# Patient Record
Sex: FEMALE | Race: BLACK OR AFRICAN AMERICAN | Employment: OTHER | URBAN - METROPOLITAN AREA
[De-identification: names, ages, dates, MRNs, and addresses within clinical notes are randomized per-mention and may not be internally consistent; named-entity substitution may affect disease eponyms.]

---

## 2018-07-03 ENCOUNTER — OFFICE VISIT (OUTPATIENT)
Dept: ORTHOPEDIC SURGERY | Age: 58
End: 2018-07-03

## 2018-07-03 VITALS
HEART RATE: 93 BPM | HEIGHT: 64 IN | BODY MASS INDEX: 30.56 KG/M2 | DIASTOLIC BLOOD PRESSURE: 71 MMHG | SYSTOLIC BLOOD PRESSURE: 115 MMHG | WEIGHT: 179 LBS

## 2018-07-03 DIAGNOSIS — M25.562 CHRONIC PAIN OF LEFT KNEE: ICD-10-CM

## 2018-07-03 DIAGNOSIS — G89.29 CHRONIC PAIN OF RIGHT KNEE: Primary | ICD-10-CM

## 2018-07-03 DIAGNOSIS — M25.561 CHRONIC PAIN OF RIGHT KNEE: Primary | ICD-10-CM

## 2018-07-03 DIAGNOSIS — G89.29 CHRONIC PAIN OF LEFT KNEE: ICD-10-CM

## 2018-07-03 PROCEDURE — 20610 DRAIN/INJ JOINT/BURSA W/O US: CPT | Performed by: ORTHOPAEDIC SURGERY

## 2018-07-03 PROCEDURE — 99203 OFFICE O/P NEW LOW 30 MIN: CPT | Performed by: ORTHOPAEDIC SURGERY

## 2018-07-03 NOTE — PROGRESS NOTES
Ramila:  NDC: 8506-2507-39  Lot Number: [id number]  Expiration Date: 8/2019      XYLOCAINE  NDC: 59740-190-77  LOT: [id number]  EXP: 7/2019    BUPIVOCAINE  NDC: 3283-5200-15  LOT: [id number]  EXP: 2/1/2019    Lt.  Knee

## 2018-07-07 NOTE — PROGRESS NOTES
Patient Name: Martha Gonzalez  : 1960  DOS: 2018        Chief Complaint:   Chief Complaint   Patient presents with    Knee Pain      Bilateral knee  RT TKR PLUS REVISIONS       History of Present Illness:  Martha Gonzalez is a 59 y.o. female who presents with a chief complaint of continued complaints of pain in the knee with irritation with walking, stairs and with overuse. She is also having complaints of pain in the left ankle which are exacerbated by the knee problem    Her right total knee arthroplasty has been functioning adequately although she is having occasional give way episodes    Pain in the knee regularly with swelling and discomfort. Increase in pain over the past several months time. Medical History  Current Medications:   Prior to Admission medications    Medication Sig Start Date End Date Taking? Authorizing Provider   gabapentin (NEURONTIN) 300 MG capsule  12  Yes Historical Provider, MD   hydrochlorothiazide (HYDRODIURIL) 25 MG tablet  5/10/12  Yes Historical Provider, MD   hydrocodone-acetaminophen (VICODIN) 5-500 MG per tablet  12   Historical Provider, MD   oxaprozin (DAYPRO) 600 MG tablet Take 1 tablet by mouth daily. 12/13/10   Celeste Rebollar MD   estrogens, conjugated, (PREMARIN) 0.3 MG TABS Take  by mouth daily. Indications: 1/2 in am and 1 tab a night 09   Historical Provider, MD     Medical History:  has a past medical history of Arthritis; DDD (degenerative disc disease), lumbar; DVT (deep venous thrombosis) ([facility] [address] and High blood pressure. Allergies: No Known Allergies    Review of Systems:   Constitutional: Negative for fever and diaphoresis. Respiratory: Negative for shortness of breath. Gastrointestinal: Negative for abdominal pain. Musculoskeletal: Positive for joint pain. Skin: Negative for itching. Neurological: Negative for loss of consciousness.      Physical Exam:  Constitutional: She is oriented to person, place, Details     Verbal consent was obtained for the procedure. The left knee(s) was prepped with iodine and the skin was anesthetized. Using a 22 gauge needle the left knee(s) joint is injected with 2 ml 1% lidocaine and 2 ml of triamcinolone (KENALOG) 40mg/ml under the lateral aspect of the knee. The injection site was cleansed with topical isopropyl alcohol and a dressing was applied. Complications:  None; patient tolerated the procedure well. Diagnostic Test Findings:   Xray   Have reviewed the xrays above from 07/06/18   and my impression is: Right knee 3 view shows evidence of total knee arthroplasty with long stem revision stable although some changes are noted with linear calcifications along the stem  Anterior stem positioning show some abutment against the anterior cortex this may correspond to her occasional pains of anterior shin discomfort on the right side    Left knee shows medial knee osteoarthritis mild on 4 views with some mild patellofemoral pain    Assessment and Plan:   Diagnosis Orders   1. Chronic pain of right knee  XR KNEE RIGHT (3 VIEWS)   2. Chronic pain of left knee  XR KNEE LEFT (MIN 4 VIEWS)    MI ARTHROCENTESIS ASPIR&/INJ MAJOR JT/BURSA W/O US    MI TRIAMCINOLONE ACETONIDE INJ        The orders below, if any, were placed during this visit:   Orders Placed This Encounter   Procedures    XR KNEE LEFT (MIN 4 VIEWS)     Order Specific Question:   Reason for exam:     Answer:   Pain RM 10    XR KNEE RIGHT (3 VIEWS)     Order Specific Question:   Reason for exam:     Answer:   Pain RM 10    MI ARTHROCENTESIS ASPIR&/INJ MAJOR JT/BURSA W/O US    MI TRIAMCINOLONE ACETONIDE INJ        Treatment Plan:   Pes planus on the left side with Achilles tendinitis and posterior tibial tendinitis. She will inserts and modified she will inserts or custom inserts would be recommended. Additional injections would be offered for the left ankle as well as symptoms continue.     We planned an injection for

## 2018-07-25 ENCOUNTER — OFFICE VISIT (OUTPATIENT)
Dept: ORTHOPEDIC SURGERY | Age: 58
End: 2018-07-25

## 2018-07-25 VITALS
HEIGHT: 64 IN | HEART RATE: 103 BPM | SYSTOLIC BLOOD PRESSURE: 131 MMHG | BODY MASS INDEX: 29.53 KG/M2 | DIASTOLIC BLOOD PRESSURE: 88 MMHG | WEIGHT: 173 LBS

## 2018-07-25 DIAGNOSIS — M17.12 PRIMARY OSTEOARTHRITIS OF LEFT KNEE: Primary | ICD-10-CM

## 2018-07-25 PROCEDURE — 99213 OFFICE O/P EST LOW 20 MIN: CPT | Performed by: ORTHOPAEDIC SURGERY

## 2018-07-25 NOTE — PROGRESS NOTES
Patient Name: Ej Sanford  : 1960  DOS: 2018        Chief Complaint:   Chief Complaint   Patient presents with    Knee Pain     Left knee       History of Present Illness:  Ej Sanford is a 59 y.o. female who presents with a chief complaint of continued complaints of pain in the knee with irritation with walking, stairs and with overuse. She is also having complaints of pain in the left ankle which are exacerbated by the knee problem    Her right total knee arthroplasty has been functioning adequately although she is having occasional give way episodes    Pain in the knee regularly with swelling and discomfort. Increase in pain over the past several months time. marked increase in mechanical sympotms of the knee . Still able to get to work with tremendous pain. Medical History  Current Medications:   Prior to Admission medications    Medication Sig Start Date End Date Taking? Authorizing Provider   hydrocodone-acetaminophen (VICODIN) 5-500 MG per tablet  12   Historical Provider, MD   gabapentin (NEURONTIN) 300 MG capsule  12   Historical Provider, MD   hydrochlorothiazide (HYDRODIURIL) 25 MG tablet  5/10/12   Historical Provider, MD   oxaprozin (DAYPRO) 600 MG tablet Take 1 tablet by mouth daily. 12/13/10   Valentin Ware MD   estrogens, conjugated, (PREMARIN) 0.3 MG TABS Take  by mouth daily. Indications: 1/2 in am and 1 tab a night 09   Historical Provider, MD     Medical History:  has a past medical history of Arthritis; DDD (degenerative disc disease), lumbar; DVT (deep venous thrombosis) ([address] and High blood pressure. Allergies: No Known Allergies    Review of Systems:   Constitutional: Negative for fever and diaphoresis. Respiratory: Negative for shortness of breath. Gastrointestinal: Negative for abdominal pain. Musculoskeletal: Positive for joint pain. Skin: Negative for itching. Neurological: Negative for loss of consciousness. Physical Exam:  Constitutional: She is oriented to person, place, and time and well-developed, well-nourished, and in no distress. No distress. HENT:   Head: Normocephalic and atraumatic. Eyes: Conjunctivae are normal.   Cardiovascular: Intact distal pulses. Pulmonary/Chest: Effort normal.   Neurological: She is alert and oriented to person, place, and time. Skin: Skin is dry. She is not diaphoretic. Psychiatric: Mood, affect and judgment normal.     Assessment   Vital Signs:   Vitals:    07/25/18 1534   BP: 131/88   Pulse: 103       leftKnee shows evidence for DJD with  obvious pseudolaxity, pain with weight bearing, antalgic gait and palpable osteophytes. Inspection: Moderate anterior swelling. Swelling is present with a mild effusion. The posterior aspect of the knee appears to be full with tenderness. There is no erythema, rash, or ecchymosis. Range of Motion: 0 to 110    Pain withvarus Left . There is notable deformity varus Left   mild    Strength:  Hamstrings rated: 4/5. Quadriceps rated: 5/5    Palpation: There is moderate tenderness along the Medial joint line. meniscal signs positive. Special Tests: Patellar Compression test is positive. Valgus & Varus test is positive. Skin: There are no rashes, ulcerations or lesions. Gait: Gait pattern is antalgic  Skin shows no rashes/ecchymosis to the affected area, no hyperesthesias, no discoloration, no temperature or color discrepancies. NEUROLOGICALLY: There is no evidence for sensory or motor deficits in the extremity. Coordination appears full with no spacticity or rigidity. Reflexes appear to be symmetric. Distal circulation intact. No signs of RSD. Additional Comments: Right total knee arthroplasty is range of motion from 0° to approximately 100°.   Significant laxity is noted and 30° although she tends tolerated this fairly well  Left ankle shows decreased swelling pes planus bilaterally notable pain along the posterior tibial and the Achilles tendon insertion dimniished    Diagnostic Test Findings:   Xray   Have reviewed the xrays above from 07/25/18   and my impression is: Right knee 3 view shows evidence of total knee arthroplasty with long stem revision stable although some changes are noted with linear calcifications along the stem  Anterior stem positioning show some abutment against the anterior cortex this may correspond to her occasional pains of anterior shin discomfort on the right side    Left knee shows medial knee osteoarthritis mild on 4 views with some mild patellofemoral pain    Assessment and Plan:         Treatment Plan:   Pes planus on the left side with Achilles tendinitis and posterior tibial tendinitis. Improved with use of inserts. Plan for arthroscopy of the left knee and injection subchondral for knee osteoarthritis. Right total knee arthroplasty recommended some anterior shin supports and aggressive range of motion and strengthening of the right leg as tolerated.        Starr Jhaveri MD

## 2018-07-27 ENCOUNTER — TELEPHONE (OUTPATIENT)
Dept: ORTHOPEDIC SURGERY | Age: 58
End: 2018-07-27

## 2018-07-30 NOTE — PROGRESS NOTES
the order may or may not be in effect during this procedure. I give my doctor permission to give me blood or blood products. I understand that there are risks with receiving blood such as hepatitis, AIDS, fever, or allergic reaction. I acknowledge that the risks, benefits, and alternatives of this treatment have been explained to me and that no express or implied warranty has been given by the hospital, any blood bank, or any person or entity as to the blood or blood components transfused. At the discretion of my doctor, I agree to allow observers, equipment/product representatives and allow photographing, and/or televising of the procedure, provided my name or identity is maintained confidentially. I agree the hospital may dispose of or use for scientific or educational purposes any tissue, fluid, or body parts which may be removed.     ________________________________Date________Time______ am/pm  ([facility])  Patient or Signature of Closest Relative or Legal Guardian    ________________________________Date________Time______am/pm      Page 1 of  1  Witness

## 2018-08-02 ENCOUNTER — TELEPHONE (OUTPATIENT)
Dept: ORTHOPEDIC SURGERY | Age: 58
End: 2018-08-02

## 2018-08-09 ENCOUNTER — ANESTHESIA EVENT (OUTPATIENT)
Dept: OPERATING ROOM | Age: 58
End: 2018-08-09
Payer: COMMERCIAL

## 2018-08-09 RX ORDER — OYSTER SHELL CALCIUM WITH VITAMIN D 500; 200 MG/1; [IU]/1
1 TABLET, FILM COATED ORAL DAILY
COMMUNITY

## 2018-08-09 RX ORDER — DULOXETIN HYDROCHLORIDE 60 MG/1
60 CAPSULE, DELAYED RELEASE ORAL DAILY
COMMUNITY
End: 2018-10-19 | Stop reason: CLARIF

## 2018-08-09 RX ORDER — FLUTICASONE PROPIONATE 50 MCG
1 SPRAY, SUSPENSION (ML) NASAL NIGHTLY PRN
COMMUNITY

## 2018-08-09 NOTE — PROGRESS NOTES
[email or web address]                          Date of Procedure 8/9 Time of Procedure 1200    PRIOR TO PROCEDURE DATE:  1. Please follow any guidelines/instructions prior to your procedure as advised by your surgeon. 2. Arrange for someone to drive you home and be with you for the first 24 hours after discharge for your safety after your procedure for which you received sedation. Ensure it is someone we can share information with regarding your discharge. 3. You must contact your surgeon for instructions IF:   You are taking any blood thinners, aspirin, anti-inflammatory or vitamin E.   There is a change in your physical condition such as a cold, fever, rash, cuts, sores or any other infection, especially near your surgical site. 4. Do not drink alcohol the day before or day of your procedure. 5. A Pre-op History and Physical for surgery MUST be completed by your Physician or Urgent Care within 30 days of your procedure date. Please bring a copy with you on the day of your procedure and along with any other testing performed. THE DAY OF YOUR PROCEDURE:  1. Follow instructions for ARRIVAL TIME as DIRECTED BY YOUR SURGEON. If your surgeon does not give you a specific arrival time, please arrive at  [place] [address] . 2. Enter the MAIN entrance from [address] [place] [address] and follow the signs to the free [address] [place] [address] or [facility] parking (offered free of charge 6am-5pm). 3. Enter the Main Entrance of the hospital (do not enter from the lower level of the parking garage). Upon entrance, check in with the  at the main desk on your left. If no one is available at the desk, proceed into the [facility] Waiting Room and go through the door directly into the [facility]. There is a Check-in desk ACROSS from Room 5 (marked with a sign hanging from the ceiling). The phone number for the surgery center is [phone].     4. Please call [phone] option #2 option #2 if you have not been preregistered yet. On the day of your procedure bring your insurance card and photo ID. You will be registered at your bedside once brought back to your room. 5. DO NOT EAT OR DRINK ANYTHING AFTER MIDNIGHT. 6. MEDICATIONS    Take the following medications with a SMALL sip of water:  MAY TAKE GABAPENTIN, CYMBALTA, AND VICODIN    Use your usual dose of inhalers the morning of surgery. BRING your rescue inhaler with you to hospital.    Anesthesia does NOT want you to take insulin the morning of surgery. They will control your blood sugar while you are at the hospital. Please contact your ordering physician for instructions regarding your insulin the night before your procedure. If you have an insulin pump, please keep it set on basal rate. 7. Do not swallow water when brushing teeth. No gum, candy, mints or ice chips. Refrain from smoking or at least decrease the amount. 8. Dress in loose, comfortable clothing appropriate for redressing after your procedure. Do not wear jewelry (including body piercings), make-up (especially NO eye make-up), fingernail polish (NO toenail polish if foot/leg surgery), lotion, powders or metal hairclips. 9. Dentures, glasses, or contacts will need to be removed before your procedure. Bring cases for your glasses, contacts, dentures, or hearing aids to protect them while you are in surgery. 10. If you use a CPAP, please bring it with you on the day of your procedure. 11. We recommend that valuable personal  belongings, such as credit cards, cash, cell phones, e-tablets or jewelry, be left at home during your stay. The hospital will not be responsible for valuables that are not secured in the hospital safe. However, if your insurance requires a co-pay, you may want to bring a method of payment, i.e. Check or credit card, if you wish to pay your co-pay the day of surgery.       12. If you are to stay overnight, you may bring a bag patterns. Nausea, headache, muscle aches, or sore throat may also occur after anesthesia. Your nurse will help you manage these potential side effects. 2. For comfort and safety, arrange to have someone at home with you for the first 24 hours after discharge. 3. You and your family will be given written instructions about your diet, activity, dressing care, medications, and return visits. 4. Once at home, should issues with nausea, pain, or bleeding occur, or should you notice any signs of infection, you should call your surgeon. 5. Always clean your hands before and after caring for your wound. Do not let your family touch your surgery site without cleaning their hands. 6. Narcotic pain medications can cause significant constipation. You may want to add a stool softener to your postoperative medication schedule or speak to your surgeon on how best to manage this SIDE EFFECT. SPECIAL INSTRUCTIONS     Thank you for allowing us to care for you. We strive to exceed your expectations in the delivery of care and service provided to you and your family. If you need to contact us for any reason, please call us at [phone]    Instructions reviewed with patient during preadmission testing phone interview. Lisa Gasca. 8/9/2018 .1:12 PM      ADDITIONAL EDUCATIONAL INFORMATION REVIEWED PER PHONE WITH YOU AND/OR YOUR FAMILY:  No Bring a urine sample on day of surgery  Yes Pain Goal-Taking Control of Your Pain  Yes FAQs about Surgical Site Infections  No Hibiclens® Bathing Instructions   Yes Antibacterial Soap - LANCE R/C'D PER SURGEON   Yes Lance® Wipes Bathing Instructions (Obtained from: [email or web address] pdf )

## 2018-08-10 ENCOUNTER — ANESTHESIA (OUTPATIENT)
Dept: OPERATING ROOM | Age: 58
End: 2018-08-10
Payer: COMMERCIAL

## 2018-08-10 ENCOUNTER — APPOINTMENT (OUTPATIENT)
Dept: GENERAL RADIOLOGY | Age: 58
End: 2018-08-10
Attending: ORTHOPAEDIC SURGERY
Payer: COMMERCIAL

## 2018-08-10 ENCOUNTER — HOSPITAL ENCOUNTER (OUTPATIENT)
Age: 58
Setting detail: OUTPATIENT SURGERY
Discharge: HOME | End: 2018-08-10
Attending: ORTHOPAEDIC SURGERY | Admitting: ORTHOPAEDIC SURGERY
Payer: COMMERCIAL

## 2018-08-10 VITALS — DIASTOLIC BLOOD PRESSURE: 90 MMHG | OXYGEN SATURATION: 100 % | SYSTOLIC BLOOD PRESSURE: 175 MMHG

## 2018-08-10 VITALS
DIASTOLIC BLOOD PRESSURE: 85 MMHG | BODY MASS INDEX: 29.53 KG/M2 | OXYGEN SATURATION: 92 % | SYSTOLIC BLOOD PRESSURE: 122 MMHG | TEMPERATURE: 97.9 F | WEIGHT: 173 LBS | HEIGHT: 64 IN | RESPIRATION RATE: 16 BRPM | HEART RATE: 70 BPM

## 2018-08-10 DIAGNOSIS — M25.562 LEFT KNEE PAIN, UNSPECIFIED CHRONICITY: ICD-10-CM

## 2018-08-10 PROCEDURE — 6370000000 HC RX 637 (ALT 250 FOR IP): Performed by: ORTHOPAEDIC SURGERY

## 2018-08-10 PROCEDURE — 3600000004 HC SURGERY LEVEL 4 BASE: Performed by: ORTHOPAEDIC SURGERY

## 2018-08-10 PROCEDURE — 3209999900 FLUORO FOR SURGICAL PROCEDURES

## 2018-08-10 PROCEDURE — S0028 INJECTION, FAMOTIDINE, 20 MG: HCPCS | Performed by: ANESTHESIOLOGY

## 2018-08-10 PROCEDURE — 3700000001 HC ADD 15 MINUTES (ANESTHESIA): Performed by: ORTHOPAEDIC SURGERY

## 2018-08-10 PROCEDURE — 29881 ARTHRS KNE SRG MNISECTMY M/L: CPT | Performed by: ORTHOPAEDIC SURGERY

## 2018-08-10 PROCEDURE — 2500000003 HC RX 250 WO HCPCS: Performed by: ANESTHESIOLOGY

## 2018-08-10 PROCEDURE — 7100000000 HC PACU RECOVERY - FIRST 15 MIN: Performed by: ORTHOPAEDIC SURGERY

## 2018-08-10 PROCEDURE — 3700000000 HC ANESTHESIA ATTENDED CARE: Performed by: ORTHOPAEDIC SURGERY

## 2018-08-10 PROCEDURE — 6360000002 HC RX W HCPCS

## 2018-08-10 PROCEDURE — L1830 KO IMMOB CANVAS LONG PRE OTS: HCPCS | Performed by: ORTHOPAEDIC SURGERY

## 2018-08-10 PROCEDURE — 3600000014 HC SURGERY LEVEL 4 ADDTL 15MIN: Performed by: ORTHOPAEDIC SURGERY

## 2018-08-10 PROCEDURE — 2709999900 HC NON-CHARGEABLE SUPPLY: Performed by: ORTHOPAEDIC SURGERY

## 2018-08-10 PROCEDURE — 6360000002 HC RX W HCPCS: Performed by: ORTHOPAEDIC SURGERY

## 2018-08-10 PROCEDURE — 7100000010 HC PHASE II RECOVERY - FIRST 15 MIN: Performed by: ORTHOPAEDIC SURGERY

## 2018-08-10 PROCEDURE — 2580000003 HC RX 258: Performed by: NURSE ANESTHETIST, CERTIFIED REGISTERED

## 2018-08-10 PROCEDURE — 6360000002 HC RX W HCPCS: Performed by: NURSE ANESTHETIST, CERTIFIED REGISTERED

## 2018-08-10 PROCEDURE — 7100000001 HC PACU RECOVERY - ADDTL 15 MIN: Performed by: ORTHOPAEDIC SURGERY

## 2018-08-10 PROCEDURE — 2580000003 HC RX 258: Performed by: ORTHOPAEDIC SURGERY

## 2018-08-10 PROCEDURE — 6370000000 HC RX 637 (ALT 250 FOR IP): Performed by: ANESTHESIOLOGY

## 2018-08-10 PROCEDURE — 6360000002 HC RX W HCPCS: Performed by: PHYSICIAN ASSISTANT

## 2018-08-10 PROCEDURE — 38232 BONE MARROW HARVEST AUTOLOG: CPT | Performed by: ORTHOPAEDIC SURGERY

## 2018-08-10 PROCEDURE — 2720000010 HC SURG SUPPLY STERILE: Performed by: ORTHOPAEDIC SURGERY

## 2018-08-10 PROCEDURE — 2580000003 HC RX 258: Performed by: ANESTHESIOLOGY

## 2018-08-10 PROCEDURE — 27599 UNLISTED PX FEMUR/KNEE: CPT | Performed by: ORTHOPAEDIC SURGERY

## 2018-08-10 PROCEDURE — 73560 X-RAY EXAM OF KNEE 1 OR 2: CPT

## 2018-08-10 PROCEDURE — 2500000003 HC RX 250 WO HCPCS: Performed by: NURSE ANESTHETIST, CERTIFIED REGISTERED

## 2018-08-10 PROCEDURE — 7100000011 HC PHASE II RECOVERY - ADDTL 15 MIN: Performed by: ORTHOPAEDIC SURGERY

## 2018-08-10 PROCEDURE — 6360000002 HC RX W HCPCS: Performed by: ANESTHESIOLOGY

## 2018-08-10 RX ORDER — HYDRALAZINE HYDROCHLORIDE 20 MG/ML
5 INJECTION INTRAMUSCULAR; INTRAVENOUS EVERY 10 MIN PRN
Status: DISCONTINUED | OUTPATIENT
Start: 2018-08-10 | End: 2018-08-10 | Stop reason: HOSPADM

## 2018-08-10 RX ORDER — ACETAMINOPHEN 500 MG
1000 TABLET ORAL EVERY 6 HOURS PRN
Status: DISCONTINUED | OUTPATIENT
Start: 2018-08-10 | End: 2018-08-10 | Stop reason: HOSPADM

## 2018-08-10 RX ORDER — MIDAZOLAM HYDROCHLORIDE 1 MG/ML
INJECTION INTRAMUSCULAR; INTRAVENOUS PRN
Status: DISCONTINUED | OUTPATIENT
Start: 2018-08-10 | End: 2018-08-10 | Stop reason: SDUPTHER

## 2018-08-10 RX ORDER — SODIUM CHLORIDE, SODIUM LACTATE, POTASSIUM CHLORIDE, CALCIUM CHLORIDE 600; 310; 30; 20 MG/100ML; MG/100ML; MG/100ML; MG/100ML
INJECTION, SOLUTION INTRAVENOUS CONTINUOUS PRN
Status: DISCONTINUED | OUTPATIENT
Start: 2018-08-10 | End: 2018-08-10 | Stop reason: SDUPTHER

## 2018-08-10 RX ORDER — ONDANSETRON 2 MG/ML
4 INJECTION INTRAMUSCULAR; INTRAVENOUS
Status: DISCONTINUED | OUTPATIENT
Start: 2018-08-10 | End: 2018-08-10 | Stop reason: HOSPADM

## 2018-08-10 RX ORDER — OXYCODONE HYDROCHLORIDE AND ACETAMINOPHEN 5; 325 MG/1; MG/1
1 TABLET ORAL ONCE
Status: COMPLETED | OUTPATIENT
Start: 2018-08-10 | End: 2018-08-10

## 2018-08-10 RX ORDER — LABETALOL HYDROCHLORIDE 5 MG/ML
5 INJECTION, SOLUTION INTRAVENOUS EVERY 10 MIN PRN
Status: DISCONTINUED | OUTPATIENT
Start: 2018-08-10 | End: 2018-08-10 | Stop reason: HOSPADM

## 2018-08-10 RX ORDER — SODIUM CHLORIDE, SODIUM LACTATE, POTASSIUM CHLORIDE, CALCIUM CHLORIDE 600; 310; 30; 20 MG/100ML; MG/100ML; MG/100ML; MG/100ML
INJECTION, SOLUTION INTRAVENOUS CONTINUOUS
Status: DISCONTINUED | OUTPATIENT
Start: 2018-08-10 | End: 2018-08-10 | Stop reason: HOSPADM

## 2018-08-10 RX ORDER — GLYCOPYRROLATE 0.2 MG/ML
0.1 INJECTION INTRAMUSCULAR; INTRAVENOUS ONCE
Status: COMPLETED | OUTPATIENT
Start: 2018-08-10 | End: 2018-08-10

## 2018-08-10 RX ORDER — FENTANYL CITRATE 50 UG/ML
INJECTION, SOLUTION INTRAMUSCULAR; INTRAVENOUS PRN
Status: DISCONTINUED | OUTPATIENT
Start: 2018-08-10 | End: 2018-08-10 | Stop reason: SDUPTHER

## 2018-08-10 RX ORDER — PROMETHAZINE HYDROCHLORIDE 25 MG/ML
6.25 INJECTION, SOLUTION INTRAMUSCULAR; INTRAVENOUS
Status: DISCONTINUED | OUTPATIENT
Start: 2018-08-10 | End: 2018-08-10 | Stop reason: HOSPADM

## 2018-08-10 RX ORDER — DIPHENHYDRAMINE HYDROCHLORIDE 50 MG/ML
12.5 INJECTION INTRAMUSCULAR; INTRAVENOUS
Status: DISCONTINUED | OUTPATIENT
Start: 2018-08-10 | End: 2018-08-10 | Stop reason: HOSPADM

## 2018-08-10 RX ORDER — LIDOCAINE HYDROCHLORIDE 20 MG/ML
INJECTION, SOLUTION INFILTRATION; PERINEURAL PRN
Status: DISCONTINUED | OUTPATIENT
Start: 2018-08-10 | End: 2018-08-10 | Stop reason: SDUPTHER

## 2018-08-10 RX ORDER — PROPOFOL 10 MG/ML
INJECTION, EMULSION INTRAVENOUS PRN
Status: DISCONTINUED | OUTPATIENT
Start: 2018-08-10 | End: 2018-08-10 | Stop reason: SDUPTHER

## 2018-08-10 RX ORDER — OXYCODONE HYDROCHLORIDE AND ACETAMINOPHEN 5; 325 MG/1; MG/1
2 TABLET ORAL EVERY 4 HOURS PRN
Status: DISCONTINUED | OUTPATIENT
Start: 2018-08-10 | End: 2018-08-10 | Stop reason: HOSPADM

## 2018-08-10 RX ORDER — LIDOCAINE HYDROCHLORIDE 10 MG/ML
1 INJECTION, SOLUTION EPIDURAL; INFILTRATION; INTRACAUDAL; PERINEURAL
Status: DISCONTINUED | OUTPATIENT
Start: 2018-08-10 | End: 2018-08-10 | Stop reason: HOSPADM

## 2018-08-10 RX ORDER — SODIUM CHLORIDE 0.9 % (FLUSH) 0.9 %
10 SYRINGE (ML) INJECTION PRN
Status: DISCONTINUED | OUTPATIENT
Start: 2018-08-10 | End: 2018-08-10 | Stop reason: HOSPADM

## 2018-08-10 RX ORDER — OXYCODONE HYDROCHLORIDE AND ACETAMINOPHEN 5; 325 MG/1; MG/1
1 TABLET ORAL EVERY 6 HOURS PRN
Qty: 28 TABLET | Refills: 0 | Status: SHIPPED | OUTPATIENT
Start: 2018-08-10 | End: 2018-08-17

## 2018-08-10 RX ORDER — ONDANSETRON 2 MG/ML
INJECTION INTRAMUSCULAR; INTRAVENOUS PRN
Status: DISCONTINUED | OUTPATIENT
Start: 2018-08-10 | End: 2018-08-10 | Stop reason: SDUPTHER

## 2018-08-10 RX ORDER — CELECOXIB 100 MG/1
200 CAPSULE ORAL ONCE
Status: COMPLETED | OUTPATIENT
Start: 2018-08-10 | End: 2018-08-10

## 2018-08-10 RX ORDER — OXYCODONE HYDROCHLORIDE AND ACETAMINOPHEN 5; 325 MG/1; MG/1
1 TABLET ORAL EVERY 4 HOURS PRN
Status: DISCONTINUED | OUTPATIENT
Start: 2018-08-10 | End: 2018-08-10 | Stop reason: HOSPADM

## 2018-08-10 RX ORDER — FENTANYL CITRATE 50 UG/ML
25 INJECTION, SOLUTION INTRAMUSCULAR; INTRAVENOUS EVERY 5 MIN PRN
Status: DISCONTINUED | OUTPATIENT
Start: 2018-08-10 | End: 2018-08-10 | Stop reason: HOSPADM

## 2018-08-10 RX ORDER — SODIUM CHLORIDE 0.9 % (FLUSH) 0.9 %
10 SYRINGE (ML) INJECTION EVERY 12 HOURS SCHEDULED
Status: DISCONTINUED | OUTPATIENT
Start: 2018-08-10 | End: 2018-08-10 | Stop reason: HOSPADM

## 2018-08-10 RX ORDER — MEPERIDINE HYDROCHLORIDE 25 MG/ML
12.5 INJECTION INTRAMUSCULAR; INTRAVENOUS; SUBCUTANEOUS EVERY 5 MIN PRN
Status: DISCONTINUED | OUTPATIENT
Start: 2018-08-10 | End: 2018-08-10 | Stop reason: HOSPADM

## 2018-08-10 RX ADMIN — Medication 2 G: at 12:15

## 2018-08-10 RX ADMIN — FENTANYL CITRATE 100 MCG: 50 INJECTION INTRAMUSCULAR; INTRAVENOUS at 12:50

## 2018-08-10 RX ADMIN — HYDROMORPHONE HYDROCHLORIDE 0.5 MG: 1 INJECTION, SOLUTION INTRAMUSCULAR; INTRAVENOUS; SUBCUTANEOUS at 14:03

## 2018-08-10 RX ADMIN — ONDANSETRON 4 MG: 2 INJECTION INTRAMUSCULAR; INTRAVENOUS at 13:34

## 2018-08-10 RX ADMIN — OXYCODONE HYDROCHLORIDE AND ACETAMINOPHEN 1 TABLET: 5; 325 TABLET ORAL at 15:36

## 2018-08-10 RX ADMIN — GLYCOPYRROLATE 0.1 MG: 0.2 INJECTION, SOLUTION INTRAMUSCULAR; INTRAVENOUS at 10:58

## 2018-08-10 RX ADMIN — FENTANYL CITRATE 50 MCG: 50 INJECTION INTRAMUSCULAR; INTRAVENOUS at 12:12

## 2018-08-10 RX ADMIN — FENTANYL CITRATE 50 MCG: 50 INJECTION INTRAMUSCULAR; INTRAVENOUS at 12:13

## 2018-08-10 RX ADMIN — SODIUM CHLORIDE, SODIUM LACTATE, POTASSIUM CHLORIDE, AND CALCIUM CHLORIDE: 600; 310; 30; 20 INJECTION, SOLUTION INTRAVENOUS at 12:09

## 2018-08-10 RX ADMIN — FENTANYL CITRATE 25 MCG: 50 INJECTION INTRAMUSCULAR; INTRAVENOUS at 14:38

## 2018-08-10 RX ADMIN — SODIUM CHLORIDE, POTASSIUM CHLORIDE, SODIUM LACTATE AND CALCIUM CHLORIDE: 600; 310; 30; 20 INJECTION, SOLUTION INTRAVENOUS at 10:57

## 2018-08-10 RX ADMIN — MIDAZOLAM HYDROCHLORIDE 2 MG: 1 INJECTION INTRAMUSCULAR; INTRAVENOUS at 12:05

## 2018-08-10 RX ADMIN — FAMOTIDINE 20 MG: 10 INJECTION, SOLUTION INTRAVENOUS at 10:58

## 2018-08-10 RX ADMIN — FENTANYL CITRATE 25 MCG: 50 INJECTION INTRAMUSCULAR; INTRAVENOUS at 14:47

## 2018-08-10 RX ADMIN — CELECOXIB 200 MG: 100 CAPSULE ORAL at 10:58

## 2018-08-10 RX ADMIN — HYDROMORPHONE HYDROCHLORIDE 0.5 MG: 1 INJECTION, SOLUTION INTRAMUSCULAR; INTRAVENOUS; SUBCUTANEOUS at 14:20

## 2018-08-10 RX ADMIN — OXYCODONE HYDROCHLORIDE AND ACETAMINOPHEN 1 TABLET: 5; 325 TABLET ORAL at 10:58

## 2018-08-10 RX ADMIN — PROPOFOL 200 MG: 10 INJECTION, EMULSION INTRAVENOUS at 12:12

## 2018-08-10 RX ADMIN — LIDOCAINE HYDROCHLORIDE 50 MG: 20 INJECTION, SOLUTION INFILTRATION; PERINEURAL at 12:12

## 2018-08-10 ASSESSMENT — PULMONARY FUNCTION TESTS
PIF_VALUE: 21
PIF_VALUE: 21
PIF_VALUE: 20
PIF_VALUE: 22
PIF_VALUE: 21
PIF_VALUE: 16
PIF_VALUE: 8
PIF_VALUE: 17
PIF_VALUE: 19
PIF_VALUE: 18
PIF_VALUE: 22
PIF_VALUE: 22
PIF_VALUE: 21
PIF_VALUE: 19
PIF_VALUE: 22
PIF_VALUE: 22
PIF_VALUE: 20
PIF_VALUE: 1
PIF_VALUE: 22
PIF_VALUE: 4
PIF_VALUE: 21
PIF_VALUE: 16
PIF_VALUE: 20
PIF_VALUE: 21
PIF_VALUE: 18
PIF_VALUE: 19
PIF_VALUE: 0
PIF_VALUE: 20
PIF_VALUE: 21
PIF_VALUE: 21
PIF_VALUE: 22
PIF_VALUE: 19
PIF_VALUE: 22
PIF_VALUE: 20
PIF_VALUE: 22
PIF_VALUE: 0
PIF_VALUE: 20
PIF_VALUE: 21
PIF_VALUE: 22
PIF_VALUE: 21
PIF_VALUE: 0
PIF_VALUE: 16
PIF_VALUE: 2
PIF_VALUE: 20
PIF_VALUE: 21
PIF_VALUE: 22
PIF_VALUE: 21
PIF_VALUE: 21
PIF_VALUE: 20
PIF_VALUE: 21
PIF_VALUE: 21
PIF_VALUE: 20
PIF_VALUE: 21
PIF_VALUE: 1
PIF_VALUE: 22
PIF_VALUE: 16
PIF_VALUE: 20
PIF_VALUE: 2
PIF_VALUE: 16
PIF_VALUE: 21
PIF_VALUE: 21
PIF_VALUE: 22
PIF_VALUE: 20
PIF_VALUE: 22
PIF_VALUE: 17
PIF_VALUE: 21
PIF_VALUE: 21
PIF_VALUE: 22
PIF_VALUE: 20
PIF_VALUE: 16
PIF_VALUE: 22
PIF_VALUE: 2
PIF_VALUE: 16
PIF_VALUE: 22
PIF_VALUE: 21
PIF_VALUE: 22
PIF_VALUE: 20
PIF_VALUE: 16
PIF_VALUE: 22
PIF_VALUE: 21
PIF_VALUE: 22
PIF_VALUE: 15

## 2018-08-10 ASSESSMENT — LIFESTYLE VARIABLES: SMOKING_STATUS: 1

## 2018-08-10 ASSESSMENT — PAIN SCALES - GENERAL
PAINLEVEL_OUTOF10: 5
PAINLEVEL_OUTOF10: 9
PAINLEVEL_OUTOF10: 9
PAINLEVEL_OUTOF10: 7
PAINLEVEL_OUTOF10: 10
PAINLEVEL_OUTOF10: 6

## 2018-08-10 ASSESSMENT — PAIN DESCRIPTION - LOCATION: LOCATION: KNEE;ANKLE;HIP

## 2018-08-10 ASSESSMENT — PAIN DESCRIPTION - ORIENTATION: ORIENTATION: LEFT

## 2018-08-10 NOTE — ANESTHESIA PRE PROCEDURE
Known Allergies    Problem List:    Patient Active Problem List   Diagnosis Code    Bursitis, hip M70.70       Past Medical History:        Diagnosis Date    Arthritis     DDD (degenerative disc disease), lumbar     DVT (deep venous thrombosis) ([facility] [address] 2004    after right knee arthroscopy    High blood pressure        Past Surgical History:        Procedure Laterality Date    EYE SURGERY      TEAR DUCTS RECONSTRUCTED    FOOT SURGERY  2000    hammer toe    HYSTERECTOMY  2004    KNEE ARTHROPLASTY  2008    right    KNEE SURGERY  8/2008    RT TKA    REVISION TOTAL KNEE ARTHROPLASTY  2011  (X3 TOTAL)    right       Social History:    Social History   Substance Use Topics    Smoking status: Current Every Day Smoker     Packs/day: 0.50     Years: 20.00     Types: Cigarettes    Smokeless tobacco: Never Used    Alcohol use Yes      Comment: RARELY                                Ready to quit: Not Answered  Counseling given: Not Answered      Vital Signs (Current):   Vitals:    08/09/18 1309 08/10/18 1017   BP:  122/82   Pulse:  88   Resp:  16   Temp:  98 °F (36.7 °C)   TempSrc:  Oral   SpO2:  96%   Weight: 173 lb (78.5 kg) 173 lb (78.5 kg)   Height: 5' 4\" (1.626 m) 5' 4\" (1.626 m)                                              BP Readings from Last 3 Encounters:   08/10/18 122/82   07/25/18 131/88   07/03/18 115/71       NPO Status: Time of last liquid consumption: 2300                        Time of last solid consumption: 2300                        Date of last liquid consumption: 08/09/18                        Date of last solid food consumption: 08/09/18    BMI:   Wt Readings from Last 3 Encounters:   08/10/18 173 lb (78.5 kg)   07/25/18 173 lb (78.5 kg)   07/03/18 179 lb (81.2 kg)     Body mass index is 29.7 kg/m².     CBC:   Lab Results   Component Value Date    WBC 7.9 05/30/2012    RBC 4.36 05/30/2012    HGB 13.7 05/30/2012    HCT 40.3 05/30/2012    MCV 92.6 05/30/2012    RDW 13.3 05/30/2012     05/30/2012       CMP:   Lab Results   Component Value Date     04/11/2011    K 4.1 04/11/2011     04/11/2011    CO2 32 04/11/2011    BUN 13 04/11/2011    CREATININE 0.6 04/20/2011    GFRAA >60 04/20/2011    GLUCOSE 81 04/11/2011    CALCIUM 9.8 04/11/2011       POC Tests: No results for input(s): POCGLU, POCNA, POCK, POCCL, POCBUN, POCHEMO, POCHCT in the last 72 hours. Coags:   Lab Results   Component Value Date    PROTIME 10.9 04/11/2011    INR 1.00 04/11/2011    APTT 28.3 04/11/2011       HCG (If Applicable): No results found for: PREGTESTUR, PREGSERUM, HCG, HCGQUANT     ABGs: No results found for: PHART, PO2ART, JRS6RBL, BKP3AZI, BEART, A8XGWQLM     Type & Screen (If Applicable):  Lab Results   Component Value Date    LABABO B 04/11/2011    [address] Positive 04/11/2011       Anesthesia Evaluation  Patient summary reviewed no history of anesthetic complications:   Airway: Mallampati: II  TM distance: >3 FB   Neck ROM: full  Mouth opening: > = 3 FB Dental:      Comment: Missing some teeth      Pulmonary:normal exam    (+) current smoker          Patient smoked on day of surgery. Cardiovascular:    (+) hypertension:,                   Neuro/Psych:               GI/Hepatic/Renal:             Endo/Other:    (+) : arthritis: OA., .                 Abdominal:           Vascular:   + DVT (post surgery), . Anesthesia Plan      general     ASA 2       Induction: intravenous. MIPS: Postoperative opioids intended and Prophylactic antiemetics administered. Plan discussed with CRNA.     Attending anesthesiologist reviewed and agrees with Rosemary Cooper MD   8/10/2018

## 2018-08-10 NOTE — PROGRESS NOTES
Spoke to Dr. Natalya Prieto per tele, received d/c instructions and Dr. Twila Gallardo with pt family member

## 2018-08-13 DIAGNOSIS — Z98.890 S/P LEFT KNEE ARTHROSCOPY: ICD-10-CM

## 2018-08-22 ENCOUNTER — OFFICE VISIT (OUTPATIENT)
Dept: ORTHOPEDIC SURGERY | Age: 58
End: 2018-08-22

## 2018-08-22 VITALS
HEIGHT: 64 IN | BODY MASS INDEX: 29.53 KG/M2 | SYSTOLIC BLOOD PRESSURE: 114 MMHG | DIASTOLIC BLOOD PRESSURE: 78 MMHG | WEIGHT: 173 LBS | HEART RATE: 95 BPM

## 2018-08-22 DIAGNOSIS — Z98.890 S/P ARTHROSCOPY OF LEFT KNEE: Primary | ICD-10-CM

## 2018-08-22 PROCEDURE — 99024 POSTOP FOLLOW-UP VISIT: CPT | Performed by: ORTHOPAEDIC SURGERY

## 2018-08-22 RX ORDER — OXYCODONE HYDROCHLORIDE AND ACETAMINOPHEN 5; 325 MG/1; MG/1
1 TABLET ORAL EVERY 6 HOURS PRN
Qty: 28 TABLET | Refills: 0 | Status: SHIPPED | OUTPATIENT
Start: 2018-08-22 | End: 2018-09-07 | Stop reason: SDUPTHER

## 2018-08-22 NOTE — PROGRESS NOTES
Department of Orthopedic Surgery  Physician Assistant   Progress Note    Subjective:     Post-Operative Day: 12 Status Post left Knee arthroscopy with stem cell subchondroplasty and PRP injection of the left ankle. Systemic or Specific Complaints: she states that her pain is improved now down to a 6 out of 10 and the burning pain on the medial side of the knee is resolved. Prior to the surgery she was a 9 out of 10. Objective:     @IPVITALS(24)@    General: alert, appears stated age, cooperative and no distress   Wound: Wound clean and dry no evidence of infection. , No Erythema, Wound Intact and serous fluid drainage noted on the medial puncture site. Motion: Painless Range of Motion   DVT Exam: No evidence of DVT seen on physical exam.       Knee is mildly swollen but thigh soft to palpation. Moving foot and ankle. Good distal pulses. Full ROM and 5/5 strength to the knee, ankle and foot. Data Review      Assessment:     Status Post left knee arthroscopy with stem cell subchondroplasty and left ankle PRP injection. Doing well postoperatively. No evidence of infection. Plan:      1: Continues current post-op course :  2:  maintain off work status until tuesday 8/28/18  Where she can return to work full duty. 3:  Remain off NSAIDs for another 4 weeks to ensure no decay in the stem cell efficacy. 4:  Continue Pain Control refilled prescription of oxycodone for one more week. 5: advised to wrap the knee to help keep the swelling out of the knee epically when returning to work. Patient being given increased dosage/quantity of opioid pain medication in excess of OSM[employer] guidelines which noted a 30 MED of opioids due to the fact that she has undergone major orthopaedic surgery as outlined in rule 4731-11-13. Dosages and further duration of the pain medication will be re-evaluated at her post op visit.   Patient was educated on dosing expectations and limits of prescribing as a result of the new

## 2018-09-04 ENCOUNTER — TELEPHONE (OUTPATIENT)
Dept: ORTHOPEDIC SURGERY | Age: 58
End: 2018-09-04

## 2018-09-07 ENCOUNTER — TELEPHONE (OUTPATIENT)
Dept: ORTHOPEDIC SURGERY | Age: 58
End: 2018-09-07

## 2018-09-07 DIAGNOSIS — Z98.890 S/P ARTHROSCOPY OF LEFT KNEE: ICD-10-CM

## 2018-09-07 RX ORDER — OXYCODONE HYDROCHLORIDE AND ACETAMINOPHEN 5; 325 MG/1; MG/1
1 TABLET ORAL EVERY 6 HOURS PRN
Qty: 28 TABLET | Refills: 0 | Status: SHIPPED | OUTPATIENT
Start: 2018-09-07 | End: 2018-09-14

## 2018-09-19 ENCOUNTER — TELEPHONE (OUTPATIENT)
Dept: ORTHOPEDIC SURGERY | Age: 58
End: 2018-09-19

## 2018-09-19 DIAGNOSIS — Z96.652 HISTORY OF TOTAL LEFT KNEE REPLACEMENT: Primary | ICD-10-CM

## 2018-09-20 RX ORDER — HYDROCODONE BITARTRATE AND ACETAMINOPHEN 5; 325 MG/1; MG/1
1 TABLET ORAL EVERY 4 HOURS PRN
Qty: 42 TABLET | Refills: 0 | Status: SHIPPED | OUTPATIENT
Start: 2018-09-20 | End: 2018-09-27

## 2018-09-26 ENCOUNTER — OFFICE VISIT (OUTPATIENT)
Dept: ORTHOPEDIC SURGERY | Age: 58
End: 2018-09-26
Payer: COMMERCIAL

## 2018-09-26 VITALS
BODY MASS INDEX: 29.53 KG/M2 | WEIGHT: 173 LBS | HEART RATE: 99 BPM | HEIGHT: 64 IN | DIASTOLIC BLOOD PRESSURE: 79 MMHG | SYSTOLIC BLOOD PRESSURE: 121 MMHG

## 2018-09-26 DIAGNOSIS — M76.62 ACHILLES TENDINITIS OF LEFT LOWER EXTREMITY: ICD-10-CM

## 2018-09-26 DIAGNOSIS — M25.562 CHRONIC PAIN OF LEFT KNEE: ICD-10-CM

## 2018-09-26 DIAGNOSIS — Z98.890 S/P LEFT KNEE ARTHROSCOPY: Primary | ICD-10-CM

## 2018-09-26 DIAGNOSIS — G89.29 CHRONIC PAIN OF LEFT KNEE: ICD-10-CM

## 2018-09-26 PROCEDURE — 20610 DRAIN/INJ JOINT/BURSA W/O US: CPT | Performed by: ORTHOPAEDIC SURGERY

## 2018-09-26 PROCEDURE — 99024 POSTOP FOLLOW-UP VISIT: CPT | Performed by: ORTHOPAEDIC SURGERY

## 2018-09-26 ASSESSMENT — ENCOUNTER SYMPTOMS
WHEEZING: 0
CONSTIPATION: 0
VOMITING: 0
BLURRED VISION: 0
DOUBLE VISION: 0
HEARTBURN: 0
ABDOMINAL PAIN: 0
SORE THROAT: 0
DIARRHEA: 0
NAUSEA: 0
SHORTNESS OF BREATH: 0
COUGH: 0

## 2018-09-26 NOTE — LETTER
[address]  [address] [employer] [address]  Phone: [phone]  Fax: [phone]    Blanka Shah MD        September 26, 2018      Please be advised that patient Regi Krishnamurthy, date of birth [birth year], was seen in my office today. She may return to work on 9/26/18.       Sincerely,        Blanka Shah MD

## 2018-09-26 NOTE — PROGRESS NOTES
Ramila:  NDC: 2464-4990-76  Lot Number: [id number] Date: 8/2019    BUPIVOCAINE  NDC: 4721-4998-17  LOT: [id number]  EXP: 6/20    XYLOCAINE  NDC: 77649-656-55  LOT: [id number]  EXP: 2/21    Lt.  Knee

## 2018-10-03 ENCOUNTER — TELEPHONE (OUTPATIENT)
Dept: ORTHOPEDIC SURGERY | Age: 58
End: 2018-10-03

## 2018-10-03 DIAGNOSIS — Z98.890 S/P ARTHROSCOPY OF KNEE: Primary | ICD-10-CM

## 2018-10-04 RX ORDER — HYDROCODONE BITARTRATE AND ACETAMINOPHEN 5; 325 MG/1; MG/1
1 TABLET ORAL EVERY 6 HOURS PRN
Qty: 28 TABLET | Refills: 0 | Status: SHIPPED | OUTPATIENT
Start: 2018-10-04 | End: 2018-10-15 | Stop reason: SDUPTHER

## 2018-10-04 RX ORDER — DULOXETIN HYDROCHLORIDE 60 MG/1
60 CAPSULE, DELAYED RELEASE ORAL DAILY
Qty: 30 CAPSULE | Refills: 1 | Status: SHIPPED | OUTPATIENT
Start: 2018-10-04 | End: 2018-12-03 | Stop reason: SDUPTHER

## 2018-10-08 ENCOUNTER — TELEPHONE (OUTPATIENT)
Dept: ORTHOPEDIC SURGERY | Age: 58
End: 2018-10-08

## 2018-10-15 ENCOUNTER — TELEPHONE (OUTPATIENT)
Dept: ORTHOPEDIC SURGERY | Age: 58
End: 2018-10-15

## 2018-10-15 ENCOUNTER — TELEPHONE (OUTPATIENT)
Dept: PAIN MANAGEMENT | Age: 58
End: 2018-10-15

## 2018-10-15 DIAGNOSIS — Z98.890 S/P ARTHROSCOPY OF KNEE: ICD-10-CM

## 2018-10-15 RX ORDER — HYDROCODONE BITARTRATE AND ACETAMINOPHEN 5; 325 MG/1; MG/1
1 TABLET ORAL EVERY 6 HOURS PRN
Qty: 28 TABLET | Refills: 0 | Status: SHIPPED | OUTPATIENT
Start: 2018-10-15 | End: 2018-10-19

## 2018-10-19 ENCOUNTER — OFFICE VISIT (OUTPATIENT)
Dept: PAIN MANAGEMENT | Age: 58
End: 2018-10-19
Payer: COMMERCIAL

## 2018-10-19 VITALS
BODY MASS INDEX: 29.78 KG/M2 | WEIGHT: 174.4 LBS | OXYGEN SATURATION: 100 % | DIASTOLIC BLOOD PRESSURE: 73 MMHG | HEIGHT: 64 IN | HEART RATE: 81 BPM | SYSTOLIC BLOOD PRESSURE: 117 MMHG

## 2018-10-19 DIAGNOSIS — M70.71 BURSITIS OF BOTH HIPS, UNSPECIFIED BURSA: ICD-10-CM

## 2018-10-19 DIAGNOSIS — M54.41 CHRONIC BILATERAL LOW BACK PAIN WITH BILATERAL SCIATICA: ICD-10-CM

## 2018-10-19 DIAGNOSIS — M51.36 BULGING LUMBAR DISC: ICD-10-CM

## 2018-10-19 DIAGNOSIS — Z96.651 HISTORY OF TOTAL RIGHT KNEE REPLACEMENT: ICD-10-CM

## 2018-10-19 DIAGNOSIS — Z98.890 S/P LEFT KNEE ARTHROSCOPY: ICD-10-CM

## 2018-10-19 DIAGNOSIS — M17.0 PRIMARY OSTEOARTHRITIS OF BOTH KNEES: ICD-10-CM

## 2018-10-19 DIAGNOSIS — G89.29 CHRONIC BILATERAL LOW BACK PAIN WITH BILATERAL SCIATICA: ICD-10-CM

## 2018-10-19 DIAGNOSIS — G89.4 CHRONIC PAIN SYNDROME: Primary | ICD-10-CM

## 2018-10-19 DIAGNOSIS — R53.83 FATIGUE, UNSPECIFIED TYPE: ICD-10-CM

## 2018-10-19 DIAGNOSIS — M70.72 BURSITIS OF BOTH HIPS, UNSPECIFIED BURSA: ICD-10-CM

## 2018-10-19 DIAGNOSIS — M54.42 CHRONIC BILATERAL LOW BACK PAIN WITH BILATERAL SCIATICA: ICD-10-CM

## 2018-10-19 PROCEDURE — 99244 OFF/OP CNSLTJ NEW/EST MOD 40: CPT | Performed by: NURSE PRACTITIONER

## 2018-10-19 RX ORDER — HYDROCODONE BITARTRATE AND ACETAMINOPHEN 5; 325 MG/1; MG/1
1 TABLET ORAL EVERY 8 HOURS PRN
Qty: 78 TABLET | Refills: 0 | Status: SHIPPED | OUTPATIENT
Start: 2018-10-19 | End: 2018-11-16 | Stop reason: SDUPTHER

## 2018-10-19 RX ORDER — GABAPENTIN 300 MG/1
300 CAPSULE ORAL 3 TIMES DAILY
Qty: 90 CAPSULE | Refills: 0 | Status: SHIPPED | OUTPATIENT
Start: 2018-10-19 | End: 2018-11-16 | Stop reason: SDUPTHER

## 2018-10-19 NOTE — PATIENT INSTRUCTIONS
[email or web address] and sign in to your Web[employer] account. Enter J482 in the [employer] box to learn more about \"Back Stretches: Exercises. \"     If you do not have an account, please click on the \"Sign Up Now\" link. Current as of: November 29, 2017  Content Version: 11.7  © 5549-6051 [employer]. Care instructions adapted under license by [facility] ([facility]). If you have questions about a medical condition or this instruction, always ask your healthcare professional. [address] any warranty or liability for your use of this information. Patient Education        Knee Arthritis: Exercises  Your Care Instructions  Here are some examples of exercises for knee arthritis. Start each exercise slowly. Ease off the exercise if you start to have pain. Your doctor or physical therapist will tell you when you can start these exercises and which ones will work best for you. How to do the exercises  Knee flexion with heel slide    5. Lie on your back with your knees bent. 6. Slide your heel back by bending your affected knee as far as you can. Then hook your other foot around your ankle to help pull your heel even farther back. 7. Hold for about 6 seconds, then rest for up to 10 seconds. 8. Repeat 8 to 12 times. 9. Switch legs and repeat steps 1 through 4, even if only one knee is sore. Quad sets    5. Sit with your affected leg straight and supported on the floor or a firm bed. Place a small, rolled-up towel under your knee. Your other leg should be bent, with that foot flat on the floor. 6. Tighten the thigh muscles of your affected leg by pressing the back of your knee down into the towel. 7. Hold for about 6 seconds, then rest for up to 10 seconds. 8. Repeat 8 to 12 times. 9. Switch legs and repeat steps 1 through 4, even if only one knee is sore. Straight-leg raises to the front    5.  Lie on your back with your good knee bent so that your foot

## 2018-10-20 NOTE — PROGRESS NOTES
Bursitis of both hips, unspecified bursa    8. Fatigue, unspecified type         PLAN:   -Chronic opiate treatment protocol was discussed withthe patient, informed consent was obtained. -Treatment guidelines were discussed and established  -Risks and benefits of narcotics were addressedwith the patient  - Obtainable long term and short term goals of opioid therapy were reviewed, including pain relief, sleep, psychosocial and physical functioning   -Obtain urine Toxicology today  -Start taking Norco 5-325 mg po tid prn, Neurontin 300 mg po tid  -Aquatic PT  -Reviewed previous imaging studies and blood work  -Obtain TSH, BMP, CBC. Reports that her PCP completed blood work recently, advised patient to have blood work faxed to the office  -Advised patient to quit smoking for  health related concerns and to improve the treatment outcomes. Education was given on quitting smoking and the use of different modalities including medications, hypnotherapy, counselling  and biofeedback. These were discussed with patient. -CBT techniques- relaxation therapies such as biofeedback, mindfulness based stress reduction, imagery, cognitive restructuring, problem solving discussed with patient  -SOAPP score 2  -ORT score 5 moderate risk  -PHQ-9 score 3 minimal depression  -Return in about 4 weeks (around 11/16/2018). Controlled Substances Monitoring: Attestation: The Prescription Monitoring Report for this patient was reviewed today.  MARLENY Navarro - CHE)

## 2018-10-30 ENCOUNTER — TELEPHONE (OUTPATIENT)
Dept: PAIN MANAGEMENT | Age: 58
End: 2018-10-30

## 2018-11-05 ENCOUNTER — HOSPITAL ENCOUNTER (OUTPATIENT)
Age: 58
Discharge: HOME | End: 2018-11-05
Payer: COMMERCIAL

## 2018-11-05 ENCOUNTER — HOSPITAL ENCOUNTER (OUTPATIENT)
Dept: GENERAL RADIOLOGY | Age: 58
Discharge: HOME | End: 2018-11-05
Payer: COMMERCIAL

## 2018-11-05 DIAGNOSIS — M51.36 BULGING LUMBAR DISC: ICD-10-CM

## 2018-11-05 DIAGNOSIS — G89.29 CHRONIC BILATERAL LOW BACK PAIN WITH BILATERAL SCIATICA: ICD-10-CM

## 2018-11-05 DIAGNOSIS — R53.83 FATIGUE, UNSPECIFIED TYPE: ICD-10-CM

## 2018-11-05 DIAGNOSIS — G89.4 CHRONIC PAIN SYNDROME: ICD-10-CM

## 2018-11-05 DIAGNOSIS — M54.41 CHRONIC BILATERAL LOW BACK PAIN WITH BILATERAL SCIATICA: ICD-10-CM

## 2018-11-05 DIAGNOSIS — M54.42 CHRONIC BILATERAL LOW BACK PAIN WITH BILATERAL SCIATICA: ICD-10-CM

## 2018-11-05 LAB
A/G RATIO: 1.8 (ref 1.1–2.2)
ALBUMIN SERPL-MCNC: 4.4 G/DL (ref 3.4–5)
ALP BLD-CCNC: 96 U/L (ref 40–129)
ALT SERPL-CCNC: 9 U/L (ref 10–40)
ANION GAP SERPL CALCULATED.3IONS-SCNC: 14 MMOL/L (ref 3–16)
AST SERPL-CCNC: 11 U/L (ref 15–37)
BILIRUB SERPL-MCNC: 0.3 MG/DL (ref 0–1)
BUN BLDV-MCNC: 12 MG/DL (ref 7–20)
CALCIUM SERPL-MCNC: 10.4 MG/DL (ref 8.3–10.6)
CHLORIDE BLD-SCNC: 102 MMOL/L (ref 99–110)
CO2: 26 MMOL/L (ref 21–32)
CREAT SERPL-MCNC: 0.6 MG/DL (ref 0.6–1.1)
GFR AFRICAN AMERICAN: >60
GFR NON-AFRICAN AMERICAN: >60
GLOBULIN: 2.4 G/DL
GLUCOSE BLD-MCNC: 86 MG/DL (ref 70–99)
HCT VFR BLD CALC: 44.6 % (ref 36–48)
HEMOGLOBIN: 14.6 G/DL (ref 12–16)
MCH RBC QN AUTO: 29.8 PG (ref 26–34)
MCHC RBC AUTO-ENTMCNC: 32.7 G/DL (ref 31–36)
MCV RBC AUTO: 91.2 FL (ref 80–100)
PDW BLD-RTO: 13.7 % (ref 12.4–15.4)
PLATELET # BLD: 330 K/UL (ref 135–450)
PMV BLD AUTO: 9 FL (ref 5–10.5)
POTASSIUM SERPL-SCNC: 4.8 MMOL/L (ref 3.5–5.1)
RBC # BLD: 4.89 M/UL (ref 4–5.2)
SODIUM BLD-SCNC: 142 MMOL/L (ref 136–145)
TOTAL PROTEIN: 6.8 G/DL (ref 6.4–8.2)
TSH SERPL DL<=0.05 MIU/L-ACNC: 0.54 UIU/ML (ref 0.27–4.2)
WBC # BLD: 7.4 K/UL (ref 4–11)

## 2018-11-05 PROCEDURE — 72100 X-RAY EXAM L-S SPINE 2/3 VWS: CPT

## 2018-11-06 ENCOUNTER — HOSPITAL ENCOUNTER (OUTPATIENT)
Dept: PHYSICAL THERAPY | Age: 58
Setting detail: THERAPIES SERIES
Discharge: HOME | End: 2018-11-06
Payer: COMMERCIAL

## 2018-11-08 ENCOUNTER — APPOINTMENT (OUTPATIENT)
Dept: PHYSICAL THERAPY | Age: 58
End: 2018-11-08
Payer: COMMERCIAL

## 2018-11-08 ENCOUNTER — HOSPITAL ENCOUNTER (OUTPATIENT)
Dept: PHYSICAL THERAPY | Age: 58
Setting detail: THERAPIES SERIES
Discharge: HOME | End: 2018-11-08
Payer: COMMERCIAL

## 2018-11-08 PROCEDURE — G8981 BODY POS CURRENT STATUS: HCPCS

## 2018-11-08 PROCEDURE — 97162 PT EVAL MOD COMPLEX 30 MIN: CPT

## 2018-11-08 PROCEDURE — G8982 BODY POS GOAL STATUS: HCPCS

## 2018-11-08 PROCEDURE — 97530 THERAPEUTIC ACTIVITIES: CPT

## 2018-11-08 PROCEDURE — 97113 AQUATIC THERAPY/EXERCISES: CPT

## 2018-11-08 ASSESSMENT — PAIN SCALES - QUEBEC BACK PAIN DISABILITY SCALE
MOVE A CHAIR: 1
WALK A FEW BLOCKS OR 300 TO 400M: 2
SLEEP THROUGH THE NIGHT: 2
GET OUT OF BED: 1
RUN ONE BLOCK OR 100M: 5
TAKE FOOD OUT OF THE REFRIGERATOR: 0
BEND OVER TO CLEAN THE BATHTUB: 4
STAND UP FOR 20 TO 30 MINUTES: 1
SIT IN A CHAIR FOR SEVERAL HOURS: 2
PULL OR PUSH HEAVY DOORS: 1
WALK SEVERAL KILOMETERS  OR MILES: 2
PUT ON SOCKS OR PANYHOSE: 1
LIFT AND CARRY A HEAVY SUITCASE: 3
QUEBEC CMS MODIFIER: CK
QUEBEC DISABILITY INDEX: 40-59%
CLIMB ONE FLIGHT OF STAIRS: 1
THROW A BALL: 2
REACH UP TO HIGH SHELVES: 3
CARRY TWO BAGS OF GROCERIES: 3
RIDE IN A CAR: 2
TURN OVER IN BED: 3
TOTAL SCORE: 40
MAKE YOUR BED: 1

## 2018-11-08 NOTE — PLAN OF CARE
Outpatient Physical Therapy  [x] [facility]    Phone: [phone]   Fax: [phone]   [] [facility]  Phone: [phone]              Fax: [phone]  [] [facility]   Phone: [phone]   Fax: [phone]     To: Referring Practitioner: Alberto Grover CNP      Patient: Rubi Osborne   : 1960   MRN: [id number]  Evaluation Date: 2018      Diagnosis Information:  · Diagnosis: Chronic pain syndrome; B knee pain; LBP; bursitis B hips    · Treatment Diagnosis: back pain, B knee pain, B hip bursitis     Physical Therapy Certification/Re-Certification Form  Dear Alberto Grover CNP,   The following patient has been evaluated for physical therapy services and for therapy to continue, Medicare requires monthly physician review of the treatment plan. Please review the attached evaluation and/or summary of the patient's plan of care, and verify that you agree therapy should continue by signing the attached document and sending it back to our office. Plan of Care/Treatment to date:  [] Therapeutic Exercise    [] Modalities:  [] Therapeutic Activity     [] Ultrasound  [] Electrical Stimulation  [] Gait Training      [] Cervical Traction [] Lumbar Traction  [] Neuromuscular Re-education    [] Cold/hotpack [] Iontophoresis   [] Instruction in HEP     Other:  [] Manual Therapy      []             [x] Aquatic Therapy      []           ? Frequency/Duration:  # Days per week: [] 1 day # Weeks: [] 1 week [] 5 weeks     [x] 2 days? [] 2 weeks [] 6 weeks     [] 3 days   [] 3 weeks [] 7 weeks     [] 4 days   [x] 4 weeks [] 8 weeks    Rehab Potential: [] Excellent [x] Good [x] Fair  [] Poor       Electronically signed by:  Oliver Lion, [id number] [employer]       If you have any questions or concerns, please don't hesitate to call.   Thank you for your referral.      Physician Signature:________________________________Date:__________________  By signing above, therapists plan is approved by physician      \

## 2018-11-08 NOTE — FLOWSHEET NOTE
Piriformis   Horiz Abd/Add     Hip flexor    Arm Circles     SKTC   PNF Diagonals    DKTC  UE oscillations f/b, s/s    ITB   Wall Push-ups    Quad  Figure 8's    Mid back   Buoy pull/push downs    UT  Tband rows    Rhom  Tband lats    Post Shoulder  Lumbar Rot w/ paddles    Pec   Small ball pull downs                   diagonals             Cervical:       AROM Flex       AROM Extension     LEs exercises:  B AROM Side Bending    Marching  10 AROM Rotation    Heel Raises  10 Chin tucks    Toe Raises  10 Chin nods     Squats  10      Hamstring Curls  10      Hip Flexion  10 Balance:      Hip Abduction 10 SLS    Hip Circles 10 Tandem stance    TA set 10 NBOS eyes open    Glut Set 10 NBOS eyes closed    Hip Extension  Hand to Opposite Knee    Hip Adduction    Box Step     Hip IR   Noodle Stance     Hip ER  Stop/Go Gait    Fig 8's  Switch Gait                Seated: B Functional:    Ankle Pumps  Step up forward    Ankle circles  Step up lateral    Knee flex & ext  Step down    Hip Abd & Adduction  [place] squats    Bicycle   Crate Lifts    Add Set with ball  Lunges forward    LX stab with med ball throws  Lunges lateral    Ankle INV  Lunges retro    Ankle EV  Lower ab curl with noodle      Upper ab curl with ball      Med ball straight lifts      Med ball diagonal lifts      Hydrorider          Noodle:      Leg Press  Deep Water:     hang at wall 1 min    relief Jog    Noodle hang deep water  Jumping Jacks    Noodle Bicycle  Heel to toe      Hand to opposite knee      Cross country skier      Rocking Horse    Goals:    Short term goals  Time Frame for Short term goals: 2 wks   Short term goal 1: pt reports 20-30% pain improvement overall   Short term goal 2: min - mod tightness R LE  Short term goal 3: pt [place] 40 min pool exercises       Long term goals  Time Frame for Long term goals : 4 wks  Long term goal 1: pt reports 40-50% pain improvement overall for ease with sleep   Long term goal 2: lumbar flex 20, ext

## 2018-11-13 ENCOUNTER — HOSPITAL ENCOUNTER (OUTPATIENT)
Dept: PHYSICAL THERAPY | Age: 58
Setting detail: THERAPIES SERIES
Discharge: HOME | End: 2018-11-13
Payer: COMMERCIAL

## 2018-11-13 ENCOUNTER — APPOINTMENT (OUTPATIENT)
Dept: PHYSICAL THERAPY | Age: 58
End: 2018-11-13
Payer: COMMERCIAL

## 2018-11-13 PROCEDURE — 97113 AQUATIC THERAPY/EXERCISES: CPT

## 2018-11-13 PROCEDURE — 97150 GROUP THERAPEUTIC PROCEDURES: CPT

## 2018-11-15 ENCOUNTER — HOSPITAL ENCOUNTER (OUTPATIENT)
Dept: PHYSICAL THERAPY | Age: 58
Setting detail: THERAPIES SERIES
Discharge: HOME | End: 2018-11-15
Payer: COMMERCIAL

## 2018-11-15 ENCOUNTER — APPOINTMENT (OUTPATIENT)
Dept: PHYSICAL THERAPY | Age: 58
End: 2018-11-15
Payer: COMMERCIAL

## 2018-11-16 ENCOUNTER — OFFICE VISIT (OUTPATIENT)
Dept: PAIN MANAGEMENT | Age: 58
End: 2018-11-16
Payer: COMMERCIAL

## 2018-11-16 VITALS
BODY MASS INDEX: 29.97 KG/M2 | OXYGEN SATURATION: 100 % | WEIGHT: 174.6 LBS | SYSTOLIC BLOOD PRESSURE: 143 MMHG | HEART RATE: 87 BPM | DIASTOLIC BLOOD PRESSURE: 92 MMHG

## 2018-11-16 DIAGNOSIS — M17.0 PRIMARY OSTEOARTHRITIS OF BOTH KNEES: ICD-10-CM

## 2018-11-16 DIAGNOSIS — M70.72 BURSITIS OF BOTH HIPS, UNSPECIFIED BURSA: ICD-10-CM

## 2018-11-16 DIAGNOSIS — M51.36 BULGING LUMBAR DISC: ICD-10-CM

## 2018-11-16 DIAGNOSIS — M70.71 BURSITIS OF BOTH HIPS, UNSPECIFIED BURSA: ICD-10-CM

## 2018-11-16 DIAGNOSIS — M47.816 LUMBAR ARTHROPATHY: ICD-10-CM

## 2018-11-16 DIAGNOSIS — G89.29 CHRONIC BILATERAL LOW BACK PAIN WITH BILATERAL SCIATICA: ICD-10-CM

## 2018-11-16 DIAGNOSIS — Z98.890 S/P LEFT KNEE ARTHROSCOPY: ICD-10-CM

## 2018-11-16 DIAGNOSIS — R53.83 FATIGUE, UNSPECIFIED TYPE: ICD-10-CM

## 2018-11-16 DIAGNOSIS — M54.42 CHRONIC BILATERAL LOW BACK PAIN WITH BILATERAL SCIATICA: ICD-10-CM

## 2018-11-16 DIAGNOSIS — M54.41 CHRONIC BILATERAL LOW BACK PAIN WITH BILATERAL SCIATICA: ICD-10-CM

## 2018-11-16 DIAGNOSIS — Z96.651 HISTORY OF TOTAL RIGHT KNEE REPLACEMENT: ICD-10-CM

## 2018-11-16 DIAGNOSIS — G89.4 CHRONIC PAIN SYNDROME: Primary | ICD-10-CM

## 2018-11-16 PROBLEM — M54.50 CHRONIC BILATERAL LOW BACK PAIN: Status: ACTIVE | Noted: 2018-11-16

## 2018-11-16 PROBLEM — R53.82 CHRONIC FATIGUE: Status: ACTIVE | Noted: 2018-11-16

## 2018-11-16 PROCEDURE — 99214 OFFICE O/P EST MOD 30 MIN: CPT | Performed by: NURSE PRACTITIONER

## 2018-11-16 RX ORDER — HYDROCODONE BITARTRATE AND ACETAMINOPHEN 5; 325 MG/1; MG/1
1 TABLET ORAL EVERY 8 HOURS PRN
Qty: 84 TABLET | Refills: 0 | Status: SHIPPED | OUTPATIENT
Start: 2018-11-16 | End: 2018-12-13 | Stop reason: SDUPTHER

## 2018-11-16 RX ORDER — GABAPENTIN 300 MG/1
300 CAPSULE ORAL 3 TIMES DAILY
Qty: 90 CAPSULE | Refills: 0 | Status: SHIPPED | OUTPATIENT
Start: 2018-11-16 | End: 2018-12-13 | Stop reason: SDUPTHER

## 2018-11-16 RX ORDER — HYDROCODONE BITARTRATE AND ACETAMINOPHEN 5; 325 MG/1; MG/1
1 TABLET ORAL EVERY 8 HOURS PRN
Qty: 78 TABLET | Refills: 0 | Status: SHIPPED | OUTPATIENT
Start: 2018-11-16 | End: 2018-11-16 | Stop reason: SDUPTHER

## 2018-11-16 NOTE — PATIENT INSTRUCTIONS
Patient Education        Back Stretches: Exercises  Your Care Instructions  Here are some examples of exercises for stretching your back. Start each exercise slowly. Ease off the exercise if you start to have pain. Your doctor or physical therapist will tell you when you can start these exercises and which ones will work best for you. How to do the exercises  Overhead stretch    1. Stand comfortably with your feet shoulder-width apart. 2. Looking straight ahead, raise both arms over your head and reach toward the ceiling. Do not allow your head to tilt back. 3. Hold for 15 to 30 seconds, then lower your arms to your sides. 4. Repeat 2 to 4 times. Side stretch    1. Stand comfortably with your feet shoulder-width apart. 2. Raise one arm over your head, and then lean to the other side. 3. Slide your hand down your leg as you let the weight of your arm gently stretch your side muscles. Hold for 15 to 30 seconds. 4. Repeat 2 to 4 times on each side. Press-up    1. Lie on your stomach, supporting your body with your forearms. 2. Press your elbows down into the floor to raise your upper back. As you do this, relax your stomach muscles and allow your back to arch without using your back muscles. As your press up, do not let your hips or pelvis come off the floor. 3. Hold for 15 to 30 seconds, then relax. 4. Repeat 2 to 4 times. Relax and rest    1. Lie on your back with a rolled towel under your neck and a pillow under your knees. Extend your arms comfortably to your sides. 2. Relax and breathe normally. 3. Remain in this position for about 10 minutes. 4. If you can, do this 2 or 3 times each day. Follow-up care is a key part of your treatment and safety. Be sure to make and go to all appointments, and call your doctor if you are having problems. It's also a good idea to know your test results and keep a list of the medicines you take. Where can you learn more?   Go to [email or web address] and sign in to your MashMang[employer] account. Enter I666 in the [employer] box to learn more about \"Back Stretches: Exercises. \"     If you do not have an account, please click on the \"Sign Up Now\" link. Current as of: November 29, 2017  Content Version: 11.8  © 2746-4981 Healthwise, [employer]. Care instructions adapted under license by [facility] ([facility]). If you have questions about a medical condition or this instruction, always ask your healthcare professional. [address] any warranty or liability for your use of this information.

## 2018-11-20 ENCOUNTER — APPOINTMENT (OUTPATIENT)
Dept: PHYSICAL THERAPY | Age: 58
End: 2018-11-20
Payer: COMMERCIAL

## 2018-11-27 ENCOUNTER — APPOINTMENT (OUTPATIENT)
Dept: PHYSICAL THERAPY | Age: 58
End: 2018-11-27
Payer: COMMERCIAL

## 2018-11-27 ENCOUNTER — HOSPITAL ENCOUNTER (OUTPATIENT)
Dept: PHYSICAL THERAPY | Age: 58
Setting detail: THERAPIES SERIES
Discharge: HOME | End: 2018-11-27
Payer: COMMERCIAL

## 2018-11-27 PROCEDURE — 97113 AQUATIC THERAPY/EXERCISES: CPT

## 2018-11-27 PROCEDURE — 97150 GROUP THERAPEUTIC PROCEDURES: CPT

## 2018-11-29 ENCOUNTER — APPOINTMENT (OUTPATIENT)
Dept: PHYSICAL THERAPY | Age: 58
End: 2018-11-29
Payer: COMMERCIAL

## 2018-11-29 ENCOUNTER — HOSPITAL ENCOUNTER (OUTPATIENT)
Dept: PHYSICAL THERAPY | Age: 58
Setting detail: THERAPIES SERIES
Discharge: HOME | End: 2018-11-29
Payer: COMMERCIAL

## 2018-11-29 PROCEDURE — 97150 GROUP THERAPEUTIC PROCEDURES: CPT

## 2018-11-29 PROCEDURE — 97113 AQUATIC THERAPY/EXERCISES: CPT

## 2018-11-29 NOTE — FLOWSHEET NOTE
Hamstring  2 Shldr Flex/Ext  10   Knee flex stretch   Shldr Abd/Add 10   Piriformis   Horiz Abd/Add  10   Hip flexor    Arm Circles  10   SKTC   PNF Diagonals 10   DKTC  UE oscillations f/b, s/s    ITB   Wall Push-ups    Quad  Figure 8's    Mid back   Buoy pull/push downs    UT  Tband rows    Rhom  Tband lats    Post Shoulder  Lumbar Rot w/ paddles    Pec   Small ball pull downs                   diagonals             Cervical:       AROM Flex       AROM Extension     LEs exercises:  B AROM Side Bending    Marching  10 AROM Rotation    Heel Raises  10 Chin tucks    Toe Raises  10 Chin nods     Squats  10      Hamstring Curls  10      Hip Flexion  10 Balance:      Hip Abduction 10 SLS    Hip Circles 10 Tandem stance    TA set 10 NBOS eyes open    Glut Set 10 NBOS eyes closed    Hip Extension  Hand to Opposite Knee 10   Hip Adduction    Box Step     Hip IR   Noodle Stance     Hip ER  Stop/Go Gait    Fig 8's  Switch Gait                Seated: B Functional: B   Ankle Pumps 20 Step up forward    Ankle circles 10 as able L Step up lateral    Knee flex & ext 20 Step down    Hip Abd & Adduction 20 [place] squats    Bicycle  20 Crate Lifts    Add Set with ball 10 Lunges forward    LX stab with med ball throws  Lunges lateral    Ankle INV  Lunges retro    Ankle EV  Lower ab curl with noodle      Upper ab curl with ball      Med ball straight lifts      Med ball diagonal lifts      Hydrorider          Noodle:      Leg Press  Deep Water:     hang at wall 2 min    relief Jog    Noodle hang deep water  Jumping Jacks    Noodle Bicycle 1 min assist Heel to toe      Hand to opposite knee      Cross country skier      Rocking Horse    Goals:    Short term goals  Time Frame for Short term goals: 2 wks   Short term goal 1: pt reports 20-30% pain improvement overall   Short term goal 2: min - mod tightness R LE  Short term goal 3: pt [place] 40 min pool exercises       Long term goals  Time Frame for Long term goals : 4 wks  Long

## 2018-12-03 ENCOUNTER — TELEPHONE (OUTPATIENT)
Dept: PAIN MANAGEMENT | Age: 58
End: 2018-12-03

## 2018-12-03 DIAGNOSIS — G89.4 CHRONIC PAIN SYNDROME: ICD-10-CM

## 2018-12-03 RX ORDER — DULOXETIN HYDROCHLORIDE 60 MG/1
60 CAPSULE, DELAYED RELEASE ORAL DAILY
Qty: 30 CAPSULE | Refills: 1 | Status: SHIPPED | OUTPATIENT
Start: 2018-12-03 | End: 2019-01-10 | Stop reason: SDUPTHER

## 2018-12-04 ENCOUNTER — OFFICE VISIT (OUTPATIENT)
Dept: ORTHOPEDIC SURGERY | Age: 58
End: 2018-12-04
Payer: COMMERCIAL

## 2018-12-04 ENCOUNTER — APPOINTMENT (OUTPATIENT)
Dept: PHYSICAL THERAPY | Age: 58
End: 2018-12-04
Payer: COMMERCIAL

## 2018-12-04 ENCOUNTER — HOSPITAL ENCOUNTER (OUTPATIENT)
Dept: PHYSICAL THERAPY | Age: 58
Setting detail: THERAPIES SERIES
Discharge: HOME | End: 2018-12-04
Payer: COMMERCIAL

## 2018-12-04 VITALS
HEART RATE: 89 BPM | SYSTOLIC BLOOD PRESSURE: 119 MMHG | HEIGHT: 64 IN | DIASTOLIC BLOOD PRESSURE: 72 MMHG | WEIGHT: 173 LBS | BODY MASS INDEX: 29.53 KG/M2

## 2018-12-04 DIAGNOSIS — Z98.890 S/P LEFT KNEE ARTHROSCOPY: Primary | ICD-10-CM

## 2018-12-04 DIAGNOSIS — M76.62 ACHILLES TENDINITIS OF LEFT LOWER EXTREMITY: ICD-10-CM

## 2018-12-04 DIAGNOSIS — M25.562 CHRONIC PAIN OF LEFT KNEE: ICD-10-CM

## 2018-12-04 DIAGNOSIS — G89.29 CHRONIC PAIN OF LEFT KNEE: ICD-10-CM

## 2018-12-04 PROCEDURE — L1812 KO ELASTIC W/JOINTS PRE OTS: HCPCS | Performed by: ORTHOPAEDIC SURGERY

## 2018-12-04 PROCEDURE — 99024 POSTOP FOLLOW-UP VISIT: CPT | Performed by: ORTHOPAEDIC SURGERY

## 2018-12-04 PROCEDURE — 97113 AQUATIC THERAPY/EXERCISES: CPT

## 2018-12-04 ASSESSMENT — ENCOUNTER SYMPTOMS
VOMITING: 0
CONSTIPATION: 0
COUGH: 0
NAUSEA: 0
HEARTBURN: 0
WHEEZING: 0
DIARRHEA: 0
BLURRED VISION: 0
ABDOMINAL PAIN: 0
SHORTNESS OF BREATH: 0
SORE THROAT: 0
DOUBLE VISION: 0

## 2018-12-04 NOTE — PROGRESS NOTES
the extremity. Coordination appears full with no spacticity or rigidity. Reflexes appear to be symmetric. Distal circulation intact. No signs of RSD. Calf nontender. Negative nica's,  no signs of dvt    Hip exam shows full ROM with no focal pain or Instability. Leg lengths are normal. There is no Trendelenburg Gait. Tenderness to the distal left Achilles with noted osseous malformation base of calcaneus likely bone spur formation but has acute tenderness with palpation. RADIOLOGY   Xray   3 viewsOf the left foot AP lateral and oblique views were performed are reviewed today revealing no acute fracture or dislocation. No significant osteoarthritic changes noted however moderate posterior calcaneal bone spur was noted and a small plantar calcaneal bone spur was noted. IMPRESSION   4 month(s) status post Left knee arthroscopy with stem cell subchondroplasty     PLAN   The patient is doing Fair 4 month(s) status post Left knee arthroscopy with stem cell subchondroplasty. The patient will finish up therapy. They may  resume normal activities. The orders below, if any, were placed during this visit:        1. Achilles tendinitis of left lower extremity  -advised at home stretching, ice and rest  - Ambulatory referral to Physical Therapy  -discussed with patient possibility of performing removal of bone posterior to the tendon for relief and decompression of pain. Patient consented to performing surgery and scheduled for mid January. 2. S/P left knee arthroscopy, synovectomy and chondroplasty 8/10/2018  -advised patient to follow up with pain management a referral to Dr. Tutu Newby for continued management of pain.   -Believe a lot of the aggravation to her knee is related to the ankle and Achilles being aggravated still causing her to alter her gait.  -Discussed with patient the potential for this to possibly improve based upon the surgical outcome of the Achilles tendon may be with relief of

## 2018-12-04 NOTE — FLOWSHEET NOTE
2 Shldr Flex/Ext  10   Knee flex stretch   Shldr Abd/Add 10   Piriformis   Horiz Abd/Add  10   Hip flexor    Arm Circles  10   SKTC   PNF Diagonals 10   DKTC  UE oscillations f/b, s/s    ITB   Wall Push-ups    Quad  Figure 8's 10   Mid back   Buoy pull/push downs    UT  Tband rows    Rhom  Tband lats    Post Shoulder  Lumbar Rot w/ paddles    Pec   Small ball pull downs                   diagonals 10            Cervical:       AROM Flex       AROM Extension     LEs exercises:  B AROM Side Bending    Marching  10 AROM Rotation    Heel Raises  10 Chin tucks    Toe Raises  10 Chin nods     Squats  10      Hamstring Curls  10      Hip Flexion  10 Balance:      Hip Abduction 10 SLS    Hip Circles 10 Tandem stance    TA set 10 NBOS eyes open    Glut Set 10 NBOS eyes closed    Hip Extension  Hand to Opposite Knee 10   Hip Adduction    Box Step     Hip IR   Noodle Stance     Hip ER  Stop/Go Gait    Fig 8's  Switch Gait                Seated: B Functional: B   Ankle Pumps 20 Step up forward    Ankle circles 10       as able L Step up lateral    Knee flex & ext 30 Step down    Hip Abd & Adduction 30 [place] squats 10   Bicycle  30 Crate Lifts    Add Set with ball 10 Lunges forward    LX stab with med ball throws  Lunges lateral    Ankle INV  Lunges retro    Ankle EV  Lower ab curl with noodle      Upper ab curl with ball      Med ball straight lifts      Med ball diagonal lifts      Hydrorider          Noodle:      Leg Press  Deep Water:     hang at wall 2 min    relief Jog    Noodle hang deep water  Jumping Jacks    Noodle Bicycle 1 min assist Heel to toe      Hand to opposite knee      Cross country skier      Rocking Horse    Goals:    Short term goals  Time Frame for Short term goals: 2 wks   Short term goal 1: pt reports 20-30% pain improvement overall   Short term goal 2: min - mod tightness R LE  Short term goal 3: pt [place] 40 min pool exercises       Long term goals  Time Frame for Long term goals : 4 wks  Long

## 2018-12-06 ENCOUNTER — HOSPITAL ENCOUNTER (OUTPATIENT)
Dept: PHYSICAL THERAPY | Age: 58
Setting detail: THERAPIES SERIES
Discharge: HOME | End: 2018-12-06
Payer: COMMERCIAL

## 2018-12-06 ENCOUNTER — APPOINTMENT (OUTPATIENT)
Dept: PHYSICAL THERAPY | Age: 58
End: 2018-12-06
Payer: COMMERCIAL

## 2018-12-11 ENCOUNTER — APPOINTMENT (OUTPATIENT)
Dept: PHYSICAL THERAPY | Age: 58
End: 2018-12-11
Payer: COMMERCIAL

## 2018-12-11 ENCOUNTER — HOSPITAL ENCOUNTER (OUTPATIENT)
Dept: PHYSICAL THERAPY | Age: 58
Setting detail: THERAPIES SERIES
Discharge: HOME | End: 2018-12-11
Payer: COMMERCIAL

## 2018-12-11 PROCEDURE — 97113 AQUATIC THERAPY/EXERCISES: CPT

## 2018-12-11 NOTE — FLOWSHEET NOTE
goal 1: pt reports 40-50% pain improvement overall for ease with sleep   Long term goal 2: lumbar flex 20, ext 10, B SB 20 for ease with dressing and general mobility   Long term goal 3: B LE 5/5 and pt is independent with pool hep to cont with 30 day FC pass         Timed Code Treatment Minutes:  45  (1 on 1 x 45 min)    Total Treatment Minutes:  45    Treatment/Activity Tolerance:  [x] Patient tolerated treatment well [] Patient limited by fatique  [] Patient limited by pain  [] Patient limited by other medical complications  [x] Other: pain 3/10 after aquatic ex.      Prognosis: [x] Good [] Fair  [] Poor    Patient Requires Follow-up: [x] Yes  [] No    Plan:   [x] Continue per plan of care [] Alter current plan (see comments)  [] Plan of care initiated [] Hold pending MD visit [] Discharge    Plan for Next Session:  Increase gt, , box step    Electronically signed by: Daphne Jerome PTA  [id number]

## 2018-12-13 ENCOUNTER — APPOINTMENT (OUTPATIENT)
Dept: PHYSICAL THERAPY | Age: 58
End: 2018-12-13
Payer: COMMERCIAL

## 2018-12-13 ENCOUNTER — OFFICE VISIT (OUTPATIENT)
Dept: PAIN MANAGEMENT | Age: 58
End: 2018-12-13
Payer: COMMERCIAL

## 2018-12-13 VITALS
HEART RATE: 92 BPM | DIASTOLIC BLOOD PRESSURE: 77 MMHG | SYSTOLIC BLOOD PRESSURE: 142 MMHG | BODY MASS INDEX: 29.49 KG/M2 | OXYGEN SATURATION: 98 % | WEIGHT: 171.8 LBS

## 2018-12-13 DIAGNOSIS — Z98.890 S/P LEFT KNEE ARTHROSCOPY: ICD-10-CM

## 2018-12-13 DIAGNOSIS — M54.41 CHRONIC BILATERAL LOW BACK PAIN WITH BILATERAL SCIATICA: ICD-10-CM

## 2018-12-13 DIAGNOSIS — M47.816 LUMBAR ARTHROPATHY: ICD-10-CM

## 2018-12-13 DIAGNOSIS — Z96.651 HISTORY OF TOTAL RIGHT KNEE REPLACEMENT: ICD-10-CM

## 2018-12-13 DIAGNOSIS — M17.0 PRIMARY OSTEOARTHRITIS OF BOTH KNEES: ICD-10-CM

## 2018-12-13 DIAGNOSIS — G89.29 CHRONIC BILATERAL LOW BACK PAIN WITH BILATERAL SCIATICA: ICD-10-CM

## 2018-12-13 DIAGNOSIS — M70.71 BURSITIS OF BOTH HIPS, UNSPECIFIED BURSA: ICD-10-CM

## 2018-12-13 DIAGNOSIS — R53.83 FATIGUE, UNSPECIFIED TYPE: ICD-10-CM

## 2018-12-13 DIAGNOSIS — M51.36 BULGING LUMBAR DISC: ICD-10-CM

## 2018-12-13 DIAGNOSIS — G89.4 CHRONIC PAIN SYNDROME: ICD-10-CM

## 2018-12-13 DIAGNOSIS — M54.42 CHRONIC BILATERAL LOW BACK PAIN WITH BILATERAL SCIATICA: ICD-10-CM

## 2018-12-13 DIAGNOSIS — M70.72 BURSITIS OF BOTH HIPS, UNSPECIFIED BURSA: ICD-10-CM

## 2018-12-13 PROCEDURE — 99213 OFFICE O/P EST LOW 20 MIN: CPT | Performed by: NURSE PRACTITIONER

## 2018-12-13 RX ORDER — GABAPENTIN 300 MG/1
300 CAPSULE ORAL 3 TIMES DAILY
Qty: 90 CAPSULE | Refills: 0 | Status: SHIPPED | OUTPATIENT
Start: 2018-12-13 | End: 2019-01-10 | Stop reason: SDUPTHER

## 2018-12-13 RX ORDER — HYDROCODONE BITARTRATE AND ACETAMINOPHEN 5; 325 MG/1; MG/1
1 TABLET ORAL EVERY 8 HOURS PRN
Qty: 84 TABLET | Refills: 0 | Status: SHIPPED | OUTPATIENT
Start: 2018-12-13 | End: 2019-01-10 | Stop reason: SDUPTHER

## 2018-12-13 NOTE — PROGRESS NOTES
while adjusting the dose of medicines or if having cognitive  issues related to the current medications. Risk of overdose and death, if medicines not taken as prescribed, were also discussed. If the patient develops new symptoms or if the symptoms worsen, the patient should call the office. While transcribing every attempt was made to maintain the accuracy of the note in terms of it's contents,there may have been some errors made inadvertently. Thank you for allowing me to participate in the care of this patient.     Radha Latham, CHE    Cc: Katelynn Sainz

## 2018-12-18 ENCOUNTER — HOSPITAL ENCOUNTER (OUTPATIENT)
Dept: PHYSICAL THERAPY | Age: 58
Setting detail: THERAPIES SERIES
Discharge: HOME | End: 2018-12-18
Payer: COMMERCIAL

## 2018-12-18 ENCOUNTER — APPOINTMENT (OUTPATIENT)
Dept: PHYSICAL THERAPY | Age: 58
End: 2018-12-18
Payer: COMMERCIAL

## 2018-12-18 PROCEDURE — 97113 AQUATIC THERAPY/EXERCISES: CPT

## 2018-12-18 PROCEDURE — 97150 GROUP THERAPEUTIC PROCEDURES: CPT

## 2018-12-20 ENCOUNTER — HOSPITAL ENCOUNTER (OUTPATIENT)
Dept: PHYSICAL THERAPY | Age: 58
Setting detail: THERAPIES SERIES
Discharge: HOME | End: 2018-12-20
Payer: COMMERCIAL

## 2018-12-20 PROCEDURE — 97530 THERAPEUTIC ACTIVITIES: CPT

## 2018-12-20 PROCEDURE — 97113 AQUATIC THERAPY/EXERCISES: CPT

## 2018-12-20 PROCEDURE — G8983 BODY POS D/C STATUS: HCPCS

## 2018-12-20 PROCEDURE — 97150 GROUP THERAPEUTIC PROCEDURES: CPT

## 2018-12-20 PROCEDURE — G8982 BODY POS GOAL STATUS: HCPCS

## 2018-12-20 ASSESSMENT — PAIN SCALES - QUEBEC BACK PAIN DISABILITY SCALE
TOTAL SCORE: 53
PULL OR PUSH HEAVY DOORS: 2
MAKE YOUR BED: 1
RIDE IN A CAR: 1
CARRY TWO BAGS OF GROCERIES: 4
SLEEP THROUGH THE NIGHT: 2
THROW A BALL: 3
TURN OVER IN BED: 2
WALK SEVERAL KILOMETERS  OR MILES: 4
REACH UP TO HIGH SHELVES: 4
WALK A FEW BLOCKS OR 300 TO 400M: 4
BEND OVER TO CLEAN THE BATHTUB: 3
PUT ON SOCKS OR PANYHOSE: 1
QUEBEC CMS MODIFIER: CK
STAND UP FOR 20 TO 30 MINUTES: 2
LIFT AND CARRY A HEAVY SUITCASE: 4
GET OUT OF BED: 2
QUEBEC DISABILITY INDEX: 40-59%
RUN ONE BLOCK OR 100M: 5
TAKE FOOD OUT OF THE REFRIGERATOR: 1
SIT IN A CHAIR FOR SEVERAL HOURS: 3
MOVE A CHAIR: 2
CLIMB ONE FLIGHT OF STAIRS: 3

## 2018-12-20 NOTE — FLOWSHEET NOTE
: 4 wks  Long term goal 1: pt reports 40-50% pain improvement overall for ease with sleep   Long term goal 2: lumbar flex 20, ext 10, B SB 20 for ease with dressing and general mobility   Long term goal 3: B LE 5/5 and pt is independent with pool hep to cont with 30 day FC pass         Timed Code Treatment Minutes:  50     Total Treatment Minutes:  50    Treatment/Activity Tolerance:  [x] Patient tolerated treatment well [] Patient limited by fatique  [] Patient limited by pain  [] Patient limited by other medical complications  [x] Other: pain 4/10 after aquatic ex. Issued written aquatic HEP booklet,   Reviewed aquatic exercise progression/precautions. Instructed not to resume aquatic ex after achilles Sx until released to do so. Pt verbalized understanding. Prognosis: [x] Good [] Fair  [] Poor    Patient Requires Follow-up: [x] Yes  [] No    Plan:   [x] Continue per plan of care [] Alter current plan (see comments)  [] Plan of care initiated [] Hold pending MD visit [x] Discharge    Plan for Next Session:  D/C, to have achilles Sx. To resume aquatic ex independently when released to do so from achilles Sx.     Electronically signed by: Rosa Pimentel, PTA  [id number]

## 2018-12-27 ENCOUNTER — APPOINTMENT (OUTPATIENT)
Dept: PHYSICAL THERAPY | Age: 58
End: 2018-12-27
Payer: COMMERCIAL

## 2019-01-03 ENCOUNTER — TELEPHONE (OUTPATIENT)
Dept: ORTHOPEDIC SURGERY | Age: 59
End: 2019-01-03

## 2019-01-10 ENCOUNTER — OFFICE VISIT (OUTPATIENT)
Dept: PAIN MANAGEMENT | Age: 59
End: 2019-01-10
Payer: COMMERCIAL

## 2019-01-10 ENCOUNTER — ANESTHESIA EVENT (OUTPATIENT)
Dept: OPERATING ROOM | Age: 59
End: 2019-01-10
Payer: COMMERCIAL

## 2019-01-10 VITALS
DIASTOLIC BLOOD PRESSURE: 80 MMHG | OXYGEN SATURATION: 96 % | SYSTOLIC BLOOD PRESSURE: 114 MMHG | HEART RATE: 83 BPM | BODY MASS INDEX: 29.56 KG/M2 | WEIGHT: 172.2 LBS

## 2019-01-10 DIAGNOSIS — M54.41 CHRONIC BILATERAL LOW BACK PAIN WITH BILATERAL SCIATICA: ICD-10-CM

## 2019-01-10 DIAGNOSIS — M70.71 BURSITIS OF BOTH HIPS, UNSPECIFIED BURSA: ICD-10-CM

## 2019-01-10 DIAGNOSIS — M51.36 BULGING LUMBAR DISC: ICD-10-CM

## 2019-01-10 DIAGNOSIS — Z96.651 HISTORY OF TOTAL RIGHT KNEE REPLACEMENT: ICD-10-CM

## 2019-01-10 DIAGNOSIS — G89.4 CHRONIC PAIN SYNDROME: Primary | ICD-10-CM

## 2019-01-10 DIAGNOSIS — M70.72 BURSITIS OF BOTH HIPS, UNSPECIFIED BURSA: ICD-10-CM

## 2019-01-10 DIAGNOSIS — M47.816 LUMBAR ARTHROPATHY: ICD-10-CM

## 2019-01-10 DIAGNOSIS — M54.42 CHRONIC BILATERAL LOW BACK PAIN WITH BILATERAL SCIATICA: ICD-10-CM

## 2019-01-10 DIAGNOSIS — G89.29 CHRONIC BILATERAL LOW BACK PAIN WITH BILATERAL SCIATICA: ICD-10-CM

## 2019-01-10 DIAGNOSIS — M76.62 LEFT ACHILLES TENDINITIS: ICD-10-CM

## 2019-01-10 DIAGNOSIS — Z98.890 S/P LEFT KNEE ARTHROSCOPY: ICD-10-CM

## 2019-01-10 DIAGNOSIS — M17.0 PRIMARY OSTEOARTHRITIS OF BOTH KNEES: ICD-10-CM

## 2019-01-10 PROCEDURE — 99213 OFFICE O/P EST LOW 20 MIN: CPT | Performed by: NURSE PRACTITIONER

## 2019-01-10 RX ORDER — GABAPENTIN 300 MG/1
300 CAPSULE ORAL 3 TIMES DAILY
Qty: 90 CAPSULE | Refills: 0 | Status: SHIPPED | OUTPATIENT
Start: 2019-01-10 | End: 2019-02-07 | Stop reason: SDUPTHER

## 2019-01-10 RX ORDER — DULOXETIN HYDROCHLORIDE 60 MG/1
60 CAPSULE, DELAYED RELEASE ORAL DAILY
Qty: 30 CAPSULE | Refills: 1 | Status: SHIPPED | OUTPATIENT
Start: 2019-01-10 | End: 2019-03-07 | Stop reason: SDUPTHER

## 2019-01-10 RX ORDER — HYDROCODONE BITARTRATE AND ACETAMINOPHEN 5; 325 MG/1; MG/1
1 TABLET ORAL EVERY 8 HOURS PRN
Qty: 84 TABLET | Refills: 0 | Status: ON HOLD | OUTPATIENT
Start: 2019-01-10 | End: 2019-01-11 | Stop reason: HOSPADM

## 2019-01-11 ENCOUNTER — ANESTHESIA (OUTPATIENT)
Dept: OPERATING ROOM | Age: 59
End: 2019-01-11
Payer: COMMERCIAL

## 2019-01-11 ENCOUNTER — HOSPITAL ENCOUNTER (OUTPATIENT)
Age: 59
Setting detail: OUTPATIENT SURGERY
Discharge: HOME | End: 2019-01-11
Attending: ORTHOPAEDIC SURGERY | Admitting: ORTHOPAEDIC SURGERY
Payer: COMMERCIAL

## 2019-01-11 VITALS — SYSTOLIC BLOOD PRESSURE: 121 MMHG | DIASTOLIC BLOOD PRESSURE: 72 MMHG | OXYGEN SATURATION: 88 % | TEMPERATURE: 95.4 F

## 2019-01-11 VITALS
OXYGEN SATURATION: 95 % | HEIGHT: 64 IN | RESPIRATION RATE: 14 BRPM | BODY MASS INDEX: 29.02 KG/M2 | TEMPERATURE: 97.8 F | SYSTOLIC BLOOD PRESSURE: 115 MMHG | WEIGHT: 170 LBS | DIASTOLIC BLOOD PRESSURE: 75 MMHG | HEART RATE: 63 BPM

## 2019-01-11 DIAGNOSIS — M76.62 ACHILLES TENDINITIS OF LEFT LOWER EXTREMITY: Primary | ICD-10-CM

## 2019-01-11 PROCEDURE — 3600000014 HC SURGERY LEVEL 4 ADDTL 15MIN: Performed by: ORTHOPAEDIC SURGERY

## 2019-01-11 PROCEDURE — 27654 REPAIR OF ACHILLES TENDON: CPT | Performed by: ORTHOPAEDIC SURGERY

## 2019-01-11 PROCEDURE — 6360000002 HC RX W HCPCS: Performed by: ORTHOPAEDIC SURGERY

## 2019-01-11 PROCEDURE — 3700000001 HC ADD 15 MINUTES (ANESTHESIA): Performed by: ORTHOPAEDIC SURGERY

## 2019-01-11 PROCEDURE — 28118 REMOVAL OF HEEL BONE: CPT | Performed by: PHYSICIAN ASSISTANT

## 2019-01-11 PROCEDURE — 6370000000 HC RX 637 (ALT 250 FOR IP): Performed by: ORTHOPAEDIC SURGERY

## 2019-01-11 PROCEDURE — 28118 REMOVAL OF HEEL BONE: CPT | Performed by: ORTHOPAEDIC SURGERY

## 2019-01-11 PROCEDURE — 3600000004 HC SURGERY LEVEL 4 BASE: Performed by: ORTHOPAEDIC SURGERY

## 2019-01-11 PROCEDURE — 2580000003 HC RX 258: Performed by: ORTHOPAEDIC SURGERY

## 2019-01-11 PROCEDURE — 27654 REPAIR OF ACHILLES TENDON: CPT | Performed by: PHYSICIAN ASSISTANT

## 2019-01-11 PROCEDURE — 3700000000 HC ANESTHESIA ATTENDED CARE: Performed by: ORTHOPAEDIC SURGERY

## 2019-01-11 PROCEDURE — 2500000003 HC RX 250 WO HCPCS: Performed by: NURSE ANESTHETIST, CERTIFIED REGISTERED

## 2019-01-11 PROCEDURE — 2709999900 HC NON-CHARGEABLE SUPPLY: Performed by: ORTHOPAEDIC SURGERY

## 2019-01-11 PROCEDURE — 7100000001 HC PACU RECOVERY - ADDTL 15 MIN: Performed by: ORTHOPAEDIC SURGERY

## 2019-01-11 PROCEDURE — 2580000003 HC RX 258: Performed by: ANESTHESIOLOGY

## 2019-01-11 PROCEDURE — 6360000002 HC RX W HCPCS: Performed by: NURSE ANESTHETIST, CERTIFIED REGISTERED

## 2019-01-11 PROCEDURE — 2720000010 HC SURG SUPPLY STERILE: Performed by: ORTHOPAEDIC SURGERY

## 2019-01-11 PROCEDURE — 7100000000 HC PACU RECOVERY - FIRST 15 MIN: Performed by: ORTHOPAEDIC SURGERY

## 2019-01-11 RX ORDER — HYDROMORPHONE HCL 110MG/55ML
PATIENT CONTROLLED ANALGESIA SYRINGE INTRAVENOUS PRN
Status: DISCONTINUED | OUTPATIENT
Start: 2019-01-11 | End: 2019-01-11 | Stop reason: SDUPTHER

## 2019-01-11 RX ORDER — ONDANSETRON 2 MG/ML
INJECTION INTRAMUSCULAR; INTRAVENOUS PRN
Status: DISCONTINUED | OUTPATIENT
Start: 2019-01-11 | End: 2019-01-11 | Stop reason: SDUPTHER

## 2019-01-11 RX ORDER — MIDAZOLAM HYDROCHLORIDE 1 MG/ML
INJECTION INTRAMUSCULAR; INTRAVENOUS PRN
Status: DISCONTINUED | OUTPATIENT
Start: 2019-01-11 | End: 2019-01-11 | Stop reason: SDUPTHER

## 2019-01-11 RX ORDER — NALOXONE HYDROCHLORIDE 0.4 MG/ML
INJECTION, SOLUTION INTRAMUSCULAR; INTRAVENOUS; SUBCUTANEOUS PRN
Status: DISCONTINUED | OUTPATIENT
Start: 2019-01-11 | End: 2019-01-11 | Stop reason: SDUPTHER

## 2019-01-11 RX ORDER — SODIUM CHLORIDE 0.9 % (FLUSH) 0.9 %
10 SYRINGE (ML) INJECTION PRN
Status: DISCONTINUED | OUTPATIENT
Start: 2019-01-11 | End: 2019-01-11 | Stop reason: HOSPADM

## 2019-01-11 RX ORDER — ACETAMINOPHEN 500 MG
1000 TABLET ORAL EVERY 6 HOURS PRN
Status: DISCONTINUED | OUTPATIENT
Start: 2019-01-11 | End: 2019-01-11 | Stop reason: HOSPADM

## 2019-01-11 RX ORDER — PROPOFOL 10 MG/ML
INJECTION, EMULSION INTRAVENOUS PRN
Status: DISCONTINUED | OUTPATIENT
Start: 2019-01-11 | End: 2019-01-11 | Stop reason: SDUPTHER

## 2019-01-11 RX ORDER — OXYCODONE HYDROCHLORIDE AND ACETAMINOPHEN 5; 325 MG/1; MG/1
2 TABLET ORAL EVERY 4 HOURS PRN
Status: DISCONTINUED | OUTPATIENT
Start: 2019-01-11 | End: 2019-01-11 | Stop reason: HOSPADM

## 2019-01-11 RX ORDER — SODIUM CHLORIDE, SODIUM LACTATE, POTASSIUM CHLORIDE, CALCIUM CHLORIDE 600; 310; 30; 20 MG/100ML; MG/100ML; MG/100ML; MG/100ML
INJECTION, SOLUTION INTRAVENOUS CONTINUOUS
Status: DISCONTINUED | OUTPATIENT
Start: 2019-01-11 | End: 2019-01-11 | Stop reason: HOSPADM

## 2019-01-11 RX ORDER — OXYCODONE HYDROCHLORIDE AND ACETAMINOPHEN 5; 325 MG/1; MG/1
1 TABLET ORAL ONCE
Status: COMPLETED | OUTPATIENT
Start: 2019-01-11 | End: 2019-01-11

## 2019-01-11 RX ORDER — OXYCODONE HYDROCHLORIDE AND ACETAMINOPHEN 5; 325 MG/1; MG/1
1 TABLET ORAL EVERY 4 HOURS PRN
Status: DISCONTINUED | OUTPATIENT
Start: 2019-01-11 | End: 2019-01-11 | Stop reason: HOSPADM

## 2019-01-11 RX ORDER — SODIUM CHLORIDE 0.9 % (FLUSH) 0.9 %
10 SYRINGE (ML) INJECTION EVERY 12 HOURS SCHEDULED
Status: DISCONTINUED | OUTPATIENT
Start: 2019-01-11 | End: 2019-01-11 | Stop reason: HOSPADM

## 2019-01-11 RX ORDER — ROCURONIUM BROMIDE 10 MG/ML
INJECTION, SOLUTION INTRAVENOUS PRN
Status: DISCONTINUED | OUTPATIENT
Start: 2019-01-11 | End: 2019-01-11 | Stop reason: SDUPTHER

## 2019-01-11 RX ORDER — OXYCODONE HYDROCHLORIDE AND ACETAMINOPHEN 5; 325 MG/1; MG/1
1 TABLET ORAL EVERY 6 HOURS PRN
Qty: 28 TABLET | Refills: 0 | Status: SHIPPED | OUTPATIENT
Start: 2019-01-11 | End: 2019-02-07

## 2019-01-11 RX ORDER — ACETAMINOPHEN 500 MG
1000 TABLET ORAL EVERY 6 HOURS PRN
Status: DISCONTINUED | OUTPATIENT
Start: 2019-01-11 | End: 2019-01-11 | Stop reason: SDUPTHER

## 2019-01-11 RX ORDER — GLYCOPYRROLATE 0.2 MG/ML
INJECTION INTRAMUSCULAR; INTRAVENOUS PRN
Status: DISCONTINUED | OUTPATIENT
Start: 2019-01-11 | End: 2019-01-11 | Stop reason: SDUPTHER

## 2019-01-11 RX ORDER — CEFAZOLIN SODIUM 2 G/50ML
2 SOLUTION INTRAVENOUS ONCE
Status: COMPLETED | OUTPATIENT
Start: 2019-01-11 | End: 2019-01-11

## 2019-01-11 RX ORDER — LIDOCAINE HYDROCHLORIDE 20 MG/ML
INJECTION, SOLUTION INFILTRATION; PERINEURAL PRN
Status: DISCONTINUED | OUTPATIENT
Start: 2019-01-11 | End: 2019-01-11 | Stop reason: SDUPTHER

## 2019-01-11 RX ORDER — FENTANYL CITRATE 50 UG/ML
INJECTION, SOLUTION INTRAMUSCULAR; INTRAVENOUS PRN
Status: DISCONTINUED | OUTPATIENT
Start: 2019-01-11 | End: 2019-01-11 | Stop reason: SDUPTHER

## 2019-01-11 RX ORDER — CELECOXIB 100 MG/1
200 CAPSULE ORAL ONCE
Status: COMPLETED | OUTPATIENT
Start: 2019-01-11 | End: 2019-01-11

## 2019-01-11 RX ORDER — LIDOCAINE HYDROCHLORIDE 10 MG/ML
1 INJECTION, SOLUTION EPIDURAL; INFILTRATION; INTRACAUDAL; PERINEURAL
Status: DISCONTINUED | OUTPATIENT
Start: 2019-01-11 | End: 2019-01-11 | Stop reason: HOSPADM

## 2019-01-11 RX ADMIN — NALOXONE HYDROCHLORIDE 0.04 MG: 0.4 INJECTION, SOLUTION INTRAMUSCULAR; INTRAVENOUS; SUBCUTANEOUS at 09:52

## 2019-01-11 RX ADMIN — SODIUM CHLORIDE, SODIUM LACTATE, POTASSIUM CHLORIDE, AND CALCIUM CHLORIDE: 600; 310; 30; 20 INJECTION, SOLUTION INTRAVENOUS at 09:20

## 2019-01-11 RX ADMIN — ONDANSETRON 4 MG: 2 INJECTION INTRAMUSCULAR; INTRAVENOUS at 09:40

## 2019-01-11 RX ADMIN — ROCURONIUM BROMIDE 40 MG: 10 INJECTION, SOLUTION INTRAVENOUS at 08:50

## 2019-01-11 RX ADMIN — FENTANYL CITRATE 50 MCG: 50 INJECTION INTRAMUSCULAR; INTRAVENOUS at 08:40

## 2019-01-11 RX ADMIN — MIDAZOLAM HYDROCHLORIDE 1 MG: 1 INJECTION INTRAMUSCULAR; INTRAVENOUS at 08:40

## 2019-01-11 RX ADMIN — GLYCOPYRROLATE 0.4 MG: 0.2 INJECTION INTRAMUSCULAR; INTRAVENOUS at 09:46

## 2019-01-11 RX ADMIN — MIDAZOLAM HYDROCHLORIDE 1 MG: 1 INJECTION INTRAMUSCULAR; INTRAVENOUS at 08:37

## 2019-01-11 RX ADMIN — LIDOCAINE HYDROCHLORIDE 50 MG: 20 INJECTION, SOLUTION INFILTRATION; PERINEURAL at 08:50

## 2019-01-11 RX ADMIN — SODIUM CHLORIDE, SODIUM LACTATE, POTASSIUM CHLORIDE, AND CALCIUM CHLORIDE: 600; 310; 30; 20 INJECTION, SOLUTION INTRAVENOUS at 08:37

## 2019-01-11 RX ADMIN — HYDROMORPHONE HYDROCHLORIDE 0.5 MG: 2 INJECTION, SOLUTION INTRAMUSCULAR; INTRAVENOUS; SUBCUTANEOUS at 09:16

## 2019-01-11 RX ADMIN — HYDROMORPHONE HYDROCHLORIDE 0.5 MG: 2 INJECTION, SOLUTION INTRAMUSCULAR; INTRAVENOUS; SUBCUTANEOUS at 09:13

## 2019-01-11 RX ADMIN — PROPOFOL 150 MG: 10 INJECTION, EMULSION INTRAVENOUS at 08:49

## 2019-01-11 RX ADMIN — GLYCOPYRROLATE 0.2 MG: 0.2 INJECTION INTRAMUSCULAR; INTRAVENOUS at 08:37

## 2019-01-11 RX ADMIN — OXYCODONE AND ACETAMINOPHEN 1 TABLET: 5; 325 TABLET ORAL at 07:35

## 2019-01-11 RX ADMIN — CEFAZOLIN SODIUM 2 G: 2 SOLUTION INTRAVENOUS at 09:02

## 2019-01-11 RX ADMIN — CELECOXIB 200 MG: 100 CAPSULE ORAL at 07:35

## 2019-01-11 RX ADMIN — FENTANYL CITRATE 50 MCG: 50 INJECTION INTRAMUSCULAR; INTRAVENOUS at 08:49

## 2019-01-11 ASSESSMENT — PULMONARY FUNCTION TESTS
PIF_VALUE: 21
PIF_VALUE: 24
PIF_VALUE: 3
PIF_VALUE: 25
PIF_VALUE: 0
PIF_VALUE: 24
PIF_VALUE: 4
PIF_VALUE: 23
PIF_VALUE: 23
PIF_VALUE: 18
PIF_VALUE: 13
PIF_VALUE: 18
PIF_VALUE: 0
PIF_VALUE: 24
PIF_VALUE: 24
PIF_VALUE: 14
PIF_VALUE: 6
PIF_VALUE: 16
PIF_VALUE: 14
PIF_VALUE: 24
PIF_VALUE: 18
PIF_VALUE: 24
PIF_VALUE: 19
PIF_VALUE: 4
PIF_VALUE: 24
PIF_VALUE: 25
PIF_VALUE: 24
PIF_VALUE: 20
PIF_VALUE: 25
PIF_VALUE: 11
PIF_VALUE: 24
PIF_VALUE: 2
PIF_VALUE: 24
PIF_VALUE: 0
PIF_VALUE: 19
PIF_VALUE: 6
PIF_VALUE: 1
PIF_VALUE: 13
PIF_VALUE: 1
PIF_VALUE: 24
PIF_VALUE: 11
PIF_VALUE: 22
PIF_VALUE: 2
PIF_VALUE: 24
PIF_VALUE: 3
PIF_VALUE: 14
PIF_VALUE: 14
PIF_VALUE: 25
PIF_VALUE: 0
PIF_VALUE: 24
PIF_VALUE: 20
PIF_VALUE: 24
PIF_VALUE: 0
PIF_VALUE: 23
PIF_VALUE: 18
PIF_VALUE: 3
PIF_VALUE: 16
PIF_VALUE: 2
PIF_VALUE: 16
PIF_VALUE: 24
PIF_VALUE: 1
PIF_VALUE: 7
PIF_VALUE: 24
PIF_VALUE: 24
PIF_VALUE: 11
PIF_VALUE: 7
PIF_VALUE: 3
PIF_VALUE: 8
PIF_VALUE: 25
PIF_VALUE: 24
PIF_VALUE: 12
PIF_VALUE: 8
PIF_VALUE: 19
PIF_VALUE: 25
PIF_VALUE: 22

## 2019-01-11 ASSESSMENT — PAIN SCALES - GENERAL: PAINLEVEL_OUTOF10: 2

## 2019-01-23 ENCOUNTER — OFFICE VISIT (OUTPATIENT)
Dept: ORTHOPEDIC SURGERY | Age: 59
End: 2019-01-23
Payer: COMMERCIAL

## 2019-01-23 VITALS
SYSTOLIC BLOOD PRESSURE: 116 MMHG | HEIGHT: 64 IN | WEIGHT: 173 LBS | DIASTOLIC BLOOD PRESSURE: 72 MMHG | HEART RATE: 93 BPM | BODY MASS INDEX: 29.53 KG/M2

## 2019-01-23 DIAGNOSIS — Z98.890 HISTORY OF ANKLE SURGERY: ICD-10-CM

## 2019-01-23 PROCEDURE — L4361 PNEUMA/VAC WALK BOOT PRE OTS: HCPCS | Performed by: ORTHOPAEDIC SURGERY

## 2019-01-23 PROCEDURE — 99024 POSTOP FOLLOW-UP VISIT: CPT | Performed by: ORTHOPAEDIC SURGERY

## 2019-01-24 ENCOUNTER — TELEPHONE (OUTPATIENT)
Dept: ORTHOPEDIC SURGERY | Age: 59
End: 2019-01-24

## 2019-02-07 ENCOUNTER — OFFICE VISIT (OUTPATIENT)
Dept: PAIN MANAGEMENT | Age: 59
End: 2019-02-07
Payer: COMMERCIAL

## 2019-02-07 VITALS
BODY MASS INDEX: 29.42 KG/M2 | HEART RATE: 85 BPM | DIASTOLIC BLOOD PRESSURE: 72 MMHG | OXYGEN SATURATION: 97 % | WEIGHT: 171.4 LBS | SYSTOLIC BLOOD PRESSURE: 110 MMHG

## 2019-02-07 DIAGNOSIS — M76.62 ACHILLES TENDINITIS OF LEFT LOWER EXTREMITY: ICD-10-CM

## 2019-02-07 DIAGNOSIS — M17.0 PRIMARY OSTEOARTHRITIS OF BOTH KNEES: ICD-10-CM

## 2019-02-07 DIAGNOSIS — G89.4 CHRONIC PAIN SYNDROME: Primary | ICD-10-CM

## 2019-02-07 DIAGNOSIS — Z98.890 S/P ACHILLES TENDON REPAIR: ICD-10-CM

## 2019-02-07 DIAGNOSIS — M54.41 CHRONIC BILATERAL LOW BACK PAIN WITH BILATERAL SCIATICA: ICD-10-CM

## 2019-02-07 DIAGNOSIS — M51.36 BULGING LUMBAR DISC: ICD-10-CM

## 2019-02-07 DIAGNOSIS — G89.29 CHRONIC BILATERAL LOW BACK PAIN WITH BILATERAL SCIATICA: ICD-10-CM

## 2019-02-07 DIAGNOSIS — M54.42 CHRONIC BILATERAL LOW BACK PAIN WITH BILATERAL SCIATICA: ICD-10-CM

## 2019-02-07 DIAGNOSIS — M76.62 LEFT ACHILLES TENDINITIS: ICD-10-CM

## 2019-02-07 DIAGNOSIS — Z98.890 S/P LEFT KNEE ARTHROSCOPY: ICD-10-CM

## 2019-02-07 DIAGNOSIS — M47.816 LUMBAR ARTHROPATHY: ICD-10-CM

## 2019-02-07 DIAGNOSIS — M70.71 BURSITIS OF BOTH HIPS, UNSPECIFIED BURSA: ICD-10-CM

## 2019-02-07 DIAGNOSIS — M70.72 BURSITIS OF BOTH HIPS, UNSPECIFIED BURSA: ICD-10-CM

## 2019-02-07 DIAGNOSIS — Z96.651 HISTORY OF TOTAL RIGHT KNEE REPLACEMENT: ICD-10-CM

## 2019-02-07 PROCEDURE — 99213 OFFICE O/P EST LOW 20 MIN: CPT | Performed by: NURSE PRACTITIONER

## 2019-02-07 RX ORDER — HYDROCODONE BITARTRATE AND ACETAMINOPHEN 5; 325 MG/1; MG/1
1 TABLET ORAL EVERY 8 HOURS PRN
Qty: 63 TABLET | Refills: 0 | Status: SHIPPED | OUTPATIENT
Start: 2019-02-07 | End: 2019-02-28

## 2019-02-07 RX ORDER — GABAPENTIN 300 MG/1
300 CAPSULE ORAL 3 TIMES DAILY
Qty: 90 CAPSULE | Refills: 0 | Status: SHIPPED | OUTPATIENT
Start: 2019-02-07 | End: 2019-03-07 | Stop reason: SDUPTHER

## 2019-02-13 ENCOUNTER — OFFICE VISIT (OUTPATIENT)
Dept: ORTHOPEDIC SURGERY | Age: 59
End: 2019-02-13

## 2019-02-13 VITALS
WEIGHT: 173 LBS | HEIGHT: 64 IN | DIASTOLIC BLOOD PRESSURE: 82 MMHG | BODY MASS INDEX: 29.53 KG/M2 | SYSTOLIC BLOOD PRESSURE: 123 MMHG | HEART RATE: 82 BPM

## 2019-02-13 DIAGNOSIS — Z98.890 HISTORY OF ANKLE SURGERY: Primary | ICD-10-CM

## 2019-02-13 PROCEDURE — 99024 POSTOP FOLLOW-UP VISIT: CPT | Performed by: PHYSICIAN ASSISTANT

## 2019-02-26 ENCOUNTER — OFFICE VISIT (OUTPATIENT)
Dept: ORTHOPEDIC SURGERY | Age: 59
End: 2019-02-26

## 2019-02-26 VITALS
BODY MASS INDEX: 29.53 KG/M2 | WEIGHT: 173 LBS | HEART RATE: 87 BPM | SYSTOLIC BLOOD PRESSURE: 112 MMHG | HEIGHT: 64 IN | DIASTOLIC BLOOD PRESSURE: 76 MMHG

## 2019-02-26 DIAGNOSIS — T81.89XD DELAYED SURGICAL WOUND HEALING, SUBSEQUENT ENCOUNTER: ICD-10-CM

## 2019-02-26 DIAGNOSIS — Z98.890 HISTORY OF ANKLE SURGERY: Primary | ICD-10-CM

## 2019-02-26 PROCEDURE — 99024 POSTOP FOLLOW-UP VISIT: CPT | Performed by: PHYSICIAN ASSISTANT

## 2019-03-07 ENCOUNTER — OFFICE VISIT (OUTPATIENT)
Dept: PAIN MANAGEMENT | Age: 59
End: 2019-03-07
Payer: COMMERCIAL

## 2019-03-07 VITALS — DIASTOLIC BLOOD PRESSURE: 82 MMHG | SYSTOLIC BLOOD PRESSURE: 121 MMHG | HEART RATE: 117 BPM | OXYGEN SATURATION: 96 %

## 2019-03-07 DIAGNOSIS — Z96.651 HISTORY OF TOTAL RIGHT KNEE REPLACEMENT: ICD-10-CM

## 2019-03-07 DIAGNOSIS — G89.4 CHRONIC PAIN SYNDROME: ICD-10-CM

## 2019-03-07 DIAGNOSIS — M76.62 ACHILLES TENDINITIS OF LEFT LOWER EXTREMITY: ICD-10-CM

## 2019-03-07 DIAGNOSIS — G89.29 CHRONIC BILATERAL LOW BACK PAIN WITH BILATERAL SCIATICA: ICD-10-CM

## 2019-03-07 DIAGNOSIS — Z98.890 S/P LEFT KNEE ARTHROSCOPY: ICD-10-CM

## 2019-03-07 DIAGNOSIS — M70.72 BURSITIS OF BOTH HIPS, UNSPECIFIED BURSA: ICD-10-CM

## 2019-03-07 DIAGNOSIS — M47.816 LUMBAR ARTHROPATHY: ICD-10-CM

## 2019-03-07 DIAGNOSIS — M54.41 CHRONIC BILATERAL LOW BACK PAIN WITH BILATERAL SCIATICA: ICD-10-CM

## 2019-03-07 DIAGNOSIS — M51.36 BULGING LUMBAR DISC: ICD-10-CM

## 2019-03-07 DIAGNOSIS — Z98.890 S/P ACHILLES TENDON REPAIR: ICD-10-CM

## 2019-03-07 DIAGNOSIS — M17.0 PRIMARY OSTEOARTHRITIS OF BOTH KNEES: ICD-10-CM

## 2019-03-07 DIAGNOSIS — M70.71 BURSITIS OF BOTH HIPS, UNSPECIFIED BURSA: ICD-10-CM

## 2019-03-07 DIAGNOSIS — M54.42 CHRONIC BILATERAL LOW BACK PAIN WITH BILATERAL SCIATICA: ICD-10-CM

## 2019-03-07 DIAGNOSIS — M76.62 LEFT ACHILLES TENDINITIS: ICD-10-CM

## 2019-03-07 PROCEDURE — 99213 OFFICE O/P EST LOW 20 MIN: CPT | Performed by: NURSE PRACTITIONER

## 2019-03-07 RX ORDER — HYDROCODONE BITARTRATE AND ACETAMINOPHEN 5; 325 MG/1; MG/1
1 TABLET ORAL EVERY 8 HOURS PRN
Qty: 84 TABLET | Refills: 0 | Status: SHIPPED | OUTPATIENT
Start: 2019-03-07 | End: 2019-04-04 | Stop reason: SDUPTHER

## 2019-03-07 RX ORDER — DULOXETIN HYDROCHLORIDE 60 MG/1
60 CAPSULE, DELAYED RELEASE ORAL DAILY
Qty: 30 CAPSULE | Refills: 1 | Status: SHIPPED | OUTPATIENT
Start: 2019-03-07 | End: 2019-04-04 | Stop reason: SDUPTHER

## 2019-03-07 RX ORDER — GABAPENTIN 300 MG/1
300 CAPSULE ORAL 3 TIMES DAILY
Qty: 90 CAPSULE | Refills: 0 | Status: SHIPPED | OUTPATIENT
Start: 2019-03-07 | End: 2019-04-04 | Stop reason: SDUPTHER

## 2019-03-12 ENCOUNTER — OFFICE VISIT (OUTPATIENT)
Dept: ORTHOPEDIC SURGERY | Age: 59
End: 2019-03-12

## 2019-03-12 VITALS
WEIGHT: 173 LBS | DIASTOLIC BLOOD PRESSURE: 64 MMHG | HEIGHT: 64 IN | BODY MASS INDEX: 29.53 KG/M2 | HEART RATE: 84 BPM | SYSTOLIC BLOOD PRESSURE: 111 MMHG

## 2019-03-12 DIAGNOSIS — Z98.890 HISTORY OF ANKLE SURGERY: Primary | ICD-10-CM

## 2019-03-12 DIAGNOSIS — T81.89XD DELAYED SURGICAL WOUND HEALING, SUBSEQUENT ENCOUNTER: ICD-10-CM

## 2019-03-12 PROBLEM — T81.89XA DELAYED SURGICAL WOUND HEALING: Status: ACTIVE | Noted: 2019-03-12

## 2019-03-12 PROCEDURE — 99024 POSTOP FOLLOW-UP VISIT: CPT | Performed by: PHYSICIAN ASSISTANT

## 2019-03-26 ENCOUNTER — OFFICE VISIT (OUTPATIENT)
Dept: ORTHOPEDIC SURGERY | Age: 59
End: 2019-03-26

## 2019-03-26 VITALS
WEIGHT: 171 LBS | HEIGHT: 64 IN | BODY MASS INDEX: 29.19 KG/M2 | DIASTOLIC BLOOD PRESSURE: 75 MMHG | HEART RATE: 83 BPM | SYSTOLIC BLOOD PRESSURE: 108 MMHG

## 2019-03-26 DIAGNOSIS — T81.89XD DELAYED SURGICAL WOUND HEALING, SUBSEQUENT ENCOUNTER: ICD-10-CM

## 2019-03-26 DIAGNOSIS — Z98.890 HISTORY OF ANKLE SURGERY: Primary | ICD-10-CM

## 2019-03-26 PROCEDURE — 99024 POSTOP FOLLOW-UP VISIT: CPT | Performed by: PHYSICIAN ASSISTANT

## 2019-04-04 ENCOUNTER — OFFICE VISIT (OUTPATIENT)
Dept: PAIN MANAGEMENT | Age: 59
End: 2019-04-04
Payer: COMMERCIAL

## 2019-04-04 VITALS — OXYGEN SATURATION: 98 % | HEART RATE: 88 BPM | DIASTOLIC BLOOD PRESSURE: 80 MMHG | SYSTOLIC BLOOD PRESSURE: 118 MMHG

## 2019-04-04 DIAGNOSIS — G89.4 CHRONIC PAIN SYNDROME: ICD-10-CM

## 2019-04-04 DIAGNOSIS — M17.0 PRIMARY OSTEOARTHRITIS OF BOTH KNEES: ICD-10-CM

## 2019-04-04 DIAGNOSIS — M51.36 BULGING LUMBAR DISC: ICD-10-CM

## 2019-04-04 DIAGNOSIS — M54.41 CHRONIC BILATERAL LOW BACK PAIN WITH BILATERAL SCIATICA: ICD-10-CM

## 2019-04-04 DIAGNOSIS — M70.72 BURSITIS OF BOTH HIPS, UNSPECIFIED BURSA: ICD-10-CM

## 2019-04-04 DIAGNOSIS — M47.816 LUMBAR ARTHROPATHY: ICD-10-CM

## 2019-04-04 DIAGNOSIS — M76.62 LEFT ACHILLES TENDINITIS: ICD-10-CM

## 2019-04-04 DIAGNOSIS — M76.62 ACHILLES TENDINITIS OF LEFT LOWER EXTREMITY: ICD-10-CM

## 2019-04-04 DIAGNOSIS — Z96.651 HISTORY OF TOTAL RIGHT KNEE REPLACEMENT: ICD-10-CM

## 2019-04-04 DIAGNOSIS — M54.42 CHRONIC BILATERAL LOW BACK PAIN WITH BILATERAL SCIATICA: ICD-10-CM

## 2019-04-04 DIAGNOSIS — M70.71 BURSITIS OF BOTH HIPS, UNSPECIFIED BURSA: ICD-10-CM

## 2019-04-04 DIAGNOSIS — Z98.890 S/P LEFT KNEE ARTHROSCOPY: ICD-10-CM

## 2019-04-04 DIAGNOSIS — G89.29 CHRONIC BILATERAL LOW BACK PAIN WITH BILATERAL SCIATICA: ICD-10-CM

## 2019-04-04 DIAGNOSIS — Z98.890 S/P ACHILLES TENDON REPAIR: ICD-10-CM

## 2019-04-04 PROCEDURE — 99213 OFFICE O/P EST LOW 20 MIN: CPT | Performed by: NURSE PRACTITIONER

## 2019-04-04 RX ORDER — GABAPENTIN 300 MG/1
300 CAPSULE ORAL 3 TIMES DAILY
Qty: 90 CAPSULE | Refills: 0 | Status: SHIPPED | OUTPATIENT
Start: 2019-04-04 | End: 2019-05-02 | Stop reason: SDUPTHER

## 2019-04-04 RX ORDER — HYDROCODONE BITARTRATE AND ACETAMINOPHEN 5; 325 MG/1; MG/1
1 TABLET ORAL EVERY 8 HOURS PRN
Qty: 84 TABLET | Refills: 0 | Status: SHIPPED | OUTPATIENT
Start: 2019-04-04 | End: 2019-05-02 | Stop reason: SDUPTHER

## 2019-04-04 RX ORDER — DULOXETIN HYDROCHLORIDE 60 MG/1
60 CAPSULE, DELAYED RELEASE ORAL DAILY
Qty: 30 CAPSULE | Refills: 1 | Status: SHIPPED | OUTPATIENT
Start: 2019-04-04 | End: 2019-05-02 | Stop reason: SDUPTHER

## 2019-04-04 NOTE — PROGRESS NOTES
Ollie Vernon  1960  [id number]      HISTORY OF PRESENT ILLNESS:  Ms. Karina Benton is a 59 y.o. female returns for a follow up visit for pain management  She has a diagnosis of   1. Chronic pain syndrome    2. S/P Achilles tendon debridement with Dr. Teddy Wang 1/11/19    3. Left Achilles tendinitis    4. Primary osteoarthritis of both knees    5. S/P left knee arthroscopy synovectomy and chondroplasty 8/10/18    6. History of total right knee replacement, +revision x3    7. Lumbar arthropathy    8. Bulging lumbar disc    9. Chronic bilateral low back pain with bilateral sciatica    10. Bursitis of both hips, unspecified bursa    11. Achilles tendinitis of left lower extremity      On the Patients Pain Assessment form:  She complains of pain in the both buttocks, left foot/feet: entire area, right knee(s), right leg(s): entire limb and bilateral lower back She rates the pain 7/10 and describes it as sharp, aching, burning, numbness, pins and needles. Current treatment regimen has helped relieve about 50% of the pain. She denies any side effects from the current pain regimen. Patient reports that since the last follow up visit the physical functioning is unchanged, family/social relationships are unchanged, mood is unchanged sleep patterns are unchanged, and that the overall functioning is unchanged. Patient denies misusing/abusing her narcotic pain medications or using any illegal drugs. There are No indicators for possible drug abuse, addiction or diversion problems. Upon obtaining medical history from Ms. Karina Benton states that pain is manageable on current pain therapy. Recovering well post left achilles tendon surgery 3 months ago. Returned to work a week ago, which causes pain at times, pain medications adequately manages her pain, takes them as prescribed. Mood is stable without anxiety. Sleep is fair with an average of 5-6 hours. Denies to having issues of constipation.  Tolerating activities/house chores with moderate tenderness to the lower back/left achilles. Working on smoking cessation    ALLERGIES: Patients list of allergies were reviewed     MEDICATIONS: Ms. Osiris Valenzuela list of medications were reviewed. Her current medications are   Outpatient Medications Prior to Visit   Medication Sig Dispense Refill    Naloxone HCl (EVZIO) 0.4 MG/0.4ML SOAJ Use as directed 1 Package 0    Prenatal Vit-Fe Fumarate-FA (PRENATAL COMPLETE PO) Take by mouth daily      calcium-vitamin D (OSCAL-500) 500-200 MG-UNIT per tablet Take 1 tablet by mouth daily      fluticasone (FLONASE) 50 MCG/ACT nasal spray 1 spray by Nasal route nightly       hydrochlorothiazide (HYDRODIURIL) 25 MG tablet 12.5 mg daily       DULoxetine (CYMBALTA) 60 MG extended release capsule Take 1 capsule by mouth daily 30 capsule 1    gabapentin (NEURONTIN) 300 MG capsule Take 1 capsule by mouth 3 times daily for 30 days. 90 capsule 0    HYDROcodone-acetaminophen (NORCO) 5-325 MG per tablet Take 1 tablet by mouth every 8 hours as needed for Pain for up to 28 days. 84 tablet 0     No facility-administered medications prior to visit. SOCIAL/FAMILY/PAST MEDICAL HISTORY: Ms. Elizabeth Good, family and past medical history was reviewed. REVIEW OF SYSTEMS:    Respiratory: Negative for apnea, chest tightness and shortness of breath or change in baseline breathing. Gastrointestinal: Negative for nausea, vomiting, abdominal pain, diarrhea, constipation, blood in stool and abdominal distention. PHYSICAL EXAM:   Nursing note and vitals reviewed. /80   Pulse 88   LMP  (LMP Unknown)   SpO2 98%   Constitutional: She appears well-developed and well-nourished. No acute distress. Skin: Skin is warm and dry, good turgor. No rash noted. She is not diaphoretic. Cardiovascular: Normal rate, regular rhythm, normal heart sounds, and does not have murmur. Pulmonary/Chest: Effort normal. No respiratory distress.  She does not have wheezes in the lung fields. She has no rales. Neurological/Psychiatric:She is alert and oriented to person, place, and time. Coordination is  normal.  Her mood isAppropriate and affect is Neutral/Euthymic(normal) . IMPRESSION:   1. Chronic pain syndrome    2. S/P Achilles tendon debridement with Dr. Jose L Kim 1/11/19    3. Left Achilles tendinitis    4. Primary osteoarthritis of both knees    5. S/P left knee arthroscopy synovectomy and chondroplasty 8/10/18    6. History of total right knee replacement, +revision x3    7. Lumbar arthropathy    8. Bulging lumbar disc    9. Chronic bilateral low back pain with bilateral sciatica    10. Bursitis of both hips, unspecified bursa    11. Achilles tendinitis of left lower extremity        PLAN:  Informed verbal consent was obtained  -Continue with Norco, Neurontin, Cymbalta, Evzio  -Nathanael exercises  -Maintain f/u with orthopedics post left ankle surgery  -Advised patient to quit smoking for  health related concerns and to improve the treatment outcomes. Education was given on quitting smoking and the use of different modalities including medications, hypnotherapy, counselling  and biofeedback. These were discussed with patient. -CBT techniques- relaxation therapies such as biofeedback, mindfulness based stress reduction, imagery, cognitive restructuring, problem solving discussed with patient  -Last UDS consistent  -Return in about 1 month (around 5/2/2019). -[place] was obtained and reviewed  for the last one year and no indicators of drug misuse  were found. Any other controlled substance prescriptions  seen on the record have been accounted for, I am aware of the patient receiving these medications. Sara January [place] record will be rechecked as part of office protocol.      Current Outpatient Medications   Medication Sig Dispense Refill    DULoxetine (CYMBALTA) 60 MG extended release capsule Take 1 capsule by mouth daily 30 capsule 1    gabapentin (NEURONTIN) 300 MG capsule Take 1 capsule by mouth 3 times daily for 30 days. 90 capsule 0    HYDROcodone-acetaminophen (NORCO) 5-325 MG per tablet Take 1 tablet by mouth every 8 hours as needed for Pain for up to 28 days. 84 tablet 0    Naloxone HCl (EVZIO) 0.4 MG/0.4ML SOAJ Use as directed 1 Package 0    Prenatal Vit-Fe Fumarate-FA (PRENATAL COMPLETE PO) Take by mouth daily      calcium-vitamin D (OSCAL-500) 500-200 MG-UNIT per tablet Take 1 tablet by mouth daily      fluticasone (FLONASE) 50 MCG/ACT nasal spray 1 spray by Nasal route nightly       hydrochlorothiazide (HYDRODIURIL) 25 MG tablet 12.5 mg daily        No current facility-administered medications for this visit. I will continue her current medication regimen  which is part of the above treatment schedule. It has been helping with Ms. Vu's chronic  medical problems which for this visit include:   Diagnoses of Chronic pain syndrome, S/P Achilles tendon debridement with Dr. Immanuel Garcia 1/11/19, Left Achilles tendinitis, Primary osteoarthritis of both knees, S/P left knee arthroscopy synovectomy and chondroplasty 8/10/18, History of total right knee replacement, +revision x3, Lumbar arthropathy, Bulging lumbar disc, Chronic bilateral low back pain with bilateral sciatica, Bursitis of both hips, unspecified bursa, and Achilles tendinitis of left lower extremity were pertinent to this visit. Risks and benefits of the medications and other alternative treatments  including no treatment were discussed with the patient. The common side effects of these medications were also explained to the patient. Informed verbal consent was obtained. Goals of current treatment regimen include improvement in pain, restoration of functioning- with focus on improvement in physical performance, general activity, work or disability,emotional distress, health care utilization and  decreased medication consumption.  Will continue to monitor progress towards achieving/maintaining therapeutic goals with special emphasis on  1. Improvement in perceived interfernce  of pain with ADL's. Ability to do home exercises independently. Ability to do household chores indoor and/or outdoor work and social and leisure activities. Improve psychosocial and physical functioning. - she is showing progression towards this treatment goal with the current regimen. 2. Ability to focus/concentrate at work and perform the duties required of her at work  Sit through a workday without lower extremity symptoms. Stand 30-60 minutes without lower extremity symptoms. Ability to lift up to 10-20 lbs. Ability to go up and down stairs. Sit 30-60 minutes  Without having to stand up frequently. - she is maintaining/progressing towards her work related goals with the current regimen. She was advised against drinking alcohol with the narcotic pain medicines, advised against driving or handling machinery while adjusting the dose of medicines or if having cognitive  issues related to the current medications. Risk of overdose and death, if medicines not taken as prescribed, were also discussed. If the patient develops new symptoms or if the symptoms worsen, the patient should call the office. While transcribing every attempt was made to maintain the accuracy of the note in terms of it's contents,there may have been some errors made inadvertently. Thank you for allowing me to participate in the care of this patient.     Katalina Galdamez CNP    Cc: Delma Ashford

## 2019-04-04 NOTE — PATIENT INSTRUCTIONS
Patient Education        Back Stretches: Exercises  Your Care Instructions  Here are some examples of exercises for stretching your back. Start each exercise slowly. Ease off the exercise if you start to have pain. Your doctor or physical therapist will tell you when you can start these exercises and which ones will work best for you. How to do the exercises  Overhead stretch    1. Stand comfortably with your feet shoulder-width apart. 2. Looking straight ahead, raise both arms over your head and reach toward the ceiling. Do not allow your head to tilt back. 3. Hold for 15 to 30 seconds, then lower your arms to your sides. 4. Repeat 2 to 4 times. Side stretch    1. Stand comfortably with your feet shoulder-width apart. 2. Raise one arm over your head, and then lean to the other side. 3. Slide your hand down your leg as you let the weight of your arm gently stretch your side muscles. Hold for 15 to 30 seconds. 4. Repeat 2 to 4 times on each side. Press-up    1. Lie on your stomach, supporting your body with your forearms. 2. Press your elbows down into the floor to raise your upper back. As you do this, relax your stomach muscles and allow your back to arch without using your back muscles. As your press up, do not let your hips or pelvis come off the floor. 3. Hold for 15 to 30 seconds, then relax. 4. Repeat 2 to 4 times. Relax and rest    1. Lie on your back with a rolled towel under your neck and a pillow under your knees. Extend your arms comfortably to your sides. 2. Relax and breathe normally. 3. Remain in this position for about 10 minutes. 4. If you can, do this 2 or 3 times each day. Follow-up care is a key part of your treatment and safety. Be sure to make and go to all appointments, and call your doctor if you are having problems. It's also a good idea to know your test results and keep a list of the medicines you take. Where can you learn more?   Go to [email or web address] and sign in to your [employer] account. Enter U479 in the [employer] box to learn more about \"Back Stretches: Exercises. \"     If you do not have an account, please click on the \"Sign Up Now\" link. Current as of: September 20, 2018  Content Version: 11.9  © 4825-6109 [employer]. Care instructions adapted under license by [employer]. If you have questions about a medical condition or this instruction, always ask your healthcare professional. [address] any warranty or liability for your use of this information.

## 2019-05-02 ENCOUNTER — OFFICE VISIT (OUTPATIENT)
Dept: PAIN MANAGEMENT | Age: 59
End: 2019-05-02
Payer: COMMERCIAL

## 2019-05-02 VITALS — HEART RATE: 94 BPM | SYSTOLIC BLOOD PRESSURE: 119 MMHG | DIASTOLIC BLOOD PRESSURE: 88 MMHG | OXYGEN SATURATION: 98 %

## 2019-05-02 DIAGNOSIS — M47.816 LUMBAR ARTHROPATHY: ICD-10-CM

## 2019-05-02 DIAGNOSIS — M54.41 CHRONIC BILATERAL LOW BACK PAIN WITH BILATERAL SCIATICA: ICD-10-CM

## 2019-05-02 DIAGNOSIS — M17.0 PRIMARY OSTEOARTHRITIS OF BOTH KNEES: ICD-10-CM

## 2019-05-02 DIAGNOSIS — M54.42 CHRONIC BILATERAL LOW BACK PAIN WITH BILATERAL SCIATICA: ICD-10-CM

## 2019-05-02 DIAGNOSIS — M70.72 BURSITIS OF BOTH HIPS, UNSPECIFIED BURSA: ICD-10-CM

## 2019-05-02 DIAGNOSIS — M76.62 LEFT ACHILLES TENDINITIS: ICD-10-CM

## 2019-05-02 DIAGNOSIS — Z98.890 S/P ACHILLES TENDON REPAIR: ICD-10-CM

## 2019-05-02 DIAGNOSIS — Z98.890 S/P LEFT KNEE ARTHROSCOPY: ICD-10-CM

## 2019-05-02 DIAGNOSIS — M70.71 BURSITIS OF BOTH HIPS, UNSPECIFIED BURSA: ICD-10-CM

## 2019-05-02 DIAGNOSIS — G89.29 CHRONIC BILATERAL LOW BACK PAIN WITH BILATERAL SCIATICA: ICD-10-CM

## 2019-05-02 DIAGNOSIS — Z96.651 HISTORY OF TOTAL RIGHT KNEE REPLACEMENT: ICD-10-CM

## 2019-05-02 DIAGNOSIS — G89.4 CHRONIC PAIN SYNDROME: ICD-10-CM

## 2019-05-02 DIAGNOSIS — M51.36 BULGING LUMBAR DISC: ICD-10-CM

## 2019-05-02 PROCEDURE — 99213 OFFICE O/P EST LOW 20 MIN: CPT | Performed by: NURSE PRACTITIONER

## 2019-05-02 RX ORDER — DULOXETIN HYDROCHLORIDE 60 MG/1
60 CAPSULE, DELAYED RELEASE ORAL DAILY
Qty: 30 CAPSULE | Refills: 1 | Status: SHIPPED | OUTPATIENT
Start: 2019-05-02 | End: 2019-07-01 | Stop reason: SDUPTHER

## 2019-05-02 RX ORDER — HYDROCODONE BITARTRATE AND ACETAMINOPHEN 5; 325 MG/1; MG/1
1 TABLET ORAL EVERY 8 HOURS PRN
Qty: 84 TABLET | Refills: 0 | Status: SHIPPED | OUTPATIENT
Start: 2019-05-02 | End: 2019-05-30 | Stop reason: SDUPTHER

## 2019-05-02 RX ORDER — GABAPENTIN 300 MG/1
300 CAPSULE ORAL 3 TIMES DAILY
Qty: 90 CAPSULE | Refills: 0 | Status: SHIPPED | OUTPATIENT
Start: 2019-05-02 | End: 2019-05-30 | Stop reason: SDUPTHER

## 2019-05-02 NOTE — PROGRESS NOTES
Abdirahman Doctor  1960  [id number]      HISTORY OF PRESENT ILLNESS:  Ms. Rupa Pepper is a 59 y.o. female returns for a follow up visit for pain management  She has a diagnosis of   1. Chronic pain syndrome    2. S/P Achilles tendon debridement with Dr. Melia Espino 1/11/19    3. Left Achilles tendinitis    4. Primary osteoarthritis of both knees    5. S/P left knee arthroscopy synovectomy and chondroplasty 8/10/18    6. History of total right knee replacement, +revision x3    7. Lumbar arthropathy    8. Bulging lumbar disc    9. Chronic bilateral low back pain with bilateral sciatica    10. Bursitis of both hips, unspecified bursa      On the Patients Pain Assessment form:  She complains of pain in the both buttocks, right knee(s), right leg(s): upper, mid and bilateral lower back She rates the pain 6/10 and describes it as sharp, aching, burning. Current treatment regimen has helped relieve about 40% of the pain. She denies any side effects from the current pain regimen. Patient reports that since the last follow up visit the physical functioning is unchanged, family/social relationships are unchanged, mood is unchanged sleep patterns are unchanged, and that the overall functioning is unchanged. Patient denies misusing/abusing her narcotic pain medications or using any illegal drugs. There are No indicators for possible drug abuse, addiction or diversion problems. Upon obtaining medical history from Ms. Rupa Pepper states that pain is manageable on current pain therapy. Recovering well post left achilles surgery. 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 Pain is adequately manages by her current medications. Mood is stable without anxiety. Sleep is fair with an average of 5-6 hours. Denies to having issues of constipation. Tolerating activities/house chores with moderate tenderness to the lower back.  Smokes 1/2 a pack of cigarettes a day    ALLERGIES: Patients list of allergies were reviewed     MEDICATIONS: Ms. Luis Angel Watkins list of medications were reviewed. Her current medications are   Outpatient Medications Prior to Visit   Medication Sig Dispense Refill    Naloxone HCl (EVZIO) 0.4 MG/0.4ML SOAJ Use as directed 1 Package 0    Prenatal Vit-Fe Fumarate-FA (PRENATAL COMPLETE PO) Take by mouth daily      calcium-vitamin D (OSCAL-500) 500-200 MG-UNIT per tablet Take 1 tablet by mouth daily      fluticasone (FLONASE) 50 MCG/ACT nasal spray 1 spray by Nasal route nightly       hydrochlorothiazide (HYDRODIURIL) 25 MG tablet 12.5 mg daily       DULoxetine (CYMBALTA) 60 MG extended release capsule Take 1 capsule by mouth daily 30 capsule 1    gabapentin (NEURONTIN) 300 MG capsule Take 1 capsule by mouth 3 times daily for 30 days. 90 capsule 0    HYDROcodone-acetaminophen (NORCO) 5-325 MG per tablet Take 1 tablet by mouth every 8 hours as needed for Pain for up to 28 days. 84 tablet 0     No facility-administered medications prior to visit. SOCIAL/FAMILY/PAST MEDICAL HISTORY: Ms. Ildefonso Scott, family and past medical history was reviewed. REVIEW OF SYSTEMS:    Respiratory: Negative for apnea, chest tightness and shortness of breath or change in baseline breathing. Gastrointestinal: Negative for nausea, vomiting, abdominal pain, diarrhea, constipation, blood in stool and abdominal distention. PHYSICAL EXAM:   Nursing note and vitals reviewed. /88   Pulse 94   LMP  (LMP Unknown)   SpO2 98%   Constitutional: She appears well-developed and well-nourished. No acute distress. Skin: Skin is warm and dry, good turgor. No rash noted. She is not diaphoretic. Cardiovascular: Normal rate, regular rhythm, normal heart sounds, and does not have murmur. Pulmonary/Chest: Effort normal. No respiratory distress. She does not have wheezes in the lung fields. She has no rales. Neurological/Psychiatric:She is alert and oriented to person, place, and time. Coordination is  normal.  Her mood isAppropriate and affect is Neutral/Euthymic(normal) . IMPRESSION:   1. Chronic pain syndrome    2. S/P Achilles tendon debridement with Dr. Dallas Pace 1/11/19    3. Left Achilles tendinitis    4. Primary osteoarthritis of both knees    5. S/P left knee arthroscopy synovectomy and chondroplasty 8/10/18    6. History of total right knee replacement, +revision x3    7. Lumbar arthropathy    8. Bulging lumbar disc    9. Chronic bilateral low back pain with bilateral sciatica    10. Bursitis of both hips, unspecified bursa        PLAN:  Informed verbal consent was obtained   -Continue with Norco, Neurontin Cymbalta, Evzio  -ZTLido 1.8% topical daily  -Nathanael exercises  -Advised patient to avoid lifting material over 40 pounds. Maintain proper body lifting and bending techniques to avoid straining her back. Verbalized understanding  -Advised patient to quit smoking for  health related concerns and to improve the treatment outcomes. Education was given on quitting smoking and the use of different modalities including medications, hypnotherapy, counselling  and biofeedback. These were discussed with patient. -CBT techniques- relaxation therapies such as biofeedback, mindfulness based stress reduction, imagery, cognitive restructuring, problem solving discussed with patient  -Last UDS consistent  -Return in about 1 month (around 5/30/2019). Current Outpatient Medications   Medication Sig Dispense Refill    DULoxetine (CYMBALTA) 60 MG extended release capsule Take 1 capsule by mouth daily 30 capsule 1    gabapentin (NEURONTIN) 300 MG capsule Take 1 capsule by mouth 3 times daily for 30 days. 90 capsule 0    HYDROcodone-acetaminophen (NORCO) 5-325 MG per tablet Take 1 tablet by mouth every 8 hours as needed for Pain for up to 28 days.  84 tablet 0    Lidocaine (ZTLIDO) 1.8 % PTCH Apply 1 patch topically daily 30 patch 0    Naloxone HCl (EVZIO) 0.4 MG/0.4ML SOAJ Use as directed 1 Package 0    Prenatal Vit-Fe Fumarate-FA (PRENATAL COMPLETE PO) Take by mouth daily      calcium-vitamin D (OSCAL-500) 500-200 MG-UNIT per tablet Take 1 tablet by mouth daily      fluticasone (FLONASE) 50 MCG/ACT nasal spray 1 spray by Nasal route nightly       hydrochlorothiazide (HYDRODIURIL) 25 MG tablet 12.5 mg daily        No current facility-administered medications for this visit. I will continue her current medication regimen  which is part of the above treatment schedule. It has been helping with Ms. Vu's chronic  medical problems which for this visit include:   Diagnoses of Chronic pain syndrome, S/P Achilles tendon debridement with Dr. Teri Vigil 1/11/19, Left Achilles tendinitis, Primary osteoarthritis of both knees, S/P left knee arthroscopy synovectomy and chondroplasty 8/10/18, History of total right knee replacement, +revision x3, Lumbar arthropathy, Bulging lumbar disc, Chronic bilateral low back pain with bilateral sciatica, and Bursitis of both hips, unspecified bursa were pertinent to this visit. Risks and benefits of the medications and other alternative treatments  including no treatment were discussed with the patient. The common side effects of these medications were also explained to the patient. Informed verbal consent was obtained. Goals of current treatment regimen include improvement in pain, restoration of functioning- with focus on improvement in physical performance, general activity, work or disability,emotional distress, health care utilization and  decreased medication consumption. Will continue to monitor progress towards achieving/maintaining therapeutic goals with special emphasis on  1. Improvement in perceived interfernce  of pain with ADL's. Ability to do home exercises independently. Ability to do household chores indoor and/or outdoor work and social and leisure activities. Improve psychosocial and physical functioning. - she is showing progression towards

## 2019-05-30 ENCOUNTER — OFFICE VISIT (OUTPATIENT)
Dept: PAIN MANAGEMENT | Age: 59
End: 2019-05-30
Payer: COMMERCIAL

## 2019-05-30 VITALS — SYSTOLIC BLOOD PRESSURE: 126 MMHG | DIASTOLIC BLOOD PRESSURE: 80 MMHG | HEART RATE: 91 BPM | OXYGEN SATURATION: 100 %

## 2019-05-30 DIAGNOSIS — M70.71 BURSITIS OF BOTH HIPS, UNSPECIFIED BURSA: ICD-10-CM

## 2019-05-30 DIAGNOSIS — M54.41 CHRONIC BILATERAL LOW BACK PAIN WITH BILATERAL SCIATICA: ICD-10-CM

## 2019-05-30 DIAGNOSIS — M47.816 LUMBAR ARTHROPATHY: ICD-10-CM

## 2019-05-30 DIAGNOSIS — S46.912A STRAIN OF LEFT SHOULDER, INITIAL ENCOUNTER: ICD-10-CM

## 2019-05-30 DIAGNOSIS — Z96.651 HISTORY OF TOTAL RIGHT KNEE REPLACEMENT: ICD-10-CM

## 2019-05-30 DIAGNOSIS — M54.42 CHRONIC BILATERAL LOW BACK PAIN WITH BILATERAL SCIATICA: ICD-10-CM

## 2019-05-30 DIAGNOSIS — M70.72 BURSITIS OF BOTH HIPS, UNSPECIFIED BURSA: ICD-10-CM

## 2019-05-30 DIAGNOSIS — G89.29 CHRONIC BILATERAL LOW BACK PAIN WITH BILATERAL SCIATICA: ICD-10-CM

## 2019-05-30 DIAGNOSIS — G89.4 CHRONIC PAIN SYNDROME: Primary | ICD-10-CM

## 2019-05-30 DIAGNOSIS — M17.0 PRIMARY OSTEOARTHRITIS OF BOTH KNEES: ICD-10-CM

## 2019-05-30 DIAGNOSIS — Z98.890 S/P LEFT KNEE ARTHROSCOPY: ICD-10-CM

## 2019-05-30 DIAGNOSIS — M76.62 LEFT ACHILLES TENDINITIS: ICD-10-CM

## 2019-05-30 DIAGNOSIS — M51.36 BULGING LUMBAR DISC: ICD-10-CM

## 2019-05-30 DIAGNOSIS — Z98.890 S/P ACHILLES TENDON REPAIR: ICD-10-CM

## 2019-05-30 PROCEDURE — 99213 OFFICE O/P EST LOW 20 MIN: CPT | Performed by: NURSE PRACTITIONER

## 2019-05-30 RX ORDER — HYDROCODONE BITARTRATE AND ACETAMINOPHEN 5; 325 MG/1; MG/1
1 TABLET ORAL EVERY 8 HOURS PRN
Qty: 90 TABLET | Refills: 0 | Status: SHIPPED | OUTPATIENT
Start: 2019-05-30 | End: 2019-05-30 | Stop reason: SDUPTHER

## 2019-05-30 RX ORDER — HYDROCODONE BITARTRATE AND ACETAMINOPHEN 5; 325 MG/1; MG/1
1 TABLET ORAL EVERY 8 HOURS PRN
Qty: 84 TABLET | Refills: 0 | Status: SHIPPED | OUTPATIENT
Start: 2019-05-30 | End: 2019-05-30 | Stop reason: SDUPTHER

## 2019-05-30 RX ORDER — GABAPENTIN 300 MG/1
300 CAPSULE ORAL 3 TIMES DAILY
Qty: 90 CAPSULE | Refills: 0 | Status: SHIPPED | OUTPATIENT
Start: 2019-05-30 | End: 2019-07-01 | Stop reason: SDUPTHER

## 2019-05-30 RX ORDER — HYDROCODONE BITARTRATE AND ACETAMINOPHEN 5; 325 MG/1; MG/1
1 TABLET ORAL EVERY 8 HOURS PRN
Qty: 21 TABLET | Refills: 0 | Status: SHIPPED | OUTPATIENT
Start: 2019-05-30 | End: 2019-07-01 | Stop reason: SDUPTHER

## 2019-05-30 NOTE — PROGRESS NOTES
Dilip Dani  1960  [id number]      HISTORY OF PRESENT ILLNESS:  Ms. Richelle Slo is a 59 y.o. female returns for a follow up visit for pain management  She has a diagnosis of   1. Chronic pain syndrome    2. Strain of left shoulder, initial encounter    3. S/P Achilles tendon debridement with Dr. Jericho Salgado 1/11/19    4. Left Achilles tendinitis    5. Primary osteoarthritis of both knees    6. S/P left knee arthroscopy synovectomy and chondroplasty 8/10/18    7. History of total right knee replacement, +revision x3    8. Lumbar arthropathy    9. Bulging lumbar disc    10. Chronic bilateral low back pain with bilateral sciatica    11. Bursitis of both hips, unspecified bursa      On the Patients Pain Assessment form:  She complains of pain in the left arm(s): entire limb, both buttocks, left foot/feet: entire area, both knee(s), both leg(s): scattered, bilateral lower back and left shoulder(s): entire area She rates the pain 8/10 and describes it as sharp, aching, burning, numbness, pins and needles. Current treatment regimen has helped relieve about 40% of the pain. She denies any side effects from the current pain regimen. Patient reports that since the last follow up visit the physical functioning is unchanged, family/social relationships are unchanged, mood is unchanged sleep patterns are unchanged, and that the overall functioning is unchanged. Patient denies misusing/abusing her narcotic pain medications or using any illegal drugs. There are No indicators for possible drug abuse, addiction or diversion problems. Upon obtaining medical history from Ms. Richelle Sol states that pain is manageable on current pain therapy. Admits to tenderness in the left shoulder for 4 days. Admits to having exercises on a machine that may have pulled on the shoulder. Pain medications adequatley manages her pain, takes medications as prescribed. Mood is stable without anxiety. Sleep is fair with an average of 5-6 hours.  Denies to having issues of constipation. Tolerating activities/house chores with moderate tenderness to the lower back. Working on smoking cessation    ALLERGIES: Patients list of allergies were reviewed     MEDICATIONS: Ms. Kathrin Garcia list of medications were reviewed. Her current medications are   Outpatient Medications Prior to Visit   Medication Sig Dispense Refill    DULoxetine (CYMBALTA) 60 MG extended release capsule Take 1 capsule by mouth daily 30 capsule 1    Naloxone HCl (EVZIO) 0.4 MG/0.4ML SOAJ Use as directed 1 Package 0    Prenatal Vit-Fe Fumarate-FA (PRENATAL COMPLETE PO) Take by mouth daily      calcium-vitamin D (OSCAL-500) 500-200 MG-UNIT per tablet Take 1 tablet by mouth daily      fluticasone (FLONASE) 50 MCG/ACT nasal spray 1 spray by Nasal route nightly       hydrochlorothiazide (HYDRODIURIL) 25 MG tablet 12.5 mg daily       gabapentin (NEURONTIN) 300 MG capsule Take 1 capsule by mouth 3 times daily for 30 days. 90 capsule 0    HYDROcodone-acetaminophen (NORCO) 5-325 MG per tablet Take 1 tablet by mouth every 8 hours as needed for Pain for up to 28 days. 84 tablet 0    Lidocaine (ZTLIDO) 1.8 % PTCH Apply 1 patch topically daily 30 patch 0     No facility-administered medications prior to visit. SOCIAL/FAMILY/PAST MEDICAL HISTORY: Ms. Wilkins Oar, family and past medical history was reviewed. REVIEW OF SYSTEMS:    Respiratory: Negative for apnea, chest tightness and shortness of breath or change in baseline breathing. Gastrointestinal: Negative for nausea, vomiting, abdominal pain, diarrhea, constipation, blood in stool and abdominal distention. PHYSICAL EXAM:   Nursing note and vitals reviewed. /80   Pulse 91   LMP  (LMP Unknown)   SpO2 100%   Constitutional: She appears well-developed and well-nourished. No acute distress. Skin: Skin is warm and dry, good turgor. No rash noted. She is not diaphoretic.   Cardiovascular: Normal rate, regular rhythm, normal heart sounds, and does not have murmur. Pulmonary/Chest: Effort normal. No respiratory distress. She does not have wheezes in the lung fields. She has no rales. Neurological/Psychiatric:She is alert and oriented to person, place, and time. Coordination is  normal. +Left shoulder pain Her mood isAppropriate and affect is Neutral/Euthymic(normal) . IMPRESSION:   1. Chronic pain syndrome    2. Strain of left shoulder, initial encounter    3. S/P Achilles tendon debridement with Dr. Ernestina Quiroga 1/11/19    4. Left Achilles tendinitis    5. Primary osteoarthritis of both knees    6. S/P left knee arthroscopy synovectomy and chondroplasty 8/10/18    7. History of total right knee replacement, +revision x3    8. Lumbar arthropathy    9. Bulging lumbar disc    10. Chronic bilateral low back pain with bilateral sciatica    11. Bursitis of both hips, unspecified bursa        PLAN:  Informed verbal consent was obtained  -Continue with Norco, Neurontin, Cymbalta, Evzio, ZTLido  -Shoulder exercise material provided  -Declined Xray of the left shoulder, opted to monitor as pain medications manage the pain. Advised to call the office for concerns or worsening of symptoms. She verbalized understanding  -Advised patient to quit smoking for  health related concerns and to improve the treatment outcomes. Education was given on quitting smoking and the use of different modalities including medications, hypnotherapy, counselling  and biofeedback. These were discussed with patient. -CBT techniques- relaxation therapies such as biofeedback, mindfulness based stress reduction, imagery, cognitive restructuring, problem solving discussed with patient  -Last UDS consistent  -Return in about 1 month (around 6/27/2019). Current Outpatient Medications   Medication Sig Dispense Refill    gabapentin (NEURONTIN) 300 MG capsule Take 1 capsule by mouth 3 times daily for 30 days.  90 capsule 0    Lidocaine (ZTLIDO) 1.8 % PTCH Apply 1 patch topically daily 30 patch 0    HYDROcodone-acetaminophen (NORCO) 5-325 MG per tablet Take 1 tablet by mouth every 8 hours as needed for Pain for up to 7 days. !Do not fill until 6/30/19! 21 tablet 0    DULoxetine (CYMBALTA) 60 MG extended release capsule Take 1 capsule by mouth daily 30 capsule 1    Naloxone HCl (EVZIO) 0.4 MG/0.4ML SOAJ Use as directed 1 Package 0    Prenatal Vit-Fe Fumarate-FA (PRENATAL COMPLETE PO) Take by mouth daily      calcium-vitamin D (OSCAL-500) 500-200 MG-UNIT per tablet Take 1 tablet by mouth daily      fluticasone (FLONASE) 50 MCG/ACT nasal spray 1 spray by Nasal route nightly       hydrochlorothiazide (HYDRODIURIL) 25 MG tablet 12.5 mg daily        No current facility-administered medications for this visit. I will continue her current medication regimen  which is part of the above treatment schedule. It has been helping with Ms. Vu's chronic  medical problems which for this visit include: The primary encounter diagnosis was Chronic pain syndrome. Diagnoses of Strain of left shoulder, initial encounter, S/P Achilles tendon debridement with Dr. Justino Schaeffer 1/11/19, Left Achilles tendinitis, Primary osteoarthritis of both knees, S/P left knee arthroscopy synovectomy and chondroplasty 8/10/18, History of total right knee replacement, +revision x3, Lumbar arthropathy, Bulging lumbar disc, Chronic bilateral low back pain with bilateral sciatica, and Bursitis of both hips, unspecified bursa were also pertinent to this visit. Risks and benefits of the medications and other alternative treatments  including no treatment were discussed with the patient. The common side effects of these medications were also explained to the patient. Informed verbal consent was obtained.    Goals of current treatment regimen include improvement in pain, restoration of functioning- with focus on improvement in physical performance, general activity, work or disability,emotional distress, health care utilization and  decreased medication consumption. Will continue to monitor progress towards achieving/maintaining therapeutic goals with special emphasis on  1. Improvement in perceived interfernce  of pain with ADL's. Ability to do home exercises independently. Ability to do household chores indoor and/or outdoor work and social and leisure activities. Improve psychosocial and physical functioning. - she is showing progression towards this treatment goal with the current regimen. 2. Ability to focus/concentrate at work and perform the duties required of her at work  Sit through a workday without lower extremity symptoms. Stand 30-60 minutes without lower extremity symptoms. Ability to lift up to 10-20 lbs. Ability to go up and down stairs. Sit 30-60 minutes  Without having to stand up frequently. - she is maintaining/progressing towards her work related goals with the current regimen. She was advised against drinking alcohol with the narcotic pain medicines, advised against driving or handling machinery while adjusting the dose of medicines or if having cognitive  issues related to the current medications. Risk of overdose and death, if medicines not taken as prescribed, were also discussed. If the patient develops new symptoms or if the symptoms worsen, the patient should call the office. While transcribing every attempt was made to maintain the accuracy of the note in terms of it's contents,there may have been some errors made inadvertently. Thank you for allowing me to participate in the care of this patient.     Dany Child CNP    Cc: Patrick Briones

## 2019-05-30 NOTE — PATIENT INSTRUCTIONS
Patient Education        Back Stretches: Exercises  Your Care Instructions  Here are some examples of exercises for stretching your back. Start each exercise slowly. Ease off the exercise if you start to have pain. Your doctor or physical therapist will tell you when you can start these exercises and which ones will work best for you. How to do the exercises  Overhead stretch    1. Stand comfortably with your feet shoulder-width apart. 2. Looking straight ahead, raise both arms over your head and reach toward the ceiling. Do not allow your head to tilt back. 3. Hold for 15 to 30 seconds, then lower your arms to your sides. 4. Repeat 2 to 4 times. Side stretch    1. Stand comfortably with your feet shoulder-width apart. 2. Raise one arm over your head, and then lean to the other side. 3. Slide your hand down your leg as you let the weight of your arm gently stretch your side muscles. Hold for 15 to 30 seconds. 4. Repeat 2 to 4 times on each side. Press-up    1. Lie on your stomach, supporting your body with your forearms. 2. Press your elbows down into the floor to raise your upper back. As you do this, relax your stomach muscles and allow your back to arch without using your back muscles. As your press up, do not let your hips or pelvis come off the floor. 3. Hold for 15 to 30 seconds, then relax. 4. Repeat 2 to 4 times. Relax and rest    1. Lie on your back with a rolled towel under your neck and a pillow under your knees. Extend your arms comfortably to your sides. 2. Relax and breathe normally. 3. Remain in this position for about 10 minutes. 4. If you can, do this 2 or 3 times each day. Follow-up care is a key part of your treatment and safety. Be sure to make and go to all appointments, and call your doctor if you are having problems. It's also a good idea to know your test results and keep a list of the medicines you take. Where can you learn more?   Go to [email or web address] and sign in to your LaunchTrackt account. Enter J866 in the VII NETWORKhire box to learn more about \"Back Stretches: Exercises. \"     If you do not have an account, please click on the \"Sign Up Now\" link. Current as of: September 20, 2018  Content Version: 12.0  © 6004-7626 [employer]. Care instructions adapted under license by [facility] ([facility]). If you have questions about a medical condition or this instruction, always ask your healthcare professional. [address] any warranty or liability for your use of this information. Patient Education        Shoulder Stretches: Exercises  Your Care Instructions  Here are some examples of exercises for your shoulder. Start each exercise slowly. Ease off the exercise if you start to have pain. Your doctor or physical therapist will tell you when you can start these exercises and which ones will work best for you. How to do the exercises  Shoulder stretch    1.  a doorway and place one arm against the door frame. Your elbow should be a little higher than your shoulder. 2. Relax your shoulders as you lean forward, allowing your chest and shoulder muscles to stretch. You can also turn your body slightly away from your arm to stretch the muscles even more. 3. Hold for 15 to 30 seconds. 4. Repeat 2 to 4 times with each arm. Shoulder and chest stretch    1. Shoulder and chest stretch  2. While sitting, relax your upper body so you slump slightly in your chair. 3. As you breathe in, straighten your back and open your arms out to the sides. 4. Gently pull your shoulder blades back and downward. 5. Hold for 15 to 30 seconds as your breathe normally. 6. Repeat 2 to 4 times. Overhead stretch    1. Reach up over your head with both arms. 2. Hold for 15 to 30 seconds. 3. Repeat 2 to 4 times. Follow-up care is a key part of your treatment and safety.  Be sure to make and go to all appointments, and call your doctor if you are having problems. It's also a good idea to know your test results and keep a list of the medicines you take. Where can you learn more? Go to [email or web address] and sign in to your [employer] account. Enter S254 in the OZ SafeRooms box to learn more about \"Shoulder Stretches: Exercises. \"     If you do not have an account, please click on the \"Sign Up Now\" link. Current as of: September 20, 2018  Content Version: 12.0  © 5593-3543 Healthwise, [employer]. Care instructions adapted under license by [facility] [place] ([facility]). If you have questions about a medical condition or this instruction, always ask your healthcare professional. [address] any warranty or liability for your use of this information.

## 2019-06-03 ENCOUNTER — TELEPHONE (OUTPATIENT)
Dept: PAIN MANAGEMENT | Age: 59
End: 2019-06-03

## 2019-06-03 DIAGNOSIS — S46.912A STRAIN OF LEFT SHOULDER, INITIAL ENCOUNTER: ICD-10-CM

## 2019-06-03 DIAGNOSIS — M76.62 LEFT ACHILLES TENDINITIS: ICD-10-CM

## 2019-06-03 DIAGNOSIS — Z98.890 S/P ACHILLES TENDON REPAIR: ICD-10-CM

## 2019-06-03 DIAGNOSIS — G89.4 CHRONIC PAIN SYNDROME: Primary | ICD-10-CM

## 2019-06-03 RX ORDER — LIDOCAINE 50 MG/G
1 PATCH TOPICAL DAILY
Qty: 30 PATCH | Refills: 0 | Status: SHIPPED | OUTPATIENT
Start: 2019-06-03 | End: 2019-07-01 | Stop reason: SDUPTHER

## 2019-06-03 NOTE — TELEPHONE ENCOUNTER
[address] [place] [address] calling about ZTLido patches. They state for the whole month, ZTLido brand will be over $100. If we give the lidocaine 5% patches, it will be $8 for the month.

## 2019-07-01 ENCOUNTER — OFFICE VISIT (OUTPATIENT)
Dept: PAIN MANAGEMENT | Age: 59
End: 2019-07-01
Payer: COMMERCIAL

## 2019-07-01 VITALS — HEART RATE: 84 BPM | OXYGEN SATURATION: 95 % | SYSTOLIC BLOOD PRESSURE: 125 MMHG | DIASTOLIC BLOOD PRESSURE: 82 MMHG

## 2019-07-01 DIAGNOSIS — Z98.890 S/P LEFT KNEE ARTHROSCOPY: ICD-10-CM

## 2019-07-01 DIAGNOSIS — Z98.890 S/P ACHILLES TENDON REPAIR: ICD-10-CM

## 2019-07-01 DIAGNOSIS — M70.72 BURSITIS OF BOTH HIPS, UNSPECIFIED BURSA: ICD-10-CM

## 2019-07-01 DIAGNOSIS — M54.41 CHRONIC BILATERAL LOW BACK PAIN WITH BILATERAL SCIATICA: ICD-10-CM

## 2019-07-01 DIAGNOSIS — G89.29 CHRONIC BILATERAL LOW BACK PAIN WITH BILATERAL SCIATICA: ICD-10-CM

## 2019-07-01 DIAGNOSIS — M54.42 CHRONIC BILATERAL LOW BACK PAIN WITH BILATERAL SCIATICA: ICD-10-CM

## 2019-07-01 DIAGNOSIS — Z96.651 HISTORY OF TOTAL RIGHT KNEE REPLACEMENT: ICD-10-CM

## 2019-07-01 DIAGNOSIS — G89.4 CHRONIC PAIN SYNDROME: ICD-10-CM

## 2019-07-01 DIAGNOSIS — M76.62 LEFT ACHILLES TENDINITIS: ICD-10-CM

## 2019-07-01 DIAGNOSIS — F17.200 READY TO QUIT SMOKING: ICD-10-CM

## 2019-07-01 DIAGNOSIS — S46.912A STRAIN OF LEFT SHOULDER, INITIAL ENCOUNTER: ICD-10-CM

## 2019-07-01 DIAGNOSIS — M17.0 PRIMARY OSTEOARTHRITIS OF BOTH KNEES: ICD-10-CM

## 2019-07-01 DIAGNOSIS — M70.71 BURSITIS OF BOTH HIPS, UNSPECIFIED BURSA: ICD-10-CM

## 2019-07-01 DIAGNOSIS — M47.816 LUMBAR ARTHROPATHY: ICD-10-CM

## 2019-07-01 DIAGNOSIS — M51.36 BULGING LUMBAR DISC: ICD-10-CM

## 2019-07-01 PROCEDURE — 99213 OFFICE O/P EST LOW 20 MIN: CPT | Performed by: NURSE PRACTITIONER

## 2019-07-01 RX ORDER — NICOTINE 21 MG/24HR
1 PATCH, TRANSDERMAL 24 HOURS TRANSDERMAL DAILY
Qty: 42 PATCH | Refills: 0 | Status: SHIPPED | OUTPATIENT
Start: 2019-07-01 | End: 2020-01-30 | Stop reason: ALTCHOICE

## 2019-07-01 RX ORDER — DULOXETIN HYDROCHLORIDE 60 MG/1
60 CAPSULE, DELAYED RELEASE ORAL DAILY
Qty: 30 CAPSULE | Refills: 1 | Status: SHIPPED | OUTPATIENT
Start: 2019-07-01 | End: 2019-08-01 | Stop reason: SDUPTHER

## 2019-07-01 RX ORDER — HYDROCODONE BITARTRATE AND ACETAMINOPHEN 5; 325 MG/1; MG/1
1 TABLET ORAL EVERY 8 HOURS PRN
Qty: 90 TABLET | Refills: 0 | Status: SHIPPED | OUTPATIENT
Start: 2019-07-01 | End: 2019-08-01 | Stop reason: SDUPTHER

## 2019-07-01 RX ORDER — GABAPENTIN 300 MG/1
300 CAPSULE ORAL 3 TIMES DAILY
Qty: 90 CAPSULE | Refills: 0 | Status: SHIPPED | OUTPATIENT
Start: 2019-07-01 | End: 2019-08-01 | Stop reason: SDUPTHER

## 2019-07-01 RX ORDER — LIDOCAINE 50 MG/G
1 PATCH TOPICAL DAILY
Qty: 30 PATCH | Refills: 0 | Status: SHIPPED | OUTPATIENT
Start: 2019-07-01 | End: 2020-01-21 | Stop reason: SDUPTHER

## 2019-07-01 NOTE — PATIENT INSTRUCTIONS
Patient Education        Back Stretches: Exercises  Your Care Instructions  Here are some examples of exercises for stretching your back. Start each exercise slowly. Ease off the exercise if you start to have pain. Your doctor or physical therapist will tell you when you can start these exercises and which ones will work best for you. How to do the exercises  Overhead stretch    1. Stand comfortably with your feet shoulder-width apart. 2. Looking straight ahead, raise both arms over your head and reach toward the ceiling. Do not allow your head to tilt back. 3. Hold for 15 to 30 seconds, then lower your arms to your sides. 4. Repeat 2 to 4 times. Side stretch    1. Stand comfortably with your feet shoulder-width apart. 2. Raise one arm over your head, and then lean to the other side. 3. Slide your hand down your leg as you let the weight of your arm gently stretch your side muscles. Hold for 15 to 30 seconds. 4. Repeat 2 to 4 times on each side. Press-up    1. Lie on your stomach, supporting your body with your forearms. 2. Press your elbows down into the floor to raise your upper back. As you do this, relax your stomach muscles and allow your back to arch without using your back muscles. As your press up, do not let your hips or pelvis come off the floor. 3. Hold for 15 to 30 seconds, then relax. 4. Repeat 2 to 4 times. Relax and rest    1. Lie on your back with a rolled towel under your neck and a pillow under your knees. Extend your arms comfortably to your sides. 2. Relax and breathe normally. 3. Remain in this position for about 10 minutes. 4. If you can, do this 2 or 3 times each day. Follow-up care is a key part of your treatment and safety. Be sure to make and go to all appointments, and call your doctor if you are having problems. It's also a good idea to know your test results and keep a list of the medicines you take. Where can you learn more?   Go to [email or web address] and sign in to your [employer] account. Enter T360 in the ExploraMedhire box to learn more about \"Back Stretches: Exercises. \"     If you do not have an account, please click on the \"Sign Up Now\" link. Current as of: September 20, 2018  Content Version: 12.0  © 2749-7719 Healthwise, [employer]. Care instructions adapted under license by [address] If you have questions about a medical condition or this instruction, always ask your healthcare professional. [address] any warranty or liability for your use of this information.

## 2019-07-01 NOTE — PROGRESS NOTES
with moderate tenderness to the lower back. ALLERGIES: Patients list of allergies were reviewed     MEDICATIONS: Ms. Mary Anne Hernandez list of medications were reviewed. Her current medications are   Outpatient Medications Prior to Visit   Medication Sig Dispense Refill    Naloxone HCl (EVZIO) 0.4 MG/0.4ML SOAJ Use as directed 1 Package 0    Prenatal Vit-Fe Fumarate-FA (PRENATAL COMPLETE PO) Take by mouth daily      calcium-vitamin D (OSCAL-500) 500-200 MG-UNIT per tablet Take 1 tablet by mouth daily      fluticasone (FLONASE) 50 MCG/ACT nasal spray 1 spray by Nasal route nightly       hydrochlorothiazide (HYDRODIURIL) 25 MG tablet 12.5 mg daily       lidocaine (LIDODERM) 5 % Place 1 patch onto the skin daily 12 hours on, 12 hours off. 30 patch 0    gabapentin (NEURONTIN) 300 MG capsule Take 1 capsule by mouth 3 times daily for 30 days. 90 capsule 0    DULoxetine (CYMBALTA) 60 MG extended release capsule Take 1 capsule by mouth daily 30 capsule 1     No facility-administered medications prior to visit. SOCIAL/FAMILY/PAST MEDICAL HISTORY: Ms. Farrah Blanton, family and past medical history was reviewed. REVIEW OF SYSTEMS:    Respiratory: Negative for apnea, chest tightness and shortness of breath or change in baseline breathing. Gastrointestinal: Negative for nausea, vomiting, abdominal pain, diarrhea, constipation, blood in stool and abdominal distention. PHYSICAL EXAM:   Nursing note and vitals reviewed. /82   Pulse 84   LMP  (LMP Unknown)   SpO2 95%   Constitutional: She appears well-developed and well-nourished. No acute distress. Skin: Skin is warm and dry, good turgor. No rash noted. She is not diaphoretic. Cardiovascular: Normal rate, regular rhythm, normal heart sounds, and does not have murmur. Pulmonary/Chest: Effort normal. No respiratory distress. She does not have wheezes in the lung fields. She has no rales.      Neurological/Psychiatric:She is alert and oriented to

## 2019-08-01 ENCOUNTER — OFFICE VISIT (OUTPATIENT)
Dept: PAIN MANAGEMENT | Age: 59
End: 2019-08-01
Payer: COMMERCIAL

## 2019-08-01 VITALS
DIASTOLIC BLOOD PRESSURE: 76 MMHG | WEIGHT: 170 LBS | BODY MASS INDEX: 29.17 KG/M2 | HEART RATE: 92 BPM | OXYGEN SATURATION: 96 % | SYSTOLIC BLOOD PRESSURE: 118 MMHG

## 2019-08-01 DIAGNOSIS — Z96.651 HISTORY OF TOTAL RIGHT KNEE REPLACEMENT: ICD-10-CM

## 2019-08-01 DIAGNOSIS — M76.62 LEFT ACHILLES TENDINITIS: ICD-10-CM

## 2019-08-01 DIAGNOSIS — M70.72 BURSITIS OF BOTH HIPS, UNSPECIFIED BURSA: ICD-10-CM

## 2019-08-01 DIAGNOSIS — Z98.890 S/P ACHILLES TENDON REPAIR: ICD-10-CM

## 2019-08-01 DIAGNOSIS — M70.71 BURSITIS OF BOTH HIPS, UNSPECIFIED BURSA: ICD-10-CM

## 2019-08-01 DIAGNOSIS — M47.816 LUMBAR ARTHROPATHY: ICD-10-CM

## 2019-08-01 DIAGNOSIS — M54.41 CHRONIC BILATERAL LOW BACK PAIN WITH BILATERAL SCIATICA: ICD-10-CM

## 2019-08-01 DIAGNOSIS — S46.912A STRAIN OF LEFT SHOULDER, INITIAL ENCOUNTER: ICD-10-CM

## 2019-08-01 DIAGNOSIS — F17.200 READY TO QUIT SMOKING: ICD-10-CM

## 2019-08-01 DIAGNOSIS — G89.29 CHRONIC BILATERAL LOW BACK PAIN WITH BILATERAL SCIATICA: ICD-10-CM

## 2019-08-01 DIAGNOSIS — M51.36 BULGING LUMBAR DISC: ICD-10-CM

## 2019-08-01 DIAGNOSIS — M17.0 PRIMARY OSTEOARTHRITIS OF BOTH KNEES: ICD-10-CM

## 2019-08-01 DIAGNOSIS — M54.42 CHRONIC BILATERAL LOW BACK PAIN WITH BILATERAL SCIATICA: ICD-10-CM

## 2019-08-01 DIAGNOSIS — Z98.890 S/P LEFT KNEE ARTHROSCOPY: ICD-10-CM

## 2019-08-01 DIAGNOSIS — G89.4 CHRONIC PAIN SYNDROME: ICD-10-CM

## 2019-08-01 PROCEDURE — 99213 OFFICE O/P EST LOW 20 MIN: CPT | Performed by: NURSE PRACTITIONER

## 2019-08-01 RX ORDER — DULOXETIN HYDROCHLORIDE 60 MG/1
60 CAPSULE, DELAYED RELEASE ORAL DAILY
Qty: 30 CAPSULE | Refills: 1 | Status: SHIPPED | OUTPATIENT
Start: 2019-08-01 | End: 2019-09-30 | Stop reason: SDUPTHER

## 2019-08-01 RX ORDER — GABAPENTIN 300 MG/1
300 CAPSULE ORAL 3 TIMES DAILY
Qty: 90 CAPSULE | Refills: 0 | Status: SHIPPED | OUTPATIENT
Start: 2019-08-01 | End: 2019-08-29 | Stop reason: SDUPTHER

## 2019-08-01 RX ORDER — HYDROCODONE BITARTRATE AND ACETAMINOPHEN 5; 325 MG/1; MG/1
1 TABLET ORAL EVERY 8 HOURS PRN
Qty: 90 TABLET | Refills: 0 | Status: SHIPPED | OUTPATIENT
Start: 2019-08-01 | End: 2019-08-29 | Stop reason: SDUPTHER

## 2019-08-01 NOTE — PATIENT INSTRUCTIONS
Patient Education        Back Stretches: Exercises  Your Care Instructions  Here are some examples of exercises for stretching your back. Start each exercise slowly. Ease off the exercise if you start to have pain. Your doctor or physical therapist will tell you when you can start these exercises and which ones will work best for you. How to do the exercises  Overhead stretch    1. Stand comfortably with your feet shoulder-width apart. 2. Looking straight ahead, raise both arms over your head and reach toward the ceiling. Do not allow your head to tilt back. 3. Hold for 15 to 30 seconds, then lower your arms to your sides. 4. Repeat 2 to 4 times. Side stretch    1. Stand comfortably with your feet shoulder-width apart. 2. Raise one arm over your head, and then lean to the other side. 3. Slide your hand down your leg as you let the weight of your arm gently stretch your side muscles. Hold for 15 to 30 seconds. 4. Repeat 2 to 4 times on each side. Press-up    1. Lie on your stomach, supporting your body with your forearms. 2. Press your elbows down into the floor to raise your upper back. As you do this, relax your stomach muscles and allow your back to arch without using your back muscles. As your press up, do not let your hips or pelvis come off the floor. 3. Hold for 15 to 30 seconds, then relax. 4. Repeat 2 to 4 times. Relax and rest    1. Lie on your back with a rolled towel under your neck and a pillow under your knees. Extend your arms comfortably to your sides. 2. Relax and breathe normally. 3. Remain in this position for about 10 minutes. 4. If you can, do this 2 or 3 times each day. Follow-up care is a key part of your treatment and safety. Be sure to make and go to all appointments, and call your doctor if you are having problems. It's also a good idea to know your test results and keep a list of the medicines you take. Where can you learn more?   Go to [email or web address] and sign in to your [employer] account. Enter R025 in the Who is Undercover Spyhire box to learn more about \"Back Stretches: Exercises. \"     If you do not have an account, please click on the \"Sign Up Now\" link. Current as of: September 20, 2018  Content Version: 12.0  © 6965-0558 Healthwise, Incorporated. Care instructions adapted under license by [facility] ([facility]). If you have questions about a medical condition or this instruction, always ask your healthcare professional. [address] any warranty or liability for your use of this information.

## 2019-08-29 ENCOUNTER — OFFICE VISIT (OUTPATIENT)
Dept: PAIN MANAGEMENT | Age: 59
End: 2019-08-29
Payer: COMMERCIAL

## 2019-08-29 VITALS — DIASTOLIC BLOOD PRESSURE: 84 MMHG | OXYGEN SATURATION: 97 % | HEART RATE: 92 BPM | SYSTOLIC BLOOD PRESSURE: 123 MMHG

## 2019-08-29 DIAGNOSIS — F17.200 READY TO QUIT SMOKING: ICD-10-CM

## 2019-08-29 DIAGNOSIS — Z98.890 S/P ACHILLES TENDON REPAIR: ICD-10-CM

## 2019-08-29 DIAGNOSIS — M54.41 CHRONIC BILATERAL LOW BACK PAIN WITH BILATERAL SCIATICA: ICD-10-CM

## 2019-08-29 DIAGNOSIS — G89.29 CHRONIC BILATERAL LOW BACK PAIN WITH BILATERAL SCIATICA: ICD-10-CM

## 2019-08-29 DIAGNOSIS — G89.4 CHRONIC PAIN SYNDROME: ICD-10-CM

## 2019-08-29 DIAGNOSIS — Z98.890 S/P LEFT KNEE ARTHROSCOPY: ICD-10-CM

## 2019-08-29 DIAGNOSIS — M17.0 PRIMARY OSTEOARTHRITIS OF BOTH KNEES: ICD-10-CM

## 2019-08-29 DIAGNOSIS — M70.72 BURSITIS OF BOTH HIPS, UNSPECIFIED BURSA: ICD-10-CM

## 2019-08-29 DIAGNOSIS — M70.71 BURSITIS OF BOTH HIPS, UNSPECIFIED BURSA: ICD-10-CM

## 2019-08-29 DIAGNOSIS — M47.816 LUMBAR ARTHROPATHY: ICD-10-CM

## 2019-08-29 DIAGNOSIS — M51.36 BULGING LUMBAR DISC: ICD-10-CM

## 2019-08-29 DIAGNOSIS — S46.912A STRAIN OF LEFT SHOULDER, INITIAL ENCOUNTER: ICD-10-CM

## 2019-08-29 DIAGNOSIS — M76.62 LEFT ACHILLES TENDINITIS: ICD-10-CM

## 2019-08-29 DIAGNOSIS — M54.42 CHRONIC BILATERAL LOW BACK PAIN WITH BILATERAL SCIATICA: ICD-10-CM

## 2019-08-29 DIAGNOSIS — Z96.651 HISTORY OF TOTAL RIGHT KNEE REPLACEMENT: ICD-10-CM

## 2019-08-29 PROCEDURE — 99213 OFFICE O/P EST LOW 20 MIN: CPT | Performed by: NURSE PRACTITIONER

## 2019-08-29 RX ORDER — HYDROCODONE BITARTRATE AND ACETAMINOPHEN 5; 325 MG/1; MG/1
1 TABLET ORAL EVERY 8 HOURS PRN
Qty: 90 TABLET | Refills: 0 | Status: SHIPPED | OUTPATIENT
Start: 2019-08-29 | End: 2019-09-30 | Stop reason: SDUPTHER

## 2019-08-29 RX ORDER — GABAPENTIN 300 MG/1
300 CAPSULE ORAL 3 TIMES DAILY
Qty: 90 CAPSULE | Refills: 0 | Status: SHIPPED | OUTPATIENT
Start: 2019-08-29 | End: 2019-09-30 | Stop reason: SDUPTHER

## 2019-08-29 NOTE — PROGRESS NOTES
showing progression towards this treatment goal with the current regimen. 2. Ability to focus/concentrate at work and perform the duties required of her at work  Sit through a workday without lower extremity symptoms. Stand 30-60 minutes without lower extremity symptoms. Ability to lift up to 10-20 lbs. Ability to go up and down stairs. Sit 30-60 minutes  Without having to stand up frequently. - she is maintaining/progressing towards her work related goals with the current regimen. She was advised against drinking alcohol with the narcotic pain medicines, advised against driving or handling machinery while adjusting the dose of medicines or if having cognitive  issues related to the current medications. Risk of overdose and death, if medicines not taken as prescribed, were also discussed. If the patient develops new symptoms or if the symptoms worsen, the patient should call the office. While transcribing every attempt was made to maintain the accuracy of the note in terms of it's contents,there may have been some errors made inadvertently. Thank you for allowing me to participate in the care of this patient.     Kenny Parnell CNP    Cc: Falguni Good

## 2019-09-30 ENCOUNTER — OFFICE VISIT (OUTPATIENT)
Dept: PAIN MANAGEMENT | Age: 59
End: 2019-09-30
Payer: COMMERCIAL

## 2019-09-30 VITALS — OXYGEN SATURATION: 97 % | DIASTOLIC BLOOD PRESSURE: 78 MMHG | SYSTOLIC BLOOD PRESSURE: 115 MMHG | HEART RATE: 96 BPM

## 2019-09-30 DIAGNOSIS — M17.0 PRIMARY OSTEOARTHRITIS OF BOTH KNEES: ICD-10-CM

## 2019-09-30 DIAGNOSIS — F17.200 READY TO QUIT SMOKING: ICD-10-CM

## 2019-09-30 DIAGNOSIS — M76.62 LEFT ACHILLES TENDINITIS: ICD-10-CM

## 2019-09-30 DIAGNOSIS — G89.29 CHRONIC BILATERAL LOW BACK PAIN WITH BILATERAL SCIATICA: ICD-10-CM

## 2019-09-30 DIAGNOSIS — Z98.890 S/P ACHILLES TENDON REPAIR: ICD-10-CM

## 2019-09-30 DIAGNOSIS — M70.71 BURSITIS OF BOTH HIPS, UNSPECIFIED BURSA: ICD-10-CM

## 2019-09-30 DIAGNOSIS — G89.4 CHRONIC PAIN SYNDROME: ICD-10-CM

## 2019-09-30 DIAGNOSIS — Z96.651 HISTORY OF TOTAL RIGHT KNEE REPLACEMENT: ICD-10-CM

## 2019-09-30 DIAGNOSIS — M54.42 CHRONIC BILATERAL LOW BACK PAIN WITH BILATERAL SCIATICA: ICD-10-CM

## 2019-09-30 DIAGNOSIS — M51.36 BULGING LUMBAR DISC: ICD-10-CM

## 2019-09-30 DIAGNOSIS — M70.72 BURSITIS OF BOTH HIPS, UNSPECIFIED BURSA: ICD-10-CM

## 2019-09-30 DIAGNOSIS — Z98.890 S/P LEFT KNEE ARTHROSCOPY: ICD-10-CM

## 2019-09-30 DIAGNOSIS — M54.41 CHRONIC BILATERAL LOW BACK PAIN WITH BILATERAL SCIATICA: ICD-10-CM

## 2019-09-30 DIAGNOSIS — M47.816 LUMBAR ARTHROPATHY: ICD-10-CM

## 2019-09-30 PROCEDURE — 99213 OFFICE O/P EST LOW 20 MIN: CPT | Performed by: NURSE PRACTITIONER

## 2019-09-30 RX ORDER — GABAPENTIN 300 MG/1
300 CAPSULE ORAL 3 TIMES DAILY
Qty: 90 CAPSULE | Refills: 0 | Status: SHIPPED | OUTPATIENT
Start: 2019-09-30 | End: 2019-10-28 | Stop reason: SDUPTHER

## 2019-09-30 RX ORDER — DULOXETIN HYDROCHLORIDE 60 MG/1
60 CAPSULE, DELAYED RELEASE ORAL DAILY
Qty: 30 CAPSULE | Refills: 1 | Status: SHIPPED | OUTPATIENT
Start: 2019-09-30 | End: 2019-11-25 | Stop reason: SDUPTHER

## 2019-09-30 RX ORDER — HYDROCODONE BITARTRATE AND ACETAMINOPHEN 5; 325 MG/1; MG/1
1 TABLET ORAL EVERY 8 HOURS PRN
Qty: 90 TABLET | Refills: 0 | Status: SHIPPED | OUTPATIENT
Start: 2019-09-30 | End: 2019-10-28 | Stop reason: SDUPTHER

## 2019-10-23 ENCOUNTER — TELEPHONE (OUTPATIENT)
Dept: PAIN MANAGEMENT | Age: 59
End: 2019-10-23

## 2019-10-23 DIAGNOSIS — G89.4 CHRONIC PAIN SYNDROME: ICD-10-CM

## 2019-10-23 DIAGNOSIS — M76.62 LEFT ACHILLES TENDINITIS: ICD-10-CM

## 2019-10-23 DIAGNOSIS — Z98.890 S/P ACHILLES TENDON REPAIR: ICD-10-CM

## 2019-10-23 DIAGNOSIS — S46.912A STRAIN OF LEFT SHOULDER, INITIAL ENCOUNTER: ICD-10-CM

## 2019-10-23 RX ORDER — LIDOCAINE 50 MG/G
1 PATCH TOPICAL DAILY
Qty: 30 PATCH | Refills: 0 | Status: CANCELLED | OUTPATIENT
Start: 2019-10-23 | End: 2019-11-22

## 2019-10-28 ENCOUNTER — OFFICE VISIT (OUTPATIENT)
Dept: PAIN MANAGEMENT | Age: 59
End: 2019-10-28
Payer: COMMERCIAL

## 2019-10-28 VITALS
OXYGEN SATURATION: 98 % | WEIGHT: 167.2 LBS | SYSTOLIC BLOOD PRESSURE: 122 MMHG | DIASTOLIC BLOOD PRESSURE: 81 MMHG | BODY MASS INDEX: 28.69 KG/M2 | HEART RATE: 99 BPM

## 2019-10-28 DIAGNOSIS — Z96.651 HISTORY OF TOTAL RIGHT KNEE REPLACEMENT: ICD-10-CM

## 2019-10-28 DIAGNOSIS — Z98.890 S/P ACHILLES TENDON REPAIR: ICD-10-CM

## 2019-10-28 DIAGNOSIS — M51.36 BULGING LUMBAR DISC: ICD-10-CM

## 2019-10-28 DIAGNOSIS — M70.71 BURSITIS OF BOTH HIPS, UNSPECIFIED BURSA: ICD-10-CM

## 2019-10-28 DIAGNOSIS — M47.816 LUMBAR ARTHROPATHY: ICD-10-CM

## 2019-10-28 DIAGNOSIS — M76.62 ACHILLES TENDINITIS OF LEFT LOWER EXTREMITY: ICD-10-CM

## 2019-10-28 DIAGNOSIS — Z98.890 S/P LEFT KNEE ARTHROSCOPY: ICD-10-CM

## 2019-10-28 DIAGNOSIS — M70.72 BURSITIS OF BOTH HIPS, UNSPECIFIED BURSA: ICD-10-CM

## 2019-10-28 DIAGNOSIS — M25.551 RIGHT HIP PAIN: ICD-10-CM

## 2019-10-28 DIAGNOSIS — M54.42 CHRONIC BILATERAL LOW BACK PAIN WITH BILATERAL SCIATICA: ICD-10-CM

## 2019-10-28 DIAGNOSIS — M54.41 CHRONIC BILATERAL LOW BACK PAIN WITH BILATERAL SCIATICA: ICD-10-CM

## 2019-10-28 DIAGNOSIS — F17.200 READY TO QUIT SMOKING: ICD-10-CM

## 2019-10-28 DIAGNOSIS — G89.4 CHRONIC PAIN SYNDROME: ICD-10-CM

## 2019-10-28 DIAGNOSIS — M17.0 PRIMARY OSTEOARTHRITIS OF BOTH KNEES: ICD-10-CM

## 2019-10-28 DIAGNOSIS — G89.29 CHRONIC BILATERAL LOW BACK PAIN WITH BILATERAL SCIATICA: ICD-10-CM

## 2019-10-28 PROCEDURE — 99213 OFFICE O/P EST LOW 20 MIN: CPT | Performed by: NURSE PRACTITIONER

## 2019-10-28 RX ORDER — GABAPENTIN 300 MG/1
300 CAPSULE ORAL 3 TIMES DAILY
Qty: 90 CAPSULE | Refills: 0 | Status: SHIPPED | OUTPATIENT
Start: 2019-10-28 | End: 2019-11-25 | Stop reason: SDUPTHER

## 2019-10-28 RX ORDER — HYDROCODONE BITARTRATE AND ACETAMINOPHEN 5; 325 MG/1; MG/1
1 TABLET ORAL EVERY 8 HOURS PRN
Qty: 90 TABLET | Refills: 0 | Status: SHIPPED | OUTPATIENT
Start: 2019-10-28 | End: 2019-11-25 | Stop reason: SDUPTHER

## 2019-11-05 ENCOUNTER — TELEPHONE (OUTPATIENT)
Dept: PAIN MANAGEMENT | Age: 59
End: 2019-11-05

## 2019-11-05 DIAGNOSIS — Z98.890 S/P ACHILLES TENDON REPAIR: ICD-10-CM

## 2019-11-05 DIAGNOSIS — G89.4 CHRONIC PAIN SYNDROME: ICD-10-CM

## 2019-11-05 DIAGNOSIS — M76.62 LEFT ACHILLES TENDINITIS: ICD-10-CM

## 2019-11-05 DIAGNOSIS — S46.912A STRAIN OF LEFT SHOULDER, INITIAL ENCOUNTER: ICD-10-CM

## 2019-11-18 ENCOUNTER — TELEPHONE (OUTPATIENT)
Dept: PAIN MANAGEMENT | Age: 59
End: 2019-11-18

## 2019-11-25 ENCOUNTER — OFFICE VISIT (OUTPATIENT)
Dept: PAIN MANAGEMENT | Age: 59
End: 2019-11-25
Payer: COMMERCIAL

## 2019-11-25 VITALS — DIASTOLIC BLOOD PRESSURE: 73 MMHG | HEART RATE: 102 BPM | SYSTOLIC BLOOD PRESSURE: 122 MMHG | OXYGEN SATURATION: 94 %

## 2019-11-25 DIAGNOSIS — M76.62 ACHILLES TENDINITIS OF LEFT LOWER EXTREMITY: ICD-10-CM

## 2019-11-25 DIAGNOSIS — M70.72 BURSITIS OF BOTH HIPS, UNSPECIFIED BURSA: ICD-10-CM

## 2019-11-25 DIAGNOSIS — M47.816 LUMBAR ARTHROPATHY: ICD-10-CM

## 2019-11-25 DIAGNOSIS — G89.4 CHRONIC PAIN SYNDROME: ICD-10-CM

## 2019-11-25 DIAGNOSIS — Z98.890 S/P ACHILLES TENDON REPAIR: ICD-10-CM

## 2019-11-25 DIAGNOSIS — Z96.651 HISTORY OF TOTAL RIGHT KNEE REPLACEMENT: ICD-10-CM

## 2019-11-25 DIAGNOSIS — M54.42 CHRONIC BILATERAL LOW BACK PAIN WITH BILATERAL SCIATICA: ICD-10-CM

## 2019-11-25 DIAGNOSIS — M17.0 PRIMARY OSTEOARTHRITIS OF BOTH KNEES: ICD-10-CM

## 2019-11-25 DIAGNOSIS — G89.29 CHRONIC BILATERAL LOW BACK PAIN WITH BILATERAL SCIATICA: ICD-10-CM

## 2019-11-25 DIAGNOSIS — M70.71 BURSITIS OF BOTH HIPS, UNSPECIFIED BURSA: ICD-10-CM

## 2019-11-25 DIAGNOSIS — M54.41 CHRONIC BILATERAL LOW BACK PAIN WITH BILATERAL SCIATICA: ICD-10-CM

## 2019-11-25 DIAGNOSIS — Z98.890 S/P LEFT KNEE ARTHROSCOPY: ICD-10-CM

## 2019-11-25 DIAGNOSIS — M51.36 BULGING LUMBAR DISC: ICD-10-CM

## 2019-11-25 DIAGNOSIS — F17.200 READY TO QUIT SMOKING: ICD-10-CM

## 2019-11-25 PROCEDURE — 99213 OFFICE O/P EST LOW 20 MIN: CPT | Performed by: NURSE PRACTITIONER

## 2019-11-25 RX ORDER — GABAPENTIN 300 MG/1
300 CAPSULE ORAL 3 TIMES DAILY
Qty: 90 CAPSULE | Refills: 0 | Status: SHIPPED | OUTPATIENT
Start: 2019-11-25 | End: 2019-12-26 | Stop reason: SDUPTHER

## 2019-11-25 RX ORDER — DULOXETIN HYDROCHLORIDE 60 MG/1
60 CAPSULE, DELAYED RELEASE ORAL DAILY
Qty: 30 CAPSULE | Refills: 1 | Status: SHIPPED | OUTPATIENT
Start: 2019-11-25 | End: 2020-01-21 | Stop reason: SDUPTHER

## 2019-11-25 RX ORDER — HYDROCODONE BITARTRATE AND ACETAMINOPHEN 5; 325 MG/1; MG/1
1 TABLET ORAL EVERY 8 HOURS PRN
Qty: 90 TABLET | Refills: 0 | Status: SHIPPED | OUTPATIENT
Start: 2019-11-25 | End: 2019-12-26

## 2019-12-11 ENCOUNTER — OFFICE VISIT (OUTPATIENT)
Dept: ORTHOPEDIC SURGERY | Age: 59
End: 2019-12-11
Payer: COMMERCIAL

## 2019-12-11 VITALS
HEIGHT: 64 IN | RESPIRATION RATE: 16 BRPM | WEIGHT: 167 LBS | DIASTOLIC BLOOD PRESSURE: 72 MMHG | SYSTOLIC BLOOD PRESSURE: 114 MMHG | HEART RATE: 78 BPM | BODY MASS INDEX: 28.51 KG/M2

## 2019-12-11 DIAGNOSIS — M25.551 PAIN OF RIGHT HIP JOINT: Primary | ICD-10-CM

## 2019-12-11 DIAGNOSIS — Z98.890 S/P LEFT KNEE ARTHROSCOPY: ICD-10-CM

## 2019-12-11 DIAGNOSIS — Z96.651 HISTORY OF TOTAL RIGHT KNEE REPLACEMENT: ICD-10-CM

## 2019-12-11 PROCEDURE — 99214 OFFICE O/P EST MOD 30 MIN: CPT | Performed by: PHYSICIAN ASSISTANT

## 2019-12-11 RX ORDER — VITAMIN A, VITAMIN C, VITAMIN D-3, VITAMIN E, VITAMIN B-1, VITAMIN B-2, NIACIN, VITAMIN B-6, CALCIUM, IRON, ZINC, COPPER 4000; 120; 400; 22; 1.84; 3; 20; 10; 1; 12; 200; 27; 25; 2 [IU]/1; MG/1; [IU]/1; MG/1; MG/1; MG/1; MG/1; MG/1; MG/1; UG/1; MG/1; MG/1; MG/1; MG/1
1 TABLET ORAL 2 TIMES DAILY
Qty: 30 TABLET | Refills: 2 | Status: SHIPPED | OUTPATIENT
Start: 2019-12-11 | End: 2020-01-30 | Stop reason: ALTCHOICE

## 2019-12-26 ENCOUNTER — OFFICE VISIT (OUTPATIENT)
Dept: PAIN MANAGEMENT | Age: 59
End: 2019-12-26
Payer: COMMERCIAL

## 2019-12-26 VITALS — DIASTOLIC BLOOD PRESSURE: 89 MMHG | HEART RATE: 95 BPM | OXYGEN SATURATION: 97 % | SYSTOLIC BLOOD PRESSURE: 134 MMHG

## 2019-12-26 DIAGNOSIS — M47.816 LUMBAR ARTHROPATHY: ICD-10-CM

## 2019-12-26 DIAGNOSIS — Z98.890 S/P ACHILLES TENDON REPAIR: ICD-10-CM

## 2019-12-26 DIAGNOSIS — M70.72 BURSITIS OF BOTH HIPS, UNSPECIFIED BURSA: ICD-10-CM

## 2019-12-26 DIAGNOSIS — M17.0 PRIMARY OSTEOARTHRITIS OF BOTH KNEES: ICD-10-CM

## 2019-12-26 DIAGNOSIS — G89.29 CHRONIC BILATERAL LOW BACK PAIN WITH BILATERAL SCIATICA: ICD-10-CM

## 2019-12-26 DIAGNOSIS — M76.62 ACHILLES TENDINITIS OF LEFT LOWER EXTREMITY: ICD-10-CM

## 2019-12-26 DIAGNOSIS — F17.200 READY TO QUIT SMOKING: ICD-10-CM

## 2019-12-26 DIAGNOSIS — Z96.651 HISTORY OF TOTAL RIGHT KNEE REPLACEMENT: ICD-10-CM

## 2019-12-26 DIAGNOSIS — G89.4 CHRONIC PAIN SYNDROME: ICD-10-CM

## 2019-12-26 DIAGNOSIS — M54.41 CHRONIC BILATERAL LOW BACK PAIN WITH BILATERAL SCIATICA: ICD-10-CM

## 2019-12-26 DIAGNOSIS — M70.71 BURSITIS OF BOTH HIPS, UNSPECIFIED BURSA: ICD-10-CM

## 2019-12-26 DIAGNOSIS — M51.36 BULGING LUMBAR DISC: ICD-10-CM

## 2019-12-26 DIAGNOSIS — M54.42 CHRONIC BILATERAL LOW BACK PAIN WITH BILATERAL SCIATICA: ICD-10-CM

## 2019-12-26 DIAGNOSIS — Z98.890 S/P LEFT KNEE ARTHROSCOPY: ICD-10-CM

## 2019-12-26 PROCEDURE — 99214 OFFICE O/P EST MOD 30 MIN: CPT | Performed by: NURSE PRACTITIONER

## 2019-12-26 RX ORDER — GABAPENTIN 300 MG/1
300 CAPSULE ORAL 3 TIMES DAILY
Qty: 90 CAPSULE | Refills: 0 | Status: SHIPPED | OUTPATIENT
Start: 2019-12-26 | End: 2020-01-21 | Stop reason: SDUPTHER

## 2020-01-06 ENCOUNTER — TELEPHONE (OUTPATIENT)
Dept: PAIN MANAGEMENT | Age: 60
End: 2020-01-06

## 2020-01-17 ENCOUNTER — HOSPITAL ENCOUNTER (OUTPATIENT)
Dept: CT IMAGING | Age: 60
Discharge: HOME | End: 2020-01-17
Payer: COMMERCIAL

## 2020-01-17 PROCEDURE — 73700 CT LOWER EXTREMITY W/O DYE: CPT

## 2020-01-21 ENCOUNTER — OFFICE VISIT (OUTPATIENT)
Dept: PAIN MANAGEMENT | Age: 60
End: 2020-01-21
Payer: COMMERCIAL

## 2020-01-21 VITALS — SYSTOLIC BLOOD PRESSURE: 118 MMHG | DIASTOLIC BLOOD PRESSURE: 78 MMHG | OXYGEN SATURATION: 100 % | HEART RATE: 89 BPM

## 2020-01-21 PROCEDURE — 99213 OFFICE O/P EST LOW 20 MIN: CPT | Performed by: NURSE PRACTITIONER

## 2020-01-21 RX ORDER — LIDOCAINE 50 MG/G
1 PATCH TOPICAL DAILY
Qty: 30 PATCH | Refills: 0 | Status: ON HOLD | OUTPATIENT
Start: 2020-01-21 | End: 2020-02-04 | Stop reason: HOSPADM

## 2020-01-21 RX ORDER — GABAPENTIN 300 MG/1
300 CAPSULE ORAL 3 TIMES DAILY
Qty: 90 CAPSULE | Refills: 0 | Status: SHIPPED | OUTPATIENT
Start: 2020-01-21 | End: 2020-04-07 | Stop reason: SDUPTHER

## 2020-01-21 RX ORDER — DULOXETIN HYDROCHLORIDE 60 MG/1
60 CAPSULE, DELAYED RELEASE ORAL DAILY
Qty: 30 CAPSULE | Refills: 1 | Status: SHIPPED | OUTPATIENT
Start: 2020-01-21 | End: 2020-02-07 | Stop reason: SDUPTHER

## 2020-01-21 NOTE — PROGRESS NOTES
Jose David Mary  1960  <[id number]>      HISTORY OF PRESENT ILLNESS:  Ms. Jaycee Leo is a 62 y.o. female returns for a follow up visit for pain management  She has a diagnosis of   1. Chronic pain syndrome    2. Bursitis of both hips, unspecified bursa    3. History of total right knee replacement, +revision x3    4. Primary osteoarthritis of both knees    5. Lumbar arthropathy    6. Bulging lumbar disc    7. Chronic bilateral low back pain with bilateral sciatica    8. Achilles tendinitis of left lower extremity    9. S/P Achilles tendon debridement with Dr. Caro Inman 1/11/19    10. S/P left knee arthroscopy synovectomy and chondroplasty 8/10/18    11. Ready to quit smoking    12. Strain of left shoulder, initial encounter    13. Left Achilles tendinitis      On the Patients Pain Assessment form:  She complains of pain in the both buttocks, both knee(s), both leg(s): lower and bilateral lower back She rates the pain 8/10 and describes it as sharp, aching, burning, numbness, pins and needles. Current treatment regimen has helped relieve about 20% of the pain. She denies any side effects from the current pain regimen. Patient reports that since the last follow up visit the physical functioning is worse, family/social relationships are unchanged, mood is worse sleep patterns are worse, and that the overall functioning is worse. Patient denies misusing/abusing her narcotic pain medications or using any illegal drugs. There are No indicators for possible drug abuse, addiction or diversion problems. Upon obtaining medical history from Ms. Jaycee Leo states that pain is manageable on current pain therapy. Admits to pain since she has not had opioids from other sources. She is scheduled for left knee partial arthroplasty with Dr. Caro Inman. Mood is stable without anxiety. Sleep is fair with an average of 5-6 hours. Denies to having issues of constipation.  Tolerating activities/house chores with moderate tenderness to the lower back/knees. ALLERGIES: Patients list of allergies were reviewed     MEDICATIONS: Ms. Tay Lozoya list of medications were reviewed. Her current medications are   Outpatient Medications Prior to Visit   Medication Sig Dispense Refill    Naloxone HCl (EVZIO) 0.4 MG/0.4ML SOAJ Use as directed 1 Package 0    Prenatal Vit-Fe Fumarate-FA (PRENATAL COMPLETE PO) Take by mouth daily      calcium-vitamin D (OSCAL-500) 500-200 MG-UNIT per tablet Take 1 tablet by mouth daily      fluticasone (FLONASE) 50 MCG/ACT nasal spray 1 spray by Nasal route nightly       hydrochlorothiazide (HYDRODIURIL) 25 MG tablet 12.5 mg daily       gabapentin (NEURONTIN) 300 MG capsule Take 1 capsule by mouth 3 times daily for 30 days. 90 capsule 0    Prenatal Vit-Fe Fumarate-FA (PRENATAL VITAMIN PLUS LOW IRON) 27-1 MG TABS Take 1 tablet by mouth 2 times daily 30 tablet 2    DULoxetine (CYMBALTA) 60 MG extended release capsule Take 1 capsule by mouth daily 30 capsule 1    nicotine (NICODERM CQ) 14 MG/24HR Place 1 patch onto the skin daily 42 patch 0     No facility-administered medications prior to visit. SOCIAL/FAMILY/PAST MEDICAL HISTORY: Ms. Viktor Singh, family and past medical history was reviewed. REVIEW OF SYSTEMS:    Respiratory: Negative for apnea, chest tightness and shortness of breath or change in baseline breathing. Gastrointestinal: Negative for nausea, vomiting, abdominal pain, diarrhea, constipation, blood in stool and abdominal distention. PHYSICAL EXAM:   Nursing note and vitals reviewed. /78   Pulse 89   LMP  (LMP Unknown)   SpO2 100%   Constitutional: She appears well-developed and well-nourished. No acute distress. Skin: Skin is warm and dry, good turgor. No rash noted. She is not diaphoretic. Cardiovascular: Normal rate, regular rhythm, normal heart sounds, and does not have murmur. Pulmonary/Chest: Effort normal. No respiratory distress.  She does not have wheezes in the lung fields. She has no rales. Neurological/Psychiatric:She is alert and oriented to person, place, and time. Coordination is  Normal. +Lumbar pain, knee pain  Her mood isAppropriate and affect is Neutral/Euthymic(normal) . IMPRESSION:   1. Chronic pain syndrome    2. Bursitis of both hips, unspecified bursa    3. History of total right knee replacement, +revision x3    4. Primary osteoarthritis of both knees    5. Lumbar arthropathy    6. Bulging lumbar disc    7. Chronic bilateral low back pain with bilateral sciatica    8. Achilles tendinitis of left lower extremity    9. S/P Achilles tendon debridement with Dr. Randolph Calix 1/11/19    10. S/P left knee arthroscopy synovectomy and chondroplasty 8/10/18    11. Ready to quit smoking    12. Strain of left shoulder, initial encounter    13. Left Achilles tendinitis        PLAN:  Informed verbal consent was obtained  -Continue with Neurontin, Cymbalta, Evzio, lidocaine  -Belbuca was prescribed x10 days pending UDS  -Nathanael exercises  -Coaching completed, patient still had metabolites of Oxycodone with the last UDS. Reports that she has not taken opioids from other sources since devon last UDS. She will be given another chance to continues with abuse deterrent opioids after passing UDS. Failure to pass UDS will results on discontinuation of opioids  -CBT techniques- relaxation therapies such as biofeedback, mindfulness based stress reduction, imagery, cognitive restructuring, problem solving discussed with patient  -Advised patient to quit smoking for  health related concerns and to improve the treatment outcomes. Education was given on quitting smoking and the use of different modalities including medications, hypnotherapy, counselling  and biofeedback. These were discussed with patient.  -Last UDS inconsistent/UDS today  -Return in about 4 weeks (around 2/18/2020).    -[place] record was obtained and reviewed  for the last one year and no indicators of drug misuse  were develops new symptoms or if the symptoms worsen, the patient should call the office. While transcribing every attempt was made to maintain the accuracy of the note in terms of it's contents,there may have been some errors made inadvertently. Thank you for allowing me to participate in the care of this patient.     Natalie Jackson CNP    Cc: Vail Gums

## 2020-01-21 NOTE — PATIENT INSTRUCTIONS
Patient Education        Back Stretches: Exercises  Introduction  Here are some examples of exercises for stretching your back. Start each exercise slowly. Ease off the exercise if you start to have pain. Your doctor or physical therapist will tell you when you can start these exercises and which ones will work best for you. How to do the exercises  Overhead stretch   1. Stand comfortably with your feet shoulder-width apart. 2. Looking straight ahead, raise both arms over your head and reach toward the ceiling. Do not allow your head to tilt back. 3. Hold for 15 to 30 seconds, then lower your arms to your sides. 4. Repeat 2 to 4 times. Side stretch   1. Stand comfortably with your feet shoulder-width apart. 2. Raise one arm over your head, and then lean to the other side. 3. Slide your hand down your leg as you let the weight of your arm gently stretch your side muscles. Hold for 15 to 30 seconds. 4. Repeat 2 to 4 times on each side. Press-up   1. Lie on your stomach, supporting your body with your forearms. 2. Press your elbows down into the floor to raise your upper back. As you do this, relax your stomach muscles and allow your back to arch without using your back muscles. As your press up, do not let your hips or pelvis come off the floor. 3. Hold for 15 to 30 seconds, then relax. 4. Repeat 2 to 4 times. Relax and rest   1. Lie on your back with a rolled towel under your neck and a pillow under your knees. Extend your arms comfortably to your sides. 2. Relax and breathe normally. 3. Remain in this position for about 10 minutes. 4. If you can, do this 2 or 3 times each day. Follow-up care is a key part of your treatment and safety. Be sure to make and go to all appointments, and call your doctor if you are having problems. It's also a good idea to know your test results and keep a list of the medicines you take. Where can you learn more? Go to https://parveen.healthBrndstr. org and sign in to your [employer] account. Enter U624 in the [employer] box to learn more about \"Back Stretches: Exercises. \"     If you do not have an account, please click on the \"Sign Up Now\" link. Current as of: June 26, 2019  Content Version: 12.3  © 6242-2357 [employer]. Care instructions adapted under license by [facility] ([facility]). If you have questions about a medical condition or this instruction, always ask your healthcare professional. [address] any warranty or liability for your use of this information. Patient Education        Knee Arthritis: Exercises  Introduction  Here are some examples of exercises for you to try. The exercises may be suggested for a condition or for rehabilitation. Start each exercise slowly. Ease off the exercises if you start to have pain. You will be told when to start these exercises and which ones will work best for you. How to do the exercises  Knee flexion with heel slide   5. Lie on your back with your knees bent. 6. Slide your heel back by bending your affected knee as far as you can. Then hook your other foot around your ankle to help pull your heel even farther back. 7. Hold for about 6 seconds, then rest for up to 10 seconds. 8. Repeat 8 to 12 times. 9. Switch legs and repeat steps 1 through 4, even if only one knee is sore. Quad sets   5. Sit with your affected leg straight and supported on the floor or a firm bed. Place a small, rolled-up towel under your knee. Your other leg should be bent, with that foot flat on the floor. 6. Tighten the thigh muscles of your affected leg by pressing the back of your knee down into the towel. 7. Hold for about 6 seconds, then rest for up to 10 seconds. 8. Repeat 8 to 12 times. 9. Switch legs and repeat steps 1 through 4, even if only one knee is sore. Straight-leg raises to the front   5.  Lie on your back with your good knee bent so that your foot rests flat on the your knee is slightly bent when your leg is extended downward. If your knee hurts when the pedal reaches the top, you can raise the seat so that your knee does not bend as much. 3. Start slowly. At first, try to do 5 to 10 minutes of cycling with little to no resistance. Then increase your time and the resistance bit by bit until you can do 20 to 30 minutes without pain. 4. If you start to have pain, rest your knee until your pain gets back to the level that is normal for you. Or cycle for less time or with less effort. Follow-up care is a key part of your treatment and safety. Be sure to make and go to all appointments, and call your doctor if you are having problems. It's also a good idea to know your test results and keep a list of the medicines you take. Where can you learn more? Go to https://Tequila MobilepepicMaulSoup.HipFlat. org and sign in to your [employer] account. Enter C159 in the [employer] box to learn more about \"Knee Arthritis: Exercises. \"     If you do not have an account, please click on the \"Sign Up Now\" link. Current as of: June 26, 2019  Content Version: 12.3  © 6407-0216 Healthwise, Incorporated. Care instructions adapted under license by [facility] ([facility]). If you have questions about a medical condition or this instruction, always ask your healthcare professional. [address] any warranty or liability for your use of this information.

## 2020-01-27 NOTE — PROGRESS NOTES
[facility] / [facility] ([facility]) [address] [facility], [address]    Acknowledgment of Informed Consent for Surgical or Medical Procedure and Sedation  I agree to allow doctor(s) TAYO MATTHEWS and his/her associates or assistants, including residents and/or other qualified medical practitioner to perform the following medical treatment or procedure and to administer or direct the administration of sedation as necessary:  Procedure(s): LEFT KNEE [address]  My doctor has explained the following regarding the proposed procedure:   the explanation of the procedure   the benefits of the procedure   the potential problems that might occur during recuperation   the risks and side effects of the procedure which could include but are not limited to severe blood loss, infection, stroke or death   the benefits, risks and side effect of alternative procedures including the consequences of declining this procedure or any alternative procedures   the likelihood of achieving satisfactory results. I acknowledge no guarantee or assurance has been made to me regarding the results. I understand that during the course of this treatment/procedure, unforeseen conditions can occur which require an additional or different procedure. I agree to allow my physician or assistants to perform such extension of the original procedure as they may find necessary. I understand that sedation will often result in temporary impairment of memory and fine motor skills and that sedation can occasionally progress to a state of deep sedation or general anesthesia. I understand the risks of anesthesia for surgery include, but are not limited to, sore throat, hoarseness, injury to face, mouth, or teeth; nausea; headache; injury to blood vessels or nerves; death, brain damage, or paralysis.     I understand that if I have a Limitation of Treatment order in effect during my hospitalization, the order

## 2020-01-28 ENCOUNTER — TELEPHONE (OUTPATIENT)
Dept: ORTHOPEDIC SURGERY | Age: 60
End: 2020-01-28

## 2020-01-28 NOTE — TELEPHONE ENCOUNTER
Gricelda Hill [id number]    Date: 2/3/2020  Type of SX:  Outpatient/Observation  Location: [facility]  CPT: [id number]   SX area: [facility]

## 2020-01-30 ENCOUNTER — HOSPITAL ENCOUNTER (OUTPATIENT)
Dept: PREADMISSION TESTING | Age: 60
Discharge: HOME | End: 2020-02-03
Payer: COMMERCIAL

## 2020-01-30 VITALS
HEART RATE: 107 BPM | DIASTOLIC BLOOD PRESSURE: 70 MMHG | RESPIRATION RATE: 14 BRPM | BODY MASS INDEX: 29.37 KG/M2 | OXYGEN SATURATION: 95 % | WEIGHT: 172 LBS | HEIGHT: 64 IN | SYSTOLIC BLOOD PRESSURE: 118 MMHG | TEMPERATURE: 97.2 F

## 2020-01-30 LAB
ABO/RH: NORMAL
ANTIBODY SCREEN: NORMAL
APTT: 29.7 SEC (ref 24.2–36.2)
C-REACTIVE PROTEIN: 1.4 MG/L (ref 0–5.1)
EKG ATRIAL RATE: 102 BPM
EKG DIAGNOSIS: NORMAL
EKG P AXIS: 67 DEGREES
EKG P-R INTERVAL: 146 MS
EKG Q-T INTERVAL: 328 MS
EKG QRS DURATION: 86 MS
EKG QTC CALCULATION (BAZETT): 427 MS
EKG R AXIS: 53 DEGREES
EKG T AXIS: 66 DEGREES
EKG VENTRICULAR RATE: 102 BPM
INR BLD: 1.05 (ref 0.86–1.14)
PROTHROMBIN TIME: 12.2 SEC (ref 10–13.2)
SEDIMENTATION RATE, ERYTHROCYTE: 21 MM/HR (ref 0–30)

## 2020-01-30 PROCEDURE — 86900 BLOOD TYPING SEROLOGIC ABO: CPT

## 2020-01-30 PROCEDURE — 87641 MR-STAPH DNA AMP PROBE: CPT

## 2020-01-30 PROCEDURE — 85730 THROMBOPLASTIN TIME PARTIAL: CPT

## 2020-01-30 PROCEDURE — 86850 RBC ANTIBODY SCREEN: CPT

## 2020-01-30 PROCEDURE — 85610 PROTHROMBIN TIME: CPT

## 2020-01-30 PROCEDURE — 93010 ELECTROCARDIOGRAM REPORT: CPT | Performed by: INTERNAL MEDICINE

## 2020-01-30 PROCEDURE — 86140 C-REACTIVE PROTEIN: CPT

## 2020-01-30 PROCEDURE — 86901 BLOOD TYPING SEROLOGIC RH(D): CPT

## 2020-01-30 PROCEDURE — 85652 RBC SED RATE AUTOMATED: CPT

## 2020-01-30 PROCEDURE — 93005 ELECTROCARDIOGRAM TRACING: CPT | Performed by: ORTHOPAEDIC SURGERY

## 2020-01-30 ASSESSMENT — PAIN DESCRIPTION - PAIN TYPE: TYPE: CHRONIC PAIN

## 2020-01-30 ASSESSMENT — PAIN DESCRIPTION - FREQUENCY: FREQUENCY: CONTINUOUS

## 2020-01-30 ASSESSMENT — PAIN DESCRIPTION - ORIENTATION: ORIENTATION: LEFT;RIGHT

## 2020-01-30 ASSESSMENT — PAIN DESCRIPTION - DESCRIPTORS: DESCRIPTORS: CONSTANT

## 2020-01-30 ASSESSMENT — PAIN DESCRIPTION - LOCATION: LOCATION: NOSE

## 2020-01-30 ASSESSMENT — PAIN SCALES - GENERAL: PAINLEVEL_OUTOF10: 6

## 2020-01-30 NOTE — PROGRESS NOTES
discharge. 3. You and your family will be given written instructions about your diet, activity, dressing care, medications, and return visits. 4. Once at home, should issues with nausea, pain, or bleeding occur, or should you notice any signs of infection, you should call your surgeon. 5. Always clean your hands before and after caring for your wound. Do not let your family touch your surgery site without cleaning their hands. 6. Narcotic pain medications can cause significant constipation. You may want to add a stool softener to your postoperative medication schedule or speak to your surgeon on how best to manage this SIDE EFFECT. SPECIAL INSTRUCTIONS     Thank you for allowing us to care for you. We strive to exceed your expectations in the overall delivery of care and service provided to you and your family. If you need to contact us for any reason, please call us at [phone]. Instructions reviewed and copy given to patient during preadmission testing visit. Vishnu Martinez. 1/30/2020 .2:32 PM      ADDITIONAL EDUCATIONAL INFORMATION REVIEWED / PROVIDED TO YOU AND YOUR FAMILY:  Yes Taking Control of Your Pain   Yes FAQs about Surgical Site Infections        Yes Lance® Wipes Bathing Instructions (Obtained from:  https://www.foodjunky.[employer]/. pdf )  Yes Hibiclens® Bathing Instructions    Yes Incentive Spirometer given to patient- PLEASE BRING THIS SPIROMETER BACK WITH YOU ON THE DAY OF YOUR SURGERY      Yes Your Guide to Knee Replacement Surgery. PLEASE BRING THIS BOOKLET BACK ON THE DAY OF YOUR SURGERY.     Yes  Reviewed/Given handout for TJ Video/Class

## 2020-01-31 ENCOUNTER — ANESTHESIA EVENT (OUTPATIENT)
Dept: OPERATING ROOM | Age: 60
DRG: 470 | End: 2020-01-31
Payer: COMMERCIAL

## 2020-01-31 LAB — MRSA SCREEN RT-PCR: NORMAL

## 2020-01-31 NOTE — PROGRESS NOTES
Checked with [facility] lab, they have the MRSA, it was received to day and will be ready by [place] on 1-31.

## 2020-02-03 ENCOUNTER — HOSPITAL ENCOUNTER (INPATIENT)
Age: 60
LOS: 1 days | Discharge: HOME | DRG: 470 | End: 2020-02-04
Attending: ORTHOPAEDIC SURGERY | Admitting: ORTHOPAEDIC SURGERY
Payer: COMMERCIAL

## 2020-02-03 ENCOUNTER — ANESTHESIA (OUTPATIENT)
Dept: OPERATING ROOM | Age: 60
DRG: 470 | End: 2020-02-03
Payer: COMMERCIAL

## 2020-02-03 ENCOUNTER — APPOINTMENT (OUTPATIENT)
Dept: GENERAL RADIOLOGY | Age: 60
DRG: 470 | End: 2020-02-03
Attending: ORTHOPAEDIC SURGERY
Payer: COMMERCIAL

## 2020-02-03 VITALS — TEMPERATURE: 97.7 F | DIASTOLIC BLOOD PRESSURE: 84 MMHG | OXYGEN SATURATION: 97 % | SYSTOLIC BLOOD PRESSURE: 119 MMHG

## 2020-02-03 PROBLEM — M17.12 OSTEOARTHRITIS OF LEFT KNEE: Status: ACTIVE | Noted: 2020-02-03

## 2020-02-03 PROBLEM — I10 HIGH BLOOD PRESSURE: Status: ACTIVE | Noted: 2020-02-03

## 2020-02-03 LAB
ABO/RH: NORMAL
ANTIBODY SCREEN: NORMAL

## 2020-02-03 PROCEDURE — 20985 CPTR-ASST DIR MS PX: CPT | Performed by: ORTHOPAEDIC SURGERY

## 2020-02-03 PROCEDURE — 6360000002 HC RX W HCPCS: Performed by: ORTHOPAEDIC SURGERY

## 2020-02-03 PROCEDURE — 0SRD0N9 REPLACEMENT OF LEFT KNEE JOINT WITH PATELLOFEMORAL SYNTHETIC SUBSTITUTE, CEMENTED, OPEN APPROACH: ICD-10-PCS | Performed by: ORTHOPAEDIC SURGERY

## 2020-02-03 PROCEDURE — 2500000003 HC RX 250 WO HCPCS: Performed by: ORTHOPAEDIC SURGERY

## 2020-02-03 PROCEDURE — 0SRD0L9 REPLACEMENT OF LEFT KNEE JOINT WITH MEDIAL UNICONDYLAR SYNTHETIC SUBSTITUTE, CEMENTED, OPEN APPROACH: ICD-10-PCS | Performed by: ORTHOPAEDIC SURGERY

## 2020-02-03 PROCEDURE — 2700000000 HC OXYGEN THERAPY PER DAY

## 2020-02-03 PROCEDURE — 6370000000 HC RX 637 (ALT 250 FOR IP): Performed by: ORTHOPAEDIC SURGERY

## 2020-02-03 PROCEDURE — 2580000003 HC RX 258: Performed by: ORTHOPAEDIC SURGERY

## 2020-02-03 PROCEDURE — 2580000003 HC RX 258: Performed by: PHYSICIAN ASSISTANT

## 2020-02-03 PROCEDURE — 86901 BLOOD TYPING SEROLOGIC RH(D): CPT

## 2020-02-03 PROCEDURE — 2580000003 HC RX 258: Performed by: ANESTHESIOLOGY

## 2020-02-03 PROCEDURE — 6360000002 HC RX W HCPCS: Performed by: PHYSICIAN ASSISTANT

## 2020-02-03 PROCEDURE — C1713 ANCHOR/SCREW BN/BN,TIS/BN: HCPCS | Performed by: ORTHOPAEDIC SURGERY

## 2020-02-03 PROCEDURE — G0378 HOSPITAL OBSERVATION PER HR: HCPCS

## 2020-02-03 PROCEDURE — 2720000010 HC SURG SUPPLY STERILE: Performed by: ORTHOPAEDIC SURGERY

## 2020-02-03 PROCEDURE — 2709999900 HC NON-CHARGEABLE SUPPLY: Performed by: ORTHOPAEDIC SURGERY

## 2020-02-03 PROCEDURE — 6370000000 HC RX 637 (ALT 250 FOR IP): Performed by: STUDENT IN AN ORGANIZED HEALTH CARE EDUCATION/TRAINING PROGRAM

## 2020-02-03 PROCEDURE — 94761 N-INVAS EAR/PLS OXIMETRY MLT: CPT

## 2020-02-03 PROCEDURE — C1776 JOINT DEVICE (IMPLANTABLE): HCPCS | Performed by: ORTHOPAEDIC SURGERY

## 2020-02-03 PROCEDURE — 86850 RBC ANTIBODY SCREEN: CPT

## 2020-02-03 PROCEDURE — 94150 VITAL CAPACITY TEST: CPT

## 2020-02-03 PROCEDURE — 2500000003 HC RX 250 WO HCPCS: Performed by: NURSE ANESTHETIST, CERTIFIED REGISTERED

## 2020-02-03 PROCEDURE — 3600000005 HC SURGERY LEVEL 5 BASE: Performed by: ORTHOPAEDIC SURGERY

## 2020-02-03 PROCEDURE — 94799 UNLISTED PULMONARY SVC/PX: CPT

## 2020-02-03 PROCEDURE — 86900 BLOOD TYPING SEROLOGIC ABO: CPT

## 2020-02-03 PROCEDURE — 7100000000 HC PACU RECOVERY - FIRST 15 MIN: Performed by: ORTHOPAEDIC SURGERY

## 2020-02-03 PROCEDURE — 27446 REVISION OF KNEE JOINT: CPT | Performed by: PHYSICIAN ASSISTANT

## 2020-02-03 PROCEDURE — 1200000000 HC SEMI PRIVATE

## 2020-02-03 PROCEDURE — 3700000000 HC ANESTHESIA ATTENDED CARE: Performed by: ORTHOPAEDIC SURGERY

## 2020-02-03 PROCEDURE — 3700000001 HC ADD 15 MINUTES (ANESTHESIA): Performed by: ORTHOPAEDIC SURGERY

## 2020-02-03 PROCEDURE — 7100000001 HC PACU RECOVERY - ADDTL 15 MIN: Performed by: ORTHOPAEDIC SURGERY

## 2020-02-03 PROCEDURE — 3600000015 HC SURGERY LEVEL 5 ADDTL 15MIN: Performed by: ORTHOPAEDIC SURGERY

## 2020-02-03 PROCEDURE — 2580000003 HC RX 258: Performed by: NURSE ANESTHETIST, CERTIFIED REGISTERED

## 2020-02-03 PROCEDURE — 6370000000 HC RX 637 (ALT 250 FOR IP): Performed by: PHYSICIAN ASSISTANT

## 2020-02-03 PROCEDURE — 73560 X-RAY EXAM OF KNEE 1 OR 2: CPT

## 2020-02-03 PROCEDURE — 3E0T3BZ INTRODUCTION OF ANESTHETIC AGENT INTO PERIPHERAL NERVES AND PLEXI, PERCUTANEOUS APPROACH: ICD-10-PCS | Performed by: ANESTHESIOLOGY

## 2020-02-03 PROCEDURE — 27446 REVISION OF KNEE JOINT: CPT | Performed by: ORTHOPAEDIC SURGERY

## 2020-02-03 PROCEDURE — 6360000002 HC RX W HCPCS: Performed by: NURSE ANESTHETIST, CERTIFIED REGISTERED

## 2020-02-03 DEVICE — CEMENT BNE 20ML 40GM FULL DOSE PMMA W/O ANTIBIO M VISC: Type: IMPLANTABLE DEVICE | Site: KNEE | Status: FUNCTIONAL

## 2020-02-03 DEVICE — BASEPLATE TIB SZ 3 L MEDIAL/RIGHT LAT UNI KNEE TI ONLAY FOR: Type: IMPLANTABLE DEVICE | Site: KNEE | Status: FUNCTIONAL

## 2020-02-03 DEVICE — COMPONENT TOT KNEE CAPPED ADV STRYKNEEA] STRYKER CORP]: Type: IMPLANTABLE DEVICE | Site: KNEE | Status: FUNCTIONAL

## 2020-02-03 DEVICE — COMPONENT FEM SZ 3 L MED R LAT CNDYL KNEE RESTORIS MCK: Type: IMPLANTABLE DEVICE | Site: KNEE | Status: FUNCTIONAL

## 2020-02-03 DEVICE — Z DUP USE 2368702 INSERT TIB X3 UNI ONLAY SIZE 3 - 8 MM MAKO: Type: IMPLANTABLE DEVICE | Site: KNEE | Status: FUNCTIONAL

## 2020-02-03 RX ORDER — MIDAZOLAM HYDROCHLORIDE 1 MG/ML
INJECTION INTRAMUSCULAR; INTRAVENOUS PRN
Status: DISCONTINUED | OUTPATIENT
Start: 2020-02-03 | End: 2020-02-03 | Stop reason: SDUPTHER

## 2020-02-03 RX ORDER — OXYCODONE HYDROCHLORIDE 5 MG/1
10 TABLET ORAL PRN
Status: DISCONTINUED | OUTPATIENT
Start: 2020-02-03 | End: 2020-02-03 | Stop reason: HOSPADM

## 2020-02-03 RX ORDER — OXYCODONE HYDROCHLORIDE 5 MG/1
5 TABLET ORAL PRN
Status: DISCONTINUED | OUTPATIENT
Start: 2020-02-03 | End: 2020-02-03 | Stop reason: HOSPADM

## 2020-02-03 RX ORDER — ONDANSETRON 2 MG/ML
INJECTION INTRAMUSCULAR; INTRAVENOUS PRN
Status: DISCONTINUED | OUTPATIENT
Start: 2020-02-03 | End: 2020-02-03 | Stop reason: SDUPTHER

## 2020-02-03 RX ORDER — SODIUM CHLORIDE 450 MG/100ML
INJECTION, SOLUTION INTRAVENOUS CONTINUOUS
Status: DISCONTINUED | OUTPATIENT
Start: 2020-02-03 | End: 2020-02-04 | Stop reason: HOSPADM

## 2020-02-03 RX ORDER — DOCUSATE SODIUM 100 MG/1
100 CAPSULE, LIQUID FILLED ORAL 2 TIMES DAILY
Status: DISCONTINUED | OUTPATIENT
Start: 2020-02-03 | End: 2020-02-04 | Stop reason: HOSPADM

## 2020-02-03 RX ORDER — CELECOXIB 200 MG/1
400 CAPSULE ORAL
Status: COMPLETED | OUTPATIENT
Start: 2020-02-03 | End: 2020-02-03

## 2020-02-03 RX ORDER — PREDNISONE 10 MG/1
10 TABLET ORAL DAILY
Status: DISCONTINUED | OUTPATIENT
Start: 2020-02-03 | End: 2020-02-04 | Stop reason: HOSPADM

## 2020-02-03 RX ORDER — MIDAZOLAM HYDROCHLORIDE 1 MG/ML
INJECTION INTRAMUSCULAR; INTRAVENOUS
Status: DISPENSED
Start: 2020-02-03 | End: 2020-02-03

## 2020-02-03 RX ORDER — GABAPENTIN 300 MG/1
300 CAPSULE ORAL 3 TIMES DAILY
Status: DISCONTINUED | OUTPATIENT
Start: 2020-02-03 | End: 2020-02-04 | Stop reason: HOSPADM

## 2020-02-03 RX ORDER — OXYCODONE HYDROCHLORIDE 5 MG/1
10 TABLET ORAL EVERY 4 HOURS PRN
Status: DISCONTINUED | OUTPATIENT
Start: 2020-02-03 | End: 2020-02-04 | Stop reason: HOSPADM

## 2020-02-03 RX ORDER — DIPHENHYDRAMINE HYDROCHLORIDE 50 MG/ML
12.5 INJECTION INTRAMUSCULAR; INTRAVENOUS
Status: DISCONTINUED | OUTPATIENT
Start: 2020-02-03 | End: 2020-02-03 | Stop reason: HOSPADM

## 2020-02-03 RX ORDER — DULOXETIN HYDROCHLORIDE 60 MG/1
60 CAPSULE, DELAYED RELEASE ORAL DAILY
Status: DISCONTINUED | OUTPATIENT
Start: 2020-02-03 | End: 2020-02-04 | Stop reason: HOSPADM

## 2020-02-03 RX ORDER — SODIUM CHLORIDE 0.9 % (FLUSH) 0.9 %
10 SYRINGE (ML) INJECTION EVERY 12 HOURS SCHEDULED
Status: DISCONTINUED | OUTPATIENT
Start: 2020-02-03 | End: 2020-02-04 | Stop reason: HOSPADM

## 2020-02-03 RX ORDER — SODIUM CHLORIDE 0.9 % (FLUSH) 0.9 %
10 SYRINGE (ML) INJECTION PRN
Status: DISCONTINUED | OUTPATIENT
Start: 2020-02-03 | End: 2020-02-04 | Stop reason: HOSPADM

## 2020-02-03 RX ORDER — ZOLPIDEM TARTRATE 5 MG/1
5 TABLET ORAL NIGHTLY PRN
Status: DISCONTINUED | OUTPATIENT
Start: 2020-02-03 | End: 2020-02-04 | Stop reason: HOSPADM

## 2020-02-03 RX ORDER — OXYCODONE HCL 10 MG/1
20 TABLET, FILM COATED, EXTENDED RELEASE ORAL
Status: COMPLETED | OUTPATIENT
Start: 2020-02-03 | End: 2020-02-03

## 2020-02-03 RX ORDER — PHENYLEPHRINE HYDROCHLORIDE 10 MG/ML
INJECTION INTRAVENOUS PRN
Status: DISCONTINUED | OUTPATIENT
Start: 2020-02-03 | End: 2020-02-03 | Stop reason: SDUPTHER

## 2020-02-03 RX ORDER — MORPHINE SULFATE 4 MG/ML
1 INJECTION, SOLUTION INTRAMUSCULAR; INTRAVENOUS EVERY 5 MIN PRN
Status: DISCONTINUED | OUTPATIENT
Start: 2020-02-03 | End: 2020-02-03 | Stop reason: HOSPADM

## 2020-02-03 RX ORDER — HYDROMORPHONE HCL 110MG/55ML
PATIENT CONTROLLED ANALGESIA SYRINGE INTRAVENOUS PRN
Status: DISCONTINUED | OUTPATIENT
Start: 2020-02-03 | End: 2020-02-03 | Stop reason: SDUPTHER

## 2020-02-03 RX ORDER — PROPOFOL 10 MG/ML
INJECTION, EMULSION INTRAVENOUS PRN
Status: DISCONTINUED | OUTPATIENT
Start: 2020-02-03 | End: 2020-02-03 | Stop reason: SDUPTHER

## 2020-02-03 RX ORDER — ROCURONIUM BROMIDE 10 MG/ML
INJECTION, SOLUTION INTRAVENOUS PRN
Status: DISCONTINUED | OUTPATIENT
Start: 2020-02-03 | End: 2020-02-03 | Stop reason: SDUPTHER

## 2020-02-03 RX ORDER — METOCLOPRAMIDE HYDROCHLORIDE 5 MG/ML
10 INJECTION INTRAMUSCULAR; INTRAVENOUS
Status: DISCONTINUED | OUTPATIENT
Start: 2020-02-03 | End: 2020-02-03 | Stop reason: HOSPADM

## 2020-02-03 RX ORDER — ACETAMINOPHEN 325 MG/1
650 TABLET ORAL EVERY 6 HOURS
Status: DISCONTINUED | OUTPATIENT
Start: 2020-02-03 | End: 2020-02-04 | Stop reason: HOSPADM

## 2020-02-03 RX ORDER — SODIUM CHLORIDE, SODIUM LACTATE, POTASSIUM CHLORIDE, CALCIUM CHLORIDE 600; 310; 30; 20 MG/100ML; MG/100ML; MG/100ML; MG/100ML
INJECTION, SOLUTION INTRAVENOUS CONTINUOUS
Status: DISCONTINUED | OUTPATIENT
Start: 2020-02-03 | End: 2020-02-03

## 2020-02-03 RX ORDER — GLYCOPYRROLATE 1 MG/5 ML
SYRINGE (ML) INTRAVENOUS PRN
Status: DISCONTINUED | OUTPATIENT
Start: 2020-02-03 | End: 2020-02-03 | Stop reason: SDUPTHER

## 2020-02-03 RX ORDER — DEXAMETHASONE SODIUM PHOSPHATE 4 MG/ML
10 INJECTION, SOLUTION INTRA-ARTICULAR; INTRALESIONAL; INTRAMUSCULAR; INTRAVENOUS; SOFT TISSUE ONCE
Status: COMPLETED | OUTPATIENT
Start: 2020-02-03 | End: 2020-02-03

## 2020-02-03 RX ORDER — FENTANYL CITRATE 50 UG/ML
INJECTION, SOLUTION INTRAMUSCULAR; INTRAVENOUS PRN
Status: DISCONTINUED | OUTPATIENT
Start: 2020-02-03 | End: 2020-02-03 | Stop reason: SDUPTHER

## 2020-02-03 RX ORDER — FENTANYL CITRATE 50 UG/ML
INJECTION, SOLUTION INTRAMUSCULAR; INTRAVENOUS
Status: DISPENSED
Start: 2020-02-03 | End: 2020-02-03

## 2020-02-03 RX ORDER — SENNA AND DOCUSATE SODIUM 50; 8.6 MG/1; MG/1
1 TABLET, FILM COATED ORAL 2 TIMES DAILY
Status: DISCONTINUED | OUTPATIENT
Start: 2020-02-03 | End: 2020-02-04 | Stop reason: HOSPADM

## 2020-02-03 RX ORDER — ATORVASTATIN CALCIUM 10 MG/1
10 TABLET, FILM COATED ORAL DAILY
COMMUNITY

## 2020-02-03 RX ORDER — ONDANSETRON 2 MG/ML
4 INJECTION INTRAMUSCULAR; INTRAVENOUS EVERY 6 HOURS PRN
Status: DISCONTINUED | OUTPATIENT
Start: 2020-02-03 | End: 2020-02-04 | Stop reason: HOSPADM

## 2020-02-03 RX ORDER — LIDOCAINE HYDROCHLORIDE 20 MG/ML
INJECTION, SOLUTION INTRAVENOUS PRN
Status: DISCONTINUED | OUTPATIENT
Start: 2020-02-03 | End: 2020-02-03 | Stop reason: SDUPTHER

## 2020-02-03 RX ORDER — HYDRALAZINE HYDROCHLORIDE 20 MG/ML
5 INJECTION INTRAMUSCULAR; INTRAVENOUS EVERY 10 MIN PRN
Status: DISCONTINUED | OUTPATIENT
Start: 2020-02-03 | End: 2020-02-03 | Stop reason: HOSPADM

## 2020-02-03 RX ORDER — SODIUM CHLORIDE, SODIUM LACTATE, POTASSIUM CHLORIDE, CALCIUM CHLORIDE 600; 310; 30; 20 MG/100ML; MG/100ML; MG/100ML; MG/100ML
INJECTION, SOLUTION INTRAVENOUS CONTINUOUS PRN
Status: DISCONTINUED | OUTPATIENT
Start: 2020-02-03 | End: 2020-02-03 | Stop reason: SDUPTHER

## 2020-02-03 RX ORDER — MAGNESIUM HYDROXIDE 1200 MG/15ML
LIQUID ORAL CONTINUOUS PRN
Status: COMPLETED | OUTPATIENT
Start: 2020-02-03 | End: 2020-02-03

## 2020-02-03 RX ORDER — KETOROLAC TROMETHAMINE 30 MG/ML
30 INJECTION, SOLUTION INTRAMUSCULAR; INTRAVENOUS EVERY 6 HOURS PRN
Status: DISCONTINUED | OUTPATIENT
Start: 2020-02-03 | End: 2020-02-04 | Stop reason: HOSPADM

## 2020-02-03 RX ORDER — LABETALOL 20 MG/4 ML (5 MG/ML) INTRAVENOUS SYRINGE
5 EVERY 10 MIN PRN
Status: DISCONTINUED | OUTPATIENT
Start: 2020-02-03 | End: 2020-02-03 | Stop reason: HOSPADM

## 2020-02-03 RX ORDER — HYDROCHLOROTHIAZIDE 25 MG/1
12.5 TABLET ORAL DAILY
Status: DISCONTINUED | OUTPATIENT
Start: 2020-02-03 | End: 2020-02-04 | Stop reason: HOSPADM

## 2020-02-03 RX ORDER — MEPERIDINE HYDROCHLORIDE 25 MG/ML
12.5 INJECTION INTRAMUSCULAR; INTRAVENOUS; SUBCUTANEOUS EVERY 5 MIN PRN
Status: DISCONTINUED | OUTPATIENT
Start: 2020-02-03 | End: 2020-02-03 | Stop reason: HOSPADM

## 2020-02-03 RX ORDER — M-VIT,TX,IRON,MINS/CALC/FOLIC 27MG-0.4MG
1 TABLET ORAL 2 TIMES DAILY
Status: DISCONTINUED | OUTPATIENT
Start: 2020-02-03 | End: 2020-02-04 | Stop reason: HOSPADM

## 2020-02-03 RX ORDER — FLUTICASONE PROPIONATE 50 MCG
1 SPRAY, SUSPENSION (ML) NASAL NIGHTLY PRN
Status: DISCONTINUED | OUTPATIENT
Start: 2020-02-03 | End: 2020-02-04 | Stop reason: HOSPADM

## 2020-02-03 RX ORDER — OXYCODONE HYDROCHLORIDE 5 MG/1
5 TABLET ORAL EVERY 4 HOURS PRN
Status: DISCONTINUED | OUTPATIENT
Start: 2020-02-03 | End: 2020-02-04 | Stop reason: HOSPADM

## 2020-02-03 RX ORDER — ACETAMINOPHEN 10 MG/ML
750 INJECTION, SOLUTION INTRAVENOUS ONCE
Status: COMPLETED | OUTPATIENT
Start: 2020-02-03 | End: 2020-02-03

## 2020-02-03 RX ORDER — VANCOMYCIN HYDROCHLORIDE 1 G/20ML
INJECTION, POWDER, LYOPHILIZED, FOR SOLUTION INTRAVENOUS PRN
Status: DISCONTINUED | OUTPATIENT
Start: 2020-02-03 | End: 2020-02-03 | Stop reason: ALTCHOICE

## 2020-02-03 RX ORDER — BUPIVACAINE HYDROCHLORIDE 5 MG/ML
INJECTION, SOLUTION EPIDURAL; INTRACAUDAL
Status: DISPENSED
Start: 2020-02-03 | End: 2020-02-03

## 2020-02-03 RX ORDER — WARFARIN SODIUM 5 MG/1
5 TABLET ORAL
Status: COMPLETED | OUTPATIENT
Start: 2020-02-03 | End: 2020-02-03

## 2020-02-03 RX ORDER — PREGABALIN 150 MG/1
150 CAPSULE ORAL
Status: COMPLETED | OUTPATIENT
Start: 2020-02-03 | End: 2020-02-03

## 2020-02-03 RX ORDER — PROMETHAZINE HYDROCHLORIDE 25 MG/ML
6.25 INJECTION, SOLUTION INTRAMUSCULAR; INTRAVENOUS
Status: DISCONTINUED | OUTPATIENT
Start: 2020-02-03 | End: 2020-02-03 | Stop reason: HOSPADM

## 2020-02-03 RX ORDER — OYSTER SHELL CALCIUM WITH VITAMIN D 500; 200 MG/1; [IU]/1
1 TABLET, FILM COATED ORAL DAILY
Status: DISCONTINUED | OUTPATIENT
Start: 2020-02-03 | End: 2020-02-03

## 2020-02-03 RX ORDER — KETAMINE HYDROCHLORIDE 50 MG/ML
INJECTION, SOLUTION, CONCENTRATE INTRAMUSCULAR; INTRAVENOUS PRN
Status: DISCONTINUED | OUTPATIENT
Start: 2020-02-03 | End: 2020-02-03 | Stop reason: SDUPTHER

## 2020-02-03 RX ORDER — OYSTER SHELL CALCIUM WITH VITAMIN D 500; 200 MG/1; [IU]/1
1 TABLET, FILM COATED ORAL DAILY
Status: DISCONTINUED | OUTPATIENT
Start: 2020-02-03 | End: 2020-02-04 | Stop reason: HOSPADM

## 2020-02-03 RX ORDER — ATORVASTATIN CALCIUM 10 MG/1
10 TABLET, FILM COATED ORAL NIGHTLY
Status: DISCONTINUED | OUTPATIENT
Start: 2020-02-03 | End: 2020-02-04 | Stop reason: HOSPADM

## 2020-02-03 RX ADMIN — KETOROLAC TROMETHAMINE 30 MG: 30 INJECTION, SOLUTION INTRAMUSCULAR at 14:11

## 2020-02-03 RX ADMIN — KETAMINE HYDROCHLORIDE 10 MG: 50 INJECTION, SOLUTION INTRAMUSCULAR; INTRAVENOUS at 12:16

## 2020-02-03 RX ADMIN — DEXAMETHASONE SODIUM PHOSPHATE 10 MG: 4 INJECTION, SOLUTION INTRAMUSCULAR; INTRAVENOUS at 09:44

## 2020-02-03 RX ADMIN — PREGABALIN 150 MG: 150 CAPSULE ORAL at 09:33

## 2020-02-03 RX ADMIN — CALCIUM CARBONATE-VITAMIN D TAB 500 MG-200 UNIT 1 TABLET: 500-200 TAB at 18:34

## 2020-02-03 RX ADMIN — ONDANSETRON 4 MG: 2 INJECTION INTRAMUSCULAR; INTRAVENOUS at 09:59

## 2020-02-03 RX ADMIN — HYDROCHLOROTHIAZIDE 12.5 MG: 25 TABLET ORAL at 17:10

## 2020-02-03 RX ADMIN — CEFAZOLIN SODIUM 2 G: 10 INJECTION, POWDER, FOR SOLUTION INTRAVENOUS at 10:09

## 2020-02-03 RX ADMIN — KETAMINE HYDROCHLORIDE 10 MG: 50 INJECTION, SOLUTION INTRAMUSCULAR; INTRAVENOUS at 10:59

## 2020-02-03 RX ADMIN — DOCUSATE SODIUM 100 MG: 100 CAPSULE, LIQUID FILLED ORAL at 20:20

## 2020-02-03 RX ADMIN — ROCURONIUM BROMIDE 20 MG: 10 INJECTION, SOLUTION INTRAVENOUS at 10:34

## 2020-02-03 RX ADMIN — HYDROMORPHONE HYDROCHLORIDE 0.5 MG: 2 INJECTION, SOLUTION INTRAMUSCULAR; INTRAVENOUS; SUBCUTANEOUS at 11:29

## 2020-02-03 RX ADMIN — ATORVASTATIN CALCIUM 10 MG: 10 TABLET, FILM COATED ORAL at 20:20

## 2020-02-03 RX ADMIN — PHENYLEPHRINE HYDROCHLORIDE 80 MCG: 10 INJECTION INTRAVENOUS at 10:18

## 2020-02-03 RX ADMIN — WARFARIN SODIUM 5 MG: 5 TABLET ORAL at 17:26

## 2020-02-03 RX ADMIN — PHENYLEPHRINE HYDROCHLORIDE 80 MCG: 10 INJECTION INTRAVENOUS at 12:16

## 2020-02-03 RX ADMIN — SUGAMMADEX 200 MG: 100 INJECTION, SOLUTION INTRAVENOUS at 12:34

## 2020-02-03 RX ADMIN — OXYCODONE HYDROCHLORIDE 20 MG: 10 TABLET, FILM COATED, EXTENDED RELEASE ORAL at 09:33

## 2020-02-03 RX ADMIN — PHENYLEPHRINE HYDROCHLORIDE 80 MCG: 10 INJECTION INTRAVENOUS at 10:28

## 2020-02-03 RX ADMIN — ACETAMINOPHEN 650 MG: 325 TABLET ORAL at 17:10

## 2020-02-03 RX ADMIN — CELECOXIB 400 MG: 200 CAPSULE ORAL at 09:36

## 2020-02-03 RX ADMIN — Medication 10 ML: at 20:32

## 2020-02-03 RX ADMIN — ACETAMINOPHEN 750 MG: 10 INJECTION, SOLUTION INTRAVENOUS at 12:26

## 2020-02-03 RX ADMIN — DOCUSATE SODIUM 50MG AND SENNOSIDES 8.6MG 1 TABLET: 8.6; 5 TABLET, FILM COATED ORAL at 20:20

## 2020-02-03 RX ADMIN — SODIUM CHLORIDE, SODIUM LACTATE, POTASSIUM CHLORIDE, AND CALCIUM CHLORIDE: 600; 310; 30; 20 INJECTION, SOLUTION INTRAVENOUS at 09:55

## 2020-02-03 RX ADMIN — ROCURONIUM BROMIDE 50 MG: 10 INJECTION, SOLUTION INTRAVENOUS at 09:59

## 2020-02-03 RX ADMIN — FENTANYL CITRATE 100 MCG: 50 INJECTION INTRAMUSCULAR; INTRAVENOUS at 09:59

## 2020-02-03 RX ADMIN — TRANEXAMIC ACID 1.17 G: 1 INJECTION, SOLUTION INTRAVENOUS at 10:29

## 2020-02-03 RX ADMIN — ONDANSETRON 4 MG: 2 INJECTION INTRAMUSCULAR; INTRAVENOUS at 12:34

## 2020-02-03 RX ADMIN — KETAMINE HYDROCHLORIDE 10 MG: 50 INJECTION, SOLUTION INTRAMUSCULAR; INTRAVENOUS at 11:55

## 2020-02-03 RX ADMIN — PREDNISONE 10 MG: 10 TABLET ORAL at 17:10

## 2020-02-03 RX ADMIN — PHENYLEPHRINE HYDROCHLORIDE 80 MCG: 10 INJECTION INTRAVENOUS at 12:34

## 2020-02-03 RX ADMIN — PHENYLEPHRINE HYDROCHLORIDE 80 MCG: 10 INJECTION INTRAVENOUS at 11:53

## 2020-02-03 RX ADMIN — PHENYLEPHRINE HYDROCHLORIDE 80 MCG: 10 INJECTION INTRAVENOUS at 12:05

## 2020-02-03 RX ADMIN — PHENYLEPHRINE HYDROCHLORIDE 80 MCG: 10 INJECTION INTRAVENOUS at 10:22

## 2020-02-03 RX ADMIN — PROPOFOL 150 MG: 10 INJECTION, EMULSION INTRAVENOUS at 09:59

## 2020-02-03 RX ADMIN — ACETAMINOPHEN 650 MG: 325 TABLET ORAL at 22:21

## 2020-02-03 RX ADMIN — PROPOFOL 30 MG: 10 INJECTION, EMULSION INTRAVENOUS at 12:34

## 2020-02-03 RX ADMIN — CEFAZOLIN SODIUM 2 G: 10 INJECTION, POWDER, FOR SOLUTION INTRAVENOUS at 17:21

## 2020-02-03 RX ADMIN — KETAMINE HYDROCHLORIDE 40 MG: 50 INJECTION, SOLUTION INTRAMUSCULAR; INTRAVENOUS at 09:59

## 2020-02-03 RX ADMIN — MIDAZOLAM HYDROCHLORIDE 2 MG: 2 INJECTION, SOLUTION INTRAMUSCULAR; INTRAVENOUS at 09:59

## 2020-02-03 RX ADMIN — DULOXETINE HYDROCHLORIDE 60 MG: 60 CAPSULE, DELAYED RELEASE ORAL at 17:10

## 2020-02-03 RX ADMIN — SODIUM CHLORIDE: 4.5 INJECTION, SOLUTION INTRAVENOUS at 13:53

## 2020-02-03 RX ADMIN — PHENYLEPHRINE HYDROCHLORIDE 80 MCG: 10 INJECTION INTRAVENOUS at 10:12

## 2020-02-03 RX ADMIN — SODIUM CHLORIDE, SODIUM LACTATE, POTASSIUM CHLORIDE, AND CALCIUM CHLORIDE: 600; 310; 30; 20 INJECTION, SOLUTION INTRAVENOUS at 10:32

## 2020-02-03 RX ADMIN — LIDOCAINE HYDROCHLORIDE 60 MG: 20 INJECTION, SOLUTION INTRAVENOUS at 09:59

## 2020-02-03 RX ADMIN — GABAPENTIN 300 MG: 300 CAPSULE ORAL at 17:10

## 2020-02-03 RX ADMIN — SODIUM CHLORIDE, POTASSIUM CHLORIDE, SODIUM LACTATE AND CALCIUM CHLORIDE: 600; 310; 30; 20 INJECTION, SOLUTION INTRAVENOUS at 09:24

## 2020-02-03 RX ADMIN — Medication 0.2 MG: at 09:59

## 2020-02-03 RX ADMIN — OXYCODONE 5 MG: 5 TABLET ORAL at 17:19

## 2020-02-03 RX ADMIN — HYDROMORPHONE HYDROCHLORIDE 0.5 MG: 2 INJECTION, SOLUTION INTRAMUSCULAR; INTRAVENOUS; SUBCUTANEOUS at 09:59

## 2020-02-03 RX ADMIN — PHENYLEPHRINE HYDROCHLORIDE 80 MCG: 10 INJECTION INTRAVENOUS at 10:59

## 2020-02-03 RX ADMIN — GABAPENTIN 300 MG: 300 CAPSULE ORAL at 20:20

## 2020-02-03 ASSESSMENT — PULMONARY FUNCTION TESTS
PIF_VALUE: 23
PIF_VALUE: 2
PIF_VALUE: 1
PIF_VALUE: 2
PIF_VALUE: 23
PIF_VALUE: 22
PIF_VALUE: 22
PIF_VALUE: 24
PIF_VALUE: 23
PIF_VALUE: 22
PIF_VALUE: 23
PIF_VALUE: 24
PIF_VALUE: 23
PIF_VALUE: 0
PIF_VALUE: 23
PIF_VALUE: 23
PIF_VALUE: 24
PIF_VALUE: 22
PIF_VALUE: 24
PIF_VALUE: 22
PIF_VALUE: 23
PIF_VALUE: 23
PIF_VALUE: 24
PIF_VALUE: 23
PIF_VALUE: 24
PIF_VALUE: 24
PIF_VALUE: 23
PIF_VALUE: 24
PIF_VALUE: 22
PIF_VALUE: 2
PIF_VALUE: 8
PIF_VALUE: 23
PIF_VALUE: 24
PIF_VALUE: 25
PIF_VALUE: 23
PIF_VALUE: 24
PIF_VALUE: 22
PIF_VALUE: 23
PIF_VALUE: 24
PIF_VALUE: 23
PIF_VALUE: 6
PIF_VALUE: 23
PIF_VALUE: 23
PIF_VALUE: 22
PIF_VALUE: 23
PIF_VALUE: 24
PIF_VALUE: 23
PIF_VALUE: 1
PIF_VALUE: 24
PIF_VALUE: 0
PIF_VALUE: 28
PIF_VALUE: 24
PIF_VALUE: 24
PIF_VALUE: 23
PIF_VALUE: 23
PIF_VALUE: 2
PIF_VALUE: 23
PIF_VALUE: 23
PIF_VALUE: 24
PIF_VALUE: 2
PIF_VALUE: 24
PIF_VALUE: 3
PIF_VALUE: 23
PIF_VALUE: 23
PIF_VALUE: 25
PIF_VALUE: 23
PIF_VALUE: 24
PIF_VALUE: 23
PIF_VALUE: 25
PIF_VALUE: 24
PIF_VALUE: 23
PIF_VALUE: 3
PIF_VALUE: 24
PIF_VALUE: 24
PIF_VALUE: 22
PIF_VALUE: 23
PIF_VALUE: 24
PIF_VALUE: 0
PIF_VALUE: 4
PIF_VALUE: 4
PIF_VALUE: 24
PIF_VALUE: 24
PIF_VALUE: 23
PIF_VALUE: 2
PIF_VALUE: 23
PIF_VALUE: 24
PIF_VALUE: 23
PIF_VALUE: 22
PIF_VALUE: 24
PIF_VALUE: 25
PIF_VALUE: 24
PIF_VALUE: 4
PIF_VALUE: 4
PIF_VALUE: 0
PIF_VALUE: 22
PIF_VALUE: 23
PIF_VALUE: 24
PIF_VALUE: 24
PIF_VALUE: 23
PIF_VALUE: 23
PIF_VALUE: 24
PIF_VALUE: 24
PIF_VALUE: 23
PIF_VALUE: 22
PIF_VALUE: 23
PIF_VALUE: 24
PIF_VALUE: 24
PIF_VALUE: 23
PIF_VALUE: 22
PIF_VALUE: 23
PIF_VALUE: 2
PIF_VALUE: 23
PIF_VALUE: 24
PIF_VALUE: 23
PIF_VALUE: 22
PIF_VALUE: 24
PIF_VALUE: 0
PIF_VALUE: 23
PIF_VALUE: 29
PIF_VALUE: 23
PIF_VALUE: 1
PIF_VALUE: 24
PIF_VALUE: 2
PIF_VALUE: 23
PIF_VALUE: 22
PIF_VALUE: 23
PIF_VALUE: 24
PIF_VALUE: 23
PIF_VALUE: 22
PIF_VALUE: 3

## 2020-02-03 ASSESSMENT — PAIN SCALES - GENERAL
PAINLEVEL_OUTOF10: 5
PAINLEVEL_OUTOF10: 5
PAINLEVEL_OUTOF10: 6
PAINLEVEL_OUTOF10: 0
PAINLEVEL_OUTOF10: 6
PAINLEVEL_OUTOF10: 0

## 2020-02-03 ASSESSMENT — PAIN DESCRIPTION - ONSET: ONSET: ON-GOING

## 2020-02-03 ASSESSMENT — PAIN DESCRIPTION - DESCRIPTORS
DESCRIPTORS: ACHING
DESCRIPTORS: ACHING
DESCRIPTORS: DISCOMFORT

## 2020-02-03 ASSESSMENT — PAIN - FUNCTIONAL ASSESSMENT
PAIN_FUNCTIONAL_ASSESSMENT: PREVENTS OR INTERFERES SOME ACTIVE ACTIVITIES AND ADLS
PAIN_FUNCTIONAL_ASSESSMENT: PREVENTS OR INTERFERES WITH MANY ACTIVE NOT PASSIVE ACTIVITIES
PAIN_FUNCTIONAL_ASSESSMENT: 0-10

## 2020-02-03 ASSESSMENT — PAIN DESCRIPTION - ORIENTATION: ORIENTATION: LEFT

## 2020-02-03 ASSESSMENT — PAIN DESCRIPTION - PAIN TYPE: TYPE: SURGICAL PAIN

## 2020-02-03 ASSESSMENT — PAIN DESCRIPTION - PROGRESSION: CLINICAL_PROGRESSION: GRADUALLY WORSENING

## 2020-02-03 ASSESSMENT — ENCOUNTER SYMPTOMS: RESPIRATORY NEGATIVE: 1

## 2020-02-03 ASSESSMENT — PAIN DESCRIPTION - FREQUENCY: FREQUENCY: CONTINUOUS

## 2020-02-03 ASSESSMENT — PAIN DESCRIPTION - LOCATION: LOCATION: KNEE

## 2020-02-03 NOTE — CONSULTS
No wheezing or rales. Abdominal:      General: Abdomen is flat. Bowel sounds are normal.      Palpations: Abdomen is soft. Musculoskeletal:      Comments: L knee brace in place. Neurological:      General: No focal deficit present. Mental Status: She is alert and oriented to person, place, and time. Comments: Drowsy, but awake and alert. A&Ox 3   Psychiatric:         Mood and Affect: Mood normal.       Physical exam:       Vitals:    02/03/20 1500   BP: 121/64   Pulse: 73   Resp: 11   Temp: 97.5 °F (36.4 °C)   SpO2: 96%       DATA:    Labs:  CBC: No results for input(s): WBC, HGB, HCT, PLT in the last 72 hours. BMP: No results for input(s): NA, K, CL, CO2, BUN, CREATININE, GLUCOSE, PHOS in the last 72 hours. Invalid input(s):  CA  LFT's: No results for input(s): AST, ALT, ALB, BILITOT, ALKPHOS in the last 72 hours. Troponin: No results for input(s): TROPONINI in the last 72 hours. BNP:No results for input(s): BNP in the last 72 hours. ABGs: No results for input(s): PHART, KES5AKK, PO2ART in the last 72 hours. INR: No results for input(s): INR in the last 72 hours. U/A:No results for input(s): NITRITE, COLORU, PHUR, LABCAST, WBCUA, RBCUA, MUCUS, TRICHOMONAS, YEAST, BACTERIA, CLARITYU, SPECGRAV, LEUKOCYTESUR, UROBILINOGEN, BILIRUBINUR, BLOODU, GLUCOSEU, AMORPHOUS in the last 72 hours. Invalid input(s): KETONESU    XR KNEE LEFT (1-2 VIEWS)   Final Result      2 views demonstrate medial compartment hemiarthroplasty with postoperative changes. Old screw tracts are seen in the femoral and tibial diaphyses. ASSESSMENT AND PLAN:  Ms. Adilia Reyna is a 61 yo F with PMHx significant for DVT and OA who presents for elective L TKA. L TKA POD # 1- Patient feeling well, somewhat drowsy on exam but otherwise no acute complaints. - Ortho primary  - Pain management   - PT/OT  - Bowel regimen  - Currently on ASA BID for DVT ppx; however, patient is high risk as she has history of DVT. Spoke to ortho NP and explained my concerns- okay with switching to warfarin  - D/C  mg BID  - Pharmacy consulted to dose warfarin      HTN  - Cont home HCTZ with holding parameters as BP on softer side     Neuropathy-  - Cont home gabapentin, Cymbalta        Will discuss with attending physician Dr. Lizzette Calle Status:Full code  FEN: General  PPX: Will switch to warfarin, pharmacy consulted   DISPO: Jasper Hagen MD  2/3/2020,  3:30 PM    Medically sable this AM but still with anaesthesia from nerve block

## 2020-02-03 NOTE — PLAN OF CARE
Problem: Falls - Risk of:  Goal: Will remain free from falls  Description  Fall precautions in place. Bed is in lowest position, wheels locked, alarm on, non-skid socks on. Call light and bedside table within reach. Patient calls out appropriately. Patient is up x1-2 assist with walker and GB. Will continue to assess and monitor. Outcome: Ongoing     Problem: Pain - Acute:  Goal: Pain level will decrease  Description  Pain level will decrease. Pt denies pain medication at this time.   Outcome: Ongoing

## 2020-02-03 NOTE — ANESTHESIA POSTPROCEDURE EVALUATION
Department of Anesthesiology  Postprocedure Note    Patient: Del Jain  MRN: [id number]  YOB: 1960  Date of evaluation: 2/3/2020  Time:  1:48 PM     Procedure Summary     Date:  02/03/20 Room / Location:  [address] [place] [address] [address] [place] [address] / [facility]    Anesthesia Start:  4277 Anesthesia Stop:  56    Procedure:  LEFT MEDIAL PARTAL KNEE ARTHROPLASTY DUGLAS (Left ) Diagnosis:       Primary osteoarthritis of left knee      (Primary osteoarthritis of left knee [M17.12])    Surgeon:  Connor Odell MD Responsible Provider:  Reena Polanco MD    Anesthesia Type:  general ASA Status:  2          Anesthesia Type: general    Lyndsay Phase I: Lyndsay Score: 8    Lyndsay Phase II:      Last vitals: Reviewed and per EMR flowsheets.        Anesthesia Post Evaluation    Patient location during evaluation: PACU  Patient participation: complete - patient participated  Level of consciousness: awake and alert  Pain score: 0  Airway patency: patent  Nausea & Vomiting: no nausea and no vomiting  Complications: no  Cardiovascular status: hemodynamically stable  Respiratory status: acceptable  Hydration status: euvolemic

## 2020-02-03 NOTE — H&P
Skip Medrano    [id number]    [facility] Same Day Surgery Update H & P  Department of General Surgery   Surgical Service   Pre-operative History and Physical  Last H & P within the last 30 days. DIAGNOSIS:   Primary osteoarthritis of left knee [M17.12]    PROCEDURE:  ID PLASTY KNEE,MED OR LAT COMPARTMT [61168] (LEFT MEDIAL PARTAL KNEE ARTHROPLASTY DUGLAS)     HISTORY OF PRESENT ILLNESS:    Patient with left knee pain, swelling and instability in the setting of arthrosis. The symptoms have been recalcitrant to conservative treatment and the patient presents today for the above procedure.      Past Medical History:        Diagnosis Date    Arthritis     Bulging lumbar disc     Chronic back pain since early 2000's    DDD (degenerative disc disease), cervical     DDD (degenerative disc disease), lumbar     DVT (deep venous thrombosis) ([facility] [address] 2004    after right knee arthroscopy    High blood pressure     Hyperlipidemia      Past Surgical History:        Procedure Laterality Date    ACHILLES TENDON SURGERY Left 1/11/2019    LEFT ACHILLES DEBRIDEMENT, TOPAZ PROCEDURE performed by Clemente Patterson MD at [facility]    hamm toe    HYSTERECTOMY  2004    JOINT REPLACEMENT      KNEE ARTHROPLASTY  2008    right    KNEE SURGERY  8/2008    RT TKA    ID ARTHRS KNE SURG W/MENISCECTOMY MED/LAT W/SHVG Left 8/10/2018    LEFT ILIAC CREST BONE MARROW HARVEST,  LEFT KNEE ARTHROSCOPY; SUBCHONDROPLASTY, ULTRASOUND GUIDED BONE MARROW CONCENTRATE INJECTION SUBCHONDRAL LEFT KNEE, PPP INJECTION INTRA-ARTICULAR LEFT KNEE, PPP INJECTION INTRA-ARTICULAR  LEFT KNEE, PPP INJECTION LEFT SUBTALAR JOINT performed by Clemente Patterson MD at [facility]  2011  (X3 TOTAL)    right     Past Social History:  Social History     Socioeconomic History    Marital status:      Spouse name: None    Number of children: None    Years of education: None    Highest education level: None   Occupational History    None   Social Needs    Financial resource strain: None    Food insecurity:     Worry: None     Inability: None    Transportation needs:     Medical: None     Non-medical: None   Tobacco Use    Smoking status: Current Every Day Smoker     Packs/day: 0.50     Years: 20.00     Pack years: 10.00     Types: Cigarettes    Smokeless tobacco: Never Used   Substance and Sexual Activity    Alcohol use: Yes     Comment: RARELY    Drug use: No    Sexual activity: None   Lifestyle    Physical activity:     Days per week: None     Minutes per session: None    Stress: None   Relationships    Social connections:     Talks on phone: None     Gets together: None     Attends Cheondoism service: None     Active member of club or organization: None     Attends meetings of clubs or organizations: None     Relationship status: None    Intimate partner violence:     Fear of current or ex partner: None     Emotionally abused: None     Physically abused: None     Forced sexual activity: None   Other Topics Concern    None   Social History Narrative    None         Medications Prior to Admission:      Prior to Admission medications    Medication Sig Start Date End Date Taking? Authorizing Provider   atorvastatin (LIPITOR) 10 MG tablet Take 10 mg by mouth daily   Yes Historical Provider, MD   gabapentin (NEURONTIN) 300 MG capsule Take 1 capsule by mouth 3 times daily for 30 days.  1/21/20 2/20/20 Yes MARLENY Solis CNP   DULoxetine (CYMBALTA) 60 MG extended release capsule Take 1 capsule by mouth daily 1/21/20  Yes MARLENY Solis CNP   Prenatal Vit-Fe Fumarate-FA (PRENATAL COMPLETE PO) Take by mouth daily   Yes Historical Provider, MD   calcium-vitamin D (OSCAL-500) 500-200 MG-UNIT per tablet Take 1 tablet by mouth daily   Yes Historical Provider, MD   hydrochlorothiazide (HYDRODIURIL) 25 MG tablet 12.5 mg

## 2020-02-03 NOTE — CONSULTS
Clinical Pharmacy Consult Note    Admit date: 2/3/2020    Subjective/Objective:  61 yo female with PMHx significant for OA and DVT. Admitted today for left partial knee arthroplasty. Per pt, she developed DVT in 2008 s/p knee surgery. Not currently on anticoagulation. Pharmacy is consulted to dose warfarin per Dr. Glenn Gasca anticoagulation regimen:  Not on home anticoagulation          Labs:  Date INR Warfarin Dose   1/30 1.05 Pre-surg   2/3 -- 5 mg ordered   2/4 2/5         No results for input(s): NA, K, CL, CO2, BUN, CREATININE, GLUCOSE in the last 72 hours. CrCl cannot be calculated (Patient's most recent lab result is older than the maximum 10 days allowed. ). Lab Results   Component Value Date    WBC 7.4 11/05/2018    HGB 14.6 11/05/2018    HCT 44.6 11/05/2018    MCV 91.2 11/05/2018     11/05/2018       Lab Results   Component Value Date    PROTIME 12.2 01/30/2020    INR 1.05 01/30/2020       Height:  5' 4\" (162.6 cm)  Weight:  172 lb (78 kg)        Assessment/Plan:  1. Anticoagulation: s/p TKA - hx of DVT following TKA  · INR goal 2-3  · Will start with 5 mg this evening then further dosing based on INR  · Warfarin dosing placeholder is ordered to alert staff to monitor for bleeding  · Medication profile reviewed for potential drug interactions. No significant drug interactions with warfarin noted. · Daily INR will be monitored and dose adjustments made as needed. · Plan to educate patient on warfarin tomorrow. Thanks for consulting pharmacy! Please call with questions:  [phone] ([email or web address])   [phone] ([address] [place] [address])    Cynthia Lebron. Raymundo DANIELS   2/3/2020 4:59 PM

## 2020-02-04 VITALS
HEART RATE: 85 BPM | WEIGHT: 172 LBS | SYSTOLIC BLOOD PRESSURE: 121 MMHG | OXYGEN SATURATION: 96 % | BODY MASS INDEX: 29.37 KG/M2 | TEMPERATURE: 98.4 F | RESPIRATION RATE: 18 BRPM | DIASTOLIC BLOOD PRESSURE: 72 MMHG | HEIGHT: 64 IN

## 2020-02-04 LAB
ANION GAP SERPL CALCULATED.3IONS-SCNC: 13 MMOL/L (ref 3–16)
BUN BLDV-MCNC: 15 MG/DL (ref 7–20)
CALCIUM SERPL-MCNC: 9.5 MG/DL (ref 8.3–10.6)
CHLORIDE BLD-SCNC: 99 MMOL/L (ref 99–110)
CO2: 25 MMOL/L (ref 21–32)
CREAT SERPL-MCNC: 0.8 MG/DL (ref 0.6–1.2)
GFR AFRICAN AMERICAN: >60
GFR NON-AFRICAN AMERICAN: >60
GLUCOSE BLD-MCNC: 159 MG/DL (ref 70–99)
HCT VFR BLD CALC: 38.2 % (ref 36–48)
HEMOGLOBIN: 12.3 G/DL (ref 12–16)
INR BLD: 1.09 (ref 0.86–1.14)
MCH RBC QN AUTO: 29.8 PG (ref 26–34)
MCHC RBC AUTO-ENTMCNC: 32.1 G/DL (ref 31–36)
MCV RBC AUTO: 92.7 FL (ref 80–100)
PDW BLD-RTO: 13 % (ref 12.4–15.4)
PLATELET # BLD: 274 K/UL (ref 135–450)
PMV BLD AUTO: 9.1 FL (ref 5–10.5)
POTASSIUM SERPL-SCNC: 4.7 MMOL/L (ref 3.5–5.1)
PROTHROMBIN TIME: 12.6 SEC (ref 10–13.2)
RBC # BLD: 4.12 M/UL (ref 4–5.2)
SODIUM BLD-SCNC: 137 MMOL/L (ref 136–145)
WBC # BLD: 14 K/UL (ref 4–11)

## 2020-02-04 PROCEDURE — G0378 HOSPITAL OBSERVATION PER HR: HCPCS

## 2020-02-04 PROCEDURE — 96374 THER/PROPH/DIAG INJ IV PUSH: CPT

## 2020-02-04 PROCEDURE — 97116 GAIT TRAINING THERAPY: CPT

## 2020-02-04 PROCEDURE — 97161 PT EVAL LOW COMPLEX 20 MIN: CPT

## 2020-02-04 PROCEDURE — 97166 OT EVAL MOD COMPLEX 45 MIN: CPT

## 2020-02-04 PROCEDURE — 99253 IP/OBS CNSLTJ NEW/EST LOW 45: CPT | Performed by: INTERNAL MEDICINE

## 2020-02-04 PROCEDURE — 97530 THERAPEUTIC ACTIVITIES: CPT

## 2020-02-04 PROCEDURE — 6370000000 HC RX 637 (ALT 250 FOR IP): Performed by: PHYSICIAN ASSISTANT

## 2020-02-04 PROCEDURE — 6360000002 HC RX W HCPCS: Performed by: PHYSICIAN ASSISTANT

## 2020-02-04 PROCEDURE — 36415 COLL VENOUS BLD VENIPUNCTURE: CPT

## 2020-02-04 PROCEDURE — 80048 BASIC METABOLIC PNL TOTAL CA: CPT

## 2020-02-04 PROCEDURE — 2580000003 HC RX 258: Performed by: PHYSICIAN ASSISTANT

## 2020-02-04 PROCEDURE — 97110 THERAPEUTIC EXERCISES: CPT

## 2020-02-04 PROCEDURE — 85610 PROTHROMBIN TIME: CPT

## 2020-02-04 PROCEDURE — 97535 SELF CARE MNGMENT TRAINING: CPT

## 2020-02-04 PROCEDURE — 85027 COMPLETE CBC AUTOMATED: CPT

## 2020-02-04 PROCEDURE — 6370000000 HC RX 637 (ALT 250 FOR IP): Performed by: STUDENT IN AN ORGANIZED HEALTH CARE EDUCATION/TRAINING PROGRAM

## 2020-02-04 RX ORDER — WARFARIN SODIUM 5 MG/1
TABLET ORAL
Qty: 21 TABLET | Refills: 0 | Status: SHIPPED | OUTPATIENT
Start: 2020-02-04 | End: 2020-04-07 | Stop reason: ALTCHOICE

## 2020-02-04 RX ORDER — WARFARIN SODIUM 5 MG/1
5 TABLET ORAL DAILY
Status: DISCONTINUED | OUTPATIENT
Start: 2020-02-04 | End: 2020-02-04 | Stop reason: HOSPADM

## 2020-02-04 RX ORDER — OXYCODONE HYDROCHLORIDE 5 MG/1
5-10 TABLET ORAL EVERY 4 HOURS PRN
Qty: 28 TABLET | Refills: 0 | Status: SHIPPED | OUTPATIENT
Start: 2020-02-04 | End: 2020-02-07

## 2020-02-04 RX ORDER — PREDNISONE 10 MG/1
10 TABLET ORAL DAILY
Qty: 10 TABLET | Refills: 0 | Status: SHIPPED | OUTPATIENT
Start: 2020-02-05 | End: 2020-02-15

## 2020-02-04 RX ORDER — SENNA AND DOCUSATE SODIUM 50; 8.6 MG/1; MG/1
1 TABLET, FILM COATED ORAL 2 TIMES DAILY
Qty: 30 TABLET | Refills: 0 | Status: SHIPPED | OUTPATIENT
Start: 2020-02-04 | End: 2020-12-08 | Stop reason: SDUPTHER

## 2020-02-04 RX ADMIN — Medication 10 ML: at 08:52

## 2020-02-04 RX ADMIN — PREDNISONE 10 MG: 10 TABLET ORAL at 08:51

## 2020-02-04 RX ADMIN — DULOXETINE HYDROCHLORIDE 60 MG: 60 CAPSULE, DELAYED RELEASE ORAL at 08:51

## 2020-02-04 RX ADMIN — DOCUSATE SODIUM 50MG AND SENNOSIDES 8.6MG 1 TABLET: 8.6; 5 TABLET, FILM COATED ORAL at 08:57

## 2020-02-04 RX ADMIN — GABAPENTIN 300 MG: 300 CAPSULE ORAL at 15:13

## 2020-02-04 RX ADMIN — MULTIPLE VITAMINS W/ MINERALS TAB 1 TABLET: TAB at 08:51

## 2020-02-04 RX ADMIN — OXYCODONE 5 MG: 5 TABLET ORAL at 12:55

## 2020-02-04 RX ADMIN — OXYCODONE 5 MG: 5 TABLET ORAL at 00:00

## 2020-02-04 RX ADMIN — OXYCODONE 5 MG: 5 TABLET ORAL at 16:20

## 2020-02-04 RX ADMIN — WARFARIN SODIUM 5 MG: 5 TABLET ORAL at 18:09

## 2020-02-04 RX ADMIN — KETOROLAC TROMETHAMINE 30 MG: 30 INJECTION, SOLUTION INTRAMUSCULAR at 15:13

## 2020-02-04 RX ADMIN — CEFAZOLIN SODIUM 2 G: 10 INJECTION, POWDER, FOR SOLUTION INTRAVENOUS at 01:57

## 2020-02-04 RX ADMIN — ACETAMINOPHEN 650 MG: 325 TABLET ORAL at 04:11

## 2020-02-04 RX ADMIN — ACETAMINOPHEN 650 MG: 325 TABLET ORAL at 15:13

## 2020-02-04 RX ADMIN — DOCUSATE SODIUM 100 MG: 100 CAPSULE, LIQUID FILLED ORAL at 08:51

## 2020-02-04 RX ADMIN — GABAPENTIN 300 MG: 300 CAPSULE ORAL at 08:51

## 2020-02-04 RX ADMIN — ACETAMINOPHEN 650 MG: 325 TABLET ORAL at 08:51

## 2020-02-04 RX ADMIN — CALCIUM CARBONATE-VITAMIN D TAB 500 MG-200 UNIT 1 TABLET: 500-200 TAB at 08:55

## 2020-02-04 ASSESSMENT — PAIN DESCRIPTION - LOCATION
LOCATION: KNEE

## 2020-02-04 ASSESSMENT — PAIN DESCRIPTION - ORIENTATION
ORIENTATION: LEFT

## 2020-02-04 ASSESSMENT — PAIN SCALES - GENERAL
PAINLEVEL_OUTOF10: 6
PAINLEVEL_OUTOF10: 8
PAINLEVEL_OUTOF10: 4
PAINLEVEL_OUTOF10: 6
PAINLEVEL_OUTOF10: 0
PAINLEVEL_OUTOF10: 5
PAINLEVEL_OUTOF10: 0

## 2020-02-04 ASSESSMENT — PAIN DESCRIPTION - ONSET
ONSET: ON-GOING

## 2020-02-04 ASSESSMENT — PAIN - FUNCTIONAL ASSESSMENT
PAIN_FUNCTIONAL_ASSESSMENT: PREVENTS OR INTERFERES SOME ACTIVE ACTIVITIES AND ADLS

## 2020-02-04 ASSESSMENT — PAIN DESCRIPTION - DESCRIPTORS
DESCRIPTORS: ACHING

## 2020-02-04 ASSESSMENT — PAIN DESCRIPTION - PAIN TYPE
TYPE: SURGICAL PAIN

## 2020-02-04 ASSESSMENT — PAIN DESCRIPTION - FREQUENCY
FREQUENCY: CONTINUOUS

## 2020-02-04 ASSESSMENT — PAIN DESCRIPTION - PROGRESSION
CLINICAL_PROGRESSION: GRADUALLY WORSENING

## 2020-02-04 NOTE — PROGRESS NOTES
Pt is alert and oriented. Ace wrap CD&I. Pt denies numbness/tingling. Tolerating diet well, denies nausea/vomiting. Pt requests oral pain medication PRN. Voiding adequately. Notified NP of foul smelling urine. Pt unable to void again for specimen collection.

## 2020-02-04 NOTE — PROGRESS NOTES
Patient is AAOx4. VSS. Patients knees buckled when trying to get up to the bathroom. Patient has been voiding per bed pan. Patient complained of some pain, medicated per mar. Patient is voiding adequately. Patient has ace wrap on, which is clean dry and intact. Will continue to monitor.

## 2020-02-04 NOTE — PROGRESS NOTES
Occupational Therapy   Occupational Therapy Initial Assessment and Treatment  Date: 2020   Patient Name: Chris Fan  MRN: [id number]     : 1960    Date of Service: 2020    Discharge Recommendations:Laverne Daugherty scored a 21/24 on the  [address] form. Current research shows that an AM-PAC score of 18 or greater is typically associated with a discharge to the patient's home setting. Based on the patients AM-PAC score and their current ADL deficits, it is recommended that the patient have 2-3 sessions per week of Occupational Therapy at d/c to increase the patients independence. OT Equipment Recommendations  Equipment Needed: No    Assessment   Performance deficits / Impairments: Decreased functional mobility ; Decreased ADL status; Decreased safe awareness  Assessment: Pt seen 2020 and appears to be functioning below baseline after a L medical partial knee anthroplasty on 2/3/2020. Pt required CGA assist for functional mobility and ADLs. Feel pt would benefit from continued OT services. Treatment Diagnosis: Decreased functional independence  Prognosis: Good  Decision Making: Medium Complexity  OT Education: OT Role;Transfer Training  Patient Education: Pt acknowledged OT role, dressing techniques, and safe walker use   REQUIRES OT FOLLOW UP: Yes  Activity Tolerance  Activity Tolerance: Patient Tolerated treatment well  Safety Devices  Safety Devices in place: Yes(Pt with PT upon departure )  Type of devices: Nurse notified; Left in bed         Treatment Diagnosis: Decreased functional independence      Restrictions  Position Activity Restriction  Other position/activity restrictions: Full weight bearing as tolerated of LLE; knee immobilizer    Subjective   General  Chart Reviewed: Yes  Patient assessed for rehabilitation services?: Yes  Additional Pertinent Hx: Pt recieved a L medical partial knee anthroplasty 2/3/2020 after pt was expereincing L knee pain, swelling and instability in the setting of arthrosis. PMHx includes DVT (2004), arthritis, bulging lumbar disc, degenerative disc disease, HTN, hyperlipidemia. Family / Caregiver Present: No  Referring Practitioner: TOMASZ Edwards  Diagnosis: Primary osteoarthritis of L knee  Subjective  Subjective: Pt lying in bed upon arrival and agreeable to therapy.    Patient Currently in Pain: Yes(7/10 at end of session - RN aware)  Pain Assessment  Pain Level: 8  Vital Signs  Temp: 98.4 °F (36.9 °C)  Temp Source: Oral  Pulse: 85  Heart Rate Source: Monitor  Resp: 18  BP: 121/72  BP Location: Left upper arm  BP Upper/Lower: Upper  MAP (mmHg): 86  Level of Consciousness: Alert  MEWS Score: 1  Patient Currently in Pain: Yes(7/10 at end of session - RN aware)  Oxygen Therapy  SpO2: 96 %  Pulse Oximeter Device Mode: Intermittent  Pulse Oximeter Device Location: Finger  O2 Device: None (Room air)  Social/Functional History  Social/Functional History  Lives With: Family( and granddaughter (14))  Type of Home: House  Home Layout: Laundry in basement, One level  Home Access: Stairs to enter with rails(3+5 FELICIANO; ralings and side of house can be used for assist)  Bathroom Shower/Tub: Tub/Shower unit  Bathroom Toilet: Standard  Bathroom Equipment: Hand-held shower(sink is close to toilet for support prn)  Home Equipment: Long-handled shoehorn(plans to get walker; hasn't been using shoe horn)  ADL Assistance: Independent  Homemaking Assistance: Independent  Ambulation Assistance: Independent  Transfer Assistance: Independent  Active : Yes  Mode of Transportation: Car  Type of occupation:  at post office  Leisure & Hobbies: Zoroastrian, go on walks  Additional Comments:  will be home to assist prn upon d/c       Objective        Orientation  Overall Orientation Status: Within Normal Limits     Balance  Sitting Balance: Stand by assistance  Standing Balance: Contact guard assistance  Standing Balance  Time: 1 min x1, 4 min x1  Activity: Bathroom mobility   Functional Mobility  Functional - Mobility Device: Rolling Walker  Activity: To/from bathroom  Assist Level: Contact guard assistance  Functional Mobility Comments: Verbal cues for directions regarding which foot to step with functional mobility  Toilet Transfers  Toilet - Technique: Ambulating  Equipment Used: Standard toilet  Toilet Transfer: Minimal assistance  Toilet Transfers Comments: Pt required min assist to sit and stand from toilet. Pt required cues for direction to toilet   ADL  Grooming: Contact guard assistance(To stand at the sink to brush teeth, wash hands and wipe face )  LE Dressing: Contact guard assistance(To don/doff socks while seated on EOB. To thread underwear to knees while seaed on EOB and pull up while standing. )  Toileting: (Attempted but did not void )  Tone RUE  RUE Tone: Normotonic  Tone LUE  LUE Tone: Normotonic  Coordination  Movements Are Fluid And Coordinated: Yes     Bed mobility  Rolling to Right: Stand by assistance  Supine to Sit: Stand by assistance(HOB raised)  Scooting: Stand by assistance  Transfers  Stand Step Transfers: Minimal assistance  Sit to stand: Minimal assistance  Stand to sit: Minimal assistance  Transfer Comments: Pt required CGA from bed but min A from toilet      Cognition  Overall Cognitive Status: WNL                 LUE AROM (degrees)  LUE AROM : WFL  RUE AROM (degrees)  RUE AROM : WFL  LUE Strength  Gross LUE Strength: WFL  RUE Strength  Gross RUE Strength: WFL           Treatment included ADL and transfer training.          Plan   Plan  Times per week: 7  Times per day: Daily  Current Treatment Recommendations: Self-Care / ADL, Functional Mobility Training, Safety Education & Training    AM-PAC Score        -[facility] Inpatient Daily Activity Raw Score: 21 (02/04/20 1412)  AM-PAC Inpatient ADL T-Scale Score : 44.27 (02/04/20 1412)  ADL Inpatient CMS 0-100% Score: 32.79 (02/04/20 1412)  ADL Inpatient CMS G-Code Modifier : Norton Earnest (02/04/20 1412)    Goals  Short term goals  Time Frame for Short term goals: By discharge   Short term goal 1: Pt will complete a toilet transfer with SBA. Short term goal 2: Pt will stand for 10 minutes with SBA during functional mobility/ADLs. Short term goal 3: Pt lucero complete LB dressing with SBA. Patient Goals   Patient goals : To go home        Therapy Time   Individual Concurrent Group Co-treatment   Time In 1318         Time Out 1347         Minutes 29         Timed Code Treatment Minutes: 19 Minutes    Total Tx Min: 29 minutes    Therapist was present, directed the patient's care, made skilled judgment, and was responsible for assessment and treatment of the patient. If patient is discharged prior to next treatment, this will serve as the discharge summary.     [facility],  [address], [place]

## 2020-02-04 NOTE — CARE COORDINATION
sheet must be sent along with the prescription(s)  4. Cost of Rx cannot be added to hospital bill. If financial assistance is needed, please contact unit  or ;  or  CANNOT provide pharmacy voucher for patients co-pays  5. Patients can then  the prescription on their way out of the hospital at discharge, or pharmacy can deliver to the bedside if staff is available. (payment due at time of pick-up or delivery - cash, check, or card accepted)     Able to afford home medications/ co-pay costs: Yes    ADLS:  Current PT AM-PAC Score: 20 /24  Current OT AM-PAC Score: 21 /24      DISCHARGE Disposition: Home with [address]: [facility]     LOC at discharge: Not Applicable  RUBIN Completed: Not Indicated    Notification completed in HENS/PAS?:  Not Applicable    IMM Completed:   Not Indicated    Transportation:  Transportation PLAN for discharge: family   Mode of Transport: [address] ordered at discharge: Yes  [address]: [facility]  Phone: [phone]  Fax: [phone]  Orders faxed: Yes    Durable Medical Equipment:  DME Provider: [facility]  Equipment obtained during hospitalization: walker    The Plan for Transition of Care is related to the following treatment goals of Primary osteoarthritis of left knee [M17.12]  Osteoarthritis of left knee, unspecified osteoarthritis type [M17.12]  Osteoarthritis of left knee, unspecified osteoarthritis type [M17.12]    The Patient and/or patient representative Annabel Ruth and her family were provided with a choice of provider and agrees with the discharge plan Not Indicated    Freedom of choice list was provided with basic dialogue that supports the patient's individualized plan of care/goals and shares the quality data associated with the providers.  Not Indicated    Care Transitions patient: No    Neo Leo, RN  [facility]  Case Management Department  Ph: 21

## 2020-02-04 NOTE — PROGRESS NOTES
Physical Therapy    Facility/Department: [place] [address] 5T ORTHO/NEURO  Initial Assessment    NAME: Winter Rogers  : 1960  MRN: [id number]    Date of Service: 2020    Discharge Recommendations: Winter Rogers scored a 20/24 on the AM-PAC short mobility form. Current research shows that an AM-PAC score of 18 or greater is typically associated with a discharge to the patient's home setting. Based on the patients AM-PAC score and their current functional mobility deficits, it is recommended that the patient have 2-3 sessions per week of Physical Therapy at d/c to increase the patients independence. PT Equipment Recommendations  Equipment Needed: Yes  Mobility Devices: [place]: Rolling    Assessment   Assessment: Pt is a 63 yo female admitted 2/3/20 for elective L partial knee replacement. Per pt, she is from home where she is indep with ADLs and gait without using AD at baseline. Pt plans to return home upon d/c and pursue home PT. Pt demo mobility that is slightly below her reported baseline. Discussed use of DME at home and pt  agreed it would be beneficial to use RW at all times initially upon d/c. Discussed further PT options upon d/c and pt verbalized she would want home PT to improve her functional ability before going to OP PT. Discussed d/c options with pt and she verbalized that she was comfortable enough with her current mobility to maintain safety at home if d/c today - will follow up tomorrow am if d/c is delayed. If home, pt will benefit from increased assist initially, home PT, and use of RW at all times.    Treatment Diagnosis: mobility impairment s/p L partial knee replacement  Decision Making: Low Complexity  PT Education: Goals;PT Role;Plan of Care;General Safety;Weight-bearing Education;Precautions;Transfer Training;Home Exercise Program; Pt educated on HEP, pt educated on donning/doffing knee immobilizer, pt educated to use knee immobilizer whenever ambulating or up on her feet - pt verbalized understanding  REQUIRES PT FOLLOW UP: Yes  Activity Tolerance  Activity Tolerance: Patient Tolerated treatment well       Patient Diagnosis(es): The encounter diagnosis was Status post total left knee replacement. has a past medical history of Arthritis, Bulging lumbar disc, Chronic back pain, DDD (degenerative disc disease), cervical, DDD (degenerative disc disease), lumbar, DVT (deep venous thrombosis) ([address] DVT (deep venous thrombosis) ([address] High blood pressure, and Hyperlipidemia. has a past surgical history that includes Foot surgery (2000); knee surgery (8/2008); Hysterectomy (2004); Eye surgery; Knee Arthroplasty (2008); Revision total knee arthroplasty (2011  (X3 TOTAL)); pr arthrs kne surg w/meniscectomy med/lat w/shvg (Left, 8/10/2018); Colonoscopy; Achilles tendon surgery (Left, 1/11/2019); joint replacement; and joint replacement (Left, 2/3/2020). Restrictions  Position Activity Restriction  Other position/activity restrictions: Full weight bearing as tolerated of LLE; knee immobilizer     Vision/Hearing  Vision: Within Functional Limits  Hearing: Within functional limits       Subjective  General  Chart Reviewed: Yes  Patient assessed for rehabilitation services?: Yes  Additional Pertinent Hx: Pt admitted 2/3/20 for elective L partial knee replacement . Khari Ann PMHx: DVT, OA, HTN, R knee TKA revisions x3, smoker. Family / Caregiver Present: No  Diagnosis: s/p L partial knee replacement  Follows Commands: Within Functional Limits  Subjective  Subjective: Pt found supine and agreeable to PT.  \"I feel I can go home today\"  Pain Screening  Patient Currently in Pain: Yes(7/10 at end of session - RN aware)       Orientation  Orientation  Overall Orientation Status: Within Normal Limits(oriented through conversation)     Social/Functional History  Social/Functional History  Lives With: Family( and granddaughter (15))  Type of Home: House  Home Layout: Laundry in basement, One level  Home Access: Stairs to enter with rails(3+5 FELICIANO; ralings and side of house can be used for assist)  Bathroom Shower/Tub: Tub/Shower unit  Bathroom Toilet: Standard  Bathroom Equipment: Hand-held shower(sink is close to toilet for support prn)  Home Equipment: Long-handled shoehorn(plans to get walker; hasn't been using shoe horn)  ADL Assistance: Independent  Homemaking Assistance: Independent  Ambulation Assistance: Independent  Transfer Assistance: Independent  Active : Yes  Mode of Transportation: Car  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[employer], go on walks  Additional Comments:  will be home to assist prn upon d/c       Objective          AROM RLE (degrees)  RLE AROM: (R knee flexion: 105 deg; demo R knee extension at or close to 0 deg in sitting)  AROM LLE (degrees)  LLE AROM : (L knee flexion: 105 deg, L knee ext: -18 deg in sitting)    Strength RLE  Strength RLE: (no formal MMT; functional strength observed through transfers and ambulation)  Strength LLE  Strength LLE: (no formal MMT; functional strength observed through transfers, ambulation, and exercises; generally weaker than R)        Bed mobility  Rolling to Left: Supervision  Rolling to Right: Supervision  Supine to Sit: Supervision(performed x2)  Sit to Supine: Supervision(performed x1)  Scooting: Supervision     Transfers  Sit to Stand: Stand by assistance(performed x3; slow and effortful; vc to push off bed/chair; vc to keep surgical leg out in front during transfer)  Stand to sit: Stand by assistance(performed x3; vc to reach back for bed/chair; vc to keep surgical leg out in front during transfer; demo controlled descent)    Comments: pt wore knee immobilizer during transfers    Ambulation  Ambulation?: Yes  Ambulation 1  Surface: level tile  Device: Rolling Walker  Assistance: CGA progressed to SBA  Quality of Gait: slow kofi, step-to gait, decreased step height, decreased step length; WBAT R  Distance: 2x50'  Comments: vc to perform correct gait - pt demo correct gait pattern; denies LH, dizziness  Stairs/Curb  Stairs?: Yes  Stairs  # Steps : (ascend/descend 5 steps - up 3 steps 4\" and down 2 steps 6\" with L rail and R crutch; up 2 steps 6\" and down 3 steps 4\" with rail on R and no AD on L)  Assistance: Contact guard assistance  Comment: Pt demo ascending/descending with L handrail and crutch on R; pt demo ascending/descending without AD using R rail; pt verbalized she felt steady and liked doing stairs without AD better; pt wearing knee immobilizer during ambulation; WBAT R     Balance  Posture: Good  Sitting - Static: Good  Sitting - Dynamic: Good  Standing - Static: Good  Standing - Dynamic: Good  Comments: demo static stance without AD; used RW to improve balance/steadiness     Exercises:   Ankle pumps x10  Quad sets x10  Glute sets x10  Quad set + SLR x10  Supine hip abduction x10  SAQ x10  Heel slides x10  Supine knee extension stretch x10  Seated active knee flexion/extension x10  Seated knee flexion stretch x10  Comments: all exercise done indep        Plan   Plan  Times per week: 7  Current Treatment Recommendations: Strengthening, Balance Training, Gait Training, Endurance Training, Transfer Training, ROM, Stair training, Home Exercise Program  Safety Devices  Type of devices: Chair alarm in place, Left in chair, Call light within reach, Nurse notified, Patient at risk for falls             AM-PAC Score  AM-PAC Inpatient Mobility Raw Score : 20 (02/04/20 1456)  AM-PAC Inpatient T-Scale Score : 47.67 (02/04/20 1456)  Mobility Inpatient CMS 0-100% Score: 35.83 (02/04/20 1456)  Mobility Inpatient CMS G-Code Modifier : CJ (02/04/20 1456)          Therapy Time   Individual Concurrent Group Co-treatment   Time In 1340         Time Out 1440         Minutes 60           Timed Code Treatment Minutes: 50    Total Treatment Minutes: [facility]    Therapist was present, directed

## 2020-02-04 NOTE — DISCHARGE SUMMARY
Department of Orthopedic Surgery  Nurse Practitioner   Discharge Summary    The Winter Rogers is a 62 y.o. female underwent total knee replacement procedure without complication. Winter Rogers was admitted to the floor following Her recovery in the PACU.      Discharge Diagnosis  left Knee Replacement    Current Inpatient Medications    Current Facility-Administered Medications: atorvastatin (LIPITOR) tablet 10 mg, 10 mg, Oral, Nightly  DULoxetine (CYMBALTA) extended release capsule 60 mg, 60 mg, Oral, Daily  fluticasone (FLONASE) 50 MCG/ACT nasal spray 1 spray, 1 spray, Nasal, Nightly PRN  gabapentin (NEURONTIN) capsule 300 mg, 300 mg, Oral, TID  hydrochlorothiazide (HYDRODIURIL) tablet 12.5 mg, 12.5 mg, Oral, Daily  therapeutic multivitamin-minerals 1 tablet, 1 tablet, Oral, BID  sodium chloride flush 0.9 % injection 10 mL, 10 mL, Intravenous, 2 times per day  sodium chloride flush 0.9 % injection 10 mL, 10 mL, Intravenous, PRN  acetaminophen (TYLENOL) tablet 650 mg, 650 mg, Oral, Q6H  docusate sodium (COLACE) capsule 100 mg, 100 mg, Oral, BID  sennosides-docusate sodium (SENOKOT-S) 8.6-50 MG tablet 1 tablet, 1 tablet, Oral, BID  magnesium hydroxide (MILK OF MAGNESIA) 400 MG/5ML suspension 30 mL, 30 mL, Oral, Daily PRN  ondansetron (ZOFRAN) injection 4 mg, 4 mg, Intravenous, Q6H PRN  0.45 % sodium chloride infusion, , Intravenous, Continuous  oxyCODONE (ROXICODONE) immediate release tablet 5 mg, 5 mg, Oral, Q4H PRN **OR** oxyCODONE (ROXICODONE) immediate release tablet 10 mg, 10 mg, Oral, Q4H PRN  HYDROmorphone (DILAUDID) injection 0.25 mg, 0.25 mg, Intravenous, Q3H PRN **OR** HYDROmorphone (DILAUDID) injection 0.5 mg, 0.5 mg, Intravenous, Q3H PRN  predniSONE (DELTASONE) tablet 10 mg, 10 mg, Oral, Daily  ketorolac (TORADOL) injection 30 mg, 30 mg, Intravenous, Q6H PRN  zolpidem (AMBIEN) tablet 5 mg, 5 mg, Oral, Nightly PRN  warfarin (COUMADIN) daily dosing (placeholder), , Other, See Admin

## 2020-02-04 NOTE — PROGRESS NOTES
Department of Orthopedic Surgery  Nurse Practitioner   Progress Note    Subjective:     Post-Operative Day: 1 Status Post left Total Knee Arthroplasty  Systemic or Specific Complaints: Patient sitting up in bed. Patient reports she had pain overnight, controlled at this time. RN at bedside. Patient seen with Dr. Gilson Hackett. Objective:     Patient Vitals for the past 24 hrs:   BP Temp Temp src Pulse Resp SpO2   02/04/20 1045 109/68 98.1 °F (36.7 °C) Oral 90 16 97 %   02/04/20 0712 111/63 98.2 °F (36.8 °C) Oral 86 16 96 %   02/04/20 0400 105/64 98.1 °F (36.7 °C) Oral 67 16 93 %   02/03/20 2345 100/67 97.6 °F (36.4 °C) Oral 71 16 94 %   02/03/20 1919 112/73 97.5 °F (36.4 °C) Oral 89 16 96 %   02/03/20 1548 -- -- -- -- -- 99 %   02/03/20 1533 113/68 97.8 °F (36.6 °C) Oral 76 16 96 %   02/03/20 1500 121/64 97.5 °F (36.4 °C) Temporal 73 11 96 %   02/03/20 1445 96/72 -- -- 76 13 94 %   02/03/20 1437 106/65 -- -- 78 11 92 %   02/03/20 1430 90/63 -- -- 77 13 92 %   02/03/20 1415 (!) 99/54 -- -- 78 14 96 %   02/03/20 1410 -- -- -- 76 18 96 %   02/03/20 1405 -- -- -- 78 13 96 %   02/03/20 1400 134/75 -- -- 84 13 95 %   02/03/20 1355 -- -- -- 86 19 98 %   02/03/20 1350 -- -- -- 87 15 98 %   02/03/20 1345 106/75 -- -- 86 15 99 %   02/03/20 1340 -- -- -- 83 10 99 %   02/03/20 1335 119/73 -- -- 83 11 98 %   02/03/20 1330 112/67 -- -- 86 13 97 %   02/03/20 1325 125/70 -- -- 83 11 100 %   02/03/20 1320 114/72 -- -- 85 12 98 %   02/03/20 1315 123/70 -- -- 84 13 99 %   02/03/20 1310 111/64 -- -- 81 11 100 %   02/03/20 1300 110/64 -- -- 77 17 99 %   02/03/20 1258 (!) 107/56 97.6 °F (36.4 °C) Temporal 82 12 97 %       General: alert, appears stated age, cooperative and no distress   Wound: Wound clean and dry no evidence of infection. ACE   Motion: Painful range of Motion   DVT Exam: No evidence of DVT seen on physical exam.       Knee swollen but thigh soft to palpation. Moving foot and ankle. Good distal pulses.       Data Review  CBC:   Lab Results   Component Value Date    WBC 14.0 02/04/2020    RBC 4.12 02/04/2020    HGB 12.3 02/04/2020    HCT 38.2 02/04/2020     02/04/2020       Assessment:     Status Post left Total Knee Arthroplasty. Doing well postoperatively. Plan:      1: Continues current post-op course : Medicine following. Post-op labs reviewed and stable. WBC slightly elevated, afebrile. Likely stress reaction related to surgery. 2:  Continue Deep venous thrombosis prophylaxis - Coumadin, h/o DVT  3:  Continue physical therapy, OT, WBAT  4:  Continue Pain Control  5:  Potential discharge home today pending therapy and pain control. Scripts placed on chart. Discharge instructions and RUBIN completed. Home with [facility]. Follow up with Dr. Caro Inman.

## 2020-02-04 NOTE — PROGRESS NOTES
Physical Therapy  Hold note    Attempted to see pt this am and she is currently eating lunch. Will f/u pm for PT session. RN aware.        Rosario Sandhoff, PT

## 2020-02-04 NOTE — CONSULTS
Clinical Pharmacy Consult Note    Admit date: 2/3/2020    Subjective/Objective:  61 yo female with PMHx significant for OA and DVT. Admitted 2/4 for left partial knee arthroplasty. Per pt, she developed DVT in 2008 s/p knee surgery. Not currently on anticoagulation. Pharmacy is consulted to dose warfarin per Dr. Indra Almanza anticoagulation regimen:  Not on home anticoagulation      Labs:  Date INR Warfarin Dose   1/30 1.05 Pre-op   2/3 -- 5 mg    2/4 1.09 5 mg ordered   2/5         Recent Labs     02/04/20  0602      K 4.7   CL 99   CO2 25   BUN 15   CREATININE 0.8   GLUCOSE 159*       Estimated Creatinine Clearance: 75 mL/min (based on SCr of 0.8 mg/dL). Lab Results   Component Value Date    WBC 14.0 (H) 02/04/2020    HGB 12.3 02/04/2020    HCT 38.2 02/04/2020    MCV 92.7 02/04/2020     02/04/2020       Lab Results   Component Value Date    PROTIME 12.6 02/04/2020    INR 1.09 02/04/2020       Height:  5' 4\" (162.6 cm)  Weight:  172 lb (78 kg)        Assessment/Plan:  1. Anticoagulation: s/p TKA - hx of DVT following TKA  · INR goal 2-3  · INR this am= 1.09.  · Expect to see more substantial INR increase starting tomorrow; may take 5-7 days to become therapeutic. · Will order 5 mg this evening. · Warfarin dosing placeholder is ordered to alert staff to monitor for bleeding  · Medication profile reviewed for potential drug interactions. Ketorolac (ordered IV x 5 days) can increase risk of GI bleed. No other significant drug interactions with warfarin noted. · Daily INR will be monitored and dose adjustments made as needed. · Patient counseled on warfarin- including signs/symptoms of bleeding, what to do if bleeding occurs, injury avoidance, and impact of vitamin K in diet. Discharge Planning: If patient discharged today, would recommend 5 mg daily, with INR check by Thursday or Friday. Thanks for consulting pharmacy!   Please call with questions:  [phone] (wireless) [phone] ([address] [place] [address])    JuanF rancisco Browning. Oneil Sage. FRANCES   2/4/2020 9:58 AM

## 2020-02-07 ENCOUNTER — TELEPHONE (OUTPATIENT)
Dept: ORTHOPEDIC SURGERY | Age: 60
End: 2020-02-07

## 2020-02-07 ENCOUNTER — TELEPHONE (OUTPATIENT)
Dept: PAIN MANAGEMENT | Age: 60
End: 2020-02-07

## 2020-02-07 DIAGNOSIS — G89.4 CHRONIC PAIN SYNDROME: ICD-10-CM

## 2020-02-07 RX ORDER — DULOXETIN HYDROCHLORIDE 60 MG/1
60 CAPSULE, DELAYED RELEASE ORAL DAILY
Qty: 30 CAPSULE | Refills: 1 | Status: SHIPPED | OUTPATIENT
Start: 2020-02-07 | End: 2020-04-07 | Stop reason: SDUPTHER

## 2020-02-07 RX ORDER — OXYCODONE HYDROCHLORIDE AND ACETAMINOPHEN 5; 325 MG/1; MG/1
1 TABLET ORAL EVERY 6 HOURS PRN
Qty: 28 TABLET | Refills: 0 | Status: SHIPPED | OUTPATIENT
Start: 2020-02-07 | End: 2020-02-12 | Stop reason: SDUPTHER

## 2020-02-07 NOTE — TELEPHONE ENCOUNTER
Spoke with Caroline Veronica. She is aware the patient needs to continue to use the ace wrap on her knee until she is seen. She also had INR results of 1.8, taking 5mg daily. Ok to continue and recheck on Monday.

## 2020-02-12 ENCOUNTER — OFFICE VISIT (OUTPATIENT)
Dept: ORTHOPEDIC SURGERY | Age: 60
End: 2020-02-12

## 2020-02-12 VITALS
BODY MASS INDEX: 29.36 KG/M2 | DIASTOLIC BLOOD PRESSURE: 68 MMHG | SYSTOLIC BLOOD PRESSURE: 117 MMHG | HEART RATE: 91 BPM | HEIGHT: 64 IN | WEIGHT: 171.96 LBS

## 2020-02-12 PROBLEM — Z96.652 STATUS POST LEFT PARTIAL KNEE REPLACEMENT: Status: ACTIVE | Noted: 2020-02-12

## 2020-02-12 PROCEDURE — 99024 POSTOP FOLLOW-UP VISIT: CPT | Performed by: ORTHOPAEDIC SURGERY

## 2020-02-12 RX ORDER — OXYCODONE HYDROCHLORIDE AND ACETAMINOPHEN 5; 325 MG/1; MG/1
1 TABLET ORAL EVERY 6 HOURS PRN
Qty: 28 TABLET | Refills: 0 | Status: SHIPPED | OUTPATIENT
Start: 2020-02-12 | End: 2020-02-19

## 2020-02-12 NOTE — PROGRESS NOTES
replacement Yes       Office Procedures:    Orders Placed This Encounter   Procedures    XR KNEE LEFT (3 VIEWS)     Standing Status:   Future     Number of Occurrences:   1     Standing Expiration Date:   3/12/2020     Order Specific Question:   Reason for exam:     Answer:   PAIN           Treatment Plan:      Continue with PT    Pain medication was refilled as needed    Continue DVT prophylaxis    Follow-up in 4 weeks     still with right sided knee pain

## 2020-02-19 ENCOUNTER — TELEPHONE (OUTPATIENT)
Dept: ORTHOPEDIC SURGERY | Age: 60
End: 2020-02-19

## 2020-02-19 DIAGNOSIS — Z96.652 STATUS POST LEFT PARTIAL KNEE REPLACEMENT: Primary | ICD-10-CM

## 2020-02-20 RX ORDER — OXYCODONE HYDROCHLORIDE AND ACETAMINOPHEN 5; 325 MG/1; MG/1
1 TABLET ORAL EVERY 6 HOURS PRN
Qty: 28 TABLET | Refills: 0 | Status: SHIPPED | OUTPATIENT
Start: 2020-02-20 | End: 2020-02-27 | Stop reason: SDUPTHER

## 2020-02-26 ENCOUNTER — TELEPHONE (OUTPATIENT)
Dept: ORTHOPEDIC SURGERY | Age: 60
End: 2020-02-26

## 2020-02-26 DIAGNOSIS — Z96.652 STATUS POST LEFT PARTIAL KNEE REPLACEMENT: ICD-10-CM

## 2020-02-27 RX ORDER — OXYCODONE HYDROCHLORIDE AND ACETAMINOPHEN 5; 325 MG/1; MG/1
1 TABLET ORAL EVERY 6 HOURS PRN
Qty: 28 TABLET | Refills: 0 | Status: SHIPPED | OUTPATIENT
Start: 2020-02-27 | End: 2020-03-05 | Stop reason: SDUPTHER

## 2020-03-05 ENCOUNTER — TELEPHONE (OUTPATIENT)
Dept: ORTHOPEDIC SURGERY | Age: 60
End: 2020-03-05

## 2020-03-05 DIAGNOSIS — Z96.652 STATUS POST LEFT PARTIAL KNEE REPLACEMENT: ICD-10-CM

## 2020-03-05 RX ORDER — OXYCODONE HYDROCHLORIDE AND ACETAMINOPHEN 5; 325 MG/1; MG/1
1 TABLET ORAL EVERY 6 HOURS PRN
Qty: 28 TABLET | Refills: 0 | Status: SHIPPED | OUTPATIENT
Start: 2020-03-05 | End: 2020-03-11 | Stop reason: SDUPTHER

## 2020-03-11 ENCOUNTER — TELEPHONE (OUTPATIENT)
Dept: ORTHOPEDIC SURGERY | Age: 60
End: 2020-03-11

## 2020-03-11 ENCOUNTER — OFFICE VISIT (OUTPATIENT)
Dept: ORTHOPEDIC SURGERY | Age: 60
End: 2020-03-11

## 2020-03-11 VITALS
HEIGHT: 64 IN | SYSTOLIC BLOOD PRESSURE: 137 MMHG | WEIGHT: 171.96 LBS | BODY MASS INDEX: 29.36 KG/M2 | HEART RATE: 92 BPM | DIASTOLIC BLOOD PRESSURE: 75 MMHG

## 2020-03-11 PROCEDURE — 99024 POSTOP FOLLOW-UP VISIT: CPT | Performed by: ORTHOPAEDIC SURGERY

## 2020-03-11 RX ORDER — OXYCODONE HYDROCHLORIDE AND ACETAMINOPHEN 5; 325 MG/1; MG/1
1 TABLET ORAL EVERY 6 HOURS PRN
Qty: 28 TABLET | Refills: 0 | Status: SHIPPED | OUTPATIENT
Start: 2020-03-11 | End: 2020-03-19 | Stop reason: SDUPTHER

## 2020-03-11 NOTE — PROGRESS NOTES
Patient Name: Del Jain MRN: <[id number]>   Age: 62 y.o. YOB: 1960   Sex: female         [address] Complaint   Patient presents with    Post-Op Check     LT PARTIAL KNEE REPLACEMENT 2/3/20       HISTORY OF PRESENT ILLNESS   Patient returns for their second  postoperative evaluation status post partial  knee replacement. The patient is doing very well. Pain in the right knee sometimes worse  They have minimal complaints of pain. There is moderate  swelling about the knee. The patient has been doing physical therapy at home . They deny any calf swelling or numbness or tingling down the leg       Assessment   PHYSICAL EXAM     Vital Signs: /75   Pulse 92   Ht 5' 4.02\" (1.626 m)   Wt 171 lb 15.3 oz (78 kg)   LMP  (LMP Unknown)   BMI 29.50 kg/m²     Examination of the knee shows a well-healed midline incision. There is mild swelling. There is no drainage. Range of motion is 0-120. There is no calf tenderness. The patient is neurovascularly intact. Incision well healed without drainage or underlying infection. Edges of the skin well opposed. Gross swelling decreased. No erythema. Distal circulation intact. No signs of RSD. Calf nontender. Negative nica's,  no signs of dvt  Hip exam shows full ROM with no focal pain or Instability. Leg lengths are normal. There is no Trendelenburg Gait. RADIOLOGY   X-Rays reviewed:previous  AP and lateral x-rays of the right knee show excellent alignment of the partial knee prosthesis. There is no loosening no fractures noted. IMPRESSION   4 weeks status post partial knee replacement    PLAN   The patient is doing well 4 weeks status post partial knee replacement. The patient will finish up therapy and be discharged to home exercise program.    They may slowly resume normal activities.               Tony Vega MD

## 2020-03-11 NOTE — TELEPHONE ENCOUNTER
Letter printed per patient's request. Kim Torres will be at the  at [facility] for patient to .

## 2020-03-18 ENCOUNTER — TELEPHONE (OUTPATIENT)
Dept: ORTHOPEDIC SURGERY | Age: 60
End: 2020-03-18

## 2020-03-18 DIAGNOSIS — Z96.652 STATUS POST LEFT PARTIAL KNEE REPLACEMENT: ICD-10-CM

## 2020-03-19 RX ORDER — OXYCODONE HYDROCHLORIDE AND ACETAMINOPHEN 5; 325 MG/1; MG/1
1 TABLET ORAL EVERY 6 HOURS PRN
Qty: 28 TABLET | Refills: 0 | Status: SHIPPED | OUTPATIENT
Start: 2020-03-19 | End: 2020-03-25 | Stop reason: SDUPTHER

## 2020-03-25 ENCOUNTER — TELEPHONE (OUTPATIENT)
Dept: ORTHOPEDIC SURGERY | Age: 60
End: 2020-03-25

## 2020-03-25 RX ORDER — OXYCODONE HYDROCHLORIDE AND ACETAMINOPHEN 5; 325 MG/1; MG/1
1 TABLET ORAL EVERY 6 HOURS PRN
Qty: 28 TABLET | Refills: 0 | Status: SHIPPED | OUTPATIENT
Start: 2020-03-25 | End: 2020-04-01 | Stop reason: SDUPTHER

## 2020-04-01 ENCOUNTER — TELEPHONE (OUTPATIENT)
Dept: ORTHOPEDIC SURGERY | Age: 60
End: 2020-04-01

## 2020-04-01 DIAGNOSIS — Z96.652 STATUS POST LEFT PARTIAL KNEE REPLACEMENT: ICD-10-CM

## 2020-04-01 RX ORDER — OXYCODONE HYDROCHLORIDE AND ACETAMINOPHEN 5; 325 MG/1; MG/1
1 TABLET ORAL EVERY 6 HOURS PRN
Qty: 28 TABLET | Refills: 0 | Status: SHIPPED | OUTPATIENT
Start: 2020-04-01 | End: 2020-04-07 | Stop reason: SDUPTHER

## 2020-04-07 ENCOUNTER — OFFICE VISIT (OUTPATIENT)
Dept: ORTHOPEDIC SURGERY | Age: 60
End: 2020-04-07

## 2020-04-07 VITALS
WEIGHT: 171.96 LBS | HEIGHT: 64 IN | BODY MASS INDEX: 29.36 KG/M2 | HEART RATE: 91 BPM | DIASTOLIC BLOOD PRESSURE: 82 MMHG | SYSTOLIC BLOOD PRESSURE: 135 MMHG

## 2020-04-07 PROCEDURE — 99024 POSTOP FOLLOW-UP VISIT: CPT | Performed by: PHYSICIAN ASSISTANT

## 2020-04-07 RX ORDER — GABAPENTIN 300 MG/1
300 CAPSULE ORAL 3 TIMES DAILY
Qty: 90 CAPSULE | Refills: 0 | Status: SHIPPED | OUTPATIENT
Start: 2020-04-07 | End: 2020-05-19

## 2020-04-07 RX ORDER — DULOXETIN HYDROCHLORIDE 60 MG/1
60 CAPSULE, DELAYED RELEASE ORAL DAILY
Qty: 30 CAPSULE | Refills: 1 | Status: SHIPPED | OUTPATIENT
Start: 2020-04-07 | End: 2020-04-30

## 2020-04-07 RX ORDER — OXYCODONE HYDROCHLORIDE AND ACETAMINOPHEN 5; 325 MG/1; MG/1
1 TABLET ORAL EVERY 6 HOURS PRN
Qty: 28 TABLET | Refills: 0 | Status: SHIPPED | OUTPATIENT
Start: 2020-04-07 | End: 2020-04-14 | Stop reason: SDUPTHER

## 2020-04-13 ENCOUNTER — TELEPHONE (OUTPATIENT)
Dept: ORTHOPEDIC SURGERY | Age: 60
End: 2020-04-13

## 2020-04-13 DIAGNOSIS — Z96.652 STATUS POST LEFT PARTIAL KNEE REPLACEMENT: ICD-10-CM

## 2020-04-14 RX ORDER — OXYCODONE HYDROCHLORIDE AND ACETAMINOPHEN 5; 325 MG/1; MG/1
1 TABLET ORAL EVERY 6 HOURS PRN
Qty: 28 TABLET | Refills: 0 | Status: SHIPPED | OUTPATIENT
Start: 2020-04-14 | End: 2020-04-21 | Stop reason: SDUPTHER

## 2020-04-21 ENCOUNTER — TELEPHONE (OUTPATIENT)
Dept: ORTHOPEDIC SURGERY | Age: 60
End: 2020-04-21

## 2020-04-21 DIAGNOSIS — Z96.652 STATUS POST LEFT PARTIAL KNEE REPLACEMENT: ICD-10-CM

## 2020-04-21 RX ORDER — OXYCODONE HYDROCHLORIDE AND ACETAMINOPHEN 5; 325 MG/1; MG/1
1 TABLET ORAL EVERY 6 HOURS PRN
Qty: 28 TABLET | Refills: 0 | Status: SHIPPED | OUTPATIENT
Start: 2020-04-21 | End: 2020-04-28 | Stop reason: SDUPTHER

## 2020-04-28 ENCOUNTER — TELEPHONE (OUTPATIENT)
Dept: ORTHOPEDIC SURGERY | Age: 60
End: 2020-04-28

## 2020-04-28 DIAGNOSIS — Z96.652 STATUS POST LEFT PARTIAL KNEE REPLACEMENT: ICD-10-CM

## 2020-04-28 RX ORDER — OXYCODONE HYDROCHLORIDE AND ACETAMINOPHEN 5; 325 MG/1; MG/1
1 TABLET ORAL EVERY 6 HOURS PRN
Qty: 28 TABLET | Refills: 0 | Status: SHIPPED | OUTPATIENT
Start: 2020-04-28 | End: 2020-05-05 | Stop reason: SDUPTHER

## 2020-04-30 RX ORDER — DULOXETIN HYDROCHLORIDE 60 MG/1
CAPSULE, DELAYED RELEASE ORAL
Qty: 30 CAPSULE | Refills: 0 | Status: SHIPPED | OUTPATIENT
Start: 2020-04-30 | End: 2020-07-08 | Stop reason: SDUPTHER

## 2020-05-05 ENCOUNTER — TELEPHONE (OUTPATIENT)
Dept: ORTHOPEDIC SURGERY | Age: 60
End: 2020-05-05

## 2020-05-05 DIAGNOSIS — Z96.652 STATUS POST LEFT PARTIAL KNEE REPLACEMENT: ICD-10-CM

## 2020-05-05 RX ORDER — OXYCODONE HYDROCHLORIDE AND ACETAMINOPHEN 5; 325 MG/1; MG/1
1 TABLET ORAL EVERY 6 HOURS PRN
Qty: 28 TABLET | Refills: 0 | Status: SHIPPED | OUTPATIENT
Start: 2020-05-05 | End: 2020-05-12 | Stop reason: SDUPTHER

## 2020-05-12 ENCOUNTER — TELEPHONE (OUTPATIENT)
Dept: ORTHOPEDIC SURGERY | Age: 60
End: 2020-05-12

## 2020-05-12 DIAGNOSIS — Z96.652 STATUS POST LEFT PARTIAL KNEE REPLACEMENT: ICD-10-CM

## 2020-05-12 RX ORDER — OXYCODONE HYDROCHLORIDE AND ACETAMINOPHEN 5; 325 MG/1; MG/1
1 TABLET ORAL EVERY 6 HOURS PRN
Qty: 28 TABLET | Refills: 0 | Status: SHIPPED | OUTPATIENT
Start: 2020-05-12 | End: 2020-05-19 | Stop reason: SDUPTHER

## 2020-05-19 ENCOUNTER — TELEPHONE (OUTPATIENT)
Dept: ORTHOPEDIC SURGERY | Age: 60
End: 2020-05-19

## 2020-05-19 DIAGNOSIS — Z96.652 STATUS POST LEFT PARTIAL KNEE REPLACEMENT: ICD-10-CM

## 2020-05-19 RX ORDER — GABAPENTIN 300 MG/1
CAPSULE ORAL
Qty: 90 CAPSULE | Refills: 2 | Status: SHIPPED | OUTPATIENT
Start: 2020-05-19 | End: 2020-06-02 | Stop reason: SDUPTHER

## 2020-05-19 RX ORDER — OXYCODONE HYDROCHLORIDE AND ACETAMINOPHEN 5; 325 MG/1; MG/1
1 TABLET ORAL EVERY 6 HOURS PRN
Qty: 28 TABLET | Refills: 0 | Status: SHIPPED | OUTPATIENT
Start: 2020-05-19 | End: 2020-05-26 | Stop reason: SDUPTHER

## 2020-05-26 ENCOUNTER — TELEPHONE (OUTPATIENT)
Dept: ORTHOPEDIC SURGERY | Age: 60
End: 2020-05-26

## 2020-05-26 DIAGNOSIS — Z96.652 STATUS POST LEFT PARTIAL KNEE REPLACEMENT: ICD-10-CM

## 2020-05-26 RX ORDER — OXYCODONE HYDROCHLORIDE AND ACETAMINOPHEN 5; 325 MG/1; MG/1
1 TABLET ORAL EVERY 6 HOURS PRN
Qty: 28 TABLET | Refills: 0 | Status: SHIPPED | OUTPATIENT
Start: 2020-05-26 | End: 2020-06-02 | Stop reason: SDUPTHER

## 2020-06-02 ENCOUNTER — OFFICE VISIT (OUTPATIENT)
Dept: ORTHOPEDIC SURGERY | Age: 60
End: 2020-06-02
Payer: COMMERCIAL

## 2020-06-02 VITALS
SYSTOLIC BLOOD PRESSURE: 116 MMHG | HEART RATE: 80 BPM | DIASTOLIC BLOOD PRESSURE: 66 MMHG | WEIGHT: 171.96 LBS | TEMPERATURE: 97.3 F | HEIGHT: 64 IN | BODY MASS INDEX: 29.36 KG/M2

## 2020-06-02 PROCEDURE — 99214 OFFICE O/P EST MOD 30 MIN: CPT | Performed by: PHYSICIAN ASSISTANT

## 2020-06-02 RX ORDER — OXYCODONE HYDROCHLORIDE AND ACETAMINOPHEN 5; 325 MG/1; MG/1
1 TABLET ORAL EVERY 6 HOURS PRN
Qty: 28 TABLET | Refills: 0 | Status: SHIPPED | OUTPATIENT
Start: 2020-06-02 | End: 2020-06-09 | Stop reason: SDUPTHER

## 2020-06-02 RX ORDER — GABAPENTIN 300 MG/1
300 CAPSULE ORAL 3 TIMES DAILY
Qty: 90 CAPSULE | Refills: 3 | Status: SHIPPED | OUTPATIENT
Start: 2020-06-02 | End: 2020-11-17 | Stop reason: SDUPTHER

## 2020-06-05 ENCOUNTER — TELEPHONE (OUTPATIENT)
Dept: ORTHOPEDIC SURGERY | Age: 60
End: 2020-06-05

## 2020-06-09 ENCOUNTER — TELEPHONE (OUTPATIENT)
Dept: ORTHOPEDIC SURGERY | Age: 60
End: 2020-06-09

## 2020-06-09 DIAGNOSIS — Z96.652 STATUS POST LEFT PARTIAL KNEE REPLACEMENT: ICD-10-CM

## 2020-06-09 RX ORDER — OXYCODONE HYDROCHLORIDE AND ACETAMINOPHEN 5; 325 MG/1; MG/1
1 TABLET ORAL EVERY 6 HOURS PRN
Qty: 28 TABLET | Refills: 0 | Status: SHIPPED | OUTPATIENT
Start: 2020-06-09 | End: 2020-06-16 | Stop reason: SDUPTHER

## 2020-06-15 ENCOUNTER — HOSPITAL ENCOUNTER (OUTPATIENT)
Dept: NUCLEAR MEDICINE | Age: 60
Discharge: HOME | End: 2020-06-15
Payer: COMMERCIAL

## 2020-06-15 PROCEDURE — 3430000000 HC RX DIAGNOSTIC RADIOPHARMACEUTICAL: Performed by: PHYSICIAN ASSISTANT

## 2020-06-15 PROCEDURE — A9503 TC99M MEDRONATE: HCPCS | Performed by: PHYSICIAN ASSISTANT

## 2020-06-15 PROCEDURE — 78315 BONE IMAGING 3 PHASE: CPT

## 2020-06-15 RX ORDER — TC 99M MEDRONATE 20 MG/10ML
25.4 INJECTION, POWDER, LYOPHILIZED, FOR SOLUTION INTRAVENOUS
Status: COMPLETED | OUTPATIENT
Start: 2020-06-15 | End: 2020-06-15

## 2020-06-15 RX ADMIN — TC 99M MEDRONATE 25.4 MILLICURIE: 20 INJECTION, POWDER, LYOPHILIZED, FOR SOLUTION INTRAVENOUS at 11:24

## 2020-06-16 ENCOUNTER — TELEPHONE (OUTPATIENT)
Dept: ORTHOPEDIC SURGERY | Age: 60
End: 2020-06-16

## 2020-06-16 DIAGNOSIS — Z96.652 STATUS POST LEFT PARTIAL KNEE REPLACEMENT: ICD-10-CM

## 2020-06-16 RX ORDER — OXYCODONE HYDROCHLORIDE AND ACETAMINOPHEN 5; 325 MG/1; MG/1
1 TABLET ORAL EVERY 6 HOURS PRN
Qty: 28 TABLET | Refills: 0 | Status: SHIPPED | OUTPATIENT
Start: 2020-06-16 | End: 2020-06-23 | Stop reason: SDUPTHER

## 2020-06-23 ENCOUNTER — TELEPHONE (OUTPATIENT)
Dept: ORTHOPEDIC SURGERY | Age: 60
End: 2020-06-23

## 2020-06-23 DIAGNOSIS — Z96.652 STATUS POST LEFT PARTIAL KNEE REPLACEMENT: ICD-10-CM

## 2020-06-23 RX ORDER — OXYCODONE HYDROCHLORIDE AND ACETAMINOPHEN 5; 325 MG/1; MG/1
1 TABLET ORAL EVERY 6 HOURS PRN
Qty: 28 TABLET | Refills: 0 | Status: SHIPPED | OUTPATIENT
Start: 2020-06-23 | End: 2020-06-30 | Stop reason: SDUPTHER

## 2020-06-30 ENCOUNTER — TELEPHONE (OUTPATIENT)
Dept: ORTHOPEDIC SURGERY | Age: 60
End: 2020-06-30

## 2020-06-30 RX ORDER — OXYCODONE HYDROCHLORIDE AND ACETAMINOPHEN 5; 325 MG/1; MG/1
1 TABLET ORAL EVERY 6 HOURS PRN
Qty: 28 TABLET | Refills: 0 | Status: SHIPPED | OUTPATIENT
Start: 2020-06-30 | End: 2020-07-08 | Stop reason: SDUPTHER

## 2020-07-07 ENCOUNTER — TELEPHONE (OUTPATIENT)
Dept: ORTHOPEDIC SURGERY | Age: 60
End: 2020-07-07

## 2020-07-07 DIAGNOSIS — Z96.652 STATUS POST LEFT PARTIAL KNEE REPLACEMENT: ICD-10-CM

## 2020-07-07 DIAGNOSIS — G89.4 CHRONIC PAIN SYNDROME: ICD-10-CM

## 2020-07-08 RX ORDER — DULOXETIN HYDROCHLORIDE 60 MG/1
60 CAPSULE, DELAYED RELEASE ORAL 2 TIMES DAILY
Qty: 60 CAPSULE | Refills: 3 | Status: SHIPPED | OUTPATIENT
Start: 2020-07-08 | End: 2020-10-15

## 2020-07-08 RX ORDER — OXYCODONE HYDROCHLORIDE AND ACETAMINOPHEN 5; 325 MG/1; MG/1
1 TABLET ORAL EVERY 6 HOURS PRN
Qty: 28 TABLET | Refills: 0 | Status: SHIPPED | OUTPATIENT
Start: 2020-07-08 | End: 2020-07-15 | Stop reason: SDUPTHER

## 2020-07-15 ENCOUNTER — TELEPHONE (OUTPATIENT)
Dept: ORTHOPEDIC SURGERY | Age: 60
End: 2020-07-15

## 2020-07-15 DIAGNOSIS — Z96.652 STATUS POST LEFT PARTIAL KNEE REPLACEMENT: ICD-10-CM

## 2020-07-15 RX ORDER — OXYCODONE HYDROCHLORIDE AND ACETAMINOPHEN 5; 325 MG/1; MG/1
1 TABLET ORAL EVERY 6 HOURS PRN
Qty: 28 TABLET | Refills: 0 | Status: SHIPPED | OUTPATIENT
Start: 2020-07-15 | End: 2020-07-21 | Stop reason: SDUPTHER

## 2020-07-17 RX ORDER — DULOXETIN HYDROCHLORIDE 60 MG/1
CAPSULE, DELAYED RELEASE ORAL
Qty: 30 CAPSULE | Refills: 0 | OUTPATIENT
Start: 2020-07-17

## 2020-07-21 ENCOUNTER — TELEPHONE (OUTPATIENT)
Dept: ORTHOPEDIC SURGERY | Age: 60
End: 2020-07-21

## 2020-07-21 DIAGNOSIS — Z96.652 STATUS POST LEFT PARTIAL KNEE REPLACEMENT: ICD-10-CM

## 2020-07-21 RX ORDER — OXYCODONE HYDROCHLORIDE AND ACETAMINOPHEN 5; 325 MG/1; MG/1
1 TABLET ORAL EVERY 6 HOURS PRN
Qty: 28 TABLET | Refills: 0 | Status: SHIPPED | OUTPATIENT
Start: 2020-07-21 | End: 2020-07-28 | Stop reason: SDUPTHER

## 2020-07-28 ENCOUNTER — TELEPHONE (OUTPATIENT)
Dept: ORTHOPEDIC SURGERY | Age: 60
End: 2020-07-28

## 2020-07-28 DIAGNOSIS — Z96.652 STATUS POST LEFT PARTIAL KNEE REPLACEMENT: ICD-10-CM

## 2020-07-28 RX ORDER — OXYCODONE HYDROCHLORIDE AND ACETAMINOPHEN 5; 325 MG/1; MG/1
1 TABLET ORAL EVERY 6 HOURS PRN
Qty: 28 TABLET | Refills: 0 | Status: SHIPPED | OUTPATIENT
Start: 2020-07-28 | End: 2020-08-04 | Stop reason: SDUPTHER

## 2020-08-04 ENCOUNTER — TELEPHONE (OUTPATIENT)
Dept: ORTHOPEDIC SURGERY | Age: 60
End: 2020-08-04

## 2020-08-04 DIAGNOSIS — Z96.652 STATUS POST LEFT PARTIAL KNEE REPLACEMENT: ICD-10-CM

## 2020-08-04 RX ORDER — OXYCODONE HYDROCHLORIDE AND ACETAMINOPHEN 5; 325 MG/1; MG/1
1 TABLET ORAL EVERY 6 HOURS PRN
Qty: 28 TABLET | Refills: 0 | Status: SHIPPED | OUTPATIENT
Start: 2020-08-04 | End: 2020-08-11 | Stop reason: SDUPTHER

## 2020-08-11 ENCOUNTER — TELEPHONE (OUTPATIENT)
Dept: ORTHOPEDIC SURGERY | Age: 60
End: 2020-08-11

## 2020-08-11 DIAGNOSIS — Z96.652 STATUS POST LEFT PARTIAL KNEE REPLACEMENT: ICD-10-CM

## 2020-08-11 RX ORDER — OXYCODONE HYDROCHLORIDE AND ACETAMINOPHEN 5; 325 MG/1; MG/1
1 TABLET ORAL EVERY 6 HOURS PRN
Qty: 28 TABLET | Refills: 0 | Status: SHIPPED | OUTPATIENT
Start: 2020-08-11 | End: 2020-08-18 | Stop reason: SDUPTHER

## 2020-08-18 ENCOUNTER — TELEPHONE (OUTPATIENT)
Dept: ORTHOPEDIC SURGERY | Age: 60
End: 2020-08-18

## 2020-08-18 DIAGNOSIS — Z96.652 STATUS POST LEFT PARTIAL KNEE REPLACEMENT: ICD-10-CM

## 2020-08-18 RX ORDER — OXYCODONE HYDROCHLORIDE AND ACETAMINOPHEN 5; 325 MG/1; MG/1
1 TABLET ORAL EVERY 6 HOURS PRN
Qty: 28 TABLET | Refills: 0 | Status: SHIPPED | OUTPATIENT
Start: 2020-08-18 | End: 2020-08-25 | Stop reason: SDUPTHER

## 2020-08-25 ENCOUNTER — TELEPHONE (OUTPATIENT)
Dept: ORTHOPEDIC SURGERY | Age: 60
End: 2020-08-25

## 2020-08-25 DIAGNOSIS — Z96.652 STATUS POST LEFT PARTIAL KNEE REPLACEMENT: ICD-10-CM

## 2020-08-25 RX ORDER — OXYCODONE HYDROCHLORIDE AND ACETAMINOPHEN 5; 325 MG/1; MG/1
1 TABLET ORAL EVERY 6 HOURS PRN
Qty: 28 TABLET | Refills: 0 | Status: SHIPPED | OUTPATIENT
Start: 2020-08-25 | End: 2020-09-01 | Stop reason: SDUPTHER

## 2020-09-01 ENCOUNTER — TELEPHONE (OUTPATIENT)
Dept: ORTHOPEDIC SURGERY | Age: 60
End: 2020-09-01

## 2020-09-01 DIAGNOSIS — Z96.652 STATUS POST LEFT PARTIAL KNEE REPLACEMENT: ICD-10-CM

## 2020-09-01 RX ORDER — OXYCODONE HYDROCHLORIDE AND ACETAMINOPHEN 5; 325 MG/1; MG/1
1 TABLET ORAL EVERY 6 HOURS PRN
Qty: 28 TABLET | Refills: 0 | Status: SHIPPED | OUTPATIENT
Start: 2020-09-01 | End: 2020-09-08 | Stop reason: SDUPTHER

## 2020-09-08 ENCOUNTER — TELEPHONE (OUTPATIENT)
Dept: ORTHOPEDIC SURGERY | Age: 60
End: 2020-09-08

## 2020-09-08 DIAGNOSIS — Z96.652 STATUS POST LEFT PARTIAL KNEE REPLACEMENT: ICD-10-CM

## 2020-09-08 RX ORDER — OXYCODONE HYDROCHLORIDE AND ACETAMINOPHEN 5; 325 MG/1; MG/1
1 TABLET ORAL EVERY 6 HOURS PRN
Qty: 28 TABLET | Refills: 0 | Status: SHIPPED | OUTPATIENT
Start: 2020-09-08 | End: 2020-09-15 | Stop reason: SDUPTHER

## 2020-09-15 ENCOUNTER — OFFICE VISIT (OUTPATIENT)
Dept: ORTHOPEDIC SURGERY | Age: 60
End: 2020-09-15
Payer: COMMERCIAL

## 2020-09-15 VITALS
SYSTOLIC BLOOD PRESSURE: 116 MMHG | DIASTOLIC BLOOD PRESSURE: 74 MMHG | WEIGHT: 171 LBS | HEIGHT: 64 IN | TEMPERATURE: 97.5 F | BODY MASS INDEX: 29.19 KG/M2 | HEART RATE: 83 BPM

## 2020-09-15 PROBLEM — M25.561 CHRONIC KNEE PAIN AFTER TOTAL REPLACEMENT OF RIGHT KNEE JOINT: Status: ACTIVE | Noted: 2020-09-15

## 2020-09-15 PROBLEM — Z96.651 CHRONIC KNEE PAIN AFTER TOTAL REPLACEMENT OF RIGHT KNEE JOINT: Status: ACTIVE | Noted: 2020-09-15

## 2020-09-15 PROBLEM — G89.29 CHRONIC KNEE PAIN AFTER TOTAL REPLACEMENT OF RIGHT KNEE JOINT: Status: ACTIVE | Noted: 2020-09-15

## 2020-09-15 PROCEDURE — 99213 OFFICE O/P EST LOW 20 MIN: CPT | Performed by: ORTHOPAEDIC SURGERY

## 2020-09-15 RX ORDER — OXYCODONE HYDROCHLORIDE AND ACETAMINOPHEN 5; 325 MG/1; MG/1
1 TABLET ORAL EVERY 6 HOURS PRN
Qty: 28 TABLET | Refills: 0 | Status: SHIPPED | OUTPATIENT
Start: 2020-09-15 | End: 2020-09-22 | Stop reason: SDUPTHER

## 2020-09-16 NOTE — PROGRESS NOTES
Patient Name: Julian Chan MRN: <[id number]>   Age: 62 y.o. YOB: 1960   Sex: female         [address] Complaint   Patient presents with    Knee Pain     Bilateral knee-Bone Scan results       HISTORY OF PRESENT ILLNESS   Patient returns for their   postoperative evaluation status post partial  knee replacement. The patient is doing very well. Pain in the right knee sometimes worse  They have minimal complaints of pain. The patient has been doing physical therapy at home . They deny any calf swelling or numbness or tingling down the leg    Left leg has been recently improved with worsening of the right knee overall. Bone scan on the right knee with ntoed areas of loosening of the tibial and femoral stems. Assessment   PHYSICAL EXAM     Vital Signs: /74   Pulse 83   Temp 97.5 °F (36.4 °C)   Ht 5' 4\" (1.626 m)   Wt 171 lb (77.6 kg)   LMP  (LMP Unknown)   BMI 29.35 kg/m²     Examination of the knee shows a well-healed midline incision. There is mild swelling. There is no drainage. Range of motion is 0-120. There is no calf tenderness. The patient is neurovascularly intact. Incision well healed without drainage or underlying infection. Edges of the skin well opposed. Gross swelling decreased. No erythema. Distal circulation intact. No signs of RSD. Calf nontender. Negative nica's,  no signs of dvt  Hip exam shows full ROM with no focal pain or Instability. Leg lengths are normal. There is no Trendelenburg Gait. Right knee with general pain in the mid tibia and the proximal femur and general areas about the total knee. Quad mechanism with pain and limited motions to 90. IMPRESSION    doing well with partial knee  . 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        PLAN       Plan for revision of the right knee with recent xrays and bone scan that have indicated loseing of the knee arthroplasty on the tibia and femoral sides. No obvious signs of infection at this point. Reviewed images, procedure, and rehabilitation timeline with patient. Discussed risks and complications associated with the procedure including but not limited to: pain, impaired mobility, blood loss, DVT, infection, pneumonia, and death. Patient states her understanding and comfort proceeding with current surgical treatment plan.   Question answered and consent obtained              Joce Fernández MD

## 2020-09-22 ENCOUNTER — TELEPHONE (OUTPATIENT)
Dept: ORTHOPEDIC SURGERY | Age: 60
End: 2020-09-22

## 2020-09-22 RX ORDER — OXYCODONE HYDROCHLORIDE AND ACETAMINOPHEN 5; 325 MG/1; MG/1
1 TABLET ORAL EVERY 6 HOURS PRN
Qty: 28 TABLET | Refills: 0 | Status: SHIPPED | OUTPATIENT
Start: 2020-09-22 | End: 2020-09-29 | Stop reason: SDUPTHER

## 2020-09-29 ENCOUNTER — TELEPHONE (OUTPATIENT)
Dept: ORTHOPEDIC SURGERY | Age: 60
End: 2020-09-29

## 2020-09-29 DIAGNOSIS — Z96.652 STATUS POST LEFT PARTIAL KNEE REPLACEMENT: ICD-10-CM

## 2020-09-29 RX ORDER — OXYCODONE HYDROCHLORIDE AND ACETAMINOPHEN 5; 325 MG/1; MG/1
1 TABLET ORAL EVERY 6 HOURS PRN
Qty: 28 TABLET | Refills: 0 | Status: SHIPPED | OUTPATIENT
Start: 2020-09-29 | End: 2020-10-06 | Stop reason: SDUPTHER

## 2020-10-06 ENCOUNTER — TELEPHONE (OUTPATIENT)
Dept: ORTHOPEDIC SURGERY | Age: 60
End: 2020-10-06

## 2020-10-06 DIAGNOSIS — Z96.652 STATUS POST LEFT PARTIAL KNEE REPLACEMENT: ICD-10-CM

## 2020-10-06 RX ORDER — OXYCODONE HYDROCHLORIDE AND ACETAMINOPHEN 5; 325 MG/1; MG/1
1 TABLET ORAL EVERY 6 HOURS PRN
Qty: 28 TABLET | Refills: 0 | Status: SHIPPED | OUTPATIENT
Start: 2020-10-06 | End: 2020-10-13 | Stop reason: SDUPTHER

## 2020-10-13 ENCOUNTER — TELEPHONE (OUTPATIENT)
Dept: ORTHOPEDIC SURGERY | Age: 60
End: 2020-10-13

## 2020-10-13 DIAGNOSIS — Z96.652 STATUS POST LEFT PARTIAL KNEE REPLACEMENT: ICD-10-CM

## 2020-10-13 RX ORDER — OXYCODONE HYDROCHLORIDE AND ACETAMINOPHEN 5; 325 MG/1; MG/1
1 TABLET ORAL EVERY 6 HOURS PRN
Qty: 28 TABLET | Refills: 0 | Status: SHIPPED | OUTPATIENT
Start: 2020-10-13 | End: 2020-10-20 | Stop reason: SDUPTHER

## 2020-10-15 RX ORDER — DULOXETIN HYDROCHLORIDE 60 MG/1
CAPSULE, DELAYED RELEASE ORAL
Qty: 180 CAPSULE | Refills: 2 | Status: SHIPPED | OUTPATIENT
Start: 2020-10-15 | End: 2020-11-17 | Stop reason: SDUPTHER

## 2020-10-20 ENCOUNTER — TELEPHONE (OUTPATIENT)
Dept: ORTHOPEDIC SURGERY | Age: 60
End: 2020-10-20

## 2020-10-20 DIAGNOSIS — Z96.652 STATUS POST LEFT PARTIAL KNEE REPLACEMENT: ICD-10-CM

## 2020-10-20 RX ORDER — OXYCODONE HYDROCHLORIDE AND ACETAMINOPHEN 5; 325 MG/1; MG/1
1 TABLET ORAL EVERY 6 HOURS PRN
Qty: 28 TABLET | Refills: 0 | Status: SHIPPED | OUTPATIENT
Start: 2020-10-20 | End: 2020-10-27 | Stop reason: SDUPTHER

## 2020-10-26 NOTE — PROGRESS NOTES
not be in effect during this procedure. I give my doctor permission to give me blood or blood products. I understand that there are risks with receiving blood such as hepatitis, AIDS, fever, or allergic reaction. I acknowledge that the risks, benefits, and alternatives of this treatment have been explained to me and that no express or implied warranty has been given by the hospital, any blood bank, or any person or entity as to the blood or blood components transfused. At the discretion of my doctor, I agree to allow observers, equipment/product representatives and allow photographing, and/or televising of the procedure, provided my name or identity is maintained confidentially. I agree the hospital may dispose of or use for scientific or educational purposes any tissue, fluid, or body parts which may be removed.     ________________________________Date________Time______ am/pm  ([facility])  Patient or Signature of Closest Relative or Legal Guardian    ________________________________Date________Time______am/pm      Page 1 of  1  Witness

## 2020-10-27 ENCOUNTER — NURSE ONLY (OUTPATIENT)
Dept: PRIMARY CARE CLINIC | Age: 60
End: 2020-10-27
Payer: COMMERCIAL

## 2020-10-27 ENCOUNTER — TELEPHONE (OUTPATIENT)
Dept: ORTHOPEDIC SURGERY | Age: 60
End: 2020-10-27

## 2020-10-27 DIAGNOSIS — Z96.652 STATUS POST LEFT PARTIAL KNEE REPLACEMENT: ICD-10-CM

## 2020-10-27 PROCEDURE — 99211 OFF/OP EST MAY X REQ PHY/QHP: CPT | Performed by: NURSE PRACTITIONER

## 2020-10-27 RX ORDER — OXYCODONE HYDROCHLORIDE AND ACETAMINOPHEN 5; 325 MG/1; MG/1
1 TABLET ORAL EVERY 6 HOURS PRN
Qty: 28 TABLET | Refills: 0 | Status: ON HOLD
Start: 2020-10-27 | End: 2020-11-03 | Stop reason: HOSPADM

## 2020-10-28 LAB — SARS-COV-2: NOT DETECTED

## 2020-10-29 RX ORDER — ALBUTEROL SULFATE 90 UG/1
2 AEROSOL, METERED RESPIRATORY (INHALATION) EVERY 4 HOURS PRN
COMMUNITY
Start: 2020-10-22

## 2020-10-29 NOTE — PROGRESS NOTES
[facility] PRE-SURGICAL TESTING INSTRUCTIONS                              PRIOR TO PROCEDURE DATE:  1. Please follow any guidelines/instructions prior to your procedure as advised by your surgeon. 2. Arrange for someone to drive you home and be with you for the first 24 hours after discharge for your safety after your procedure for which you received sedation. Ensure it is someone we can share information with regarding your discharge. 3. You must contact your surgeon for instructions IF:   You are taking any blood thinners, aspirin, anti-inflammatory or vitamin E.   There is a change in your physical condition such as a cold, fever, rash, cuts, sores or any other infection, especially near your surgical site. 4. Do not drink alcohol the day before or day of your procedure. 5. A Pre-op History and Physical for surgery MUST be completed by your Physician or Urgent Care within 30 days of your procedure date. Please bring a copy with you on the day of your procedure and along with any other testing performed. THE DAY OF YOUR PROCEDURE:  1. Follow instructions for ARRIVAL TIME as DIRECTED BY YOUR SURGEON. I    2. Enter the MAIN entrance from [address] and follow the signs to the free [employer] or [employer] parking (offered free of charge 6am-5pm). 3. Enter the Main Entrance of the hospital (do not enter from the lower level of the parking garage). Upon entrance, check in with the  at the main desk on your left. If no one is available at the desk, proceed into the [facility] Waiting Room and go through the door directly into the [facility]. There is a Check-in desk ACROSS from Room 5 (marked with a sign hanging from the ceiling). The phone number for the surgery center is [phone]. 4. Please call [phone] option #2 option #2 if you have not been preregistered yet. On the day of your procedure bring your insurance card and photo ID.  You will be registered at your bedside once brought back to your room. 5. DO NOT EAT ANYTHING eight hours prior to surgery. May have 8 ounces of water 4 hours prior to surgery. 6. MEDICATIONS    Take the following medications with a SMALL sip of water: gabapentin and bring albuterol inhaler   Use your usual dose of inhalers the morning of surgery. BRING your rescue inhaler with you to hospital.    Anesthesia does NOT want you to take insulin the morning of surgery. They will control your blood sugar while you are at the hospital. Please contact your ordering physician for instructions regarding your insulin the night before your procedure. If you have an insulin pump, please keep it set on basal rate. 7. Do not swallow water when brushing teeth. No gum, candy, mints or ice chips. Refrain from smoking or at least decrease the amount. 8. Dress in loose, comfortable clothing appropriate for redressing after your procedure. Do not wear jewelry (including body piercings), make-up (especially NO eye make-up), fingernail polish (NO toenail polish if foot/leg surgery), lotion, powders or metal hairclips. 9. Dentures, glasses, or contacts will need to be removed before your procedure. Bring cases for your glasses, contacts, dentures, or hearing aids to protect them while you are in surgery. 10. If you use a CPAP, please bring it with you on the day of your procedure. 11. We recommend that valuable personal  belongings such as cash, cell phones, e-tablets or jewelry, be left at home during your stay. The hospital will not be responsible for valuables that are not secured in the hospital safe. However, if your insurance requires a co-pay, you may want to bring a method of payment, i.e. Check or credit card, if you wish to pay your co-pay the day of surgery. 12. If you are to stay overnight, you may bring a bag with personal items.  Please have any large items you may need brought in by your family after your arrival to your hospital room. 15. If you have a Living Will or Durable Power of , please bring a copy on the day of your procedure. 15. With your permission, one family member may accompany you while you are being prepared for surgery. Once you are ready, additional family members may join you. HOW WE KEEP YOU SAFE and WORK TO PREVENT SURGICAL SITE INFECTIONS:  1. Health care workers should always check your ID bracelet to verify your name and birth date. You will be asked many times to state your name, date of birth, and allergies. 2. Health care workers should always clean their hands with soap or alcohol gel before providing care to you. It is okay to ask anyone if they cleaned their hands before they touch you. 3. You will be actively involved in verifying the type of procedure you are having and ensuring the correct surgical site. This will be confirmed multiple times prior to your procedure. Do NOT angelika your surgery site UNLESS instructed to by your surgeon. 4. Do not shave or wax for 72 hours prior to procedure near your operative site. Shaving with a razor can irritate your skin and make it easier to develop an infection. On the day of your procedure, any hair that needs to be removed near the surgical site will be clipped by a healthcare worker using a special clippers designed to avoid skin irritation. 5. When you are in the operating room, your surgical site will be cleansed with a special soap, and in most cases, you will be given an antibiotic before the surgery begins. What to expect AFTER YOUR PROCEDURE:  1. Immediately following your procedure, your will be taken to the PACU for the first phase of your recovery. Your nurse will help you recover from any potential side effects of anesthesia, such as extreme drowsiness, changes in your vital signs or breathing patterns. Nausea, headache, muscle aches, or sore throat may also occur after anesthesia.   Your nurse will help you manage these potential side effects. 2. For comfort and safety, arrange to have someone at home with you for the first 24 hours after discharge. 3. You and your family will be given written instructions about your diet, activity, dressing care, medications, and return visits. 4. Once at home, should issues with nausea, pain, or bleeding occur, or should you notice any signs of infection, you should call your surgeon. 5. Always clean your hands before and after caring for your wound. Do not let your family touch your surgery site without cleaning their hands. 6. Narcotic pain medications can cause significant constipation. You may want to add a stool softener to your postoperative medication schedule or speak to your surgeon on how best to manage this SIDE EFFECT. SPECIAL INSTRUCTIONS bring albuterol inhaler, may have bag with personal items/  can be with patient while preparing for surgery, visiting hours 9am to 7pm 1 visitor at a time but may switch out. Patient verbalizes understanding. Thank you for allowing us to care for you. We strive to exceed your expectations in the delivery of care and service provided to you and your family. If you need to contact us for any reason, please call us at [phone]    Instructions reviewed with patient during preadmission testing phone interview. Anna Robertson. 10/29/2020 .12:10 PM      ADDITIONAL EDUCATIONAL INFORMATION REVIEWED PER PHONE WITH YOU AND/OR YOUR FAMILY:  No Bring a urine sample on day of surgery  Yes Hibiclens® Bathing Instructions   No  Antibacterial Soap  Informed patient will be having I/S teaching  And review a fall risk contract, patient verbalizes understanding

## 2020-10-29 NOTE — PROGRESS NOTES
Snoring? Do you snore loudly (loud enough to be heard through closed doors, or your bed partner elbows you for snoring at night)? No    Tired? Do you often feel tired, fatigued, or sleepy during the daytime (such as falling asleep during driving)? Yes    Observed? Has anyone observed you stop breathing or choking/gasping during your sleep? No    Pressure? Do you have or are being treated for high blood pressure? Yes    Neck Size? (measured around Johnsons apple)  For male, is your shirt collar 17 inches or larger? For female, is your shirt collar 16 inches or larger? No    Age older than 48years old? Yes    Gender = Male  No    Body Mass Index more than 35 kg/m2? No    Risk of GEOVANI Scoring criteria:    [] Low risk:  Yes to 0 - 2 questions    [x] Intermediate risk:  Yes to 3 - 4 questions    [] High risk:  Yes to 5 - 8 questions       For ALL PATIENTS THAT SCORE  5 or >n/a    Results called to OR Scheduling? N/A Seen by faculty. Patient given Sleep Apnea Referral Pamphlet? N/A Seen by faculty.

## 2020-10-29 NOTE — RESULT ENCOUNTER NOTE

## 2020-10-29 NOTE — PROGRESS NOTES
Called office of Dr. Tyrese Laura [phone] spoke with Bhargavi Khan; requested EKG tracing from 10/22/2020 of which she will fax.

## 2020-10-30 ENCOUNTER — TELEPHONE (OUTPATIENT)
Dept: ORTHOPEDIC SURGERY | Age: 60
End: 2020-10-30

## 2020-10-30 ENCOUNTER — ANESTHESIA EVENT (OUTPATIENT)
Dept: OPERATING ROOM | Age: 60
DRG: 468 | End: 2020-10-30
Payer: COMMERCIAL

## 2020-11-02 ENCOUNTER — ANESTHESIA (OUTPATIENT)
Dept: OPERATING ROOM | Age: 60
DRG: 468 | End: 2020-11-02
Payer: COMMERCIAL

## 2020-11-02 ENCOUNTER — HOSPITAL ENCOUNTER (INPATIENT)
Age: 60
LOS: 1 days | Discharge: HOME HEALTH | DRG: 468 | End: 2020-11-03
Attending: ORTHOPAEDIC SURGERY | Admitting: ORTHOPAEDIC SURGERY
Payer: COMMERCIAL

## 2020-11-02 ENCOUNTER — APPOINTMENT (OUTPATIENT)
Dept: GENERAL RADIOLOGY | Age: 60
DRG: 468 | End: 2020-11-02
Attending: ORTHOPAEDIC SURGERY
Payer: COMMERCIAL

## 2020-11-02 VITALS
OXYGEN SATURATION: 100 % | RESPIRATION RATE: 6 BRPM | DIASTOLIC BLOOD PRESSURE: 67 MMHG | TEMPERATURE: 93.6 F | SYSTOLIC BLOOD PRESSURE: 107 MMHG

## 2020-11-02 PROBLEM — Z96.651 STATUS POST REVISION OF TOTAL REPLACEMENT OF RIGHT KNEE: Status: ACTIVE | Noted: 2020-11-02

## 2020-11-02 LAB
ABO/RH: NORMAL
ANTIBODY SCREEN: NORMAL
APPEARANCE FLUID: NORMAL
C-REACTIVE PROTEIN: 1.5 MG/L (ref 0–5.1)
CELL COUNT FLUID TYPE: NORMAL
CLOT EVALUATION: NORMAL
COLOR FLUID: YELLOW
LYMPHOCYTES, BODY FLUID: 18 %
MONOCYTE, FLUID: 9 %
NEUTROPHIL, FLUID: 73 %
NUCLEATED CELLS FLUID: 180 /CUMM
NUMBER OF CELLS COUNTED FLUID: 100
RBC FLUID: 148 /CUMM

## 2020-11-02 PROCEDURE — 6370000000 HC RX 637 (ALT 250 FOR IP): Performed by: ORTHOPAEDIC SURGERY

## 2020-11-02 PROCEDURE — 2580000003 HC RX 258: Performed by: ANESTHESIOLOGY

## 2020-11-02 PROCEDURE — 87206 SMEAR FLUORESCENT/ACID STAI: CPT

## 2020-11-02 PROCEDURE — 87070 CULTURE OTHR SPECIMN AEROBIC: CPT

## 2020-11-02 PROCEDURE — 6360000002 HC RX W HCPCS: Performed by: NURSE ANESTHETIST, CERTIFIED REGISTERED

## 2020-11-02 PROCEDURE — 2580000003 HC RX 258: Performed by: ORTHOPAEDIC SURGERY

## 2020-11-02 PROCEDURE — 87081 CULTURE SCREEN ONLY: CPT

## 2020-11-02 PROCEDURE — 73560 X-RAY EXAM OF KNEE 1 OR 2: CPT

## 2020-11-02 PROCEDURE — C1713 ANCHOR/SCREW BN/BN,TIS/BN: HCPCS | Performed by: ORTHOPAEDIC SURGERY

## 2020-11-02 PROCEDURE — 2709999900 HC NON-CHARGEABLE SUPPLY: Performed by: ORTHOPAEDIC SURGERY

## 2020-11-02 PROCEDURE — 0SPC0JZ REMOVAL OF SYNTHETIC SUBSTITUTE FROM RIGHT KNEE JOINT, OPEN APPROACH: ICD-10-PCS | Performed by: ORTHOPAEDIC SURGERY

## 2020-11-02 PROCEDURE — 3600000015 HC SURGERY LEVEL 5 ADDTL 15MIN: Performed by: ORTHOPAEDIC SURGERY

## 2020-11-02 PROCEDURE — 86901 BLOOD TYPING SEROLOGIC RH(D): CPT

## 2020-11-02 PROCEDURE — 3700000001 HC ADD 15 MINUTES (ANESTHESIA): Performed by: ORTHOPAEDIC SURGERY

## 2020-11-02 PROCEDURE — 2500000003 HC RX 250 WO HCPCS: Performed by: NURSE ANESTHETIST, CERTIFIED REGISTERED

## 2020-11-02 PROCEDURE — 7100000001 HC PACU RECOVERY - ADDTL 15 MIN: Performed by: ORTHOPAEDIC SURGERY

## 2020-11-02 PROCEDURE — 86900 BLOOD TYPING SEROLOGIC ABO: CPT

## 2020-11-02 PROCEDURE — 6360000002 HC RX W HCPCS: Performed by: ANESTHESIOLOGY

## 2020-11-02 PROCEDURE — 6360000002 HC RX W HCPCS: Performed by: PHYSICIAN ASSISTANT

## 2020-11-02 PROCEDURE — 88305 TISSUE EXAM BY PATHOLOGIST: CPT

## 2020-11-02 PROCEDURE — 1200000000 HC SEMI PRIVATE

## 2020-11-02 PROCEDURE — 87015 SPECIMEN INFECT AGNT CONCNTJ: CPT

## 2020-11-02 PROCEDURE — 86850 RBC ANTIBODY SCREEN: CPT

## 2020-11-02 PROCEDURE — 2580000003 HC RX 258: Performed by: PHYSICIAN ASSISTANT

## 2020-11-02 PROCEDURE — 6360000002 HC RX W HCPCS: Performed by: ORTHOPAEDIC SURGERY

## 2020-11-02 PROCEDURE — 7100000000 HC PACU RECOVERY - FIRST 15 MIN: Performed by: ORTHOPAEDIC SURGERY

## 2020-11-02 PROCEDURE — 3600000005 HC SURGERY LEVEL 5 BASE: Performed by: ORTHOPAEDIC SURGERY

## 2020-11-02 PROCEDURE — 2500000003 HC RX 250 WO HCPCS: Performed by: ORTHOPAEDIC SURGERY

## 2020-11-02 PROCEDURE — 94150 VITAL CAPACITY TEST: CPT

## 2020-11-02 PROCEDURE — 6370000000 HC RX 637 (ALT 250 FOR IP): Performed by: PHYSICIAN ASSISTANT

## 2020-11-02 PROCEDURE — C1776 JOINT DEVICE (IMPLANTABLE): HCPCS | Performed by: ORTHOPAEDIC SURGERY

## 2020-11-02 PROCEDURE — 94799 UNLISTED PULMONARY SVC/PX: CPT

## 2020-11-02 PROCEDURE — 64447 NJX AA&/STRD FEMORAL NRV IMG: CPT | Performed by: ANESTHESIOLOGY

## 2020-11-02 PROCEDURE — 0SRC0J9 REPLACEMENT OF RIGHT KNEE JOINT WITH SYNTHETIC SUBSTITUTE, CEMENTED, OPEN APPROACH: ICD-10-PCS | Performed by: ORTHOPAEDIC SURGERY

## 2020-11-02 PROCEDURE — 88331 PATH CONSLTJ SURG 1 BLK 1SPC: CPT

## 2020-11-02 PROCEDURE — 3700000000 HC ANESTHESIA ATTENDED CARE: Performed by: ORTHOPAEDIC SURGERY

## 2020-11-02 PROCEDURE — 89051 BODY FLUID CELL COUNT: CPT

## 2020-11-02 PROCEDURE — 87102 FUNGUS ISOLATION CULTURE: CPT

## 2020-11-02 PROCEDURE — 86140 C-REACTIVE PROTEIN: CPT

## 2020-11-02 PROCEDURE — 87116 MYCOBACTERIA CULTURE: CPT

## 2020-11-02 PROCEDURE — 2720000010 HC SURG SUPPLY STERILE: Performed by: ORTHOPAEDIC SURGERY

## 2020-11-02 PROCEDURE — 27487 REVISE/REPLACE KNEE JOINT: CPT | Performed by: ORTHOPAEDIC SURGERY

## 2020-11-02 PROCEDURE — 87205 SMEAR GRAM STAIN: CPT

## 2020-11-02 DEVICE — IMPLANTABLE DEVICE: Type: IMPLANTABLE DEVICE | Site: KNEE | Status: FUNCTIONAL

## 2020-11-02 DEVICE — SLEEVE TIB 29MM KNEE PORCOAT PARTIALLY REV SYS ATTUNE: Type: IMPLANTABLE DEVICE | Site: KNEE | Status: FUNCTIONAL

## 2020-11-02 DEVICE — BASEPLATE TIB SZ 3 KNEE CO CHROM MOLYBDENUM TI ALLY ROT: Type: IMPLANTABLE DEVICE | Site: KNEE | Status: FUNCTIONAL

## 2020-11-02 DEVICE — SLEEVE FEM L34MM UNIV MTPHSEAL FULL POR LPS: Type: IMPLANTABLE DEVICE | Site: KNEE | Status: FUNCTIONAL

## 2020-11-02 DEVICE — CABLE SURG DIA1.7MM S STL HA CERCLAGE W/ CRMP 29880101S] DEPUY SYNTHES USA]: Type: IMPLANTABLE DEVICE | Site: KNEE | Status: FUNCTIONAL

## 2020-11-02 DEVICE — CEMENT BNE 20ML 41GM FULL DOSE PMMA W/ TOBRA M VISC RADPQ: Type: IMPLANTABLE DEVICE | Site: KNEE | Status: FUNCTIONAL

## 2020-11-02 DEVICE — STEM FEM L75MM DIA14MM UNIV KNEE FLUT PRESSFIT FOR ROT HNG: Type: IMPLANTABLE DEVICE | Site: KNEE | Status: FUNCTIONAL

## 2020-11-02 DEVICE — STEM FEM L110MM DIA16MM KNEE TI ALLY CRSS SLT PRESSFIT REV: Type: IMPLANTABLE DEVICE | Site: KNEE | Status: FUNCTIONAL

## 2020-11-02 RX ORDER — SODIUM CHLORIDE 450 MG/100ML
INJECTION, SOLUTION INTRAVENOUS CONTINUOUS
Status: ACTIVE | OUTPATIENT
Start: 2020-11-02 | End: 2020-11-02

## 2020-11-02 RX ORDER — OXYCODONE HYDROCHLORIDE 5 MG/1
5 TABLET ORAL EVERY 4 HOURS PRN
Status: DISCONTINUED | OUTPATIENT
Start: 2020-11-02 | End: 2020-11-03 | Stop reason: HOSPADM

## 2020-11-02 RX ORDER — SENNA AND DOCUSATE SODIUM 50; 8.6 MG/1; MG/1
1 TABLET, FILM COATED ORAL 2 TIMES DAILY
Status: DISCONTINUED | OUTPATIENT
Start: 2020-11-02 | End: 2020-11-03 | Stop reason: HOSPADM

## 2020-11-02 RX ORDER — ROPIVACAINE HYDROCHLORIDE 5 MG/ML
INJECTION, SOLUTION EPIDURAL; INFILTRATION; PERINEURAL
Status: COMPLETED | OUTPATIENT
Start: 2020-11-02 | End: 2020-11-02

## 2020-11-02 RX ORDER — ROPIVACAINE HYDROCHLORIDE 5 MG/ML
INJECTION, SOLUTION EPIDURAL; INFILTRATION; PERINEURAL
Status: COMPLETED
Start: 2020-11-02 | End: 2020-11-02

## 2020-11-02 RX ORDER — ONDANSETRON 2 MG/ML
INJECTION INTRAMUSCULAR; INTRAVENOUS PRN
Status: DISCONTINUED | OUTPATIENT
Start: 2020-11-02 | End: 2020-11-02 | Stop reason: SDUPTHER

## 2020-11-02 RX ORDER — HYDRALAZINE HYDROCHLORIDE 20 MG/ML
5 INJECTION INTRAMUSCULAR; INTRAVENOUS EVERY 10 MIN PRN
Status: DISCONTINUED | OUTPATIENT
Start: 2020-11-02 | End: 2020-11-02 | Stop reason: HOSPADM

## 2020-11-02 RX ORDER — DULOXETIN HYDROCHLORIDE 60 MG/1
60 CAPSULE, DELAYED RELEASE ORAL 2 TIMES DAILY
Status: DISCONTINUED | OUTPATIENT
Start: 2020-11-02 | End: 2020-11-03 | Stop reason: HOSPADM

## 2020-11-02 RX ORDER — PREDNISONE 10 MG/1
10 TABLET ORAL DAILY
Status: DISCONTINUED | OUTPATIENT
Start: 2020-11-02 | End: 2020-11-03 | Stop reason: HOSPADM

## 2020-11-02 RX ORDER — FENTANYL CITRATE 50 UG/ML
INJECTION, SOLUTION INTRAMUSCULAR; INTRAVENOUS PRN
Status: DISCONTINUED | OUTPATIENT
Start: 2020-11-02 | End: 2020-11-02 | Stop reason: SDUPTHER

## 2020-11-02 RX ORDER — FLUTICASONE PROPIONATE 50 MCG
1 SPRAY, SUSPENSION (ML) NASAL NIGHTLY PRN
Status: DISCONTINUED | OUTPATIENT
Start: 2020-11-02 | End: 2020-11-03 | Stop reason: HOSPADM

## 2020-11-02 RX ORDER — MAGNESIUM HYDROXIDE 1200 MG/15ML
LIQUID ORAL CONTINUOUS PRN
Status: COMPLETED | OUTPATIENT
Start: 2020-11-02 | End: 2020-11-02

## 2020-11-02 RX ORDER — LABETALOL 20 MG/4 ML (5 MG/ML) INTRAVENOUS SYRINGE
5 EVERY 10 MIN PRN
Status: DISCONTINUED | OUTPATIENT
Start: 2020-11-02 | End: 2020-11-02 | Stop reason: HOSPADM

## 2020-11-02 RX ORDER — MIDAZOLAM HYDROCHLORIDE 1 MG/ML
INJECTION INTRAMUSCULAR; INTRAVENOUS PRN
Status: DISCONTINUED | OUTPATIENT
Start: 2020-11-02 | End: 2020-11-02 | Stop reason: SDUPTHER

## 2020-11-02 RX ORDER — CEFAZOLIN SODIUM 2 G/50ML
2 SOLUTION INTRAVENOUS ONCE
Status: COMPLETED | OUTPATIENT
Start: 2020-11-02 | End: 2020-11-02

## 2020-11-02 RX ORDER — METOCLOPRAMIDE HYDROCHLORIDE 5 MG/ML
10 INJECTION INTRAMUSCULAR; INTRAVENOUS
Status: DISCONTINUED | OUTPATIENT
Start: 2020-11-02 | End: 2020-11-02 | Stop reason: HOSPADM

## 2020-11-02 RX ORDER — ATORVASTATIN CALCIUM 10 MG/1
10 TABLET, FILM COATED ORAL DAILY
Status: DISCONTINUED | OUTPATIENT
Start: 2020-11-02 | End: 2020-11-03 | Stop reason: HOSPADM

## 2020-11-02 RX ORDER — ALBUTEROL SULFATE 2.5 MG/3ML
2.5 SOLUTION RESPIRATORY (INHALATION) EVERY 4 HOURS PRN
Status: DISCONTINUED | OUTPATIENT
Start: 2020-11-02 | End: 2020-11-03 | Stop reason: HOSPADM

## 2020-11-02 RX ORDER — SODIUM CHLORIDE 450 MG/100ML
INJECTION, SOLUTION INTRAVENOUS CONTINUOUS
Status: DISCONTINUED | OUTPATIENT
Start: 2020-11-02 | End: 2020-11-02

## 2020-11-02 RX ORDER — GABAPENTIN 300 MG/1
300 CAPSULE ORAL 3 TIMES DAILY
Status: DISCONTINUED | OUTPATIENT
Start: 2020-11-02 | End: 2020-11-03 | Stop reason: HOSPADM

## 2020-11-02 RX ORDER — NEOSTIGMINE METHYLSULFATE 5 MG/5 ML
SYRINGE (ML) INTRAVENOUS PRN
Status: DISCONTINUED | OUTPATIENT
Start: 2020-11-02 | End: 2020-11-02 | Stop reason: SDUPTHER

## 2020-11-02 RX ORDER — SODIUM CHLORIDE, SODIUM LACTATE, POTASSIUM CHLORIDE, CALCIUM CHLORIDE 600; 310; 30; 20 MG/100ML; MG/100ML; MG/100ML; MG/100ML
INJECTION, SOLUTION INTRAVENOUS CONTINUOUS
Status: DISCONTINUED | OUTPATIENT
Start: 2020-11-02 | End: 2020-11-02

## 2020-11-02 RX ORDER — ONDANSETRON 2 MG/ML
4 INJECTION INTRAMUSCULAR; INTRAVENOUS EVERY 6 HOURS PRN
Status: DISCONTINUED | OUTPATIENT
Start: 2020-11-02 | End: 2020-11-03 | Stop reason: HOSPADM

## 2020-11-02 RX ORDER — OXYCODONE HYDROCHLORIDE 5 MG/1
5 TABLET ORAL PRN
Status: DISCONTINUED | OUTPATIENT
Start: 2020-11-02 | End: 2020-11-02 | Stop reason: HOSPADM

## 2020-11-02 RX ORDER — HYDROCHLOROTHIAZIDE 25 MG/1
12.5 TABLET ORAL DAILY
Status: DISCONTINUED | OUTPATIENT
Start: 2020-11-02 | End: 2020-11-03 | Stop reason: HOSPADM

## 2020-11-02 RX ORDER — PROMETHAZINE HYDROCHLORIDE 25 MG/ML
6.25 INJECTION, SOLUTION INTRAMUSCULAR; INTRAVENOUS
Status: DISCONTINUED | OUTPATIENT
Start: 2020-11-02 | End: 2020-11-02 | Stop reason: HOSPADM

## 2020-11-02 RX ORDER — PRENATAL WITH FERROUS FUM AND FOLIC ACID 3080; 920; 120; 400; 22; 1.84; 3; 20; 10; 1; 12; 200; 27; 25; 2 [IU]/1; [IU]/1; MG/1; [IU]/1; MG/1; MG/1; MG/1; MG/1; MG/1; MG/1; UG/1; MG/1; MG/1; MG/1; MG/1
1 TABLET ORAL DAILY
Status: DISCONTINUED | OUTPATIENT
Start: 2020-11-02 | End: 2020-11-03 | Stop reason: HOSPADM

## 2020-11-02 RX ORDER — ROCURONIUM BROMIDE 10 MG/ML
INJECTION, SOLUTION INTRAVENOUS PRN
Status: DISCONTINUED | OUTPATIENT
Start: 2020-11-02 | End: 2020-11-02 | Stop reason: SDUPTHER

## 2020-11-02 RX ORDER — PREGABALIN 150 MG/1
150 CAPSULE ORAL ONCE
Status: COMPLETED | OUTPATIENT
Start: 2020-11-02 | End: 2020-11-02

## 2020-11-02 RX ORDER — DIPHENHYDRAMINE HYDROCHLORIDE 50 MG/ML
12.5 INJECTION INTRAMUSCULAR; INTRAVENOUS
Status: DISCONTINUED | OUTPATIENT
Start: 2020-11-02 | End: 2020-11-02 | Stop reason: HOSPADM

## 2020-11-02 RX ORDER — MEPERIDINE HYDROCHLORIDE 25 MG/ML
12.5 INJECTION INTRAMUSCULAR; INTRAVENOUS; SUBCUTANEOUS EVERY 5 MIN PRN
Status: DISCONTINUED | OUTPATIENT
Start: 2020-11-02 | End: 2020-11-02 | Stop reason: HOSPADM

## 2020-11-02 RX ORDER — SODIUM CHLORIDE 0.9 % (FLUSH) 0.9 %
10 SYRINGE (ML) INJECTION PRN
Status: DISCONTINUED | OUTPATIENT
Start: 2020-11-02 | End: 2020-11-03 | Stop reason: HOSPADM

## 2020-11-02 RX ORDER — MORPHINE SULFATE 4 MG/ML
1 INJECTION, SOLUTION INTRAMUSCULAR; INTRAVENOUS EVERY 5 MIN PRN
Status: DISCONTINUED | OUTPATIENT
Start: 2020-11-02 | End: 2020-11-02 | Stop reason: HOSPADM

## 2020-11-02 RX ORDER — DEXAMETHASONE SODIUM PHOSPHATE 4 MG/ML
10 INJECTION, SOLUTION INTRA-ARTICULAR; INTRALESIONAL; INTRAMUSCULAR; INTRAVENOUS; SOFT TISSUE ONCE
Status: COMPLETED | OUTPATIENT
Start: 2020-11-02 | End: 2020-11-02

## 2020-11-02 RX ORDER — ACETAMINOPHEN 325 MG/1
650 TABLET ORAL EVERY 6 HOURS
Status: DISCONTINUED | OUTPATIENT
Start: 2020-11-02 | End: 2020-11-03 | Stop reason: HOSPADM

## 2020-11-02 RX ORDER — PROMETHAZINE HYDROCHLORIDE 12.5 MG/1
12.5 TABLET ORAL EVERY 6 HOURS PRN
Status: DISCONTINUED | OUTPATIENT
Start: 2020-11-02 | End: 2020-11-03 | Stop reason: HOSPADM

## 2020-11-02 RX ORDER — SENNA AND DOCUSATE SODIUM 50; 8.6 MG/1; MG/1
1 TABLET, FILM COATED ORAL 2 TIMES DAILY
Status: DISCONTINUED | OUTPATIENT
Start: 2020-11-02 | End: 2020-11-02

## 2020-11-02 RX ORDER — OXYCODONE HCL 10 MG/1
20 TABLET, FILM COATED, EXTENDED RELEASE ORAL ONCE
Status: COMPLETED | OUTPATIENT
Start: 2020-11-02 | End: 2020-11-02

## 2020-11-02 RX ORDER — NICOTINE 21 MG/24HR
1 PATCH, TRANSDERMAL 24 HOURS TRANSDERMAL DAILY PRN
Status: DISCONTINUED | OUTPATIENT
Start: 2020-11-02 | End: 2020-11-03 | Stop reason: HOSPADM

## 2020-11-02 RX ORDER — OXYCODONE HYDROCHLORIDE 5 MG/1
10 TABLET ORAL PRN
Status: DISCONTINUED | OUTPATIENT
Start: 2020-11-02 | End: 2020-11-02 | Stop reason: HOSPADM

## 2020-11-02 RX ORDER — OXYCODONE HYDROCHLORIDE 5 MG/1
10 TABLET ORAL EVERY 4 HOURS PRN
Status: DISCONTINUED | OUTPATIENT
Start: 2020-11-02 | End: 2020-11-03 | Stop reason: HOSPADM

## 2020-11-02 RX ORDER — CELECOXIB 200 MG/1
400 CAPSULE ORAL ONCE
Status: COMPLETED | OUTPATIENT
Start: 2020-11-02 | End: 2020-11-02

## 2020-11-02 RX ORDER — HYDROMORPHONE HCL 110MG/55ML
PATIENT CONTROLLED ANALGESIA SYRINGE INTRAVENOUS PRN
Status: DISCONTINUED | OUTPATIENT
Start: 2020-11-02 | End: 2020-11-02 | Stop reason: SDUPTHER

## 2020-11-02 RX ORDER — GLYCOPYRROLATE 1 MG/5 ML
SYRINGE (ML) INTRAVENOUS PRN
Status: DISCONTINUED | OUTPATIENT
Start: 2020-11-02 | End: 2020-11-02 | Stop reason: SDUPTHER

## 2020-11-02 RX ORDER — VANCOMYCIN HYDROCHLORIDE 1 G/20ML
INJECTION, POWDER, LYOPHILIZED, FOR SOLUTION INTRAVENOUS PRN
Status: DISCONTINUED | OUTPATIENT
Start: 2020-11-02 | End: 2020-11-02 | Stop reason: ALTCHOICE

## 2020-11-02 RX ORDER — B-COMPLEX WITH VITAMIN C
1 TABLET ORAL DAILY
Status: DISCONTINUED | OUTPATIENT
Start: 2020-11-02 | End: 2020-11-03 | Stop reason: HOSPADM

## 2020-11-02 RX ORDER — ACETAMINOPHEN 500 MG
1000 TABLET ORAL ONCE
Status: COMPLETED | OUTPATIENT
Start: 2020-11-02 | End: 2020-11-02

## 2020-11-02 RX ORDER — LIDOCAINE HYDROCHLORIDE 20 MG/ML
INJECTION, SOLUTION INTRAVENOUS PRN
Status: DISCONTINUED | OUTPATIENT
Start: 2020-11-02 | End: 2020-11-02 | Stop reason: SDUPTHER

## 2020-11-02 RX ORDER — PROPOFOL 10 MG/ML
INJECTION, EMULSION INTRAVENOUS PRN
Status: DISCONTINUED | OUTPATIENT
Start: 2020-11-02 | End: 2020-11-02 | Stop reason: SDUPTHER

## 2020-11-02 RX ORDER — SODIUM CHLORIDE 0.9 % (FLUSH) 0.9 %
10 SYRINGE (ML) INJECTION EVERY 12 HOURS SCHEDULED
Status: DISCONTINUED | OUTPATIENT
Start: 2020-11-02 | End: 2020-11-03 | Stop reason: HOSPADM

## 2020-11-02 RX ADMIN — ASPIRIN 325 MG: 325 TABLET, COATED ORAL at 20:04

## 2020-11-02 RX ADMIN — HYDROMORPHONE HYDROCHLORIDE 1 MG: 2 INJECTION, SOLUTION INTRAMUSCULAR; INTRAVENOUS; SUBCUTANEOUS at 08:35

## 2020-11-02 RX ADMIN — TRANEXAMIC ACID 1.18 G: 1 INJECTION, SOLUTION INTRAVENOUS at 07:48

## 2020-11-02 RX ADMIN — SODIUM CHLORIDE, SODIUM LACTATE, POTASSIUM CHLORIDE, AND CALCIUM CHLORIDE: 600; 310; 30; 20 INJECTION, SOLUTION INTRAVENOUS at 09:00

## 2020-11-02 RX ADMIN — Medication 10 ML: at 20:05

## 2020-11-02 RX ADMIN — ROPIVACAINE HYDROCHLORIDE 30 ML: 5 INJECTION, SOLUTION EPIDURAL; INFILTRATION; PERINEURAL at 07:19

## 2020-11-02 RX ADMIN — FENTANYL CITRATE 100 MCG: 50 INJECTION INTRAMUSCULAR; INTRAVENOUS at 07:20

## 2020-11-02 RX ADMIN — CELECOXIB 400 MG: 200 CAPSULE ORAL at 06:37

## 2020-11-02 RX ADMIN — SODIUM CHLORIDE, SODIUM LACTATE, POTASSIUM CHLORIDE, AND CALCIUM CHLORIDE: 600; 310; 30; 20 INJECTION, SOLUTION INTRAVENOUS at 06:45

## 2020-11-02 RX ADMIN — HYDROMORPHONE HYDROCHLORIDE 0.5 MG: 2 INJECTION, SOLUTION INTRAMUSCULAR; INTRAVENOUS; SUBCUTANEOUS at 09:21

## 2020-11-02 RX ADMIN — ROCURONIUM BROMIDE 50 MG: 10 INJECTION INTRAVENOUS at 07:36

## 2020-11-02 RX ADMIN — DOCUSATE SODIUM 50 MG AND SENNOSIDES 8.6 MG 1 TABLET: 8.6; 5 TABLET, FILM COATED ORAL at 20:04

## 2020-11-02 RX ADMIN — ACETAMINOPHEN 650 MG: 325 TABLET ORAL at 20:03

## 2020-11-02 RX ADMIN — DULOXETINE HYDROCHLORIDE 60 MG: 60 CAPSULE, DELAYED RELEASE ORAL at 20:04

## 2020-11-02 RX ADMIN — VANCOMYCIN HYDROCHLORIDE 1000 MG: 10 INJECTION, POWDER, LYOPHILIZED, FOR SOLUTION INTRAVENOUS at 18:37

## 2020-11-02 RX ADMIN — ACETAMINOPHEN 650 MG: 325 TABLET ORAL at 15:02

## 2020-11-02 RX ADMIN — CALCIUM CARBONATE-VITAMIN D TAB 500 MG-200 UNIT 1 TABLET: 500-200 TAB at 15:03

## 2020-11-02 RX ADMIN — VANCOMYCIN HYDROCHLORIDE 1250 MG: 10 INJECTION, POWDER, LYOPHILIZED, FOR SOLUTION INTRAVENOUS at 06:57

## 2020-11-02 RX ADMIN — OXYCODONE HYDROCHLORIDE 10 MG: 5 TABLET ORAL at 20:06

## 2020-11-02 RX ADMIN — PREDNISONE 10 MG: 10 TABLET ORAL at 15:02

## 2020-11-02 RX ADMIN — MIDAZOLAM HYDROCHLORIDE 2 MG: 2 INJECTION, SOLUTION INTRAMUSCULAR; INTRAVENOUS at 07:20

## 2020-11-02 RX ADMIN — Medication 0.2 MG: at 08:03

## 2020-11-02 RX ADMIN — ATORVASTATIN CALCIUM 10 MG: 10 TABLET, FILM COATED ORAL at 15:02

## 2020-11-02 RX ADMIN — HYDROCHLOROTHIAZIDE 12.5 MG: 25 TABLET ORAL at 15:02

## 2020-11-02 RX ADMIN — DEXAMETHASONE SODIUM PHOSPHATE 10 MG: 4 INJECTION, SOLUTION INTRAMUSCULAR; INTRAVENOUS at 07:25

## 2020-11-02 RX ADMIN — GABAPENTIN 300 MG: 300 CAPSULE ORAL at 20:04

## 2020-11-02 RX ADMIN — OXYCODONE HYDROCHLORIDE 20 MG: 10 TABLET, FILM COATED, EXTENDED RELEASE ORAL at 06:37

## 2020-11-02 RX ADMIN — HYDROMORPHONE HYDROCHLORIDE 0.5 MG: 2 INJECTION, SOLUTION INTRAMUSCULAR; INTRAVENOUS; SUBCUTANEOUS at 09:39

## 2020-11-02 RX ADMIN — ASPIRIN 325 MG: 325 TABLET, COATED ORAL at 15:02

## 2020-11-02 RX ADMIN — GABAPENTIN 300 MG: 300 CAPSULE ORAL at 15:02

## 2020-11-02 RX ADMIN — CEFAZOLIN SODIUM 2 G: 2 SOLUTION INTRAVENOUS at 07:42

## 2020-11-02 RX ADMIN — ONDANSETRON 4 MG: 2 INJECTION INTRAMUSCULAR; INTRAVENOUS at 08:40

## 2020-11-02 RX ADMIN — PREGABALIN 150 MG: 150 CAPSULE ORAL at 06:37

## 2020-11-02 RX ADMIN — Medication 5 MG: at 10:43

## 2020-11-02 RX ADMIN — VITAMIN A, VITAMIN C, VITAMIN D-3, VITAMIN E, VITAMIN B-1, VITAMIN B-2, NIACIN, VITAMIN B-6, CALCIUM, IRON, ZINC, COPPER 1 TABLET: 4000; 120; 400; 22; 1.84; 3; 20; 10; 1; 12; 200; 27; 25; 2 TABLET ORAL at 18:36

## 2020-11-02 RX ADMIN — Medication 0.6 MG: at 10:43

## 2020-11-02 RX ADMIN — SODIUM CHLORIDE: 450 INJECTION, SOLUTION INTRAVENOUS at 15:00

## 2020-11-02 RX ADMIN — PROPOFOL 200 MG: 10 INJECTION, EMULSION INTRAVENOUS at 07:36

## 2020-11-02 RX ADMIN — ACETAMINOPHEN 1000 MG: 500 TABLET ORAL at 06:38

## 2020-11-02 RX ADMIN — LIDOCAINE HYDROCHLORIDE 100 MG: 20 INJECTION, SOLUTION INTRAVENOUS at 07:36

## 2020-11-02 ASSESSMENT — PULMONARY FUNCTION TESTS
PIF_VALUE: 23
PIF_VALUE: 22
PIF_VALUE: 23
PIF_VALUE: 19
PIF_VALUE: 23
PIF_VALUE: 24
PIF_VALUE: 22
PIF_VALUE: 19
PIF_VALUE: 25
PIF_VALUE: 24
PIF_VALUE: 18
PIF_VALUE: 23
PIF_VALUE: 2
PIF_VALUE: 19
PIF_VALUE: 1
PIF_VALUE: 22
PIF_VALUE: 0
PIF_VALUE: 1
PIF_VALUE: 23
PIF_VALUE: 19
PIF_VALUE: 22
PIF_VALUE: 23
PIF_VALUE: 24
PIF_VALUE: 23
PIF_VALUE: 19
PIF_VALUE: 23
PIF_VALUE: 23
PIF_VALUE: 24
PIF_VALUE: 23
PIF_VALUE: 23
PIF_VALUE: 3
PIF_VALUE: 22
PIF_VALUE: 23
PIF_VALUE: 22
PIF_VALUE: 23
PIF_VALUE: 22
PIF_VALUE: 23
PIF_VALUE: 22
PIF_VALUE: 23
PIF_VALUE: 22
PIF_VALUE: 1
PIF_VALUE: 19
PIF_VALUE: 23
PIF_VALUE: 6
PIF_VALUE: 23
PIF_VALUE: 19
PIF_VALUE: 22
PIF_VALUE: 22
PIF_VALUE: 19
PIF_VALUE: 19
PIF_VALUE: 23
PIF_VALUE: 22
PIF_VALUE: 19
PIF_VALUE: 23
PIF_VALUE: 22
PIF_VALUE: 23
PIF_VALUE: 23
PIF_VALUE: 22
PIF_VALUE: 23
PIF_VALUE: 1
PIF_VALUE: 1
PIF_VALUE: 23
PIF_VALUE: 22
PIF_VALUE: 5
PIF_VALUE: 23
PIF_VALUE: 23
PIF_VALUE: 22
PIF_VALUE: 23
PIF_VALUE: 5
PIF_VALUE: 22
PIF_VALUE: 19
PIF_VALUE: 22
PIF_VALUE: 19
PIF_VALUE: 26
PIF_VALUE: 23
PIF_VALUE: 23
PIF_VALUE: 25
PIF_VALUE: 22
PIF_VALUE: 1
PIF_VALUE: 22
PIF_VALUE: 22
PIF_VALUE: 19
PIF_VALUE: 23
PIF_VALUE: 23
PIF_VALUE: 19
PIF_VALUE: 23
PIF_VALUE: 19
PIF_VALUE: 3
PIF_VALUE: 19
PIF_VALUE: 19
PIF_VALUE: 23
PIF_VALUE: 23
PIF_VALUE: 15
PIF_VALUE: 0
PIF_VALUE: 22
PIF_VALUE: 23
PIF_VALUE: 23
PIF_VALUE: 22
PIF_VALUE: 15
PIF_VALUE: 1
PIF_VALUE: 2
PIF_VALUE: 23
PIF_VALUE: 23
PIF_VALUE: 3
PIF_VALUE: 23
PIF_VALUE: 29
PIF_VALUE: 23
PIF_VALUE: 5
PIF_VALUE: 22
PIF_VALUE: 24
PIF_VALUE: 1
PIF_VALUE: 23
PIF_VALUE: 23
PIF_VALUE: 24
PIF_VALUE: 23
PIF_VALUE: 24
PIF_VALUE: 23
PIF_VALUE: 23
PIF_VALUE: 22
PIF_VALUE: 22
PIF_VALUE: 23
PIF_VALUE: 22
PIF_VALUE: 22
PIF_VALUE: 19
PIF_VALUE: 23
PIF_VALUE: 23
PIF_VALUE: 22
PIF_VALUE: 23
PIF_VALUE: 23
PIF_VALUE: 2
PIF_VALUE: 23
PIF_VALUE: 19
PIF_VALUE: 22
PIF_VALUE: 1
PIF_VALUE: 22
PIF_VALUE: 15
PIF_VALUE: 22
PIF_VALUE: 22
PIF_VALUE: 19
PIF_VALUE: 24
PIF_VALUE: 23
PIF_VALUE: 21
PIF_VALUE: 15
PIF_VALUE: 23
PIF_VALUE: 22
PIF_VALUE: 23
PIF_VALUE: 23
PIF_VALUE: 26
PIF_VALUE: 1
PIF_VALUE: 23
PIF_VALUE: 15
PIF_VALUE: 22
PIF_VALUE: 22
PIF_VALUE: 1
PIF_VALUE: 1
PIF_VALUE: 22
PIF_VALUE: 24
PIF_VALUE: 22
PIF_VALUE: 19
PIF_VALUE: 23
PIF_VALUE: 22
PIF_VALUE: 23
PIF_VALUE: 22
PIF_VALUE: 23
PIF_VALUE: 2
PIF_VALUE: 22
PIF_VALUE: 23
PIF_VALUE: 23
PIF_VALUE: 24
PIF_VALUE: 23
PIF_VALUE: 22
PIF_VALUE: 23
PIF_VALUE: 19
PIF_VALUE: 24
PIF_VALUE: 23
PIF_VALUE: 1
PIF_VALUE: 3
PIF_VALUE: 23
PIF_VALUE: 22
PIF_VALUE: 23
PIF_VALUE: 19
PIF_VALUE: 23
PIF_VALUE: 22
PIF_VALUE: 23
PIF_VALUE: 1
PIF_VALUE: 23
PIF_VALUE: 3
PIF_VALUE: 23
PIF_VALUE: 24
PIF_VALUE: 23

## 2020-11-02 ASSESSMENT — PAIN - FUNCTIONAL ASSESSMENT: PAIN_FUNCTIONAL_ASSESSMENT: 0-10

## 2020-11-02 ASSESSMENT — PAIN SCALES - GENERAL
PAINLEVEL_OUTOF10: 0
PAINLEVEL_OUTOF10: 7

## 2020-11-02 ASSESSMENT — PAIN DESCRIPTION - DESCRIPTORS: DESCRIPTORS: ACHING

## 2020-11-02 NOTE — PROGRESS NOTES
PACU Transfer Note    Pt meets criteria as per Speedy Score and ASPAN Standards to transfer to next phase of care.      Vitals:    11/02/20 1345   BP: 109/76   Pulse: 76   Resp: 12   Temp: 98 °F (36.7 °C)   SpO2: 99%     BP within   20% of pt's admitting BP as per SPEEDY SCORE    SpO2: 99 %    O2 Flow Rate (L/min): 2 L/min      Intake/Output Summary (Last 24 hours) at 11/2/2020 1354  Last data filed at 11/2/2020 1353  Gross per 24 hour   Intake 1867 ml   Output 500 ml   Net 1367 ml           11/2/2020 1:54 PM

## 2020-11-02 NOTE — PLAN OF CARE
Problem: Falls - Risk of:  Goal: Will remain free from falls  Description: Will remain free from falls  Outcome: Ongoing  Note: Patient will remain free from falls throughout shift. Up x1 with gait belt and walker. Fall precautions in place. Non-skid socks on. Bed locked in lowest position with alarm on and 2/4 side rails up. Bedside table, belongings, and call light placed within reach. Patient calling out appropriately when needing assistance. Hourly rounding in anticipation of patient needs. Floor clean and free from clutter. Room door open. Will continue to monitor. Problem: Pain:  Goal: Pain level will decrease  Description: Pain level will decrease  Outcome: Ongoing  Note: Patient denies having any pain at this time. Using numerical pain rating scale appropriately. Educated on available pain medication, side effects, and verbalized understanding. Knee immobilizer remains in place. Patient repositioned in bed for comfort. Will continue to monitor and reassess.

## 2020-11-02 NOTE — PROGRESS NOTES
0725 pt tolerated right adductor canal block well, pt resting comfortably post block with O2 and monitors in place.  at bedside.

## 2020-11-02 NOTE — ANESTHESIA PROCEDURE NOTES
Peripheral Block    Patient location during procedure: pre-op  Start time: 11/2/2020 7:16 AM  End time: 11/2/2020 7:22 AM  Staffing  Anesthesiologist: Favian Anthony MD  Performed: anesthesiologist   Preanesthetic Checklist  Completed: patient identified, site marked, surgical consent, pre-op evaluation, timeout performed, IV checked, risks and benefits discussed, monitors and equipment checked, anesthesia consent given, oxygen available and patient being monitored  Peripheral Block  Patient position: supine  Prep: ChloraPrep  Patient monitoring: cardiac monitor, continuous pulse ox, frequent blood pressure checks and IV access  Block type: Femoral  Laterality: right  Injection technique: single-shot  Procedures: ultrasound guided  Adductor canal  Provider prep: mask and sterile gloves  Needle  Needle type: short-bevel   Needle gauge: 22 G  Needle length: 8 cm  Needle localization: ultrasound guidance  Assessment  Injection assessment: negative aspiration for heme, no paresthesia on injection and local visualized surrounding nerve on ultrasound  Paresthesia pain: none  Slow fractionated injection: yes  Hemodynamics: stable  Additional Notes  Sartorius and Vastus Medialis Muscle, Femoral artery and Saphenous nerve are identified; the tip of the needle and the spread of the local anesthetic around the Saphenous nerve are visualized. The Saphenous nerve appeared to be anatomically normal and there were no abnormal pathologically findings seen. Right Adductor Canal Block Note    Indication: Postoperative analgesia upon request of the attending surgeon. Procedure: Informed consent obtained and pre-procedural timeout procedure performed. Patient supine. Right thigh externally rotated. Sterile prep.   A 22g 80mm insulated regional block needle was inserted at the level of the mid-thigh and directed under ultrasound visualization into the adductor canal, with the needle tip visualized just lateral to the femoral artery. Ropivacaine 0.5% was injected under ultrasound visualization with good spread noted around the femoral artery. 30cc total volume injected. Negative aspiration for heme prior to injection and at several points during injection. Procedure tolerated well. No apparent complications. Medications Administered  Ropivacaine (NAROPIN) injection 0.5%, 30 mL  Reason for block: post-op pain management            Carola Tracy.  Veronica Kitchen MD  November 2, 2020 8:46 AM

## 2020-11-02 NOTE — PROGRESS NOTES
4 Eyes Admission Assessment     I agree as the admission nurse that 2 RN's have performed a thorough Head to Toe Skin Assessment on the patient. ALL assessment sites listed below have been assessed on admission. Areas assessed by both nurses: yes  [x]   Head, Face, and Ears   [x]   Shoulders, Back, and Chest  [x]   Arms, Elbows, and Hands   [x]   Coccyx, Sacrum, and Ischium  [x]   Legs, Feet, and Heels        Does the Patient have Skin Breakdown?   No         Nicola Prevention initiated:  No   Wound Care Orders initiated:  No      [employer] nurse consulted for Pressure Injury (Stage 3,4, Unstageable, DTI, NWPT, and Complex wounds) or Nicola score 18 or lower:  No      Nurse 1 eSignature: Electronically signed by Amauri Solorio RN on 11/2/20 at 3:05 PM EST    **SHARE this note so that the co-signing nurse is able to place an eSignature**    Nurse 2 eSignature: {Esignature:[id number]}

## 2020-11-02 NOTE — ANESTHESIA PRE PROCEDURE
Department of Anesthesiology  Preprocedure Note       Name:  Tony Curry   Age:  62 y.o.  :  1960                                          MRN:  [id number]         Date:  2020      Surgeon: Gregor Puri):  Indiana Kennedy MD    Procedure: Procedure(s):  RIGHT REVISION KNEE ARTHROPLASTY    Medications prior to admission:   Prior to Admission medications    Medication Sig Start Date End Date Taking? Authorizing Provider   albuterol sulfate  (90 Base) MCG/ACT inhaler Inhale 2 puffs into the lungs every 4 hours as needed 10/22/20  Yes Historical Provider, MD   oxyCODONE-acetaminophen (PERCOCET) 5-325 MG per tablet Take 1 tablet by mouth every 6 hours as needed for Pain for up to 7 days. Intended supply: 7 days. Take lowest dose possible to manage pain 10/27/20 11/3/20 Yes TOMASZ Cabello   DULoxetine (CYMBALTA) 60 MG extended release capsule TAKE ONE CAPSULE BY MOUTH TWICE A DAY 10/15/20  Yes TOMASZ Alejo   gabapentin (NEURONTIN) 300 MG capsule Take 1 capsule by mouth 3 times daily for 30 days.  6/2/20 10/29/20 Yes TOMASZ Cabello   sennosides-docusate sodium (SENOKOT-S) 8.6-50 MG tablet Take 1 tablet by mouth 2 times daily  Patient taking differently: Take 1 tablet by mouth 2 times daily as needed  20  Yes MARLENY Cervantes - CNP   atorvastatin (LIPITOR) 10 MG tablet Take 10 mg by mouth daily   Yes Historical Provider, MD   fluticasone (FLONASE) 50 MCG/ACT nasal spray 1 spray by Nasal route nightly as needed    Yes Historical Provider, MD   hydrochlorothiazide (HYDRODIURIL) 25 MG tablet 12.5 mg daily  5/10/12  Yes Historical Provider, MD   aspirin 81 MG tablet Take 81 mg by mouth daily    Historical Provider, MD   Naloxone HCl (EVZIO) 0.4 MG/0.4ML SOAJ Use as directed 1/10/19   MARLENY Stout - CNP   Prenatal Vit-Fe Fumarate-FA (PRENATAL COMPLETE PO) Take by mouth daily    Historical Provider, MD   calcium-vitamin D (OSCAL-500) 500-200 MG-UNIT per tablet Take 1 tablet by mouth daily    Historical Provider, MD       Current medications:    Current Facility-Administered Medications   Medication Dose Route Frequency Provider Last Rate Last Dose    ceFAZolin (ANCEF) 2 g in dextrose 3 % 50 mL IVPB (duplex)  2 g Intravenous Once Indiana Kennedy MD        vancomycin (VANCOCIN) 1,250 mg in dextrose 5 % 250 mL IVPB  15 mg/kg Intravenous Once Indiana Kennedy .7 mL/hr at 11/02/20 0657 1,250 mg at 11/02/20 0922    ortho mix (with morphine) injection   Injection On Call Indiana Kennedy MD        Dexamethasone Sodium Phosphate injection 10 mg  10 mg Intravenous Once Indiana Kennedy MD        tranexamic acid (CYKLOKAPRON) 1,178 mg in sodium chloride 0.9 % 50 mL IVPB  15 mg/kg Intravenous Once Indiana Kennedy MD        lactated ringers infusion   Intravenous Continuous Brannon Salas DO        HYDROmorphone (DILAUDID) injection 0.25 mg  0.25 mg Intravenous Q5 Min PRN Favian Anthony MD        HYDROmorphone (DILAUDID) injection 0.5 mg  0.5 mg Intravenous Q5 Min PRN Favian Anthony MD        morphine injection 1 mg  1 mg Intravenous Q5 Min PRN Favian Anthony MD        HYDROmorphone (DILAUDID) injection 0.5 mg  0.5 mg Intravenous Q5 Min PRN Favian Anthony MD        oxyCODONE (ROXICODONE) immediate release tablet 5 mg  5 mg Oral PRN Favian Anthony MD        Or    oxyCODONE (ROXICODONE) immediate release tablet 10 mg  10 mg Oral PRN Favian Anthony MD        diphenhydrAMINE (BENADRYL) injection 12.5 mg  12.5 mg Intravenous Once PRN Favian Anthony MD        metoclopramide (REGLAN) injection 10 mg  10 mg Intravenous Once PRN Favian Anthony MD        promethazine (PHENERGAN) injection 6.25 mg  6.25 mg Intravenous Once PRN Favian Anthony MD        labetalol (NORMODYNE;TRANDATE) injection syringe 5 mg  5 mg Intravenous Q10 Min PRN Favian Anthony MD        hydrALAZINE (APRESOLINE) injection 5 mg  5 mg Intravenous Q10 Min PRN Shante Smith Naina Glass MD        meperidine (DEMEROL) injection 12.5 mg  12.5 mg Intravenous Q5 Min PRN Lovie Lanes, MD           Allergies:  No Known Allergies    Problem List:    Patient Active Problem List   Diagnosis Code    Bursitis, hip M70.70    Left knee pain M25.562    S/P left knee arthroscopy Z98.890    Chronic pain syndrome G89.4    Bulging lumbar disc M51.26    Primary osteoarthritis of both knees M17.0    History of total right knee replacement, +revision x3 Z96.651    Chronic bilateral low back pain M54.5, G89.29    Chronic fatigue R53.82    Fatigue R53.83    Achilles tendinitis of left lower extremity M76.62    Lumbar arthropathy M47.816    History of ankle surgery 1/11/2019 Z98.890    Left Achilles tendinitis M76.62    Delayed surgical wound healing T81.89XA    Primary osteoarthritis of left knee M17.12    Osteoarthritis of left knee M17.12    DVT (deep venous thrombosis) ([facility]) I82.409    High blood pressure I10    Status post left partial knee replacement Z96.652    Chronic knee pain after total replacement of right knee joint M25.561, G89.29, Z96.651       Past Medical History:        Diagnosis Date    Arthritis     Bulging lumbar disc     Chronic back pain since early 2000's    DDD (degenerative disc disease), cervical     DDD (degenerative disc disease), lumbar     DVT (deep venous thrombosis) ([address] 2004    after right knee arthroscopy    DVT (deep venous thrombosis) ([address] 1/1/2004    after right knee arthroscopy    High blood pressure     Hx of blood clots     right leg     Hyperlipidemia        Past Surgical History:        Procedure Laterality Date    ACHILLES TENDON SURGERY Left 1/11/2019    LEFT ACHILLES DEBRIDEMENT, TOPAZ PROCEDURE performed by Herman Beatty MD at [address] [facility]    hammer toe    HYSTERECTOMY  2004    JOINT REPLACEMENT      JOINT REPLACEMENT Left 2/3/2020 LEFT MEDIAL PARTAL KNEE ARTHROPLASTY DUGLAS performed by Kvng Lara MD at [address] [facility]  2008    right    KNEE SURGERY  8/2008    RT TKA    [place] ARTHRS KNE SURG W/MENISCECTOMY MED/LAT W/SHVG Left 8/10/2018    LEFT ILIAC CREST BONE MARROW HARVEST,  LEFT KNEE ARTHROSCOPY; SUBCHONDROPLASTY, ULTRASOUND GUIDED BONE MARROW CONCENTRATE INJECTION SUBCHONDRAL LEFT KNEE, PPP INJECTION INTRA-ARTICULAR LEFT KNEE, PPP INJECTION INTRA-ARTICULAR  LEFT KNEE, PPP INJECTION LEFT SUBTALAR JOINT performed by Kvng Lara MD at [address] [facility]  2011  (X3 TOTAL)    right       Social History:    Social History     Tobacco Use    Smoking status: Current Every Day Smoker     Packs/day: 0.50     Years: 20.00     Pack years: 10.00     Types: Cigarettes    Smokeless tobacco: Never Used   Substance Use Topics    Alcohol use: Yes     Comment: RARELY                                Ready to quit: Not Answered  Counseling given: Not Answered      Vital Signs (Current):   Vitals:    10/29/20 0800 11/02/20 0635   BP:  123/80   Pulse:  88   Resp:  14   Temp:  98 °F (36.7 °C)   TempSrc:  Oral   SpO2:  96%   Weight: 173 lb (78.5 kg) 173 lb (78.5 kg)   Height: 5' 4\" (1.626 m) 5' 4\" (1.626 m)                                              BP Readings from Last 3 Encounters:   11/02/20 123/80   09/15/20 116/74   06/02/20 116/66       NPO Status: Time of last liquid consumption: 2350                        Time of last solid consumption: 2350                        Date of last liquid consumption: 11/01/20                        Date of last solid food consumption: 11/01/20    BMI:   Wt Readings from Last 3 Encounters:   11/02/20 173 lb (78.5 kg)   09/15/20 171 lb (77.6 kg)   06/02/20 171 lb 15.3 oz (78 kg)     Body mass index is 29.7 kg/m².     CBC:   Lab Results   Component Value Date    WBC 14.0 02/04/2020    RBC 4.12 02/04/2020    HGB 12.3 02/04/2020    HCT 38.2 02/04/2020    MCV 92.7 02/04/2020    RDW 13.0 02/04/2020     02/04/2020       CMP:   Lab Results   Component Value Date     02/04/2020    K 4.7 02/04/2020    CL 99 02/04/2020    CO2 25 02/04/2020    BUN 15 02/04/2020    CREATININE 0.8 02/04/2020    GFRAA >60 02/04/2020    GFRAA >60 04/20/2011    AGRATIO 1.8 11/05/2018    LABGLOM >60 02/04/2020    GLUCOSE 159 02/04/2020    PROT 6.8 11/05/2018    CALCIUM 9.5 02/04/2020    BILITOT 0.3 11/05/2018    ALKPHOS 96 11/05/2018    AST 11 11/05/2018    ALT 9 11/05/2018       POC Tests: No results for input(s): POCGLU, POCNA, POCK, POCCL, POCBUN, POCHEMO, POCHCT in the last 72 hours. Coags:   Lab Results   Component Value Date    PROTIME 12.6 02/04/2020    INR 1.09 02/04/2020    APTT 29.7 01/30/2020       HCG (If Applicable): No results found for: PREGTESTUR, PREGSERUM, HCG, HCGQUANT     ABGs: No results found for: PHART, PO2ART, AOW1MIU, XRT4FKQ, BEART, K7HYNCZP     Type & Screen (If Applicable):  Lab Results   Component Value Date    LABABO B 04/11/2011    [address] Positive 04/11/2011       Drug/Infectious Status (If Applicable):  No results found for: HIV, HEPCAB    COVID-19 Screening (If Applicable):   Lab Results   Component Value Date    COVID19 Not Detected 10/27/2020         Anesthesia Evaluation  Patient summary reviewed and Nursing notes reviewed no history of anesthetic complications:   Airway: Mallampati: II  TM distance: >3 FB   Neck ROM: full  Mouth opening: > = 3 FB Dental:          Pulmonary:Negative Pulmonary ROS                              Cardiovascular:    (+) hypertension:,                   Neuro/Psych:   Negative Neuro/Psych ROS              GI/Hepatic/Renal: Neg GI/Hepatic/Renal ROS            Endo/Other: Negative Endo/Other ROS                    Abdominal:           Vascular: negative vascular ROS. Anesthesia Plan      general     ASA 1    (76-year-old female presents for RIGHT REVISION KNEE ARTHROPLASTY.   Plan general anesthesia with ASA standard monitors; adductor canal block for postoperative pain control. Questions answered. Patient agreeable with anesthetic plan.  )  Induction: intravenous. Anesthetic plan and risks discussed with patient. Plan discussed with CRNA.     Attending anesthesiologist reviewed and agrees with Pre Eval content        Michaela Davalos MD   11/2/2020

## 2020-11-02 NOTE — PROGRESS NOTES
Physical Therapy/Occupational Therapy    Hold note    Referral received. Per chart, pt given nerve block and RN reports she cannot feel her knee or thigh. Will hold PT and OT this pm and f/u 11/3/20.     Monse Shultz, PT  Benjamin Jiménez OTR/L  0180

## 2020-11-02 NOTE — PROGRESS NOTES
Patient admitted to 55 s/p a right knee revision. Vital signs stable. A/O x4. Oriented to room and surroundings. Patient received a block to the RLE and endorsing full sensation to toes but decreased sensation to knee and thigh. Patient able to wiggle R toes and +2 R pedal pulse. Ace wrap and bishop drain in place to RLE with knee immobilizer on. Denies having any pain. Tolerating crackers and water. Denies n/v. Ambulated to bathroom x1 assist with walker and gait belt. Patient voided clear, yellow urine. Fall precautions in place. Education provided regarding bed alarm and call light. Bed alarm engaged and call light placed within reach. Patient resting in bed with no further needs. Will continue to monitor.

## 2020-11-02 NOTE — ANESTHESIA POSTPROCEDURE EVALUATION
Department of Anesthesiology  Postprocedure Note    Patient: Dony Acevedo  MRN: [id number]  [place]: 1960  Date of evaluation: 11/2/2020  Time:  1:04 PM     Procedure Summary     Date:  11/02/20 Room / Location:  [facility] [address]    Anesthesia Start:  0730 Anesthesia Stop:  0287    Procedure:  RIGHT REVISION KNEE ARTHROPLASTY (Right Knee) Diagnosis:       Failed total knee arthroplasty, initial encounter [address]      (Failed total knee arthroplasty, initial encounter ([facility] [[address])    Surgeon:  Juliane Smalls MD Responsible Provider:  Alex Ojeda MD    Anesthesia Type:  general ASA Status:  3          Anesthesia Type: general    Lyndsay Phase I: Lyndsay Score: 7    Lyndsay Phase II:      Last vitals: Reviewed and per EMR flowsheets.        Anesthesia Post Evaluation    Patient location during evaluation: PACU  Patient participation: complete - patient participated  Level of consciousness: awake and alert  Pain score: 0  Airway patency: patent  Nausea & Vomiting: no nausea and no vomiting  Complications: no  Cardiovascular status: hemodynamically stable  Respiratory status: acceptable  Hydration status: euvolemic

## 2020-11-02 NOTE — H&P
Jaime Benjamin    [id number]    [facility] Same Day Surgery Update H & P  Department of General Surgery   Surgical Service   Pre-operative History and Physical  Last H & P within the last 30 days. DIAGNOSIS:   Failed total knee arthroplasty, initial encounter ([facility] [T84.018A, Z96.659]    Procedure(s):  RIGHT REVISION KNEE ARTHROPLASTY     HISTORY OF PRESENT ILLNESS:   Patient with right knee pain, swelling and instability in the setting of previous TKA. Covid 19:  Patient denies fever, chills, cough or known exposure to Covid-19.        Past Medical History:        Diagnosis Date    Arthritis     Bulging lumbar disc     Chronic back pain since early 2000's    DDD (degenerative disc disease), cervical     DDD (degenerative disc disease), lumbar     DVT (deep venous thrombosis) ([facility] 2004    after right knee arthroscopy    DVT (deep venous thrombosis) ([facility] 1/1/2004    after right knee arthroscopy    High blood pressure     Hx of blood clots     right leg     Hyperlipidemia      Past Surgical History:        Procedure Laterality Date    ACHILLES TENDON SURGERY Left 1/11/2019    LEFT ACHILLES DEBRIDEMENT, TOPAZ PROCEDURE performed by Hailey Moe MD at [address] [facility]    hammer toe    HYSTERECTOMY  2004    JOINT REPLACEMENT      JOINT REPLACEMENT Left 2/3/2020    LEFT MEDIAL PARTAL KNEE ARTHROPLASTY DUGLAS performed by Hailey Moe MD at [address] [facility]  2008    right    KNEE SURGERY  8/2008    RT TKA    [place] ARTHRS KNE SURG W/MENISCECTOMY MED/LAT W/SHVG Left 8/10/2018    LEFT ILIAC CREST BONE MARROW HARVEST,  LEFT KNEE ARTHROSCOPY; SUBCHONDROPLASTY, ULTRASOUND GUIDED BONE MARROW CONCENTRATE INJECTION SUBCHONDRAL LEFT KNEE, PPP INJECTION INTRA-ARTICULAR LEFT KNEE, PPP INJECTION INTRA-ARTICULAR  LEFT KNEE, PPP INJECTION LEFT SUBTALAR JOINT performed by Hailey Moe MD at [address] [facility] REVISION TOTAL KNEE ARTHROPLASTY  2011  (X3 TOTAL)    right     Past Social History:  Social History     Socioeconomic History    Marital status:      Spouse name: None    Number of children: None    Years of education: None    Highest education level: None   Occupational History    None   Social Needs    Financial resource strain: None    Food insecurity     Worry: None     Inability: None    Transportation needs     Medical: None     Non-medical: None   Tobacco Use    Smoking status: Current Every Day Smoker     Packs/day: 0.50     Years: 20.00     Pack years: 10.00     Types: Cigarettes    Smokeless tobacco: Never Used   Substance and Sexual Activity    Alcohol use: Yes     Comment: RARELY    Drug use: No    Sexual activity: None   Lifestyle    Physical activity     Days per week: None     Minutes per session: None    Stress: None   Relationships    Social connections     Talks on phone: None     Gets together: None     Attends Mu-ism service: None     Active member of club or organization: None     Attends meetings of clubs or organizations: None     Relationship status: None    Intimate partner violence     Fear of current or ex partner: None     Emotionally abused: None     Physically abused: None     Forced sexual activity: None   Other Topics Concern    None   Social History Narrative    None         Medications Prior to Admission:      Prior to Admission medications    Medication Sig Start Date End Date Taking? Authorizing Provider   albuterol sulfate  (90 Base) MCG/ACT inhaler Inhale 2 puffs into the lungs every 4 hours as needed 10/22/20  Yes Historical Provider, MD   oxyCODONE-acetaminophen (PERCOCET) 5-325 MG per tablet Take 1 tablet by mouth every 6 hours as needed for Pain for up to 7 days. Intended supply: 7 days.  Take lowest dose possible to manage pain 10/27/20 11/3/20 Yes TOMASZ Greenfield   DULoxetine (CYMBALTA) 60 MG extended release capsule TAKE ONE CAPSULE BY MOUTH TWICE A DAY 10/15/20  Yes Losolivia Teran TOMASZ Donovan   gabapentin (NEURONTIN) 300 MG capsule Take 1 capsule by mouth 3 times daily for 30 days. 6/2/20 10/29/20 Yes TOMASZ Weinberg   sennosides-docusate sodium (SENOKOT-S) 8.6-50 MG tablet Take 1 tablet by mouth 2 times daily  Patient taking differently: Take 1 tablet by mouth 2 times daily as needed  2/4/20  Yes MARLENY Metcalf CNP   atorvastatin (LIPITOR) 10 MG tablet Take 10 mg by mouth daily   Yes Historical Provider, MD   fluticasone (FLONASE) 50 MCG/ACT nasal spray 1 spray by Nasal route nightly as needed    Yes Historical Provider, MD   hydrochlorothiazide (HYDRODIURIL) 25 MG tablet 12.5 mg daily  5/10/12  Yes Historical Provider, MD   aspirin 81 MG tablet Take 81 mg by mouth daily    Historical Provider, MD   Naloxone HCl (EVZIO) 0.4 MG/0.4ML SOAJ Use as directed 1/10/19   MARLENY Sumner CNP   Prenatal Vit-Fe Fumarate-FA (PRENATAL COMPLETE PO) Take by mouth daily    Historical Provider, MD   calcium-vitamin D (OSCAL-500) 500-200 MG-UNIT per tablet Take 1 tablet by mouth daily    Historical Provider, MD         Allergies:  Patient has no known allergies. PHYSICAL EXAM:      /80   Pulse 88   Temp 98 °F (36.7 °C) (Oral)   Resp 14   Ht 5' 4\" (1.626 m)   Wt 173 lb (78.5 kg)   LMP  (LMP Unknown)   SpO2 96%   BMI 29.70 kg/m²      Airway:  Airway patent with no audible stridor    Heart:  regular rate and rhythm, No murmur noted    Lungs:  No increased work of breathing, good air exchange, CTA bilaterally     Abdomen:  soft, non-distended, non-tender, no rebound tenderness or guarding, normal active bowel sounds and no masses palpated    ASSESSMENT AND PLAN     Patient is a 62 y.o. female with above specified procedure planned. 1.  Patient seen and focused exam done today- no new changes since last physical exam on 10/22/20    2. Access to ancillary services are available per request of the provider.         Janey Ramos,

## 2020-11-02 NOTE — CONSULTS
Internal Medicine  PGY 2  Consult Note     Reason for Consult:  Medical Management for Post op Rt knee revision    History Obtained From:  patient    HISTORY OF PRESENT ILLNESS:      ILLNESS:  Ms. Patria Shone is a 63 yo F with PMHx significant for HTN, DVT (after Rt knee surgery in 2004), HLD and OA who presented to [facility] for Rt revision knee arthroplasty. Internal medicine was consulted for post op medical management. She currently reports her pain is 5/10 (well controlled with pain meds). Denies numbness or tingling in RLE. She denies Nausea, vomiting (is able to tolerate pudding and water). Denies Abd pain, weakness, dizziness, blurry vision, dysuria. She reports she is a currently 1/2 Pack per day smoker but can go several days without smoking. Of note she has Hx of Rt LE DVT after Rt knee surgery in 2004.     Past Medical History:        Diagnosis Date    Arthritis     Bulging lumbar disc     Chronic back pain since early 2000's    DDD (degenerative disc disease), cervical     DDD (degenerative disc disease), lumbar     DVT (deep venous thrombosis) ([address] 2004    after right knee arthroscopy    DVT (deep venous thrombosis) ([address] 1/1/2004    after right knee arthroscopy    High blood pressure     Hx of blood clots     right leg     Hyperlipidemia        Past Surgical History:        Procedure Laterality Date    ACHILLES TENDON SURGERY Left 1/11/2019    LEFT ACHILLES DEBRIDEMENT, TOPAZ PROCEDURE performed by Indiana Kennedy MD at [facility]    hammer toe    HYSTERECTOMY  2004    JOINT REPLACEMENT      JOINT REPLACEMENT Left 2/3/2020    LEFT MEDIAL PARTAL KNEE ARTHROPLASTY DUGLAS performed by Indiana Kennedy MD at [facility]  2008    right    KNEE SURGERY  8/2008    RT TKA    [place] ARTHRS KNE SURG W/MENISCECTOMY MED/LAT W/SHVG Left 8/10/2018    LEFT ILIAC CREST BONE MARROW HARVEST,  LEFT KNEE ARTHROSCOPY; SUBCHONDROPLASTY, ULTRASOUND GUIDED BONE MARROW CONCENTRATE INJECTION SUBCHONDRAL LEFT KNEE, PPP INJECTION INTRA-ARTICULAR LEFT KNEE, PPP INJECTION INTRA-ARTICULAR  LEFT KNEE, PPP INJECTION LEFT SUBTALAR JOINT performed by Geno Humphreys MD at [facility]  2011  (X3 TOTAL)    right    REVISION TOTAL KNEE ARTHROPLASTY Right 11/2/2020    RIGHT REVISION KNEE ARTHROPLASTY performed by Geno Humphreys MD at [address] [place] [address] Admission:    Medications Prior to Admission: albuterol sulfate  (90 Base) MCG/ACT inhaler, Inhale 2 puffs into the lungs every 4 hours as needed  oxyCODONE-acetaminophen (PERCOCET) 5-325 MG per tablet, Take 1 tablet by mouth every 6 hours as needed for Pain for up to 7 days. Intended supply: 7 days. Take lowest dose possible to manage pain  DULoxetine (CYMBALTA) 60 MG extended release capsule, TAKE ONE CAPSULE BY MOUTH TWICE A DAY  gabapentin (NEURONTIN) 300 MG capsule, Take 1 capsule by mouth 3 times daily for 30 days. aspirin 81 MG tablet, Take 81 mg by mouth daily  atorvastatin (LIPITOR) 10 MG tablet, Take 10 mg by mouth daily  Prenatal Vit-Fe Fumarate-FA (PRENATAL COMPLETE PO), Take by mouth daily  calcium-vitamin D (OSCAL-500) 500-200 MG-UNIT per tablet, Take 1 tablet by mouth daily  fluticasone (FLONASE) 50 MCG/ACT nasal spray, 1 spray by Nasal route nightly as needed   hydrochlorothiazide (HYDRODIURIL) 25 MG tablet, 12.5 mg daily   sennosides-docusate sodium (SENOKOT-S) 8.6-50 MG tablet, Take 1 tablet by mouth 2 times daily (Patient taking differently: Take 1 tablet by mouth 2 times daily as needed )  Naloxone HCl (EVZIO) 0.4 MG/0.4ML SOAJ, Use as directed    Allergies:  Patient has no known allergies. Social History:   · TOBACCO:   reports that she has been smoking cigarettes. She has a 10.00 pack-year smoking history. She has never used smokeless tobacco.  · ETOH:   reports current alcohol use.   · DRUGS : no use  · Patient currently lives with family   ·   Family History:       Problem Relation Age of Onset    Diabetes Mother     Hypertension Mother     Hypertension Father     Cancer Other        Review of Systems    ROS: A 10 point review of systems was conducted, significant findings as noted in HPI. Physical Exam  Constitutional:       General: She is not in acute distress. HENT:      Head: Normocephalic and atraumatic. Nose: No congestion. Mouth/Throat:      Mouth: Mucous membranes are moist.   Eyes:      General: No scleral icterus. Extraocular Movements: Extraocular movements intact. Neck:      Musculoskeletal: Normal range of motion. Cardiovascular:      Rate and Rhythm: Normal rate and regular rhythm. Heart sounds: No murmur. Pulmonary:      Effort: Pulmonary effort is normal.      Breath sounds: No wheezing or rales. Abdominal:      Palpations: Abdomen is soft. Tenderness: There is no abdominal tenderness. Musculoskeletal:      Right lower leg: No edema. Left lower leg: No edema. Comments: ROM restrcited in RLE due to knee brace   Lymphadenopathy:      Cervical: No cervical adenopathy. Skin:     General: Skin is warm. Comments: RLE brace in place. Neurological:      General: No focal deficit present. Mental Status: She is alert and oriented to person, place, and time. Comments: Sensation intact in RLE. Is able to lift RLE against gravity without overt pain. RUE, LUE, LLE strength 5/5       Physical exam:       Vitals:    11/02/20 1424   BP: 96/62   Pulse: 81   Resp: 14   Temp: 97.4 °F (36.3 °C)   SpO2: 95%       DATA:    XR KNEE RIGHT (1-2 VIEWS)   Final Result      Frontal and lateral views demonstrate long stem/revision knee arthroplasty in standard position with postoperative changes. Old fracture deformity of the proximal tibial diaphysis is noted.               ASSESSMENT AND PLAN:    POD#0 Rt Revision Rt Knee arthoplasty.   -Pain management, Abx ppx as per ortho. -PT/OT, early ambulation.  -Bowel regimen: Senokot-s daily   -DVT Ppx: Active smoker, Pt has hx of DVT after Rt knee surgery in the past. She is high risk for recurrent DVT. Would recommend DCing Aspirin, and recommend DVT prophylaxis with NOAC/warfarin. Will contact orthopedic surgery. -F/u CBC, BMP tomorrow AM.     HTN  -Continue home hctz. Hx peripheral neuropathy  -Continue home gabapentin, cymbalta.        Will discuss with attending physician Dr. Deisy Oliva Status:Full code  Medically stable will need anticoagulation post op with hx of DVT, discussed with orthopedics  FEN: General   PPX:   DISPO: Maria Del Carmen Rainey MD  11/2/2020,  3:37 PM

## 2020-11-02 NOTE — PROGRESS NOTES
RESPIRATORY THERAPY ASSESSMENT    Name:  [address] [place] [address] Record Number:  [id number]  Age: 62 y.o. Gender: female  : 1960  Today's Date:  2020  Room:  [address] [place] [address]-    Assessment     Is the patient being admitted for a COPD or Asthma exacerbation? No   (If yes the patient will be seen every 4 hours for the first 24 hours and then reassessed)    Patient Admission Diagnosis      Allergies  No Known Allergies    Minimum Predicted Vital Capacity:     816          Actual Vital Capacity:      1L              Pulmonary History: none  Home Oxygen Therapy:  none  Home Respiratory Therapy: Albuterol Q4H PRN   Current Respiratory Therapy:  Albuterol Q4H PRN  Treatment Type: IS       Respiratory Severity Index(RSI)   Patients with orders for inhalation medications, oxygen, or any therapeutic treatment modality will be placed on Respiratory Protocol. They will be assessed with the first treatment and at least every 72 hours thereafter. The following severity scale will be used to determine frequency of treatment intervention.     Smoking History: Pulmonary Disease or Smoking History, Greater than 15 pack year = 2    Social History  Social History     Tobacco Use    Smoking status: Current Every Day Smoker     Packs/day: 0.50     Years: 20.00     Pack years: 10.00     Types: Cigarettes    Smokeless tobacco: Never Used   Substance Use Topics    Alcohol use: Yes     Comment: RARELY    Drug use: No       Recent Surgical History: Surgery of Extremities = 1  Past Surgical History  Past Surgical History:   Procedure Laterality Date    ACHILLES TENDON SURGERY Left 2019    LEFT ACHILLES DEBRIDEMENT, TOPAZ PROCEDURE performed by Karel Hernandez MD at [facility]    hammer toe    HYSTERECTOMY  2004    JOINT REPLACEMENT      JOINT REPLACEMENT Left 2/3/2020    LEFT MEDIAL PARTAL KNEE ARTHROPLASTY DUGLAS performed by Governor Sanford MD at [facility]  2008    right    KNEE SURGERY  8/2008    RT TKA    [place] ARTHRS KNE SURG W/MENISCECTOMY MED/LAT W/SHVG Left 8/10/2018    LEFT ILIAC CREST BONE MARROW HARVEST,  LEFT KNEE ARTHROSCOPY; SUBCHONDROPLASTY, ULTRASOUND GUIDED BONE MARROW CONCENTRATE INJECTION SUBCHONDRAL LEFT KNEE, PPP INJECTION INTRA-ARTICULAR LEFT KNEE, PPP INJECTION INTRA-ARTICULAR  LEFT KNEE, PPP INJECTION LEFT SUBTALAR JOINT performed by Governor Sanford MD at [address] [place] [address]  2011  (X3 TOTAL)    right    REVISION TOTAL KNEE ARTHROPLASTY Right 11/2/2020    RIGHT REVISION KNEE ARTHROPLASTY performed by Governor Sanford MD at [facility] OR       Level of Consciousness: Alert, Oriented, and Cooperative = 0    Level of Activity: Walking with assistance = 1    Respiratory Pattern: Regular Pattern; RR 8-20 = 0    Breath Sounds: Clear = 0    Sputum   ,  ,    Cough: Strong, spontaneous, non-productive = 0    Vital Signs   BP 96/62   Pulse 81   Temp 97.4 °F (36.3 °C) (Oral)   Resp 14   Ht 5' 4\" (1.626 m)   Wt 173 lb (78.5 kg)   LMP  (LMP Unknown)   SpO2 95%   BMI 29.70 kg/m²   SPO2 (COPD values may differ): Greater than or equal to 92% on room air = 0    Peak Flow (asthma only): not applicable = 0    RSI: 0-4 = See once and convert to home regimen or discontinue        Plan       Goals: medication delivery, mobilize retained secretions, volume expansion and improve oxygenation    Patient/caregiver was educated on the proper method of use for Respiratory Care Devices:  Yes      Level of patient/caregiver understanding able to:   ? Verbalize understanding   ? Demonstrate understanding       ? Teach back        ? Needs reinforcement       ? No available caregiver               ? Other:     Response to education:  Very Good     Is patient being placed on Home Treatment Regimen? Yes     Does the patient have everything they need prior to discharge?   NA     Comments: Chart reviewed    Plan of Care: Continue Albuterol Q4H PRN    Electronically signed by Teresa Adler RCP on 11/2/2020 at 4:04 PM    Respiratory Protocol Guidelines     1. Assessment and treatment by Respiratory Therapy will be initiated for medication and therapeutic interventions upon initiation of aerosolized medication. 2. Physician will be contacted for respiratory rate (RR) greater than 35 breaths per minute. Therapy will be held for heart rate (HR) greater than 140 beats per minute, pending direction from physician. 3. Bronchodilators will be administered via Metered Dose Inhaler (MDI) with spacer when the following criteria are met:  a. Alert and cooperative     b. HR < 140 bpm  c. RR < 30 bpm                d. Can demonstrate a 2-3 second inspiratory hold  4. Bronchodilators will be administered via Hand Held Nebulizer [facility]) to patients when ANY of the following criteria are met  a. Incognizant or uncooperative          b. Patients treated with HHN at Home        c. Unable to demonstrate proper use of MDI with spacer     d. RR > 30 bpm   5. Bronchodilators will be delivered via Metered Dose Inhaler (MDI), HHN, Aerogen to intubated patients on mechanical ventilation. 6. Inhalation medication orders will be delivered and/or substituted as outlined below. Aerosolized Medications Ordering and Administration Guidelines:    1. All Medications will be ordered by a physician, and their frequency and/or modality will be adjusted as defined by the patients Respiratory Severity Index (RSI) score. 2. If the patient does not have documented COPD, consider discontinuing anticholinergics when RSI is less than 9.  3. If the bronchospasm worsens (increased RSI), then the bronchodilator frequency can be increased to a maximum of every 4 hours. If greater than every 4 hours is required, the physician will be contacted.   4. If the bronchospasm improves, the frequency of the bronchodilator can be decreased, based on the patient's RSI, but not less than home treatment regimen frequency. 5. Bronchodilator(s) will be discontinued if patient has a RSI less than 9 and has received no scheduled or as needed treatment for 72  Hrs. Patients Ordered on a Mucolytic Agent:    1. Must always be administered with a bronchodilator. 2. Discontinue if patient experiences worsened bronchospasm, or secretions have lessened to the point that the patient is able to clear them with a cough. Anti-inflammatory and Combination Medications:    1. If the patient lacks prior history of lung disease, is not using inhaled anti-inflammatory medication at home, and lacks wheezing by examination or by history for at least 24 hours, contact physician for possible discontinuation.

## 2020-11-03 VITALS
RESPIRATION RATE: 18 BRPM | SYSTOLIC BLOOD PRESSURE: 96 MMHG | HEART RATE: 103 BPM | DIASTOLIC BLOOD PRESSURE: 70 MMHG | OXYGEN SATURATION: 96 % | TEMPERATURE: 98.8 F | HEIGHT: 64 IN | WEIGHT: 173 LBS | BODY MASS INDEX: 29.53 KG/M2

## 2020-11-03 LAB
ANION GAP SERPL CALCULATED.3IONS-SCNC: 9 MMOL/L (ref 3–16)
BUN BLDV-MCNC: 19 MG/DL (ref 7–20)
CALCIUM SERPL-MCNC: 9.2 MG/DL (ref 8.3–10.6)
CHLORIDE BLD-SCNC: 102 MMOL/L (ref 99–110)
CO2: 27 MMOL/L (ref 21–32)
CREAT SERPL-MCNC: 0.9 MG/DL (ref 0.6–1.2)
GFR AFRICAN AMERICAN: >60
GFR NON-AFRICAN AMERICAN: >60
GLUCOSE BLD-MCNC: 153 MG/DL (ref 70–99)
HCT VFR BLD CALC: 31.5 % (ref 36–48)
HEMOGLOBIN: 10.4 G/DL (ref 12–16)
INR BLD: 1.03 (ref 0.86–1.14)
MCH RBC QN AUTO: 30.3 PG (ref 26–34)
MCHC RBC AUTO-ENTMCNC: 33.1 G/DL (ref 31–36)
MCV RBC AUTO: 91.3 FL (ref 80–100)
PDW BLD-RTO: 13.3 % (ref 12.4–15.4)
PLATELET # BLD: 259 K/UL (ref 135–450)
PMV BLD AUTO: 9 FL (ref 5–10.5)
POTASSIUM SERPL-SCNC: 4.1 MMOL/L (ref 3.5–5.1)
PROTHROMBIN TIME: 11.9 SEC (ref 10–13.2)
RBC # BLD: 3.45 M/UL (ref 4–5.2)
SODIUM BLD-SCNC: 138 MMOL/L (ref 136–145)
WBC # BLD: 15.9 K/UL (ref 4–11)

## 2020-11-03 PROCEDURE — 97166 OT EVAL MOD COMPLEX 45 MIN: CPT

## 2020-11-03 PROCEDURE — 36415 COLL VENOUS BLD VENIPUNCTURE: CPT

## 2020-11-03 PROCEDURE — 6370000000 HC RX 637 (ALT 250 FOR IP): Performed by: PHYSICIAN ASSISTANT

## 2020-11-03 PROCEDURE — 85610 PROTHROMBIN TIME: CPT

## 2020-11-03 PROCEDURE — 97116 GAIT TRAINING THERAPY: CPT

## 2020-11-03 PROCEDURE — 97161 PT EVAL LOW COMPLEX 20 MIN: CPT

## 2020-11-03 PROCEDURE — 85027 COMPLETE CBC AUTOMATED: CPT

## 2020-11-03 PROCEDURE — 80048 BASIC METABOLIC PNL TOTAL CA: CPT

## 2020-11-03 PROCEDURE — 97535 SELF CARE MNGMENT TRAINING: CPT

## 2020-11-03 PROCEDURE — 97530 THERAPEUTIC ACTIVITIES: CPT

## 2020-11-03 PROCEDURE — 99253 IP/OBS CNSLTJ NEW/EST LOW 45: CPT | Performed by: INTERNAL MEDICINE

## 2020-11-03 RX ORDER — OXYCODONE HYDROCHLORIDE 5 MG/1
5-10 TABLET ORAL EVERY 4 HOURS PRN
Qty: 28 TABLET | Refills: 0 | Status: SHIPPED | OUTPATIENT
Start: 2020-11-03 | End: 2020-11-06

## 2020-11-03 RX ORDER — WARFARIN SODIUM 5 MG/1
5 TABLET ORAL DAILY
Status: DISCONTINUED | OUTPATIENT
Start: 2020-11-03 | End: 2020-11-03 | Stop reason: HOSPADM

## 2020-11-03 RX ORDER — WARFARIN SODIUM 5 MG/1
TABLET ORAL
Qty: 30 TABLET | Refills: 0 | Status: SHIPPED | OUTPATIENT
Start: 2020-11-03 | End: 2020-11-17 | Stop reason: SDUPTHER

## 2020-11-03 RX ORDER — PREDNISONE 10 MG/1
10 TABLET ORAL DAILY
Qty: 10 TABLET | Refills: 0 | Status: SHIPPED | OUTPATIENT
Start: 2020-11-04 | End: 2020-11-14

## 2020-11-03 RX ORDER — CEFADROXIL 500 MG/1
500 CAPSULE ORAL 2 TIMES DAILY
Qty: 20 CAPSULE | Refills: 0 | Status: SHIPPED | OUTPATIENT
Start: 2020-11-03 | End: 2020-11-13

## 2020-11-03 RX ADMIN — GABAPENTIN 300 MG: 300 CAPSULE ORAL at 13:23

## 2020-11-03 RX ADMIN — PREDNISONE 10 MG: 10 TABLET ORAL at 07:56

## 2020-11-03 RX ADMIN — OXYCODONE HYDROCHLORIDE 10 MG: 5 TABLET ORAL at 08:59

## 2020-11-03 RX ADMIN — ATORVASTATIN CALCIUM 10 MG: 10 TABLET, FILM COATED ORAL at 07:56

## 2020-11-03 RX ADMIN — ASPIRIN 325 MG: 325 TABLET, COATED ORAL at 07:56

## 2020-11-03 RX ADMIN — VITAMIN A, VITAMIN C, VITAMIN D-3, VITAMIN E, VITAMIN B-1, VITAMIN B-2, NIACIN, VITAMIN B-6, CALCIUM, IRON, ZINC, COPPER 1 TABLET: 4000; 120; 400; 22; 1.84; 3; 20; 10; 1; 12; 200; 27; 25; 2 TABLET ORAL at 07:56

## 2020-11-03 RX ADMIN — DULOXETINE HYDROCHLORIDE 60 MG: 60 CAPSULE, DELAYED RELEASE ORAL at 07:57

## 2020-11-03 RX ADMIN — GABAPENTIN 300 MG: 300 CAPSULE ORAL at 07:56

## 2020-11-03 RX ADMIN — OXYCODONE HYDROCHLORIDE 10 MG: 5 TABLET ORAL at 04:51

## 2020-11-03 RX ADMIN — ACETAMINOPHEN 650 MG: 325 TABLET ORAL at 07:57

## 2020-11-03 RX ADMIN — OXYCODONE HYDROCHLORIDE 10 MG: 5 TABLET ORAL at 13:23

## 2020-11-03 RX ADMIN — HYDROCHLOROTHIAZIDE 12.5 MG: 25 TABLET ORAL at 07:57

## 2020-11-03 RX ADMIN — ACETAMINOPHEN 650 MG: 325 TABLET ORAL at 13:23

## 2020-11-03 ASSESSMENT — PAIN SCALES - GENERAL
PAINLEVEL_OUTOF10: 8
PAINLEVEL_OUTOF10: 7
PAINLEVEL_OUTOF10: 7
PAINLEVEL_OUTOF10: 6

## 2020-11-03 NOTE — OP NOTE
[facility]               [address]                                OPERATIVE REPORT    PATIENT NAME: Ruth Sandra                  :        1960  MED REC NO:   [id number]                          ROOM:       5512  ACCOUNT NO:   [de-identified]                           ADMIT DATE: 2020  PROVIDER:     Shaquille Medrano MD    DATE OF PROCEDURE:  2020    PREOPERATIVE DIAGNOSIS:  Right total knee arthroplasty with loose and  changing femoral and tibial stems with extension beyond diaphyseal  borders. POSTOPERATIVE DIAGNOSIS:  Right total knee arthroplasty with loose and  changing femoral and tibial stems with extension beyond diaphyseal  borders with surprisingly stable femoral and tibial prosthesis. OPERATION PERFORMED:  Revision total knee arthroplasty. SURGEON:  Shaquille Medrano MD    ANESTHESIA:  General with regional block. INDICATIONS:  The patient is a 53-year-old female with significant  history of previous revision total knee arthroplasty and now with  loosening of the femoral and tibial stems and migration outside of the  canal.  She has been having consistent pain that has been worsening in  the past year to two years and did not respond to physical therapy or  other conservative management options. She is here now for definitive  management regarding her knee with just short of full extension and  flexion only to 85 to 90 degrees with difficulty obtaining flexion  beyond this. Risks and benefits were explained including hardware  failure, wound complications, anesthesia-related issues, potential for  subsequent revision again, and high risk of infection. DESCRIPTION OF PROCEDURE:  The patient was brought to the operating room  in stable condition, underwent induction of general anesthesia and  insertion of airway. She was placed in the supine position.    Preoperatively, she had been given an adductor block under ultrasound  guidance. Right leg was prepped with a ChloraPrep scrub, draped in a  sterile fashion. Proximal tourniquet was applied and was raised at the  initial onset of the case and just prior to cementing the knee. Total  tourniquet time 30 minutes. Anterior incision was well marked after sterile prep and drape. Perioperative antibiotics were checked as well as tranexamic acid. Timeout was obtained and an anterior incision was done after tourniquet  was raised after exsanguination. Anterior incision was extended down to  fascia. Multiple Ethibond sutures were identified where previous  quadriceps snip had been done as well as repair of the patella along the  tibial tubercle. The aspiration of the deeper tissues was done and this  was sent for cell count and culture. Additional deep pathology was  noted to have no acute inflammation as well as deep culture sent. Incision was extended down to the knee joint itself and then, given the  plan for distal femoral replacement, dissection was taken along the  femoral and tibial components medially and extended into the posterior  aspect of the tibia and the femur. Patella was markedly limited in  terms of its ability to move and during manipulation was seen to have   from the anterior tibia. Secondary #2 Ethibond sutures were  placed through bone to secure the patellar tendon. Quadriceps and  patellar tendon were released of scar tissue. Lateral release was  performed with difficult visualization and then, it was subsequently  extended with additional dissection. Femoral soft tissues were  reflected from the femur itself directly down to bone and then,  alignment obtained of the femur to obtain appropriate resection along  the distal femoral shaft. Once the femur was released and polyethylene was removed, femur was cut  out of the bone and was found to be fairly adherent.   This was impacted  out of the bone with stem intact. Tibia was subsequently also impacted  out of the bone after its lateral release from the underlying bone. Deeper canal evaluations took some effort, but deeper cement was removed  from the femur quite easily and the tibia with great difficulty. Distal  cement plug could not be removed in its entirety, but was able to allow  for advancement of reamers down the canal.  Reamers extending down into  the femur and tibia were continued until chatter was heard on the  femoral and the tibial components. Offsets of the pedestal were  identified in the preoperative x-rays to assure that the canal was not  violated, and attention was drawn to pointing the reamers into the area  of the canal rather than into the false area of the previous stem. Subsequently, the proximal aspects of the tibia and the femur were  shaved for the individual cones and _____ along with stem extensions to  allow for firm seating. Minor tibial resection was done. Some femoral  resection was thought to be necessary, but with trialing at the tibial  and femoral component. Alignments were thought to be excellent with a  12-mm poly. Cement was mixed. Tibia and femoral components were  assembled on the back table, impacted into place. Cement was placed on  the tibial surface and tibial component was impacted into place into  appropriate rotation which was difficult to assess, but ultimately was  thought to be adequate upon impaction. Femur was subsequently impacted  into place. Cable was placed around the distal femur to secure the  femur and prevent additional fracture. Rotations of the femur and the  tibia allowed for appropriate patellar tracking and were marked prior to  insertion. During cementing, tourniquet had been raised. After cementing, this was  released. Final polyethylene was impacted into place with a locking pin  fixation on the femur.   Fascia was subsequently closed with interrupted  0 Ethibond suture in greater than 90 degrees of flexion. Irrisept was  utilized during the case. Vancomycin was used upon closure. Final  fascial closure was obtained with interrupted 0 Ethibond suture followed  by running continuous barbed suture and then interrupted suture fixation  for the subcuticular, subcutaneous tissues with Monocryl, final closure  with running nylon suture and a vacuum-type dressing. The patient  tolerated the procedure well, left the operating room in stable  condition. ESTIMATED BLOOD LOSS:  450 mL.         Heath Stone MD    D: 11/02/2020 11:54:35       T: 11/02/2020 13:31:35     [email or web address]  Job#: [id number]     Doc#: [id number]    CC:

## 2020-11-03 NOTE — PROGRESS NOTES
Discharge instructions reviewed with Pt. Pt was encouraged to ask questions. Pt prescriptions sent down to [facility]. Prescriptions delivered to Pt prior to discharge. Peripheral IVs removed.

## 2020-11-03 NOTE — PROGRESS NOTES
Department of Orthopedic Surgery  Nurse Practitioner   Progress Note    Subjective:     Post-Operative Day: 1 Status Post right Total Knee Arthroplasty Revision  Systemic or Specific Complaints: No complaints. Patient sitting up in bed. Pain controlled at this time. No family at bedside. Objective:     Patient Vitals for the past 24 hrs:   BP Temp Temp src Pulse Resp SpO2   11/03/20 1144 107/62 98.7 °F (37.1 °C) Oral 91 18 98 %   11/03/20 0802 121/74 98.2 °F (36.8 °C) Oral 103 18 96 %   11/03/20 0700 -- -- Oral -- -- --   11/03/20 0229 111/71 98 °F (36.7 °C) Oral 88 18 97 %   11/03/20 0015 112/69 97.7 °F (36.5 °C) Oral 76 18 99 %   11/02/20 1915 105/72 98.1 °F (36.7 °C) Oral 79 18 98 %   11/02/20 1424 96/62 97.4 °F (36.3 °C) Oral 81 14 95 %   11/02/20 1345 109/76 98 °F (36.7 °C) Temporal 76 12 99 %   11/02/20 1330 102/66 -- -- 80 11 --   11/02/20 1315 118/70 -- -- 79 11 --   11/02/20 1300 108/64 -- -- 83 14 --   11/02/20 1245 (!) 88/54 -- -- 78 9 --       General: alert, appears stated age, cooperative and no distress   Wound: Wound clean and dry no evidence of infection. ACE   Motion: Painful range of Motion   DVT Exam: No evidence of DVT seen on physical exam.       Knee swollen but thigh soft to palpation. Moving foot and ankle. Good distal pulses. Data Review  CBC:   Lab Results   Component Value Date    WBC 15.9 11/03/2020    RBC 3.45 11/03/2020    HGB 10.4 11/03/2020    HCT 31.5 11/03/2020     11/03/2020       Assessment:     Status Post right Total Knee Arthroplasty Revision. Doing well postoperatively. Plan:      1: Discharge today, Return to Clinic: 2 weeks : Post-op labs reviewed and stable. Medicine following. 2:  Continue Deep venous thrombosis prophylaxis - Coumadin  3:  Continue physical therapy, OT, WBAT  4:  Continue Pain Control  5:  Discharge home today. Patient will need [facility] for OT, nursing for dressing changes and lab draws. Scripts sent to [facility] to beds.  Discharge

## 2020-11-03 NOTE — CARE COORDINATION
up the prescription on their way out of the hospital at discharge, or pharmacy can deliver to the bedside if staff is available. (payment due at time of pick-up or delivery - cash, check, or card accepted)     Able to afford home medications/ co-pay costs: Yes    ADLS:  Current PT AM-PAC Score: 22 /24  Current OT AM-PAC Score: 20 /24      Discharge Disposition: Home with [address]: PT/OT     LOC at discharge: Skilled  [address] Completed: Yes    Notification completed in [facility]/PAS?:  No    IMM Completed:   No    Transportation:  Transportation Plan for discharge: family   Mode of Transport: [address]:  [address] ordered at discharge: Yes  [address]: [facility]  Phone: [phone]  Fax: [phone]  Orders faxed: Yes    Durable Medical Equipment:  DME Provider: N/A   Equipment obtained during hospitalization: Has needed DME at home     Home Oxygen and Respiratory Equipment:  Oxygen needed at discharge?: Not Indicated    Dialysis:  Dialysis patient: No    Referrals made at [facility] for outpatient continued care:  Not Applicable    Additional CM Notes:   SW rounded on this date. Patient to discharge home today. Patient has needed DME at home. Patient set up with [facility] prior to admission for surgery. SW notified [facility] of discharge. Family to transport home. No other needs noted at this time. The Plan for Transition of Care is related to the following treatment goals Failed total knee arthroplasty, initial encounter ([facility] [T84.018A, Z96.659]  Status post revision of total replacement of right knee [Z96.651]      The Patient and/or patient representative Patient  was provided with a choice of provider and agrees with the discharge plan Yes    Freedom of choice list was provided with basic dialogue that supports the patient's individualized plan of care/goals and shares the quality data associated with the providers.  Yes    Care Transitions patient: No    Rodríguez Chaudhari, [facility]   Case Management Department  Ph: [phone]

## 2020-11-03 NOTE — PLAN OF CARE
Problem: Falls - Risk of:  Goal: Will remain free from falls  Description: Will remain free from falls  11/2/2020 1932 by Yoan Castillo RN  Outcome: Ongoing     Problem: Pain:  Goal: Pain level will decrease  Description: Pain level will decrease  11/2/2020 1932 by Yoan Castillo RN  Outcome: Ongoing

## 2020-11-03 NOTE — PROGRESS NOTES
Occupational Therapy   Occupational Therapy Initial Assessment/Treatment  Date: 11/3/2020   Patient Name: Rosita Jimenez  MRN: [id number]     : 1960    Date of Service: 11/3/2020    Discharge Recommendations: Rosita Jimenez scored a 20/24 on the AM-PAC ADL Inpatient form. Current research shows that an AM-PAC score of 18 or greater is typically associated with a discharge to the patient's home setting. Please see assessment section for further patient specific details. If patient discharges prior to next session this note will serve as a discharge summary. Please see below for the latest assessment towards goals. OT Equipment Recommendations  Equipment Needed: No    Assessment   Performance deficits / Impairments: Decreased functional mobility ; Decreased ADL status  Assessment: Pt from home w/ spouse. PTA pt was independent w/ ADLs and functional mobility. Currently pt requires CGA for ADLs, transfers, and functional mobility. Pt required cues for safe hand placement for sit to stand transfers, and cues for sequencing during LE dressing. Pt plans to d/c home w/ , no concerns noted. Recommend initial 24 hr assist, no ongoing OT at d/c. Treatment Diagnosis: Impaired ADLs and functional mobility  Prognosis: Good  Decision Making: Medium Complexity  OT Education: OT Role;Plan of Care;Transfer Training  Patient Education: Pt verbalized understanding  REQUIRES OT FOLLOW UP: Yes  Activity Tolerance  Activity Tolerance: Patient Tolerated treatment well  Safety Devices  Safety Devices in place: Yes  Type of devices: Left in chair;Call light within reach; Chair alarm in place;Nurse notified           Patient Diagnosis(es): The encounter diagnosis was Failed total knee arthroplasty, initial encounter ([facility] [address]      has a past medical history of Arthritis, Bulging lumbar disc, Chronic back pain, DDD (degenerative disc disease), cervical, DDD (degenerative disc disease), lumbar, DVT (deep venous thrombosis) ([facility] [address] DVT (deep venous thrombosis) ([facility] [address] High blood pressure, Hx of blood clots, and Hyperlipidemia. has a past surgical history that includes Foot surgery (2000); knee surgery (8/2008); Hysterectomy (2004); Eye surgery; Knee Arthroplasty (2008); Revision total knee arthroplasty (2011  (X3 TOTAL)); pr arthrs kne surg w/meniscectomy med/lat w/shvg (Left, 8/10/2018); Colonoscopy; Achilles tendon surgery (Left, 1/11/2019); joint replacement; joint replacement (Left, 2/3/2020); and Revision total knee arthroplasty (Right, 11/2/2020). Treatment Diagnosis: Impaired ADLs and functional mobility      Restrictions  Position Activity Restriction  Other position/activity restrictions: Full WBAT    Subjective   General  Additional Pertinent Hx: Pt is 61 y/o F admitted for R revision knee arthroplasty. PMH significant for arthritis, bulging lumbar disc, chronic back pain, DDD, DVT, high blood pressure, history of blood clots, hyperlipidemia  Family / Caregiver Present: No  Referring Practitioner: TOMASZ Fowler  Diagnosis: Failed total knee arthroplasy, initial encounter  Subjective  Subjective: Pt semi-supine upon arrival, agreeable to OT eval/treat.  \"I'm getting there, the block is starting to wear off\"  Pain: Pt denies pain  Social/Functional History  Social/Functional History  Lives With: Spouse  Type of Home: House  Home Layout: One level, Laundry in basement  Home Access: Stairs to enter with rails  Entrance Stairs - Number of Steps: 5+5  Entrance Stairs - Rails: Left(Left for first set, right for second set)  Bathroom Shower/Tub: Walk-in shower  Bathroom Toilet: Standard  Home Equipment: Crutches, Cane, Rolling walker(uses the cane for longer distances, or if in feeling pain)  Receives Help From: Family(kids help w/ straightening the house up, laundry, occ help w/ cooking)  ADL Assistance: [facility] [address]: Needs assistance  Homemaking Responsibilities: Yes  Ambulation Assistance: Independent  Transfer Assistance: Independent  Active : Yes  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 Easily reoriented)     Balance  Sitting Balance: Stand by assistance  Standing Balance: Contact guard assistance  Standing Balance  Time: x2 minutes total  Activity: transfers, donning underwear, washing hands at sink  Functional Mobility  Functional - Mobility Device: Rolling Walker  Activity: To/from bathroom  Assist Level: Contact guard assistance  Toilet Transfers  Toilet - Technique: Ambulating  Equipment Used: Standard toilet(use of grab bar on L side)  Toilet Transfer: Contact guard assistance  Toilet Transfers Comments: Pt demonstrating good hand placement for stand to sit, w/ cues for safe body positioning to be able to feel toilet behind legs  ADL  Grooming: Contact guard assistance(for stance, washing hands at sink)  LE Dressing: Contact guard assistance(Cues for sequencing to dress surgical side first, donning underwear seated EOB and adjusting sock long sitting in bed)        Bed mobility  Supine to Sit: Supervision(Pt semi supine upon arrival, then sitting upright and holding upright position while HOB was lowered)  Transfers  Sit to stand: Contact guard assistance(Cues for hand placement. x1 trial from bed; x1 trial from toilet)  Stand to sit: Contact guard assistance(x1 trial to toilet w/ cues for safe body positioning; x1 trial to chair.  Correct hand placement noted for both trials)  Shower transfers: Discussed safe way to complete shower transfers, w/ pt verbalizing good understanding     Cognition  Overall Cognitive Status: WFL  Arousal/Alertness: Appropriate responses to stimuli  Attention Span: Appears intact  Memory: Appears intact  Safety Judgement: Good awareness of safety precautions  Insights: Fully aware of deficits  Initiation: Does not require cues  Sequencing: Requires cues for some(When donning underwear, cues to dress surgical side first)                 LUE AROM (degrees)  LUE AROM : WFL  RUE AROM (degrees)  RUE AROM : WFL  LUE Strength  Gross LUE Strength: WFL  RUE Strength  Gross RUE Strength: WFL                   Plan   Plan  Times per week: 7  Times per day: Daily  Current Treatment Recommendations: Functional Mobility Training, Self-Care / ADL    G-Code     OutComes Score                                                  AM-PAC Score        AM-[facility] Inpatient Daily Activity Raw Score: 20 (11/03/20 1010)  AM-PAC Inpatient ADL T-Scale Score : 42.03 (11/03/20 1010)  ADL Inpatient CMS 0-100% Score: 38.32 (11/03/20 1010)  ADL Inpatient CMS G-Code Modifier : Pedrito Chadwick (11/03/20 1010)    Goals  Short term goals  Time Frame for Short term goals: by d/c  Short term goal 1: Pt to complete LE dressing SBA  Short term goal 2: Pt to complete toilet transfers SBA  Short term goal 3: Pt to tolerate x5 minutes standing SBA while completing simple ADLs       Therapy Time   Individual Concurrent Group Co-treatment   Time In 0856         Time Out 0949         Minutes 53              Timed Code Treatment Minutes:   38    Total Treatment Minutes:  [address], S/OT  Sumi Finley OTR/L 1968  Therapist was present, directed the patient's care, made skilled judgement, was responsible for assessment and treatment of the patient.

## 2020-11-03 NOTE — DISCHARGE SUMMARY
Department of Orthopedic Surgery  Nurse Practitioner   Discharge Summary    The Juan Butler is a 62 y.o. female underwent total knee replacement revision procedure without complication. Juan Butler was admitted to the floor following Her recovery in the PACU.      Discharge Diagnosis  right Knee Replacement Revision    Current Inpatient Medications    Current Facility-Administered Medications: warfarin (COUMADIN) tablet 5 mg, 5 mg, Oral, Daily  albuterol (PROVENTIL) nebulizer solution 2.5 mg, 2.5 mg, Nebulization, Q4H PRN  atorvastatin (LIPITOR) tablet 10 mg, 10 mg, Oral, Daily  Oyster Shell Calcium/D 500-200 MG-UNIT 1 tablet, 1 tablet, Oral, Daily  DULoxetine (CYMBALTA) extended release capsule 60 mg, 60 mg, Oral, BID  fluticasone (FLONASE) 50 MCG/ACT nasal spray 1 spray, 1 spray, Nasal, Nightly PRN  gabapentin (NEURONTIN) capsule 300 mg, 300 mg, Oral, TID  hydroCHLOROthiazide (HYDRODIURIL) tablet 12.5 mg, 12.5 mg, Oral, Daily  prenatal vitamin 27-1 MG tablet 1 tablet, 1 tablet, Oral, Daily  sennosides-docusate sodium (SENOKOT-S) 8.6-50 MG tablet 1 tablet, 1 tablet, Oral, BID  sodium chloride flush 0.9 % injection 10 mL, 10 mL, Intravenous, 2 times per day  sodium chloride flush 0.9 % injection 10 mL, 10 mL, Intravenous, PRN  acetaminophen (TYLENOL) tablet 650 mg, 650 mg, Oral, Q6H  magnesium hydroxide (MILK OF MAGNESIA) 400 MG/5ML suspension 30 mL, 30 mL, Oral, Daily PRN  oxyCODONE (ROXICODONE) immediate release tablet 5 mg, 5 mg, Oral, Q4H PRN **OR** oxyCODONE (ROXICODONE) immediate release tablet 10 mg, 10 mg, Oral, Q4H PRN  HYDROmorphone (DILAUDID) injection 0.25 mg, 0.25 mg, Intravenous, Q3H PRN **OR** HYDROmorphone (DILAUDID) injection 0.5 mg, 0.5 mg, Intravenous, Q3H PRN  promethazine (PHENERGAN) tablet 12.5 mg, 12.5 mg, Oral, Q6H PRN **OR** ondansetron (ZOFRAN) injection 4 mg, 4 mg, Intravenous, Q6H PRN  predniSONE (DELTASONE) tablet 10 mg, 10 mg, Oral, Daily  nicotine (NICODERM CQ) 14 MG/24HR 1 patch, 1 patch, Transdermal, Daily PRN    Post-operatively the patients diet was advanced as tolerated and their incision was checked on POD #1. The incision is dressing in place, clean, dry and intact with no signs of infection. The patient remained neurovascularly intact in the lower extremity and had intact pulses distally. Patients calf remained soft and showed no evidence of DVT. The patient was able to move their leg and ankle/foot without any problems post-operatively. Physical therapy and occupational therapy were consulted and began working with the patient post-operatively. The patient progressed with PT/OT as would be expected and continued to improve through their stay. The patients pain was initially controlled with IV medications but we were able to transition to oral pain medications soon after arrival to the floor and their pain remained under good control through their hospital stay. From a medical standpoint the patient remained stable and continued to have the medicine team follow throughout their stay. The patients dressing was changed/incison was checked on day of d/c. The patient will be discharged at this time to Home  with their current diet restrictions and will continue to follow the total knee precautions outlined to them by us and PT/OT. Condition on Discharge: Stable    Plan  Return visit in 2 weeks. .  Patient was instructed on the use of pain medications, the signs and symptoms of infection, and was given our number to call should they have any questions or concerns following discharge. For opioid prescriptions given at discharge the following statement is provided for compliance with OSMB rules. Patient being given increased dosage/quantity of opoid pain medication in excess of OSMB guidelines which noted a 30 MED daily of opioids due to the fact that he/she has undergone major orthopaedic surgery as outlined in rule 4731-11-13.   Dosages and further duration of the pain medication will be re-evaluated at her post op visit in 2 weeks. Patient was educated on dosing expectations and limits of prescribing as a result of the new [facility] rules enacted August 31, 2017. Please also note that this is not the initial opoid prescription issued to this patient but a continuation of medication utilized during the hospital admission as noted in the medical record. OARRS report has also been utilized to screen for any abuse history or suspicious activity as outlined in [place]. All efforts have been taken to prevent abuse potential and misuse of opioid medications including education, screening, and close clinical follow up.

## 2020-11-03 NOTE — PLAN OF CARE
Problem: Falls - Risk of:  Goal: Will remain free from falls  Description: Will remain free from falls  11/3/2020 0803 by Chiqui Clark RN  Outcome: Ongoing  Note: Patient alert and oriented X4, non-skid socks on, bed in lowest position and locked, side rails up X2, call light and belongings within reach, bed alarm on for safety, and fall sign posted. Will continue to monitor.

## 2020-11-03 NOTE — PROGRESS NOTES
Physical Therapy    Facility/Department: [facility] 5T ORTHO/NEURO  Initial Assessment/discharge note    NAME: Sharon Kapoor  : 1960  MRN: [id number]    Date of Service: 11/3/2020    Discharge Recommendations:Laverne Manzano scored a 22/24 on the AM-PAC short mobility form. Current research shows that an AM-PAC score of 18 or greater is typically associated with a discharge to the patient's home setting. Based on the patient's AM-PAC score and their current functional mobility deficits, it is recommended that the patient have 2-3 sessions per week of Physical Therapy at d/c to increase the patient's independence but deferring continued post op PT recommendations to ortho MD as pt reports he verbally told her she will have 3 week delay to start PT. At this time, this patient demonstrates the endurance and safety to discharge home with  (home vs OP services). and a follow up treatment frequency of 2-3x/wk. Please see assessment section for further patient specific details. PT Equipment Recommendations  Equipment Needed: No    Assessment   Assessment: 61 yo admitted 20 for R TKR revision (#4). Pt demo good mobility with rolling walker and R KI (WBAT R). Pt plans to return home with spouse at discharge. If home, recommend assist PRN, use of rolling walker (has) and will defer ongoing PT to ortho MD. No further acute PT needs so will sign off. Treatment Diagnosis: mobility impairment due to R TKR revision  Decision Making: Low Complexity  Patient Education: Pt educated on PT role, importance of OOB mobility, need to call for assist to get up, WBAT with KI on and she verbalized understanding. REQUIRES PT FOLLOW UP: No  Activity Tolerance  Activity Tolerance: Patient Tolerated treatment well;Patient limited by fatigue       Patient Diagnosis(es): The encounter diagnosis was Failed total knee arthroplasty, initial encounter ([facility] [address]      has a past medical history of Arthritis, Bulging lumbar disc, Chronic back pain, DDD (degenerative disc disease), cervical, DDD (degenerative disc disease), lumbar, DVT (deep venous thrombosis) ([facility] [address] DVT (deep venous thrombosis) ([facility] [address] High blood pressure, Hx of blood clots, and Hyperlipidemia. has a past surgical history that includes Foot surgery (2000); knee surgery (8/2008); Hysterectomy (2004); Eye surgery; Knee Arthroplasty (2008); Revision total knee arthroplasty (2011  (X3 TOTAL)); pr arthrs kne surg w/meniscectomy med/lat w/shvg (Left, 8/10/2018); Colonoscopy; Achilles tendon surgery (Left, 1/11/2019); joint replacement; joint replacement (Left, 2/3/2020); and Revision total knee arthroplasty (Right, 11/2/2020). Restrictions  Position Activity Restriction  Other position/activity restrictions: up with assist, WBAT R, KI     Vision/Hearing  Vision: Impaired  Vision Exceptions: Wears glasses for reading  Hearing: Within functional limits       Subjective  General  Chart Reviewed: Yes  Additional Pertinent Hx: 61 yo admitted 11/2/20 for R TKR revision per Dr. Kendrick Nelson. Pmhx: chronic pain, HTN, R TKR revision x3. Family / Caregiver Present: Yes(spouse)  Diagnosis: R TKR  Follows Commands: Within Functional Limits  Subjective  Subjective: Pt found reclined in chair and agreeable to PT. Pt reports Dr. Kendrick Nelson told her she would not begin post op PT for 3 weeks and no knee flexion for now.  (no orders in chart)  Pain Screening  Patient Currently in Pain: Yes(rates knee pain 3/10, RN aware)         Orientation  Orientation  Overall Orientation Status: Within Functional Limits     Social/Functional History  Social/Functional History  Lives With: Spouse  Type of Home: House  Home Layout: One level, Laundry in basement  Home Access: Stairs to enter with rails  Entrance Stairs - Number of Steps: 5+5  Entrance Stairs - Rails: Left(Left for first set, right for second set)  Bathroom Shower/Tub: Walk-in shower  Bathroom Toilet: Standard  Home Equipment: Danton Restrepo, Rolling walker(uses the cane for longer distances, or if in feeling pain)  Receives Help From: Family(kids help w/ straightening the house up, laundry, occ help w/ cooking)  ADL Assistance: [address]: Needs assistance  Homemaking Responsibilities: Yes  Ambulation Assistance: Independent  Transfer Assistance: Independent  Active : Yes  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 Gloria Molina told her no knee flexion due to incision site concerns)           Bed mobility  Supine to Sit: Supervision  Sit to Supine: Supervision  Scooting: Supervision     Transfers  Sit to Stand: Supervision  Stand to sit: Supervision     Ambulation  Device: Rolling Walker  Other Apparatus: Right;Knee Immobilizer  Assistance: Stand by assistance  Quality of Gait: slow kofi with step to pattern leading R; WBAT R  Distance: 50 ft  Stairs/Curb  Stairs?: (up/down 5 steps with B rails x2 WBAT R with KI on CGA/SBA; occ vc for safe sequence)        Exercises  Comments: Pt demo supine x10 R LE independent: GS, SLR, hip abduction (pt declined knee flexion exercises per MD verbal instruction)     Plan   Safety Devices  Type of devices: Bed alarm in place, Left in bed, Nurse notified, Call light within reach                        AM-PAC Score  AM-PAC Inpatient Mobility Raw Score : 22 (11/03/20 1148)  AM-PAC Inpatient T-Scale Score : 53.28 (11/03/20 1148)  Mobility Inpatient CMS 0-100% Score: 20.91 (11/03/20 1148)  Mobility Inpatient CMS G-Code Modifier : CJ (11/03/20 1148)               Therapy Time   Individual Concurrent Group Co-treatment   Time In 1115         Time Out 1145         Minutes 30           Timed Code Treatment Minutes: 15      Total Treatment Minutes:  [address], PT

## 2020-11-03 NOTE — PROGRESS NOTES
CLINICAL PHARMACY NOTE: MEDS TO [address] Select Patient?: No  Total # of Prescriptions Filled: 4   The following medications were delivered to the patient:  · Oxycodone 5mg  · Prednisone 10mg  · Cefadroxil 500mg  · Warfarin 5mg  Total # of Interventions Completed: 0  Time Spent (min): 60    Additional Documentation:

## 2020-11-04 ENCOUNTER — TELEPHONE (OUTPATIENT)
Dept: ORTHOPEDIC SURGERY | Age: 60
End: 2020-11-04

## 2020-11-04 LAB — MRSA CULTURE ONLY: NORMAL

## 2020-11-04 RX ORDER — OXYCODONE HYDROCHLORIDE 10 MG/1
15 TABLET ORAL EVERY 4 HOURS PRN
Qty: 63 TABLET | Refills: 0 | Status: SHIPPED | OUTPATIENT
Start: 2020-11-04 | End: 2020-11-11

## 2020-11-05 ENCOUNTER — TELEPHONE (OUTPATIENT)
Dept: ORTHOPEDIC SURGERY | Age: 60
End: 2020-11-05

## 2020-11-06 ENCOUNTER — TELEPHONE (OUTPATIENT)
Dept: ORTHOPEDIC SURGERY | Age: 60
End: 2020-11-06

## 2020-11-06 NOTE — TELEPHONE ENCOUNTER
Per Artemio Mass, patient needs to go up to 7.5 mg daily. Re draw on Monday. Patient and Florentino Cruz notified.

## 2020-11-09 ENCOUNTER — TELEPHONE (OUTPATIENT)
Dept: ORTHOPEDIC SURGERY | Age: 60
End: 2020-11-09

## 2020-11-09 NOTE — TELEPHONE ENCOUNTER
I called and spoke with the patient and [facility]. She unwrapped her and took the cotton off. The patient states that the wound vac is running all the time. I told her she can take the batteries out if needed. The patient also states that she is getting a headache every time she stands up and it will last about half hour. Not sure if this is medication.

## 2020-11-09 NOTE — TELEPHONE ENCOUNTER
Per Puneet Colunga: Hold medication for 3 days and recheck on Thursday. Andrés Trejo and the patient notified.

## 2020-11-12 NOTE — TELEPHONE ENCOUNTER
PT 21.7 INR 1.8    I spoke with the patient and let her know she needs to start taking 5 mg of coumadin and recheck on Monday. As far as the headaches we reviewed her symptoms with Clarence Chen. We will keep watch of this and see her on Tuesday. LM for Ludivina Espinal with the new dosage.

## 2020-11-16 ENCOUNTER — TELEPHONE (OUTPATIENT)
Dept: ORTHOPEDIC SURGERY | Age: 60
End: 2020-11-16

## 2020-11-16 DIAGNOSIS — Z96.652 STATUS POST LEFT PARTIAL KNEE REPLACEMENT: Primary | ICD-10-CM

## 2020-11-16 RX ORDER — OXYCODONE HYDROCHLORIDE 10 MG/1
15 TABLET ORAL EVERY 4 HOURS PRN
Qty: 60 TABLET | Refills: 0 | Status: SHIPPED | OUTPATIENT
Start: 2020-11-16 | End: 2020-11-23 | Stop reason: SDUPTHER

## 2020-11-17 ENCOUNTER — OFFICE VISIT (OUTPATIENT)
Dept: ORTHOPEDIC SURGERY | Age: 60
End: 2020-11-17

## 2020-11-17 VITALS
DIASTOLIC BLOOD PRESSURE: 72 MMHG | HEART RATE: 96 BPM | WEIGHT: 173 LBS | SYSTOLIC BLOOD PRESSURE: 116 MMHG | HEIGHT: 64 IN | BODY MASS INDEX: 29.53 KG/M2 | TEMPERATURE: 97 F

## 2020-11-17 PROCEDURE — 99024 POSTOP FOLLOW-UP VISIT: CPT | Performed by: PHYSICIAN ASSISTANT

## 2020-11-17 RX ORDER — GABAPENTIN 300 MG/1
300 CAPSULE ORAL 3 TIMES DAILY
Qty: 90 CAPSULE | Refills: 3 | Status: SHIPPED | OUTPATIENT
Start: 2020-11-17 | End: 2021-04-20 | Stop reason: SDUPTHER

## 2020-11-17 RX ORDER — DULOXETIN HYDROCHLORIDE 60 MG/1
60 CAPSULE, DELAYED RELEASE ORAL 2 TIMES DAILY
Qty: 180 CAPSULE | Refills: 2 | Status: SHIPPED | OUTPATIENT
Start: 2020-11-17 | End: 2020-12-16 | Stop reason: SDUPTHER

## 2020-11-17 RX ORDER — WARFARIN SODIUM 5 MG/1
TABLET ORAL
Qty: 45 TABLET | Refills: 0 | Status: SHIPPED | OUTPATIENT
Start: 2020-11-17 | End: 2021-01-26

## 2020-11-17 ASSESSMENT — ENCOUNTER SYMPTOMS
SHORTNESS OF BREATH: 0
ABDOMINAL PAIN: 0
ALLERGIC/IMMUNOLOGIC NEGATIVE: 1
SORE THROAT: 0
CONSTIPATION: 0
NAUSEA: 0
WHEEZING: 0
EYES NEGATIVE: 1
COUGH: 0
DIARRHEA: 0
VOMITING: 0

## 2020-11-17 NOTE — PROGRESS NOTES
Patient Name: Dony Acevedo MRN: <[id number]>   Age: 62 y.o. YOB: 1960   Sex: female         CHIEF COMPLAINT   Right total Knee revision postoperative visit    HISTORY OF PRESENT ILLNESS   Patient returns for their first postoperative evaluation status post right total knee revision. The patient is doing very fair. They have moderate complaints of pain. Rates pain as a 7 out of 10  There is moderate swelling about the knee. The patient has been doing physical therapy at home with home therapist.    They deny any calf swelling or numbness or tingling down the leg       Assessment     Review of Systems   Constitutional: Negative for chills and fever. HENT: Negative for ear discharge, ear pain, hearing loss, nosebleeds and sore throat. Eyes: Negative. Respiratory: Negative for cough, shortness of breath and wheezing. Cardiovascular: Negative for chest pain and palpitations. Gastrointestinal: Negative for abdominal pain, constipation, diarrhea, nausea and vomiting. Endocrine: Negative. Genitourinary: Negative for dysuria, frequency and urgency. Musculoskeletal: Positive for joint swelling. Skin: Negative for rash. Allergic/Immunologic: Negative. Neurological: Negative for dizziness and headaches. Hematological: Negative. Psychiatric/Behavioral: Negative. PHYSICAL EXAM     Vital Signs:   Vitals:    11/17/20 1414   BP: 116/72   Pulse: 96   Temp: 97 °F (36.1 °C)       Examination of the knee shows a well-healed midline incision. There is moderate swelling. There is no drainage. Range of motion is 0to 90. The patient is neurovascularly intact. NEUROLOGICALLY: There is no evidence for sensory or motor deficits in the extremity. Coordination appears full with no spacticity or rigidity. Reflexes appear to be symmetric. Distal circulation intact. No signs of RSD. Calf nontender.  Negative nica's,  no signs of dvt    Hip exam shows full ROM with no focal pain or Instability. Leg lengths are normal. There is no Trendelenburg Gait. RADIOLOGY   Xray   Have reviewed the xrays above from 11/17/20   3 views of the right knee AP, lateral and sunrise views were performed and reviewed today and my impression is: Well-placed hinged knee prosthesis with long tibial and femoral stems. No evidence of acute fracture dislocation    IMPRESSION   2 week(s) status post right total knee revision    PLAN   The patient is doing well 2 week(s) status post right total knee revision. The patient will finish up therapy. They may slowly resume normal activities. DVT plan continue warfarin for the next 4 weeks  Refilled Coumadin at today's visit  Refilled patient gabapentin and Cymbalta as well. Advised to follow-up in 2 weeks for repeat evaluation    The orders below, if any, were placed during this visit:          ICD-10-CM    1. Status post revision of total replacement of right knee  Z96.651 XR KNEE RIGHT (3 VIEWS)   2. Chronic pain syndrome  G89.4 gabapentin (NEURONTIN) 300 MG capsule     DULoxetine (CYMBALTA) 60 MG extended release capsule   3.  Chronic bilateral low back pain with bilateral sciatica  M54.42 gabapentin (NEURONTIN) 300 MG capsule    M54.41     G89.29          TOMASZ Schmidt

## 2020-11-19 ENCOUNTER — TELEPHONE (OUTPATIENT)
Dept: ORTHOPEDIC SURGERY | Age: 60
End: 2020-11-19

## 2020-11-19 NOTE — TELEPHONE ENCOUNTER
Stefany Lau from [place] [phone]    PT 27.7  INR 2.3      Per Marisol Better: Stay where she is at. 7.5 mg MWF  5 mg the rest of the time. Recheck on Monday. Jocelyn Chen both notified.

## 2020-11-23 ENCOUNTER — TELEPHONE (OUTPATIENT)
Dept: ORTHOPEDIC SURGERY | Age: 60
End: 2020-11-23

## 2020-11-23 DIAGNOSIS — Z96.652 STATUS POST LEFT PARTIAL KNEE REPLACEMENT: ICD-10-CM

## 2020-11-23 LAB
ANAEROBIC CULTURE: NORMAL
BODY FLUID CULTURE, STERILE: NORMAL
GRAM STAIN RESULT: NORMAL
GRAM STAIN RESULT: NORMAL
WOUND/ABSCESS: NORMAL

## 2020-11-23 RX ORDER — OXYCODONE HYDROCHLORIDE 10 MG/1
15 TABLET ORAL EVERY 4 HOURS PRN
Qty: 60 TABLET | Refills: 0 | Status: SHIPPED | OUTPATIENT
Start: 2020-11-23 | End: 2020-11-30

## 2020-11-23 NOTE — TELEPHONE ENCOUNTER
PT: 27.1  INR: 2.3        7.5 mg MWF  5 mg the rest of the time. Do you want a recheck on Wed since Thanksgiving is on Thursday?       Abena Rodgers with [facility] [phone]

## 2020-11-23 NOTE — TELEPHONE ENCOUNTER
CRUZ for the patient and Varun Farias with [facility] regarding Sebastian's answer.

## 2020-11-30 ENCOUNTER — TELEPHONE (OUTPATIENT)
Dept: ORTHOPEDIC SURGERY | Age: 60
End: 2020-11-30

## 2020-12-01 ENCOUNTER — TELEPHONE (OUTPATIENT)
Dept: ORTHOPEDIC SURGERY | Age: 60
End: 2020-12-01

## 2020-12-01 DIAGNOSIS — Z96.652 STATUS POST LEFT PARTIAL KNEE REPLACEMENT: Primary | ICD-10-CM

## 2020-12-01 RX ORDER — OXYCODONE HYDROCHLORIDE 10 MG/1
15 TABLET ORAL EVERY 4 HOURS PRN
Qty: 60 TABLET | Refills: 0 | Status: SHIPPED | OUTPATIENT
Start: 2020-12-01 | End: 2020-12-08 | Stop reason: SDUPTHER

## 2020-12-03 ENCOUNTER — TELEPHONE (OUTPATIENT)
Dept: ORTHOPEDIC SURGERY | Age: 60
End: 2020-12-03

## 2020-12-07 ENCOUNTER — TELEPHONE (OUTPATIENT)
Dept: ORTHOPEDIC SURGERY | Age: 60
End: 2020-12-07

## 2020-12-07 LAB
FUNGUS (MYCOLOGY) CULTURE: NORMAL
FUNGUS (MYCOLOGY) CULTURE: NORMAL
FUNGUS STAIN: NORMAL
FUNGUS STAIN: NORMAL

## 2020-12-07 NOTE — TELEPHONE ENCOUNTER
[facility] from [facility] [phone]    INR: 1.9  PT: 22.7    7.5 MG MWF  5 MG rest of the days    RT TKA REVISION 11/2/20

## 2020-12-08 ENCOUNTER — OFFICE VISIT (OUTPATIENT)
Dept: PRIMARY CARE CLINIC | Age: 60
End: 2020-12-08
Payer: COMMERCIAL

## 2020-12-08 ENCOUNTER — OFFICE VISIT (OUTPATIENT)
Dept: ORTHOPEDIC SURGERY | Age: 60
End: 2020-12-08

## 2020-12-08 VITALS
HEART RATE: 88 BPM | TEMPERATURE: 98.1 F | DIASTOLIC BLOOD PRESSURE: 77 MMHG | SYSTOLIC BLOOD PRESSURE: 123 MMHG | BODY MASS INDEX: 29.53 KG/M2 | HEIGHT: 64 IN | WEIGHT: 173 LBS

## 2020-12-08 PROCEDURE — 99024 POSTOP FOLLOW-UP VISIT: CPT | Performed by: PHYSICIAN ASSISTANT

## 2020-12-08 PROCEDURE — 99211 OFF/OP EST MAY X REQ PHY/QHP: CPT | Performed by: NURSE PRACTITIONER

## 2020-12-08 RX ORDER — SENNA AND DOCUSATE SODIUM 50; 8.6 MG/1; MG/1
1 TABLET, FILM COATED ORAL 2 TIMES DAILY PRN
Qty: 60 TABLET | Refills: 1 | Status: SHIPPED | OUTPATIENT
Start: 2020-12-08 | End: 2021-01-07

## 2020-12-08 RX ORDER — OXYCODONE HYDROCHLORIDE 10 MG/1
15 TABLET ORAL EVERY 4 HOURS PRN
Qty: 60 TABLET | Refills: 0 | Status: SHIPPED | OUTPATIENT
Start: 2020-12-08 | End: 2020-12-15

## 2020-12-08 ASSESSMENT — ENCOUNTER SYMPTOMS
ALLERGIC/IMMUNOLOGIC NEGATIVE: 1
NAUSEA: 0
ABDOMINAL PAIN: 0
COUGH: 0
SORE THROAT: 0
EYES NEGATIVE: 1
CONSTIPATION: 0
SHORTNESS OF BREATH: 0
VOMITING: 0
WHEEZING: 0
DIARRHEA: 0

## 2020-12-08 NOTE — PROGRESS NOTES
Patient Name: Sanjuana Cm MRN: <[id number]>   Age: 56 y.o. YOB: 1960   Sex: female         CHIEF COMPLAINT   Right total Knee revision postoperative visit    HISTORY OF PRESENT ILLNESS   Patient returns for their second postoperative evaluation status post right total knee revision. The patient is doing very fair. They have moderate complaints of pain. Rates pain as a 7 out of 10  There is moderate swelling about the knee. The patient has been doing physical therapy at home with home therapist.  Notes are still trying to work on her flexion and she is getting to 80 with assistance  They deny any calf swelling or numbness or tingling down the leg       Assessment     Review of Systems   Constitutional: Negative for chills and fever. HENT: Negative for ear discharge, ear pain, hearing loss, nosebleeds and sore throat. Eyes: Negative. Respiratory: Negative for cough, shortness of breath and wheezing. Cardiovascular: Negative for chest pain and palpitations. Gastrointestinal: Negative for abdominal pain, constipation, diarrhea, nausea and vomiting. Endocrine: Negative. Genitourinary: Negative for dysuria, frequency and urgency. Musculoskeletal: Positive for joint swelling. Skin: Negative for rash. Allergic/Immunologic: Negative. Neurological: Negative for dizziness and headaches. Hematological: Negative. Psychiatric/Behavioral: Negative. PHYSICAL EXAM     Vital Signs:   Vitals:    12/08/20 1158   BP: 123/77   Pulse: 88   Temp: 98.1 °F (36.7 °C)       Examination of the knee shows a well-healed midline incision. There is moderate swelling. There is no drainage. Range of motion is 0to 70 with these but can push to 90 with tension. The patient is neurovascularly intact. NEUROLOGICALLY: There is no evidence for sensory or motor deficits in the extremity. Coordination appears full with no spacticity or rigidity.  Reflexes appear to be symmetric. Distal circulation intact. No signs of RSD. Calf nontender. Negative nica's,  no signs of dvt    Hip exam shows full ROM with no focal pain or Instability. Leg lengths are normal. There is no Trendelenburg Gait. RADIOLOGY   Xray   Have reviewed the xrays above from 11/17/2020  3 views of the right knee AP, lateral and sunrise views were performed on 11/17/2020 and reviewed today and my impression is: Well-placed hinged knee prosthesis with long tibial and femoral stems. No evidence of acute fracture dislocation    IMPRESSION   5 week(s) status post right total knee revision    PLAN   The patient is doing well 5 week(s) status post right total knee revision. The patient will finish up therapy. They may slowly resume normal activities. We will schedule patient for right knee manipulation  We will hold Coumadin until procedure none we will restart  Refilled pain medication as well as stool softener for the patient today's visit    Advised to follow-up in 2 weeks for repeat evaluation    The orders below, if any, were placed during this visit:          ICD-10-CM    1. Status post revision of total replacement of right knee  Z96.651    2.  Status post left partial knee replacement  Z96.652 oxyCODONE HCl (OXY-IR) 10 MG immediate release tablet         TOMASZ Bethea

## 2020-12-08 NOTE — LETTER
[facility]  Surgery Precert & Billing Form:    DEMOGRAPHICS:                                                                                                       Patient Name:  Karla Amos  Patient :  1960   Patient SS#:      Patient Phone:  [phone] (home) [phone] (work) Alt.  Patient Phone:    Patient Address:  [address] [place] [address]    PCP:  Agapito MATOS  Insurance: BC/YU    DIAGNOSIS & PROCEDURE:                                                                                      Diagnosis: Status post total knee  Operation: right    SURGERY  INFORMATION  Date of Surgery:   20  Location:    [facility]  Type:    Outpatient  23 hour hold:  No  Surgeon:     Maikol Lundberg MD    20     BILLING INFORMATION:                                                                                                Physician Procedure                                            CPT Codes                      PA, or Fellow Procedure                                      CPT Codes                    Precert information:

## 2020-12-09 ENCOUNTER — TELEPHONE (OUTPATIENT)
Dept: ORTHOPEDIC SURGERY | Age: 60
End: 2020-12-09

## 2020-12-09 LAB — SARS-COV-2: NOT DETECTED

## 2020-12-11 ENCOUNTER — ANESTHESIA EVENT (OUTPATIENT)
Dept: POSTOP/PACU | Age: 60
End: 2020-12-11

## 2020-12-14 ENCOUNTER — HOSPITAL ENCOUNTER (OUTPATIENT)
Dept: POSTOP/PACU | Age: 60
Discharge: HOME | End: 2020-12-14
Payer: COMMERCIAL

## 2020-12-14 ENCOUNTER — ANESTHESIA (OUTPATIENT)
Dept: POSTOP/PACU | Age: 60
End: 2020-12-14

## 2020-12-14 VITALS
SYSTOLIC BLOOD PRESSURE: 100 MMHG | TEMPERATURE: 97.3 F | OXYGEN SATURATION: 97 % | DIASTOLIC BLOOD PRESSURE: 62 MMHG | BODY MASS INDEX: 29.02 KG/M2 | WEIGHT: 170 LBS | HEART RATE: 87 BPM | HEIGHT: 64 IN | RESPIRATION RATE: 16 BRPM

## 2020-12-14 VITALS — DIASTOLIC BLOOD PRESSURE: 82 MMHG | OXYGEN SATURATION: 100 % | SYSTOLIC BLOOD PRESSURE: 188 MMHG

## 2020-12-14 LAB
GLUCOSE BLD-MCNC: 101 MG/DL (ref 70–99)
INR BLD: 1.14 (ref 0.86–1.14)
PERFORMED ON: ABNORMAL
PROTHROMBIN TIME: 13.2 SEC (ref 10–13.2)

## 2020-12-14 PROCEDURE — 6360000002 HC RX W HCPCS: Performed by: NURSE ANESTHETIST, CERTIFIED REGISTERED

## 2020-12-14 PROCEDURE — 7100000011 HC PHASE II RECOVERY - ADDTL 15 MIN

## 2020-12-14 PROCEDURE — 2500000003 HC RX 250 WO HCPCS: Performed by: NURSE ANESTHETIST, CERTIFIED REGISTERED

## 2020-12-14 PROCEDURE — 3700000000 HC ANESTHESIA ATTENDED CARE

## 2020-12-14 PROCEDURE — 27570 FIXATION OF KNEE JOINT: CPT | Performed by: ORTHOPAEDIC SURGERY

## 2020-12-14 PROCEDURE — 3600000500 HC JOINT MANIPULATION

## 2020-12-14 PROCEDURE — 7100000010 HC PHASE II RECOVERY - FIRST 15 MIN

## 2020-12-14 PROCEDURE — 85610 PROTHROMBIN TIME: CPT

## 2020-12-14 PROCEDURE — 6360000002 HC RX W HCPCS: Performed by: ANESTHESIOLOGY

## 2020-12-14 PROCEDURE — 6370000000 HC RX 637 (ALT 250 FOR IP): Performed by: ANESTHESIOLOGY

## 2020-12-14 PROCEDURE — 7100000001 HC PACU RECOVERY - ADDTL 15 MIN

## 2020-12-14 PROCEDURE — 2580000003 HC RX 258: Performed by: ANESTHESIOLOGY

## 2020-12-14 PROCEDURE — 7100000000 HC PACU RECOVERY - FIRST 15 MIN

## 2020-12-14 PROCEDURE — 6360000002 HC RX W HCPCS: Performed by: ORTHOPAEDIC SURGERY

## 2020-12-14 RX ORDER — ONDANSETRON 2 MG/ML
4 INJECTION INTRAMUSCULAR; INTRAVENOUS
Status: COMPLETED | OUTPATIENT
Start: 2020-12-14 | End: 2020-12-14

## 2020-12-14 RX ORDER — SODIUM CHLORIDE, SODIUM LACTATE, POTASSIUM CHLORIDE, CALCIUM CHLORIDE 600; 310; 30; 20 MG/100ML; MG/100ML; MG/100ML; MG/100ML
INJECTION, SOLUTION INTRAVENOUS CONTINUOUS
Status: DISCONTINUED | OUTPATIENT
Start: 2020-12-14 | End: 2020-12-15 | Stop reason: HOSPADM

## 2020-12-14 RX ORDER — FENTANYL CITRATE 50 UG/ML
25 INJECTION, SOLUTION INTRAMUSCULAR; INTRAVENOUS EVERY 5 MIN PRN
Status: DISCONTINUED | OUTPATIENT
Start: 2020-12-14 | End: 2020-12-15 | Stop reason: HOSPADM

## 2020-12-14 RX ORDER — ROPIVACAINE HYDROCHLORIDE 5 MG/ML
INJECTION, SOLUTION EPIDURAL; INFILTRATION; PERINEURAL
Status: DISPENSED
Start: 2020-12-14 | End: 2020-12-14

## 2020-12-14 RX ORDER — LIDOCAINE HYDROCHLORIDE 20 MG/ML
INJECTION, SOLUTION EPIDURAL; INFILTRATION; INTRACAUDAL; PERINEURAL PRN
Status: DISCONTINUED | OUTPATIENT
Start: 2020-12-14 | End: 2020-12-14 | Stop reason: SDUPTHER

## 2020-12-14 RX ORDER — PREDNISONE 10 MG/1
10 TABLET ORAL DAILY
Qty: 10 TABLET | Refills: 0 | Status: SHIPPED | OUTPATIENT
Start: 2020-12-14 | End: 2020-12-24

## 2020-12-14 RX ORDER — KETOROLAC TROMETHAMINE 30 MG/ML
30 INJECTION, SOLUTION INTRAMUSCULAR; INTRAVENOUS
Status: COMPLETED | OUTPATIENT
Start: 2020-12-14 | End: 2020-12-14

## 2020-12-14 RX ORDER — PROPOFOL 10 MG/ML
INJECTION, EMULSION INTRAVENOUS PRN
Status: DISCONTINUED | OUTPATIENT
Start: 2020-12-14 | End: 2020-12-14 | Stop reason: SDUPTHER

## 2020-12-14 RX ORDER — OXYCODONE HYDROCHLORIDE AND ACETAMINOPHEN 5; 325 MG/1; MG/1
2 TABLET ORAL ONCE
Status: COMPLETED | OUTPATIENT
Start: 2020-12-14 | End: 2020-12-14

## 2020-12-14 RX ADMIN — HYDROMORPHONE HYDROCHLORIDE 0.5 MG: 1 INJECTION, SOLUTION INTRAMUSCULAR; INTRAVENOUS; SUBCUTANEOUS at 08:01

## 2020-12-14 RX ADMIN — PROPOFOL 20 MG: 10 INJECTION, EMULSION INTRAVENOUS at 07:43

## 2020-12-14 RX ADMIN — SODIUM CHLORIDE, POTASSIUM CHLORIDE, SODIUM LACTATE AND CALCIUM CHLORIDE: 600; 310; 30; 20 INJECTION, SOLUTION INTRAVENOUS at 06:30

## 2020-12-14 RX ADMIN — PROPOFOL 60 MG: 10 INJECTION, EMULSION INTRAVENOUS at 07:41

## 2020-12-14 RX ADMIN — KETOROLAC TROMETHAMINE 30 MG: 30 INJECTION, SOLUTION INTRAMUSCULAR at 08:06

## 2020-12-14 RX ADMIN — PROPOFOL 40 MG: 10 INJECTION, EMULSION INTRAVENOUS at 07:42

## 2020-12-14 RX ADMIN — OXYCODONE HYDROCHLORIDE AND ACETAMINOPHEN 2 TABLET: 5; 325 TABLET ORAL at 08:43

## 2020-12-14 RX ADMIN — HYDROMORPHONE HYDROCHLORIDE 0.5 MG: 1 INJECTION, SOLUTION INTRAMUSCULAR; INTRAVENOUS; SUBCUTANEOUS at 08:21

## 2020-12-14 RX ADMIN — ONDANSETRON 4 MG: 2 INJECTION INTRAMUSCULAR; INTRAVENOUS at 08:00

## 2020-12-14 RX ADMIN — LIDOCAINE HYDROCHLORIDE 40 MG: 20 INJECTION, SOLUTION EPIDURAL; INFILTRATION; INTRACAUDAL; PERINEURAL at 07:41

## 2020-12-14 ASSESSMENT — PAIN SCALES - GENERAL
PAINLEVEL_OUTOF10: 9
PAINLEVEL_OUTOF10: 6
PAINLEVEL_OUTOF10: 6
PAINLEVEL_OUTOF10: 7
PAINLEVEL_OUTOF10: 8
PAINLEVEL_OUTOF10: 7

## 2020-12-14 ASSESSMENT — PAIN - FUNCTIONAL ASSESSMENT: PAIN_FUNCTIONAL_ASSESSMENT: 0-10

## 2020-12-14 NOTE — OP NOTE
Operative Note      Patient: Rose Jimenez  YOB: 1960  MRN: [id number]    Date of Procedure: 12/14/2020    Pre-Op Diagnosis: * No pre-op diagnosis entered *    Post-Op Diagnosis: Same       * No procedures listed *    * No surgeons listed *    Assistant:   * No surgical staff found *    Anesthesia: * No anesthesia type entered *    Estimated Blood Loss (mL): Minimal    Complications: None    Specimens:   * No specimens in log *    Implants:  * No implants in log *      Drains:   Negative Pressure Wound Therapy Knee Anterior;Right (Active)           Detailed Description of Procedure:   Manipulation of the knee Operative Note    Patient: Rose Jimenez MRN: [id number]     YOB: 1960  Age: 56 y.o. Sex: female      Date of Operation: [unfilled]     Preoperative Diagnosis:right total knee arthroplasty stiffness. Postoperative Diagnosis: same    Procedure Performed: right manipulation of the knee under anesthesia      Surgeon: William Hallman  Assistant(s): * No surgical staff found *  Anesthesia: General    Drains: None  Patient is here for definitive treatment regarding stiffness postoperative of the right arthroplasty knee. Prior to this surgical intervention patient had been doing well with physical therapy but was limited in range of motion and pain. The relative risks and benefits of manipulation of the right knee were discussed. The relative increase risk of iatrogenic fracture, cutaneous nerve injury with the approach were discussed along with potential wound healing problems    Description of Procedure: Patient was brought to the operating room in stable condition. Affected knee was evaluated in range of motion recorded as0 to70. After anesthesia was sufficient patient's knee was extended fully and flexed fully to the extent that soft tissues and bony limits allowed. The final range of motion recorded was0 to110.   Patient was wrapped iced and left the operating room in stable condition  Complications: None  Disposition: Admission after recovery  Condition: Stable  Attending Attestation: I was present for the entire procedure. Signed by: Edgar Trejo MD, 49/3/4908          Complications: None  Disposition: Admission after recovery  Condition: Stable  Attending Attestation: I was present  for the entire procedure.     Signed by: Edgar Trejo, 12/14/2020      Electronically signed by Edgar Trejo MD on 12/14/2020 at 7:48 AM

## 2020-12-14 NOTE — H&P
CONCENTRATE INJECTION SUBCHONDRAL LEFT KNEE, PPP INJECTION INTRA-ARTICULAR LEFT KNEE, PPP INJECTION INTRA-ARTICULAR  LEFT KNEE, PPP INJECTION LEFT SUBTALAR JOINT performed by Barney Baker MD at [address]  2011  (X3 TOTAL)    right    REVISION TOTAL KNEE ARTHROPLASTY Right 11/2/2020    RIGHT REVISION KNEE ARTHROPLASTY performed by Barney Baker MD at [address]       Medications Prior to Admission:   Prior to Admission medications    Medication Sig Start Date End Date Taking? Authorizing Provider   sennosides-docusate sodium (SENOKOT-S) 8.6-50 MG tablet Take 1 tablet by mouth 2 times daily as needed for Constipation 12/8/20 1/7/21 Yes Odella Snellen, PA   oxyCODONE HCl (OXY-IR) 10 MG immediate release tablet Take 1.5 tablets by mouth every 4 hours as needed for Pain for up to 7 days. 12/8/20 12/15/20 Yes Odella Snellen, PA   gabapentin (NEURONTIN) 300 MG capsule Take 1 capsule by mouth 3 times daily for 30 days.  11/17/20 12/17/20 Yes Odella Snellen, PA   DULoxetine (CYMBALTA) 60 MG extended release capsule Take 1 capsule by mouth 2 times daily 11/17/20  Yes TOMASZ Avilez   atorvastatin (LIPITOR) 10 MG tablet Take 10 mg by mouth daily   Yes Historical Provider, MD   hydrochlorothiazide (HYDRODIURIL) 25 MG tablet 12.5 mg daily  5/10/12  Yes Historical Provider, MD   warfarin (COUMADIN) 5 MG tablet 5mg 4x week and 7.5 3x a week 11/17/20   Odella Snellen, PA   albuterol sulfate  (90 Base) MCG/ACT inhaler Inhale 2 puffs into the lungs every 4 hours as needed 10/22/20   Historical Provider, MD   Prenatal Vit-Fe Fumarate-FA (PRENATAL COMPLETE PO) Take by mouth daily    Historical Provider, MD   calcium-vitamin D (OSCAL-500) 500-200 MG-UNIT per tablet Take 1 tablet by mouth daily    Historical Provider, MD   fluticasone (FLONASE) 50 MCG/ACT nasal spray 1 spray by Nasal route nightly as needed     Historical Provider, MD       Allergies:  Patient has no known allergies. Social History:   Social History     Socioeconomic History    Marital status:      Spouse name: None    Number of children: None    Years of education: None    Highest education level: None   Occupational History    None   Social Needs    Financial resource strain: None    Food insecurity     Worry: None     Inability: None    Transportation needs     Medical: None     Non-medical: None   Tobacco Use    Smoking status: Current Every Day Smoker     Packs/day: 0.50     Years: 20.00     Pack years: 10.00     Types: Cigarettes    Smokeless tobacco: Never Used   Substance and Sexual Activity    Alcohol use: Yes     Comment: RARELY    Drug use: No    Sexual activity: None   Lifestyle    Physical activity     Days per week: None     Minutes per session: None    Stress: None   Relationships    Social connections     Talks on phone: None     Gets together: None     Attends [employer] service: None     Active member of club or organization: None     Attends meetings of clubs or organizations: None     Relationship status: None    Intimate partner violence     Fear of current or ex partner: None     Emotionally abused: None     Physically abused: None     Forced sexual activity: None   Other Topics Concern    None   Social History Narrative    None         Family History:       Problem Relation Age of Onset    Diabetes Mother     Hypertension Mother     Hypertension Father     Cancer Other          REVIEW OF SYSTEMS:    Constitutional: Negative for chills, fatigue and fever. HENT: Negative for loss of hearing   Eyes: Negative for visual disturbance. Respiratory: Negative for  cough, chest tightness, shortness of breath and wheezing. Cardiovascular: Negative for chest pain, palpitations and exertional chest pressure  Gastrointestinal: Negative for constipation, diarrhea, nausea and vomiting. Musculoskeletal: Positive for gait problem, and joint swelling (right knee).     Skin: Negative for rash and nonhealing wounds. Neurological: Negative for dizziness, syncope, light-headedness,    Hematological: Does not bruise/bleed easily. Psychiatric/Behavioral: Negative for agitation    PHYSICAL EXAM:      /61   Pulse 88   Temp 98.3 °F (36.8 °C) (Oral)   Resp 16   Ht 5' 4\" (1.626 m)   Wt 170 lb (77.1 kg)   LMP  (LMP Unknown)   SpO2 95%   BMI 29.18 kg/m²  I      Eyes:  pupils equal, round and reactive to light and conjunctiva normal    Head/ENT:  Normocephalic, without obvious abnormality, atramatic,     Neck:  Supple, symmetrical, trachea midline, no adenopathy, thyroid symmetric, not enlarged and no tenderness, skin warm and dry. Heart: regular rate and rhythm    Lungs:  No increased work of breathing, good air exchange, clear to auscultation bilaterally, no crackles or wheezing    Abdomen:  Normal bowel sounds, soft, non-distended, non-tender, no masses palpated    Extremities:  No clubbing, cyanosis, or edema    Right knee: Skin intact, moderate swelling, limited flexion. PMS intact distal to the knee      ASSESSMENT AND PLAN:    1. Patient is a 56 y.o. female with above specified procedure planned. 2.  Access to ancillary services are available per request of the provider.     Amy June 12/14/2020

## 2020-12-14 NOTE — PROGRESS NOTES
0845 pt reports pain 7/10, on admit pain was 6/10 after two percocet pt states pain acceptable for dc

## 2020-12-14 NOTE — ANESTHESIA PRE PROCEDURE
Department of Anesthesiology  Preprocedure Note       Name:  Kimmie Mckeon   Age:  56 y.o.  :  1960                                          MRN:  [id number]         Date:  2020      Surgeon: * No surgeons listed *    Procedure: * No procedures listed *    Medications prior to admission:   Prior to Admission medications    Medication Sig Start Date End Date Taking? Authorizing Provider   sennosides-docusate sodium (SENOKOT-S) 8.6-50 MG tablet Take 1 tablet by mouth 2 times daily as needed for Constipation 20 Yes TOMASZ Brizuela   oxyCODONE HCl (OXY-IR) 10 MG immediate release tablet Take 1.5 tablets by mouth every 4 hours as needed for Pain for up to 7 days. 12/8/20 12/15/20 Yes TOMASZ Brizuela   gabapentin (NEURONTIN) 300 MG capsule Take 1 capsule by mouth 3 times daily for 30 days.  20 Yes TOMASZ Brizuela   DULoxetine (CYMBALTA) 60 MG extended release capsule Take 1 capsule by mouth 2 times daily 20  Yes Diedra Fleischer Wigger, PA   atorvastatin (LIPITOR) 10 MG tablet Take 10 mg by mouth daily   Yes Historical Provider, MD   hydrochlorothiazide (HYDRODIURIL) 25 MG tablet 12.5 mg daily  5/10/12  Yes Historical Provider, MD   warfarin (COUMADIN) 5 MG tablet 5mg 4x week and 7.5 3x a week 20   TOMASZ Brizuela   albuterol sulfate  (90 Base) MCG/ACT inhaler Inhale 2 puffs into the lungs every 4 hours as needed 10/22/20   Historical Provider, MD   Prenatal Vit-Fe Fumarate-FA (PRENATAL COMPLETE PO) Take by mouth daily    Historical Provider, MD   calcium-vitamin D (OSCAL-500) 500-200 MG-UNIT per tablet Take 1 tablet by mouth daily    Historical Provider, MD   fluticasone (FLONASE) 50 MCG/ACT nasal spray 1 spray by Nasal route nightly as needed     Historical Provider, MD       Current medications:    Current Outpatient Medications   Medication Sig Dispense Refill  sennosides-docusate sodium (SENOKOT-S) 8.6-50 MG tablet Take 1 tablet by mouth 2 times daily as needed for Constipation 60 tablet 1    oxyCODONE HCl (OXY-IR) 10 MG immediate release tablet Take 1.5 tablets by mouth every 4 hours as needed for Pain for up to 7 days. 60 tablet 0    gabapentin (NEURONTIN) 300 MG capsule Take 1 capsule by mouth 3 times daily for 30 days.  90 capsule 3    DULoxetine (CYMBALTA) 60 MG extended release capsule Take 1 capsule by mouth 2 times daily 180 capsule 2    atorvastatin (LIPITOR) 10 MG tablet Take 10 mg by mouth daily      hydrochlorothiazide (HYDRODIURIL) 25 MG tablet 12.5 mg daily       warfarin (COUMADIN) 5 MG tablet 5mg 4x week and 7.5 3x a week 45 tablet 0    albuterol sulfate  (90 Base) MCG/ACT inhaler Inhale 2 puffs into the lungs every 4 hours as needed      Prenatal Vit-Fe Fumarate-FA (PRENATAL COMPLETE PO) Take by mouth daily      calcium-vitamin D (OSCAL-500) 500-200 MG-UNIT per tablet Take 1 tablet by mouth daily      fluticasone (FLONASE) 50 MCG/ACT nasal spray 1 spray by Nasal route nightly as needed        Current Facility-Administered Medications   Medication Dose Route Frequency Provider Last Rate Last Admin    lactated ringers infusion   Intravenous Continuous Sarah Ashby  mL/hr at 12/14/20 0630 New Bag at 12/14/20 0630       Allergies:  No Known Allergies    Problem List:    Patient Active Problem List   Diagnosis Code    Bursitis, hip M70.70    Left knee pain M25.562    S/P left knee arthroscopy Z98.890    Chronic pain syndrome G89.4    Bulging lumbar disc M51.26    Primary osteoarthritis of both knees M17.0    History of total right knee replacement, +revision x3 Z96.651    Chronic bilateral low back pain M54.5, G89.29    Chronic fatigue R53.82    Fatigue R53.83    Achilles tendinitis of left lower extremity M76.62    Lumbar arthropathy M47.816    History of ankle surgery 1/11/2019 Z98.890  Left Achilles tendinitis M76.62    Delayed surgical wound healing T81.89XA    Primary osteoarthritis of left knee M17.12    Osteoarthritis of left knee M17.12    DVT (deep venous thrombosis) ([facility]) I82.409    High blood pressure I10    Status post left partial knee replacement Z96.652    Chronic knee pain after total replacement of right knee joint M25.561, G89.29, Z96.651    Status post revision of total replacement of right knee Z96.651    Failed total knee arthroplasty, initial encounter ([facility] T84.018A, Z96.659    Hyperlipidemia E78.5       Past Medical History:        Diagnosis Date    Arthritis     Bulging lumbar disc     Chronic back pain since early 2000's    DDD (degenerative disc disease), cervical     DDD (degenerative disc disease), lumbar     DVT (deep venous thrombosis) ([facility] 2004    after right knee arthroscopy    DVT (deep venous thrombosis) ([facility] 1/1/2004    after right knee arthroscopy    High blood pressure     Hx of blood clots 2008    right leg     Hyperlipidemia     Sinusitis        Past Surgical History:        Procedure Laterality Date    ACHILLES TENDON SURGERY Left 1/11/2019    LEFT ACHILLES DEBRIDEMENT, TOPAZ PROCEDURE performed by Li Serrano MD at [address]    hammer toe    HYSTERECTOMY  2004    JOINT REPLACEMENT      JOINT REPLACEMENT Left 2/3/2020    LEFT MEDIAL PARTAL KNEE ARTHROPLASTY DUGLAS performed by Li Serrano MD at [address] [facility]  2008    right    KNEE SURGERY  8/2008    RT TKA    FL ARTHRS KNE SURG W/MENISCECTOMY MED/LAT W/SHVG Left 8/10/2018 LEFT ILIAC CREST BONE MARROW HARVEST,  LEFT KNEE ARTHROSCOPY; SUBCHONDROPLASTY, ULTRASOUND GUIDED BONE MARROW CONCENTRATE INJECTION SUBCHONDRAL LEFT KNEE, PPP INJECTION INTRA-ARTICULAR LEFT KNEE, PPP INJECTION INTRA-ARTICULAR  LEFT KNEE, PPP INJECTION LEFT SUBTALAR JOINT performed by Tyler Torres MD at [address] [facility]  2011  (X3 TOTAL)    right    REVISION TOTAL KNEE ARTHROPLASTY Right 11/2/2020    RIGHT REVISION KNEE ARTHROPLASTY performed by Tyler Torres MD at [address] History:    Social History     Tobacco Use    Smoking status: Current Every Day Smoker     Packs/day: 0.50     Years: 20.00     Pack years: 10.00     Types: Cigarettes    Smokeless tobacco: Never Used   Substance Use Topics    Alcohol use: Yes     Comment: RARELY                                Ready to quit: Not Answered  Counseling given: Not Answered      Vital Signs (Current):   Vitals:    12/14/20 0551 12/14/20 0605   BP:  103/61   Pulse:  88   Resp:  16   Temp:  98.3 °F (36.8 °C)   TempSrc:  Oral   SpO2:  95%   Weight: 170 lb (77.1 kg)    Height: 5' 4\" (1.626 m)                                               BP Readings from Last 3 Encounters:   12/14/20 103/61   12/08/20 123/77   11/17/20 116/72       NPO Status: Time of last liquid consumption: 2355                        Time of last solid consumption: 2200                        Date of last liquid consumption: 12/13/20                        Date of last solid food consumption: 12/13/20    BMI:   Wt Readings from Last 3 Encounters:   12/14/20 170 lb (77.1 kg)   12/10/20 170 lb (77.1 kg)   12/08/20 173 lb (78.5 kg)     Body mass index is 29.18 kg/m².     CBC:   Lab Results   Component Value Date    WBC 15.9 11/03/2020    RBC 3.45 11/03/2020    HGB 10.4 11/03/2020    HCT 31.5 11/03/2020    MCV 91.3 11/03/2020    RDW 13.3 11/03/2020     11/03/2020       CMP:   Lab Results   Component Value Date     11/03/2020 MIPS: Postoperative opioids intended and Prophylactic antiemetics administered. Anesthetic plan and risks discussed with patient. Use of blood products discussed with patient whom consented to blood products. Plan discussed with attending and CRNA.     Attending anesthesiologist reviewed and agrees with Pre Eval content              Reese Snow DO   12/14/2020

## 2020-12-14 NOTE — PROGRESS NOTES
Ambulatory Surgery/Procedure Discharge Note    Vitals:    12/14/20 0943   BP: 100/62   Pulse: 87   Resp: 16   Temp: 97.3 °F (36.3 °C)   SpO2: 97%     Patient meets discharge criteria based on Lyndsay score. In: 1060 [P.O.:360; I.V.:700]  Out: -     Restroom use offered before discharge. Yes    Pain assessment:  present - adequately treated  Pain Level: 6    Discharge instructions given to patient's  in the waiting room. Patient states pain is 6/10, patient has been been treated with percocet in PACU. Cane left with patient. All needs met. Patient discharged to home/self care.  Patient discharged via wheel chair by transporter to waiting family/S.O.       12/14/2020 10:14 AM

## 2020-12-15 ENCOUNTER — TELEPHONE (OUTPATIENT)
Dept: ORTHOPEDIC SURGERY | Age: 60
End: 2020-12-15

## 2020-12-15 DIAGNOSIS — Z96.651 STATUS POST REVISION OF TOTAL REPLACEMENT OF RIGHT KNEE: Primary | ICD-10-CM

## 2020-12-15 NOTE — TELEPHONE ENCOUNTER
Prescription Refill     Medication Name:  OXYCODONE  Pharmacy: [facility]  Patient Contact Number:  [phone]

## 2020-12-15 NOTE — TELEPHONE ENCOUNTER
I spoke with the patient and let her know that she will need to go to outpatient therapy. The referral is already in the [facility] system, she just needs to call and schedule.

## 2020-12-16 RX ORDER — DULOXETIN HYDROCHLORIDE 60 MG/1
60 CAPSULE, DELAYED RELEASE ORAL 2 TIMES DAILY
Qty: 180 CAPSULE | Refills: 2 | Status: SHIPPED | OUTPATIENT
Start: 2020-12-16 | End: 2021-04-20 | Stop reason: SDUPTHER

## 2020-12-16 RX ORDER — OXYCODONE HYDROCHLORIDE 10 MG/1
15 TABLET ORAL EVERY 4 HOURS PRN
Qty: 60 TABLET | Refills: 0 | Status: SHIPPED | OUTPATIENT
Start: 2020-12-16 | End: 2020-12-23 | Stop reason: SDUPTHER

## 2020-12-21 ENCOUNTER — TELEPHONE (OUTPATIENT)
Dept: ORTHOPEDIC SURGERY | Age: 60
End: 2020-12-21

## 2020-12-21 ENCOUNTER — HOSPITAL ENCOUNTER (OUTPATIENT)
Dept: PHYSICAL THERAPY | Age: 60
Setting detail: THERAPIES SERIES
Discharge: HOME | End: 2020-12-21
Payer: COMMERCIAL

## 2020-12-21 NOTE — PROGRESS NOTES
Physical Therapy      [facility], [facility]  [address] [facility]  Phone: [phone]   Fax:     [phone]    Physical Therapy  Cancellation/No-show Note  Patient Name:  Hiro Ramirez  :  1960   Date:  2020  Cancelled visits to date: 0  No-shows to date: 1    Patient status for today's appointment patient:  []  Cancelled  []  Rescheduled appointment  [x]  No-show     Reason given by patient:  []  Patient ill  []  Conflicting appointment  []  No transportation    []  Conflict with work  []  No reason given  [x]  Other:     Comments:  Showed up too late to be seen    Phone call information:   []  Phone call made today to patient at _ time at number provided:      []  Patient answered, conversation as follows:    []  Patient did not answer, message left as follows:  []  Phone call not made today    Electronically signed by:  Jad Harrison [id number]

## 2020-12-21 NOTE — TELEPHONE ENCOUNTER
The patient resumed taking her coumadin after her manipulation. INR 1.4  PT 17.3    7.5 mg M W F  5 mg the rest.    Keep the same or change? How long will she be needing to check INR?

## 2020-12-22 ENCOUNTER — APPOINTMENT (OUTPATIENT)
Dept: PHYSICAL THERAPY | Age: 60
End: 2020-12-22
Payer: COMMERCIAL

## 2020-12-22 ENCOUNTER — HOSPITAL ENCOUNTER (OUTPATIENT)
Dept: PHYSICAL THERAPY | Age: 60
Setting detail: THERAPIES SERIES
Discharge: HOME | End: 2020-12-22
Payer: COMMERCIAL

## 2020-12-22 LAB
AFB CULTURE (MYCOBACTERIA): NORMAL
AFB CULTURE (MYCOBACTERIA): NORMAL
AFB SMEAR: NORMAL
AFB SMEAR: NORMAL

## 2020-12-22 PROCEDURE — G0283 ELEC STIM OTHER THAN WOUND: HCPCS

## 2020-12-22 PROCEDURE — 97140 MANUAL THERAPY 1/> REGIONS: CPT

## 2020-12-22 PROCEDURE — 97161 PT EVAL LOW COMPLEX 20 MIN: CPT

## 2020-12-22 PROCEDURE — 97530 THERAPEUTIC ACTIVITIES: CPT

## 2020-12-22 NOTE — FLOWSHEET NOTE
[facility] - Outpatient Rehabilitation and Therapy, [place]  [address] [place] [address]  Phone: [phone]   Fax:     [phone]      Physical Therapy Treatment Note/ Progress Report:     Date:  2020    Patient Name:  Bonnie Anderson    :  1960  MRN: [id number]    Pertinent Medical History:Additional Pertinent Hx: HTN, blood clots, back pain    Medical/Treatment Diagnosis Information:  · Diagnosis: s/p R TKR revision  · Treatment Diagnosis: R knee pain with dec ROM, strength, gait, balance limiting function    Insurance/Certification information:  PT Insurance Information: [address] - $25/ 75 vpcy (used )  Physician Information:  Referring Practitioner: Dr. Bryce Pepe of care signed (Y/N): sent to inbox    Date of Patient follow up with Physician: YOVANNY     Progress Report: []  Yes  [x]  No     Date Range for reporting period:  Beginnin2020  Ending:      Progress report due (10 Rx/or 30 days whichever is less): 37    Recertification due (POC duration/ or 90 days whichever is less):     Visit # POC/Insurance Allowable Auth Needed    75 vpcy []Yes   [x]No     Latex Allergy:  [x]NO      []YES  Preferred Language for Healthcare:   [x]English       []Other:    Functional Scale:       Date assessed: at eval  Test: LEFS  Score:17 (60-79%)    Pain level:  6-8/10     History of Injury:  Patient stated complaint:Pt has had a total of 5 R knee surgeries including one TKR  and most recent revision 20 with following manipulation on . Pt currently amb with good tech using std cane L UE. Rating pain at 6-8/10; N/T constantly all over the R knee; she notes elevating and icing knee daily.      SUBJECTIVE:  See eval   **delay in start of PT after manipulation x 7 business days       OBJECTIVE:   Observation:    Test measurements:      RESTRICTIONS/PRECAUTIONS:     Exercises/Interventions: Therapeutic Ex (11154)   Min: Reps/Resistance Notes/CUES   NuStep (sit close for extra knee flexion)     Step flexion str Add x 3 min on/off Reviewed tech verbally - pt states doing at home    GSS incline           FAQ on green machine 0# add     Hs curl with TB           SLR     SAQ               Therapeutic Activity (49248) Min: 15 See below                                   NMR re-education (32537)  Min:  CUES NEEDED   TKE w/ TB      QS w/ add set                    Manual Intervention (07313) Min: 10     Knee mobs seated - post    PROM flex seated on Green FAQ machine    PROM flex supine    PROM seated w/ back against wall and pulling w/ strap X 2 min     add       X 3 min     add    Patella Mobs X 5 min               Modalities  Min:15     IFC with CP X 15 min  Supine w/ legs on roll                     Other Therapeutic Activities: Pt was educated on PT POC, Diagnosis, Prognosis, pathomechanics as well as frequency and duration of scheduling future physical therapy appointments. Time was also taken on this day to answer all patient questions and participation in PT. Reviewed appointment policy in detail with patient and patient verbalized understanding. Home Exercise Program: Patient was instructed in the following for HEP:     . Patient verbalized/demonstrated understanding and was issued written handout.   12/22: reviewed importance of cont flexion hep (step flexion stretches, Heel slides using belt and seated flexion on chair as pt states she is doing)       Therapeutic Exercise and NMR EXR  [] (71015) Provided verbal/tactile cueing for activities related to strengthening, flexibility, endurance, ROM for improvements in LE, proximal hip, and core control with self care, mobility, lifting, ambulation.  [] (69473) Provided verbal/tactile cueing for activities related to improving balance, coordination, kinesthetic sense, posture, motor skill, proprioception  to assist with LE, proximal hip, and core control in self care, mobility, lifting, ambulation and eccentric single leg control. [address] and Therapeutic Activities:    [x] (22418 or 35951) Provided verbal/tactile cueing for activities related to improving balance, coordination, kinesthetic sense, posture, motor skill, proprioception and motor activation to allow for proper function of core, proximal hip and LE with self care and ADLs and functional mobility.   [] (74842) Gait Re-education- Provided training and instruction to the patient for proper LE, core and proximal hip recruitment and positioning and eccentric body weight control with ambulation re-education including up and down stairs     Home Exercise Program:    [x] (24850) Reviewed/Progressed HEP activities related to strengthening, flexibility, endurance, ROM of core, proximal hip and LE for functional self-care, mobility, lifting and ambulation/stair navigation   [] (73469)Reviewed/Progressed HEP activities related to improving balance, coordination, kinesthetic sense, posture, motor skill, proprioception of core, proximal hip and LE for self care, mobility, lifting, and ambulation/stair navigation      Manual Treatments:  PROM / STM / Oscillations-Mobs:  G-I, II, III, IV (PA's, Inf., Post.)  [x] (93875) Provided manual therapy to mobilize LE, proximal hip and/or LS spine soft tissue/joints for the purpose of modulating pain, promoting relaxation,  increasing ROM, reducing/eliminating soft tissue swelling/inflammation/restriction, improving soft tissue extensibility and allowing for proper ROM for normal function with self care, mobility, lifting and ambulation.      Charges:  Timed Code Treatment Minutes: 25   Total Treatment Minutes: 60      [x] EVAL (LOW) 28278 (typically 20 minutes face-to-face)  [] EVAL (MOD) 68772 (typically 30 minutes face-to-face)  [] EVAL (HIGH) 96152 (typically 45 minutes face-to-face)  [] RE-EVAL     [] UO(20293) x     [] Dry needle 1 or 2 Muscles (18385)  [] NMR (84575) x     [] Dry needle 3+ Muscles (84421)  [x] Manual (03358) x     [] Ultrasound (60563) x  [x] TA (95684) x     [] Mech Traction (77410)  [] ES(attended) (34834)     [x] ES (un) (77635):   [] Vasopump (36758) [] Ionto (19928)   [] Other:     GOALS:  Patient stated goal: \"walk without pain\"  [x]? Progressing: []? Met: []? Not Met: []? Adjusted     Therapist goals for Patient:   Short Term Goals: To be achieved in: 2 weeks  1. Independent in HEP and progression per patient tolerance, in order to prevent re-injury. [x]? Progressing: []? Met: []? Not Met: []? Adjusted  2. Patient will have a decrease in pain by 20-30% overall to facilitate improvement in movement, function, and ADLs as indicated by Functional Deficits. [x]? Progressing: []? Met: []? Not Met: []? Adjusted  3. Patient will demonstrate increased AROM of R knee flex to 100 to allow for proper joint functioning as indicated by patients Functional Deficits. [x]? Progressing: []? Met: []? Not Met: []? Adjusted     Long Term Goals: To be achieved at discharge:  1. Disability index score of 30% or less for the LEFS to assist with reaching prior level of function. [x]? Progressing: []? Met: []? Not Met: []? Adjusted   2. Patient will have a decrease in pain by 40-50% overall to facilitate improvement in movement, function, and ADLs as indicated by Functional Deficits. [x]? Progressing: []? Met: []? Not Met: []? Adjusted  3. Patient will demonstrate increased AROM of R knee flex to 110 to allow for proper joint functioning as indicated by patients Functional Deficits. [x]? Progressing: []? Met: []? Not Met: []? Adjusted  4. Patient will demonstrate an increase in Strength to good R QS and control and 5/5 R LE to allow for proper functional mobility as indicated by patients Functional Deficits. [x]? Progressing: []? Met: []? Not Met: []? Adjusted  5. Patient will be able to amb without AD without symptoms or restriction. [x]?  Progressing: []? Met: []? Not Met: []? Adjusted  6. \"walk without pain and walk for exercise\"   [x]? Progressing: []? Met: []? Not Met: []? Adjusted          ASSESSMENT:  See eval    Treatment/Activity Tolerance:  [x] Patient tolerated treatment well [] Patient limited by fatique  [] Patient limited by pain  [] Patient limited by other medical complications  [] Other:     Overall Progression Towards Functional goals/ Treatment Progress Update:  [] Patient is progressing as expected towards functional goals listed. [] Progression is slowed due to complexities/Impairments listed. [] Progression has been slowed due to co-morbidities. [x] Plan just implemented, too soon to assess goals progression <30days   [] Goals require adjustment due to lack of progress  [] Patient is not progressing as expected and requires additional follow up with physician  [] Other    Prognosis for POC: [x] Good [] Fair  [] Poor    Patient requires continued skilled intervention: [x] Yes  [] No        PLAN:   [] Continue per plan of care [] Alter current plan (see comments)  [x] Plan of care initiated [] Hold pending MD visit [] Discharge    Electronically signed by: Hima Peres, PT [id number]    Note: If patient does not return for scheduled/recommended follow up visits, this note will serve as a discharge from care along with the most recent update on progress.

## 2020-12-22 NOTE — PLAN OF CARE
cane L UE. Rating pain at 6-8/10; N/T constantly all over the R knee; she notes elevating and icing knee daily. Relevant Medical History:Additional Pertinent Hx: HTN, blood clots, back pain, L partial TKR 2020  Functional Scale/Score: LEFS = 14 (60-79%)    Pain Scale:6-8/10  Easing factors: rest, ice, meds   Provocative factors: activity; walking on it     Type: [x]Constant   []Intermittent  []Radiating []Localized []other:     Numbness/Tingling: yes all over knee     Occupation/School:NA    Living Status/Prior Level of Function: Independent with ADLs and IADLs,     OBJECTIVE:   Palpation: TTP med/lat and ant knee; warmth noted in knee     Functional Mobility/Transfers: Independent    Posture: WFL    Bandages/Dressings/Incisions: incision healed well     Gait: (include devices/WB status) good with use of std cane L UE     ROM LEFT RIGHT   HIP Flex     HIP Abd     HIP Ext     HIP IR     HIP ER     Knee ext -2    Knee Flex 93  Pt notes prior to manip ROM was 90 and thinks MD got her to 110     Ankle PF     Ankle DF     Ankle In     Ankle Ev     Strength  LEFT RIGHT   HIP Flexors 5    HIP Abductors     HIP Ext     Hip ER     Knee EXT (quad) 3+    Knee Flex (HS) 4+    Ankle DF 5    Ankle PF     Ankle Inv     Ankle EV     QS fair    Circumference  Mid apex  7 cm prox             Reflexes/Sensation:    [x]Dermatomes/Myotomes intact    [x]Reflexes equal and normal bilaterally   []Other:    Joint mobility: patellar mobility moderately tight    []Normal    [x]Hypo   []Hyper    Orthopedic Special Tests:                        [x] Patient history, allergies, meds reviewed. Medical chart reviewed. See intake form. Review Of Systems (ROS):  [x]Performed Review of systems (Integumentary, CardioPulmonary, Neurological) by intake and observation. Intake form has been scanned into medical record.  Patient has been instructed to contact their primary care physician regarding ROS issues if not already being addressed at this functional questionnaire and intake)   [x]Reduced ability to tolerate prolonged functional positions   [x]Reduced ability or difficulty with changes of positions or transfers between positions   [x]Reduced ability to maintain good posture and demonstrate good body mechanics with sitting, bending, and lifting   [x]Reduced ability to sleep   [x] Reduced ability or tolerance with driving and/or computer work   [x]Reduced ability to perform lifting, carrying tasks   [x]Reduced ability to squat   []Reduced ability to forward bend   [x]Reduced ability to ambulate prolonged functional periods/distances/surfaces   [x]Reduced ability to ascend/descend stairs   [x]Reduced ability to run, hop or jump   []other:     Participation Restrictions   [x]Reduced participation in self care activities   [x]Reduced participation in home management activities   []Reduced participation in work activities   [x]Reduced participation in social activities. []Reduced participation in sport activities. Classification :    [x]Signs/symptoms consistent with post-surgical and post manipulation status including decreased ROM, strength and function.    []Signs/symptoms consistent with joint sprain/strain   []Signs/symptoms consistent with patella-femoral syndrome   []Signs/symptoms consistent with knee OA/hip OA   []Signs/symptoms consistent with internal derangement of knee/Hip   []Signs/symptoms consistent with functional hip weakness/NMR control      []Signs/symptoms consistent with tendinitis/tendinosis    []signs/symptoms consistent with pathology which may benefit from Dry needling      [x]other:  Post manipulation     Prognosis/Rehab Potential:      []Excellent   []Good    [x]Fair   []Poor    Tolerance of evaluation/treatment:    []Excellent   []Good    [x]Fair   []Poor    Physical Therapy Evaluation Complexity Justification  [x] A history of present problem with:  [] no personal factors and/or comorbidities that impact the plan of Independent in HEP and progression per patient tolerance, in order to prevent re-injury. [x] Progressing: [] Met: [] Not Met: [] Adjusted  2. Patient will have a decrease in pain by 20-30% overall to facilitate improvement in movement, function, and ADLs as indicated by Functional Deficits. [x] Progressing: [] Met: [] Not Met: [] Adjusted  3. Patient will demonstrate increased AROM of R knee flex to 100 to allow for proper joint functioning as indicated by patients Functional Deficits. [x] Progressing: [] Met: [] Not Met: [] Adjusted    Long Term Goals: To be achieved at discharge:  1. Disability index score of 30% or less for the LEFS to assist with reaching prior level of function. [x] Progressing: [] Met: [] Not Met: [] Adjusted   2. Patient will have a decrease in pain by 40-50% overall to facilitate improvement in movement, function, and ADLs as indicated by Functional Deficits. [x] Progressing: [] Met: [] Not Met: [] Adjusted  3. Patient will demonstrate increased AROM of R knee flex to 110 to allow for proper joint functioning as indicated by patients Functional Deficits. [x] Progressing: [] Met: [] Not Met: [] Adjusted  4. Patient will demonstrate an increase in Strength to good R QS and control and 5/5 R LE to allow for proper functional mobility as indicated by patients Functional Deficits. [x] Progressing: [] Met: [] Not Met: [] Adjusted  5. Patient will be able to amb without AD without symptoms or restriction. [x] Progressing: [] Met: [] Not Met: [] Adjusted  6. \"walk without pain and walk for exercise\"   [x] Progressing: [] Met: [] Not Met: [] Adjusted     Electronically signed by:  Alexandria Leo, PT [id number]      Note: If patient does not return for scheduled/recommended follow up visits, this note will serve as a discharge from care along with the most recent update on progress.

## 2020-12-23 ENCOUNTER — OFFICE VISIT (OUTPATIENT)
Dept: ORTHOPEDIC SURGERY | Age: 60
End: 2020-12-23

## 2020-12-23 ENCOUNTER — TELEPHONE (OUTPATIENT)
Dept: ORTHOPEDIC SURGERY | Age: 60
End: 2020-12-23

## 2020-12-23 VITALS
TEMPERATURE: 97.1 F | HEIGHT: 64 IN | DIASTOLIC BLOOD PRESSURE: 89 MMHG | SYSTOLIC BLOOD PRESSURE: 131 MMHG | BODY MASS INDEX: 29.02 KG/M2 | WEIGHT: 170 LBS | HEART RATE: 85 BPM

## 2020-12-23 PROCEDURE — 99024 POSTOP FOLLOW-UP VISIT: CPT | Performed by: PHYSICIAN ASSISTANT

## 2020-12-23 RX ORDER — TIZANIDINE 2 MG/1
2 TABLET ORAL EVERY 8 HOURS PRN
Qty: 30 TABLET | Refills: 1 | Status: SHIPPED | OUTPATIENT
Start: 2020-12-23

## 2020-12-23 RX ORDER — OXYCODONE HYDROCHLORIDE 10 MG/1
15 TABLET ORAL EVERY 4 HOURS PRN
Qty: 60 TABLET | Refills: 0 | Status: SHIPPED | OUTPATIENT
Start: 2020-12-23 | End: 2020-12-29 | Stop reason: SDUPTHER

## 2020-12-23 ASSESSMENT — ENCOUNTER SYMPTOMS
VOMITING: 0
COUGH: 0
DIARRHEA: 0
SORE THROAT: 0
SHORTNESS OF BREATH: 0
ALLERGIC/IMMUNOLOGIC NEGATIVE: 1
CONSTIPATION: 0
EYES NEGATIVE: 1
WHEEZING: 0
NAUSEA: 0
ABDOMINAL PAIN: 0

## 2020-12-23 NOTE — PROGRESS NOTES
Patient Name: Kristen Summers MRN: <[id number]>   Age: 56 y.o. YOB: 1960   Sex: female         CHIEF COMPLAINT   Right total Knee revision postoperative visit    HISTORY OF PRESENT ILLNESS   Patient returns for their third postoperative evaluation status post right total knee revision. First postop visit status post manipulation    The patient is doing very fair. They have moderate complaints of pain. Rates pain as a 6 out of 10  There is moderate swelling about the knee. The patient has been doing physical therapy outpatient physical therapy at [facility]. Notes that are fairly aggressive with her and she is having a lot of pain in the anterior shin mostly. They deny any calf swelling or numbness or tingling down the leg       Assessment     Review of Systems   Constitutional: Negative for chills and fever. HENT: Negative for ear discharge, ear pain, hearing loss, nosebleeds and sore throat. Eyes: Negative. Respiratory: Negative for cough, shortness of breath and wheezing. Cardiovascular: Negative for chest pain and palpitations. Gastrointestinal: Negative for abdominal pain, constipation, diarrhea, nausea and vomiting. Endocrine: Negative. Genitourinary: Negative for dysuria, frequency and urgency. Musculoskeletal: Positive for joint swelling. Skin: Negative for rash. Allergic/Immunologic: Negative. Neurological: Negative for dizziness and headaches. Hematological: Negative. Psychiatric/Behavioral: Negative. PHYSICAL EXAM     Vital Signs:   Vitals:    12/23/20 1134   BP: 131/89   Pulse: 85   Temp: 97.1 °F (36.2 °C)       Examination of the knee shows a well-healed midline incision. There is moderate swelling. There is no drainage. Range of motion is 10 to 85 with assistance can get to 0 degrees extension to 90 degrees flexion  Tenderness noted to anterior tibialis. The patient is neurovascularly intact. NEUROLOGICALLY: There is no evidence for sensory or motor deficits in the extremity. Coordination appears full with no spacticity or rigidity. Reflexes appear to be symmetric. Distal circulation intact. No signs of RSD. Calf nontender. Negative nica's,  no signs of dvt    Hip exam shows full ROM with no focal pain or Instability. Leg lengths are normal. There is no Trendelenburg Gait. RADIOLOGY   Xray   Have reviewed the xrays above from 11/17/2020  3 views of the right knee AP, lateral and sunrise views were performed on 11/17/2020 and reviewed today and my impression is: Well-placed hinged knee prosthesis with long tibial and femoral stems. No evidence of acute fracture dislocation    IMPRESSION   7 week(s) status post right total knee revision    PLAN   The patient is doing well 7 week(s) status post right total knee revision. The patient will finish up therapy. They may slowly resume normal activities.   Advised patient to switch physical therapist as they are being too aggressive with her  We will continue current Coumadin regiment recheck on Wednesday, December 30  Refilled pain medication and gave patient prescription for tizanidine 2 mg 1 3 times daily as needed muscle spasm    Advised to follow-up in 1 weeks for repeat evaluation    The orders below, if any, were placed during this visit:          ICD-10-CM    1. Status post revision of total replacement of right knee  Z96.651 oxyCODONE HCl (OXY-IR) 10 MG immediate release tablet         TOMASZ Hansen

## 2020-12-24 ENCOUNTER — APPOINTMENT (OUTPATIENT)
Dept: PHYSICAL THERAPY | Age: 60
End: 2020-12-24
Payer: COMMERCIAL

## 2020-12-28 ENCOUNTER — APPOINTMENT (OUTPATIENT)
Dept: PHYSICAL THERAPY | Age: 60
End: 2020-12-28
Payer: COMMERCIAL

## 2020-12-29 ENCOUNTER — APPOINTMENT (OUTPATIENT)
Dept: PHYSICAL THERAPY | Age: 60
End: 2020-12-29
Payer: COMMERCIAL

## 2020-12-29 ENCOUNTER — OFFICE VISIT (OUTPATIENT)
Dept: ORTHOPEDIC SURGERY | Age: 60
End: 2020-12-29

## 2020-12-29 VITALS
BODY MASS INDEX: 29.02 KG/M2 | HEART RATE: 91 BPM | SYSTOLIC BLOOD PRESSURE: 103 MMHG | HEIGHT: 64 IN | TEMPERATURE: 97 F | WEIGHT: 170 LBS | DIASTOLIC BLOOD PRESSURE: 63 MMHG

## 2020-12-29 PROCEDURE — 99024 POSTOP FOLLOW-UP VISIT: CPT | Performed by: PHYSICIAN ASSISTANT

## 2020-12-29 RX ORDER — OXYCODONE HYDROCHLORIDE 10 MG/1
15 TABLET ORAL EVERY 4 HOURS PRN
Qty: 60 TABLET | Refills: 0 | Status: SHIPPED | OUTPATIENT
Start: 2020-12-29 | End: 2021-01-05 | Stop reason: SDUPTHER

## 2020-12-29 ASSESSMENT — ENCOUNTER SYMPTOMS
CONSTIPATION: 0
ABDOMINAL PAIN: 0
SHORTNESS OF BREATH: 0
VOMITING: 0
ALLERGIC/IMMUNOLOGIC NEGATIVE: 1
WHEEZING: 0
SORE THROAT: 0
COUGH: 0
DIARRHEA: 0
EYES NEGATIVE: 1
NAUSEA: 0

## 2020-12-29 NOTE — PROGRESS NOTES
Patient Name: Tyson Benitez MRN: <[id number]>   Age: 56 y.o. YOB: 1960   Sex: female         CHIEF COMPLAINT   Right total Knee revision postoperative visit    HISTORY OF PRESENT ILLNESS   Patient returns for their fourth postoperative evaluation status post right total knee revision. First postop visit status post manipulation    The patient is doing very fair. They have moderate complaints of pain. Rates pain as a 6 out of 10  There is small swelling about the knee. The patient has been doing physical therapy on her own at home. She notes moderate improvement in her overall motion as well as reduction in some of the pain that she is experiencing with the previous physical therapist and how aggressive they were being. They deny any calf swelling or numbness or tingling down the leg       Assessment     Review of Systems   Constitutional: Negative for chills and fever. HENT: Negative for ear discharge, ear pain, hearing loss, nosebleeds and sore throat. Eyes: Negative. Respiratory: Negative for cough, shortness of breath and wheezing. Cardiovascular: Negative for chest pain and palpitations. Gastrointestinal: Negative for abdominal pain, constipation, diarrhea, nausea and vomiting. Endocrine: Negative. Genitourinary: Negative for dysuria, frequency and urgency. Musculoskeletal: Positive for joint swelling. Skin: Negative for rash. Allergic/Immunologic: Negative. Neurological: Negative for dizziness and headaches. Hematological: Negative. Psychiatric/Behavioral: Negative. PHYSICAL EXAM     Vital Signs:   Vitals:    12/29/20 1127   BP: 103/63   Pulse: 91   Temp: 97 °F (36.1 °C)       Examination of the knee shows a well-healed midline incision. There is small swelling. There is no drainage.     Range of motion is 2-3 to 90-95 with assistance can get to 0 degrees extension to 90 degrees flexion

## 2020-12-29 NOTE — PLAN OF CARE
[facility], [facility]  [address] [facility], [facility]  Phone: [phone]   Fax:     [phone]      Physical Therapy Discharge Summary    Dear Dr. Shana Stoner,    We had the pleasure of treating the following patient for physical therapy services at [address]. A summary of our findings can be found in the discharge summary below. This includes our final assessment. If you have any questions or concerns regarding these findings, please do not hesitate to contact me at the office phone number checked above. Thank you for the referral.       Physician Signature:________________________________Date:__________________  By signing above, therapists plan is approved by physician          Patient: Ildefonso Mazariegos   : 1960   MRN: [id number]  Referring Physician: Referring Practitioner: Dr. Shana Stoner      Evaluation Date: 2020        Medical/Treatment Diagnosis Information:  · Diagnosis: s/p R TKR revision  · Treatment Diagnosis: R knee pain with dec ROM, strength, gait, balance limiting function       Insurance/Certification information:  [address]     Date Range of Treatment: evaluation only on 20   Total visits:1      Functional Outcomes Measure: NT   Test:  Score:    SUBJECTIVE: pt attended evaluation only and then canceled remaining sessions stating \"MD wants to try something different. \"        Pain Scale: 6-8/10    Type: [x]Constant   []Intermitment  []Radiating []Localized  []other:     Functional Limitations: []Sitting [x]Standing [x]Walking    [x]Squatting [x]Stairs            [x]ADL's  []Driving []Sports/Recreation  []Other:      OBJECTIVE:no updates available; pt only attended eval       Response to Treatment:   []Patient met all goals and has responded well to treatment and will continue HEP   []Patient should continue to improve in reasonable time if they continue HEP   []Patient has plateaued and is no longer responding to skilled PT intervention    []Patient is getting worse and would benefit from return to referring MD   [x]Patient unable to adhere to initial POC       PLAN:   Refer back to MD per pt statement    Reason for Discharge:  [] Goals Met   [x] Patient did not return after initial evaluation   [] Progress Plateaued  [] Missed _____ scheduled appointments   [] No insurance coverage [] Patient terminated therapy   [] MD discharged from PT [] Financial Limitations  [] Other:      Electronically signed by:  Uvaldo Mesa, PT [id number]

## 2020-12-30 ENCOUNTER — APPOINTMENT (OUTPATIENT)
Dept: PHYSICAL THERAPY | Age: 60
End: 2020-12-30
Payer: COMMERCIAL

## 2020-12-31 ENCOUNTER — TELEPHONE (OUTPATIENT)
Dept: ORTHOPEDIC SURGERY | Age: 60
End: 2020-12-31

## 2021-01-04 ENCOUNTER — TELEPHONE (OUTPATIENT)
Dept: ORTHOPEDIC SURGERY | Age: 61
End: 2021-01-04

## 2021-01-04 DIAGNOSIS — Z96.651 STATUS POST REVISION OF TOTAL REPLACEMENT OF RIGHT KNEE: ICD-10-CM

## 2021-01-05 RX ORDER — OXYCODONE HYDROCHLORIDE 10 MG/1
15 TABLET ORAL EVERY 4 HOURS PRN
Qty: 60 TABLET | Refills: 0 | Status: SHIPPED | OUTPATIENT
Start: 2021-01-05 | End: 2021-01-15 | Stop reason: SDUPTHER

## 2021-01-06 ENCOUNTER — TELEPHONE (OUTPATIENT)
Dept: ORTHOPEDIC SURGERY | Age: 61
End: 2021-01-06

## 2021-01-06 DIAGNOSIS — Z96.651 STATUS POST REVISION OF TOTAL REPLACEMENT OF RIGHT KNEE: ICD-10-CM

## 2021-01-12 ENCOUNTER — OFFICE VISIT (OUTPATIENT)
Dept: ORTHOPEDIC SURGERY | Age: 61
End: 2021-01-12

## 2021-01-12 VITALS — TEMPERATURE: 97.9 F | WEIGHT: 172 LBS | BODY MASS INDEX: 29.52 KG/M2

## 2021-01-12 DIAGNOSIS — Z96.651 STATUS POST REVISION OF TOTAL REPLACEMENT OF RIGHT KNEE: Primary | ICD-10-CM

## 2021-01-12 DIAGNOSIS — Z96.651 STATUS POST REVISION OF TOTAL REPLACEMENT OF RIGHT KNEE: ICD-10-CM

## 2021-01-12 LAB
ANION GAP SERPL CALCULATED.3IONS-SCNC: 9 MMOL/L (ref 3–16)
APTT: 27.6 SEC (ref 24.2–36.2)
BUN BLDV-MCNC: 7 MG/DL (ref 7–20)
C-REACTIVE PROTEIN: 4.4 MG/L (ref 0–5.1)
CALCIUM SERPL-MCNC: 10.5 MG/DL (ref 8.3–10.6)
CHLORIDE BLD-SCNC: 100 MMOL/L (ref 99–110)
CO2: 32 MMOL/L (ref 21–32)
CREAT SERPL-MCNC: 0.6 MG/DL (ref 0.6–1.2)
GFR AFRICAN AMERICAN: >60
GFR NON-AFRICAN AMERICAN: >60
GLUCOSE BLD-MCNC: 89 MG/DL (ref 70–99)
HCT VFR BLD CALC: 35.9 % (ref 36–48)
HEMOGLOBIN: 11.5 G/DL (ref 12–16)
INR BLD: 1.03 (ref 0.86–1.14)
MCH RBC QN AUTO: 25.7 PG (ref 26–34)
MCHC RBC AUTO-ENTMCNC: 32 G/DL (ref 31–36)
MCV RBC AUTO: 80.4 FL (ref 80–100)
PDW BLD-RTO: 17.8 % (ref 12.4–15.4)
PLATELET # BLD: 356 K/UL (ref 135–450)
PMV BLD AUTO: 10 FL (ref 5–10.5)
POTASSIUM SERPL-SCNC: 4.1 MMOL/L (ref 3.5–5.1)
PROTHROMBIN TIME: 12 SEC (ref 10–13.2)
RBC # BLD: 4.47 M/UL (ref 4–5.2)
REASON FOR REJECTION: NORMAL
REJECTED TEST: NORMAL
SODIUM BLD-SCNC: 141 MMOL/L (ref 136–145)
WBC # BLD: 8 K/UL (ref 4–11)

## 2021-01-12 PROCEDURE — 99024 POSTOP FOLLOW-UP VISIT: CPT | Performed by: ORTHOPAEDIC SURGERY

## 2021-01-12 ASSESSMENT — ENCOUNTER SYMPTOMS
NAUSEA: 0
ABDOMINAL PAIN: 0
SHORTNESS OF BREATH: 0
ALLERGIC/IMMUNOLOGIC NEGATIVE: 1
COUGH: 0
CONSTIPATION: 0
WHEEZING: 0
SORE THROAT: 0
VOMITING: 0
DIARRHEA: 0
EYES NEGATIVE: 1

## 2021-01-12 NOTE — PROGRESS NOTES
Patient Name: Talia Lester MRN: <[id number]>   Age: 56 y.o. YOB: 1960   Sex: female         CHIEF COMPLAINT   Right total Knee revision postoperative visit    HISTORY OF PRESENT ILLNESS   Patient returns for their fourth postoperative evaluation status post right total knee revision. First postop visit status post manipulation    The patient is doing very fair. They have moderate complaints of pain. Rates pain as a 6 out of 10  There is small swelling about the knee. The patient has been doing physical therapy on her own at home. She notes moderate improvement in her overall motion as well as reduction in some of the pain that she is experiencing with the previous physical therapist and how aggressive they were being. They deny any calf swelling or numbness or tingling down the leg       Assessment     Review of Systems   Constitutional: Negative for chills and fever. HENT: Negative for ear discharge, ear pain, hearing loss, nosebleeds and sore throat. Eyes: Negative. Respiratory: Negative for cough, shortness of breath and wheezing. Cardiovascular: Negative for chest pain and palpitations. Gastrointestinal: Negative for abdominal pain, constipation, diarrhea, nausea and vomiting. Endocrine: Negative. Genitourinary: Negative for dysuria, frequency and urgency. Musculoskeletal: Positive for joint swelling. Skin: Negative for rash. Allergic/Immunologic: Negative. Neurological: Negative for dizziness and headaches. Hematological: Negative. Psychiatric/Behavioral: Negative. PHYSICAL EXAM     Vital Signs:   Vitals:    01/12/21 1224   Temp: 97.9 °F (36.6 °C)       Examination of the knee shows a well-healed midline incision. There is small swelling. There is no drainage. Range of motion is 0-95 no extendsor lag. Tenderness noted to anterior tibialis improved since previous visit. The patient is neurovascularly intact. NEUROLOGICALLY: There is no evidence for sensory or motor deficits in the extremity. Coordination appears full with no spacticity or rigidity. Reflexes appear to be symmetric. Distal circulation intact. No signs of RSD. Calf nontender. Negative nica's,  no signs of dvt    Hip exam shows full ROM with no focal pain or Instability. Leg lengths are normal. There is no Trendelenburg Gait. RADIOLOGY   Xray   Have reviewed the xrays above from 11/17/2020  3 views of the right knee AP, lateral and sunrise views were performed on 11/17/2020 and reviewed today and my impression is: Well-placed hinged knee prosthesis with long tibial and femoral stems. No evidence of acute fracture dislocation    IMPRESSION   10 week(s) status post right total knee revision    PLAN   The patient is doing well 10 week(s) status post right total knee revision. The patient will finish up therapy. They may slowly resume normal activities. Advised patient to continue with home exercises on her own we will hold outpatient physical therapy at present time as we do not believe it is necessary as she is gaining the appropriate motions on her own. Refilled pain medication at today's visit    Advised to follow-up in 2 weeks for repeat evaluation    The orders below, if any, were placed during this visit:        No diagnosis found.       Enzo Maravilla MD

## 2021-01-15 RX ORDER — OXYCODONE HYDROCHLORIDE 10 MG/1
15 TABLET ORAL EVERY 4 HOURS PRN
Qty: 60 TABLET | Refills: 0 | Status: SHIPPED | OUTPATIENT
Start: 2021-01-15 | End: 2021-01-22 | Stop reason: SDUPTHER

## 2021-01-22 ENCOUNTER — TELEPHONE (OUTPATIENT)
Dept: ORTHOPEDIC SURGERY | Age: 61
End: 2021-01-22

## 2021-01-22 DIAGNOSIS — Z96.651 STATUS POST REVISION OF TOTAL REPLACEMENT OF RIGHT KNEE: ICD-10-CM

## 2021-01-22 RX ORDER — OXYCODONE HYDROCHLORIDE 10 MG/1
15 TABLET ORAL EVERY 4 HOURS PRN
Qty: 60 TABLET | Refills: 0 | Status: SHIPPED | OUTPATIENT
Start: 2021-01-22 | End: 2021-01-29

## 2021-01-22 NOTE — TELEPHONE ENCOUNTER
Prescription Refill     Medication Name:  OXYCODONE  Pharmacy: [facility]  Patient Contact Number:  [phone]

## 2021-01-26 ENCOUNTER — OFFICE VISIT (OUTPATIENT)
Dept: ORTHOPEDIC SURGERY | Age: 61
End: 2021-01-26
Payer: COMMERCIAL

## 2021-01-26 VITALS
BODY MASS INDEX: 29.37 KG/M2 | HEIGHT: 64 IN | TEMPERATURE: 97.3 F | DIASTOLIC BLOOD PRESSURE: 74 MMHG | WEIGHT: 172 LBS | HEART RATE: 94 BPM | SYSTOLIC BLOOD PRESSURE: 123 MMHG

## 2021-01-26 DIAGNOSIS — M25.511 ACUTE PAIN OF RIGHT SHOULDER: Primary | ICD-10-CM

## 2021-01-26 DIAGNOSIS — Z96.651 STATUS POST REVISION OF TOTAL REPLACEMENT OF RIGHT KNEE: ICD-10-CM

## 2021-01-26 PROCEDURE — 99213 OFFICE O/P EST LOW 20 MIN: CPT | Performed by: PHYSICIAN ASSISTANT

## 2021-01-26 PROCEDURE — 99024 POSTOP FOLLOW-UP VISIT: CPT | Performed by: PHYSICIAN ASSISTANT

## 2021-01-26 RX ORDER — OXYCODONE AND ACETAMINOPHEN 10; 325 MG/1; MG/1
1 TABLET ORAL EVERY 4 HOURS PRN
Qty: 42 TABLET | Refills: 0 | Status: SHIPPED | OUTPATIENT
Start: 2021-01-26 | End: 2021-02-02

## 2021-01-26 ASSESSMENT — ENCOUNTER SYMPTOMS
DIARRHEA: 0
CONSTIPATION: 0
ALLERGIC/IMMUNOLOGIC NEGATIVE: 1
SHORTNESS OF BREATH: 0
VOMITING: 0
SORE THROAT: 0
NAUSEA: 0
WHEEZING: 0
COUGH: 0
EYES NEGATIVE: 1
ABDOMINAL PAIN: 0
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
## 2021-02-05 ENCOUNTER — TELEPHONE (OUTPATIENT)
Dept: ORTHOPEDIC SURGERY | Age: 61
End: 2021-02-05

## 2021-02-05 DIAGNOSIS — Z96.651 STATUS POST REVISION OF TOTAL REPLACEMENT OF RIGHT KNEE: Primary | ICD-10-CM

## 2021-02-05 RX ORDER — OXYCODONE AND ACETAMINOPHEN 10; 325 MG/1; MG/1
1 TABLET ORAL EVERY 4 HOURS PRN
Qty: 42 TABLET | Refills: 0 | Status: SHIPPED | OUTPATIENT
Start: 2021-02-05 | End: 2021-02-12

## 2021-02-16 ENCOUNTER — TELEPHONE (OUTPATIENT)
Dept: ORTHOPEDIC SURGERY | Age: 61
End: 2021-02-16

## 2021-02-16 DIAGNOSIS — Z96.651 STATUS POST REVISION OF TOTAL REPLACEMENT OF RIGHT KNEE: Primary | ICD-10-CM

## 2021-02-16 RX ORDER — OXYCODONE AND ACETAMINOPHEN 10; 325 MG/1; MG/1
1 TABLET ORAL EVERY 4 HOURS PRN
Qty: 42 TABLET | Refills: 0 | Status: SHIPPED | OUTPATIENT
Start: 2021-02-16 | End: 2021-02-23 | Stop reason: SDUPTHER

## 2021-02-23 ENCOUNTER — TELEPHONE (OUTPATIENT)
Dept: ORTHOPEDIC SURGERY | Age: 61
End: 2021-02-23

## 2021-02-23 DIAGNOSIS — Z96.651 STATUS POST REVISION OF TOTAL REPLACEMENT OF RIGHT KNEE: ICD-10-CM

## 2021-02-23 RX ORDER — OXYCODONE AND ACETAMINOPHEN 10; 325 MG/1; MG/1
1 TABLET ORAL EVERY 4 HOURS PRN
Qty: 35 TABLET | Refills: 0 | Status: SHIPPED | OUTPATIENT
Start: 2021-02-23 | End: 2021-03-02

## 2021-02-23 NOTE — TELEPHONE ENCOUNTER
Prescription Refill     Medication Name:  [address] [facility] [address]: [address]  Patient Contact Number:  [phone]

## 2021-03-03 ENCOUNTER — TELEPHONE (OUTPATIENT)
Dept: ORTHOPEDIC SURGERY | Age: 61
End: 2021-03-03

## 2021-03-03 DIAGNOSIS — Z96.651 STATUS POST REVISION OF TOTAL REPLACEMENT OF RIGHT KNEE: Primary | ICD-10-CM

## 2021-03-03 RX ORDER — OXYCODONE AND ACETAMINOPHEN 10; 325 MG/1; MG/1
1 TABLET ORAL EVERY 4 HOURS PRN
Qty: 35 TABLET | Refills: 0 | Status: SHIPPED | OUTPATIENT
Start: 2021-03-03 | End: 2021-03-10 | Stop reason: SDUPTHER

## 2021-03-10 ENCOUNTER — TELEPHONE (OUTPATIENT)
Dept: ORTHOPEDIC SURGERY | Age: 61
End: 2021-03-10

## 2021-03-10 DIAGNOSIS — Z96.651 STATUS POST REVISION OF TOTAL REPLACEMENT OF RIGHT KNEE: ICD-10-CM

## 2021-03-10 RX ORDER — OXYCODONE AND ACETAMINOPHEN 10; 325 MG/1; MG/1
1 TABLET ORAL EVERY 6 HOURS PRN
Qty: 28 TABLET | Refills: 0 | Status: SHIPPED | OUTPATIENT
Start: 2021-03-10 | End: 2021-03-17 | Stop reason: SDUPTHER

## 2021-03-17 ENCOUNTER — TELEPHONE (OUTPATIENT)
Dept: ORTHOPEDIC SURGERY | Age: 61
End: 2021-03-17

## 2021-03-17 DIAGNOSIS — Z96.651 STATUS POST REVISION OF TOTAL REPLACEMENT OF RIGHT KNEE: ICD-10-CM

## 2021-03-17 RX ORDER — OXYCODONE AND ACETAMINOPHEN 10; 325 MG/1; MG/1
1 TABLET ORAL EVERY 6 HOURS PRN
Qty: 28 TABLET | Refills: 0 | Status: SHIPPED | OUTPATIENT
Start: 2021-03-17 | End: 2021-03-24

## 2021-03-24 ENCOUNTER — TELEPHONE (OUTPATIENT)
Dept: ORTHOPEDIC SURGERY | Age: 61
End: 2021-03-24

## 2021-03-24 DIAGNOSIS — Z96.651 STATUS POST REVISION OF TOTAL REPLACEMENT OF RIGHT KNEE: Primary | ICD-10-CM

## 2021-03-25 RX ORDER — OXYCODONE AND ACETAMINOPHEN 10; 325 MG/1; MG/1
1 TABLET ORAL EVERY 6 HOURS PRN
Qty: 28 TABLET | Refills: 0 | Status: SHIPPED | OUTPATIENT
Start: 2021-03-25 | End: 2021-04-01 | Stop reason: SDUPTHER

## 2021-04-01 ENCOUNTER — TELEPHONE (OUTPATIENT)
Dept: ORTHOPEDIC SURGERY | Age: 61
End: 2021-04-01

## 2021-04-01 DIAGNOSIS — Z96.651 STATUS POST REVISION OF TOTAL REPLACEMENT OF RIGHT KNEE: ICD-10-CM

## 2021-04-01 RX ORDER — OXYCODONE AND ACETAMINOPHEN 10; 325 MG/1; MG/1
1 TABLET ORAL EVERY 8 HOURS PRN
Qty: 21 TABLET | Refills: 0 | Status: SHIPPED | OUTPATIENT
Start: 2021-04-01 | End: 2021-04-08 | Stop reason: SDUPTHER

## 2021-04-08 ENCOUNTER — TELEPHONE (OUTPATIENT)
Dept: ORTHOPEDIC SURGERY | Age: 61
End: 2021-04-08

## 2021-04-08 DIAGNOSIS — G89.29 CHRONIC KNEE PAIN AFTER TOTAL REPLACEMENT OF RIGHT KNEE JOINT: ICD-10-CM

## 2021-04-08 DIAGNOSIS — Z96.651 CHRONIC KNEE PAIN AFTER TOTAL REPLACEMENT OF RIGHT KNEE JOINT: ICD-10-CM

## 2021-04-08 DIAGNOSIS — M25.561 CHRONIC KNEE PAIN AFTER TOTAL REPLACEMENT OF RIGHT KNEE JOINT: ICD-10-CM

## 2021-04-08 DIAGNOSIS — Z96.651 STATUS POST REVISION OF TOTAL REPLACEMENT OF RIGHT KNEE: Primary | ICD-10-CM

## 2021-04-08 DIAGNOSIS — Z96.651 STATUS POST REVISION OF TOTAL REPLACEMENT OF RIGHT KNEE: ICD-10-CM

## 2021-04-08 RX ORDER — OXYCODONE AND ACETAMINOPHEN 10; 325 MG/1; MG/1
1 TABLET ORAL EVERY 8 HOURS PRN
Qty: 21 TABLET | Refills: 0 | Status: SHIPPED | OUTPATIENT
Start: 2021-04-08 | End: 2021-04-15 | Stop reason: SDUPTHER

## 2021-04-15 ENCOUNTER — TELEPHONE (OUTPATIENT)
Dept: ORTHOPEDIC SURGERY | Age: 61
End: 2021-04-15

## 2021-04-15 DIAGNOSIS — Z96.651 STATUS POST REVISION OF TOTAL REPLACEMENT OF RIGHT KNEE: ICD-10-CM

## 2021-04-15 RX ORDER — OXYCODONE AND ACETAMINOPHEN 10; 325 MG/1; MG/1
1 TABLET ORAL EVERY 8 HOURS PRN
Qty: 21 TABLET | Refills: 0 | Status: SHIPPED | OUTPATIENT
Start: 2021-04-15 | End: 2021-04-20 | Stop reason: SDUPTHER

## 2021-04-20 ENCOUNTER — OFFICE VISIT (OUTPATIENT)
Dept: ORTHOPEDIC SURGERY | Age: 61
End: 2021-04-20
Payer: COMMERCIAL

## 2021-04-20 VITALS
HEART RATE: 92 BPM | WEIGHT: 172 LBS | BODY MASS INDEX: 29.37 KG/M2 | SYSTOLIC BLOOD PRESSURE: 110 MMHG | DIASTOLIC BLOOD PRESSURE: 71 MMHG | HEIGHT: 64 IN

## 2021-04-20 DIAGNOSIS — M54.42 CHRONIC BILATERAL LOW BACK PAIN WITH BILATERAL SCIATICA: ICD-10-CM

## 2021-04-20 DIAGNOSIS — G89.29 CHRONIC BILATERAL LOW BACK PAIN WITH BILATERAL SCIATICA: ICD-10-CM

## 2021-04-20 DIAGNOSIS — Z96.651 CHRONIC KNEE PAIN AFTER TOTAL REPLACEMENT OF RIGHT KNEE JOINT: Primary | ICD-10-CM

## 2021-04-20 DIAGNOSIS — M54.41 CHRONIC BILATERAL LOW BACK PAIN WITH BILATERAL SCIATICA: ICD-10-CM

## 2021-04-20 DIAGNOSIS — M25.561 CHRONIC KNEE PAIN AFTER TOTAL REPLACEMENT OF RIGHT KNEE JOINT: Primary | ICD-10-CM

## 2021-04-20 DIAGNOSIS — Z96.651 STATUS POST REVISION OF TOTAL REPLACEMENT OF RIGHT KNEE: ICD-10-CM

## 2021-04-20 DIAGNOSIS — G89.4 CHRONIC PAIN SYNDROME: ICD-10-CM

## 2021-04-20 DIAGNOSIS — G89.29 CHRONIC KNEE PAIN AFTER TOTAL REPLACEMENT OF RIGHT KNEE JOINT: Primary | ICD-10-CM

## 2021-04-20 PROCEDURE — 99214 OFFICE O/P EST MOD 30 MIN: CPT | Performed by: PHYSICIAN ASSISTANT

## 2021-04-20 RX ORDER — DULOXETIN HYDROCHLORIDE 60 MG/1
60 CAPSULE, DELAYED RELEASE ORAL 2 TIMES DAILY
Qty: 180 CAPSULE | Refills: 2 | Status: SHIPPED | OUTPATIENT
Start: 2021-04-20 | End: 2022-05-23

## 2021-04-20 RX ORDER — OXYCODONE AND ACETAMINOPHEN 10; 325 MG/1; MG/1
1 TABLET ORAL EVERY 8 HOURS PRN
Qty: 21 TABLET | Refills: 0 | Status: SHIPPED | OUTPATIENT
Start: 2021-04-20 | End: 2021-04-27

## 2021-04-20 RX ORDER — GABAPENTIN 300 MG/1
300 CAPSULE ORAL 3 TIMES DAILY
Qty: 90 CAPSULE | Refills: 3 | Status: SHIPPED | OUTPATIENT
Start: 2021-04-20 | End: 2021-09-07

## 2021-04-20 ASSESSMENT — ENCOUNTER SYMPTOMS
VOMITING: 0
WHEEZING: 0
NAUSEA: 0
ABDOMINAL PAIN: 0
EYES NEGATIVE: 1
DIARRHEA: 0
CONSTIPATION: 0
SORE THROAT: 0
ALLERGIC/IMMUNOLOGIC NEGATIVE: 1
COUGH: 0
SHORTNESS OF BREATH: 0

## 2021-04-20 NOTE — PROGRESS NOTES
Patient Name: Chris Velázquez MRN: [id number]   Age: 56 y.o. YOB: 1960   Sex: female         CHIEF COMPLAINT   Right total Knee revision postoperative visit    HISTORY OF PRESENT ILLNESS   Patient returns for their sixth postoperative evaluation status post right total knee revision. Second postop visit status post manipulation    The patient is doing very fair. They have moderate complaints of pain. Rates pain as a 5 out of 10  There is small swelling about the knee. The patient has been doing physical therapy on her own at home as well as with physical therapist.  She notes moderate improvement in her overall motion as well as reduction in some of the pain that she is experiencing with the previous physical therapist notes she has been doing a lot of this on her own she denies any fall trauma or injury and she is hopeful to continue to increase her overall activity over the next few weeks. She notes that her family and her son are planning on building a container home in the next coming weeks to months. She denies numbness burning tingling radiating pain notes it has a muscle pulling/ache. Denies any fall trauma or injury. They deny any calf swelling or numbness or tingling down the leg       Assessment     Review of Systems   Constitutional: Negative for chills and fever. HENT: Negative for ear discharge, ear pain, hearing loss, nosebleeds and sore throat. Eyes: Negative. Respiratory: Negative for cough, shortness of breath and wheezing. Cardiovascular: Negative for chest pain and palpitations. Gastrointestinal: Negative for abdominal pain, constipation, diarrhea, nausea and vomiting. Endocrine: Negative. Genitourinary: Negative for dysuria, frequency and urgency. Musculoskeletal: Positive for joint swelling. Skin: Negative for rash. Allergic/Immunologic: Negative. Neurological: Negative for dizziness and headaches. Hematological: Negative. Psychiatric/Behavioral: Negative. PHYSICAL EXAM     Vital Signs:   Vitals:    04/20/21 0936   BP: 110/71   Pulse: 92       Examination of the knee shows a well-healed midline incision. There is small swelling. There is no drainage. Range of motion is 0-95 no extendsor lag. Tenderness noted to anterior tibialis improved since previous visit. The patient is neurovascularly intact. NEUROLOGICALLY: There is no evidence for sensory or motor deficits in the extremity. Coordination appears full with no spacticity or rigidity. Reflexes appear to be symmetric. Distal circulation intact. No signs of RSD. Calf nontender. Negative nica's,  no signs of dvt    Hip exam shows full ROM with no focal pain or Instability. Leg lengths are normal. There is no Trendelenburg Gait. RADIOLOGY   Xray   Have reviewed the xrays above from 11/17/2020  3 views of the right knee AP, lateral and sunrise views were performed on 11/17/2020 and reviewed today and my impression is: Well-placed hinged knee prosthesis with long tibial and femoral stems. No evidence of acute fracture dislocation    IMPRESSION   5 months(s) status post right total knee revision    PLAN   The patient is doing well 5 months(s) status post right total knee revision. .   Advised for patient to continue with physical therapy for evaluation treatment of status post revision of type right total knee and for evaluation treatment of right shoulder pain. They may slowly resume normal activities. Advised patient to continue with home exercises on her own    Made adjustments to patient's pain medication at today's visit reduce the 15 mg immediate release oxycodone every 4 hours to oxycodone with acetaminophen 10 mg every 8 hours to see if helps continue with pain relief with the added benefits of the Tylenol without the extra opiate.   -Gave patient referral for pain management with continued evaluation regarding postoperative pain

## 2021-04-28 ENCOUNTER — TELEPHONE (OUTPATIENT)
Dept: ORTHOPEDIC SURGERY | Age: 61
End: 2021-04-28

## 2021-04-28 DIAGNOSIS — Z96.651 STATUS POST REVISION OF TOTAL REPLACEMENT OF RIGHT KNEE: Primary | ICD-10-CM

## 2021-04-28 RX ORDER — OXYCODONE AND ACETAMINOPHEN 10; 325 MG/1; MG/1
1 TABLET ORAL EVERY 8 HOURS PRN
Qty: 21 TABLET | Refills: 0 | Status: SHIPPED | OUTPATIENT
Start: 2021-04-28 | End: 2021-05-05 | Stop reason: SDUPTHER

## 2021-05-05 ENCOUNTER — TELEPHONE (OUTPATIENT)
Dept: ORTHOPEDIC SURGERY | Age: 61
End: 2021-05-05

## 2021-05-05 DIAGNOSIS — Z96.651 STATUS POST REVISION OF TOTAL REPLACEMENT OF RIGHT KNEE: ICD-10-CM

## 2021-05-05 RX ORDER — OXYCODONE AND ACETAMINOPHEN 10; 325 MG/1; MG/1
1 TABLET ORAL EVERY 8 HOURS PRN
Qty: 21 TABLET | Refills: 0 | Status: SHIPPED | OUTPATIENT
Start: 2021-05-05 | End: 2021-05-13 | Stop reason: SDUPTHER

## 2021-05-05 NOTE — TELEPHONE ENCOUNTER
Prescription Refill     Medication Name:  OXYCODONE  Pharmacy: [address] [place] [address] Drive  Patient Contact Number:  [phone]

## 2021-05-12 ENCOUNTER — TELEPHONE (OUTPATIENT)
Dept: ORTHOPEDIC SURGERY | Age: 61
End: 2021-05-12

## 2021-05-12 DIAGNOSIS — Z96.651 STATUS POST REVISION OF TOTAL REPLACEMENT OF RIGHT KNEE: ICD-10-CM

## 2021-05-12 NOTE — TELEPHONE ENCOUNTER
Patient needs refill on her oxycodone. She can be reached at [phone]. [facility] pharmacy: [phone].

## 2021-05-13 RX ORDER — OXYCODONE AND ACETAMINOPHEN 10; 325 MG/1; MG/1
1 TABLET ORAL EVERY 8 HOURS PRN
Qty: 21 TABLET | Refills: 0 | Status: SHIPPED | OUTPATIENT
Start: 2021-05-13 | End: 2021-05-21 | Stop reason: SDUPTHER

## 2021-05-20 ENCOUNTER — TELEPHONE (OUTPATIENT)
Dept: ORTHOPEDIC SURGERY | Age: 61
End: 2021-05-20

## 2021-05-20 DIAGNOSIS — Z96.651 STATUS POST REVISION OF TOTAL REPLACEMENT OF RIGHT KNEE: ICD-10-CM

## 2021-05-20 NOTE — TELEPHONE ENCOUNTER
[facility] to [facility]. I tried to call the patient to see if she has gotten an appt with pain management. We have playing phone tag.

## 2021-05-20 NOTE — TELEPHONE ENCOUNTER
Prescription Refill     Medication Name:  Oxycodone    [facility]    Patient Contact Number:  [phone]

## 2021-05-21 RX ORDER — OXYCODONE AND ACETAMINOPHEN 10; 325 MG/1; MG/1
1 TABLET ORAL EVERY 8 HOURS PRN
Qty: 21 TABLET | Refills: 0 | Status: SHIPPED | OUTPATIENT
Start: 2021-05-21 | End: 2021-05-26 | Stop reason: SDUPTHER

## 2021-05-26 ENCOUNTER — TELEPHONE (OUTPATIENT)
Dept: ORTHOPEDIC SURGERY | Age: 61
End: 2021-05-26

## 2021-05-26 DIAGNOSIS — Z96.651 STATUS POST REVISION OF TOTAL REPLACEMENT OF RIGHT KNEE: ICD-10-CM

## 2021-05-26 RX ORDER — OXYCODONE AND ACETAMINOPHEN 10; 325 MG/1; MG/1
1 TABLET ORAL EVERY 8 HOURS PRN
Qty: 21 TABLET | Refills: 0 | Status: SHIPPED | OUTPATIENT
Start: 2021-05-26 | End: 2021-06-02 | Stop reason: SDUPTHER

## 2021-05-26 NOTE — TELEPHONE ENCOUNTER
Prescription Refill     Medication Name:  OXYCODONE  Pharmacy: [facility] [address], [address] [place] [address] [phone]   Patient Contact Number:  [phone]

## 2021-06-02 ENCOUNTER — TELEPHONE (OUTPATIENT)
Dept: ORTHOPEDIC SURGERY | Age: 61
End: 2021-06-02

## 2021-06-02 DIAGNOSIS — Z96.651 STATUS POST REVISION OF TOTAL REPLACEMENT OF RIGHT KNEE: ICD-10-CM

## 2021-06-02 RX ORDER — OXYCODONE AND ACETAMINOPHEN 10; 325 MG/1; MG/1
1 TABLET ORAL EVERY 8 HOURS PRN
Qty: 21 TABLET | Refills: 0 | Status: SHIPPED | OUTPATIENT
Start: 2021-06-02 | End: 2021-06-09 | Stop reason: SDUPTHER

## 2021-06-02 NOTE — TELEPHONE ENCOUNTER
Prescription Refill     Medication Name:  Oxycodone  Pharmacy: [facility]  Patient Contact Number:  [phone]

## 2021-06-09 ENCOUNTER — TELEPHONE (OUTPATIENT)
Dept: ORTHOPEDIC SURGERY | Age: 61
End: 2021-06-09

## 2021-06-09 DIAGNOSIS — Z96.651 STATUS POST REVISION OF TOTAL REPLACEMENT OF RIGHT KNEE: ICD-10-CM

## 2021-06-09 RX ORDER — OXYCODONE AND ACETAMINOPHEN 10; 325 MG/1; MG/1
1 TABLET ORAL EVERY 8 HOURS PRN
Qty: 21 TABLET | Refills: 0 | Status: SHIPPED | OUTPATIENT
Start: 2021-06-09 | End: 2021-06-15 | Stop reason: SDUPTHER

## 2021-06-09 NOTE — TELEPHONE ENCOUNTER
Route to [facility]. I tried to call the patient to ask about pain management. Left a message.

## 2021-06-15 ENCOUNTER — OFFICE VISIT (OUTPATIENT)
Dept: ORTHOPEDIC SURGERY | Age: 61
End: 2021-06-15
Payer: COMMERCIAL

## 2021-06-15 VITALS
HEIGHT: 64 IN | SYSTOLIC BLOOD PRESSURE: 132 MMHG | HEART RATE: 94 BPM | WEIGHT: 172 LBS | DIASTOLIC BLOOD PRESSURE: 89 MMHG | BODY MASS INDEX: 29.37 KG/M2

## 2021-06-15 DIAGNOSIS — Z96.651 STATUS POST REVISION OF TOTAL REPLACEMENT OF RIGHT KNEE: Primary | ICD-10-CM

## 2021-06-15 DIAGNOSIS — M25.561 CHRONIC KNEE PAIN AFTER TOTAL REPLACEMENT OF RIGHT KNEE JOINT: ICD-10-CM

## 2021-06-15 DIAGNOSIS — Z96.651 CHRONIC KNEE PAIN AFTER TOTAL REPLACEMENT OF RIGHT KNEE JOINT: ICD-10-CM

## 2021-06-15 DIAGNOSIS — G89.29 CHRONIC KNEE PAIN AFTER TOTAL REPLACEMENT OF RIGHT KNEE JOINT: ICD-10-CM

## 2021-06-15 PROCEDURE — 99214 OFFICE O/P EST MOD 30 MIN: CPT | Performed by: PHYSICIAN ASSISTANT

## 2021-06-15 RX ORDER — OXYCODONE AND ACETAMINOPHEN 10; 325 MG/1; MG/1
1 TABLET ORAL EVERY 8 HOURS PRN
Qty: 21 TABLET | Refills: 0 | Status: SHIPPED | OUTPATIENT
Start: 2021-06-15 | End: 2021-06-22

## 2021-06-15 ASSESSMENT — ENCOUNTER SYMPTOMS
NAUSEA: 0
ABDOMINAL PAIN: 0
EYES NEGATIVE: 1
CONSTIPATION: 0
SORE THROAT: 0
VOMITING: 0
ALLERGIC/IMMUNOLOGIC NEGATIVE: 1
COUGH: 0
DIARRHEA: 0
WHEEZING: 0
SHORTNESS OF BREATH: 0

## 2021-06-15 NOTE — PROGRESS NOTES
Patient Name: Teodora Rodriguez MRN: [id number]   Age: 56 y.o. YOB: 1960   Sex: female         CHIEF COMPLAINT   Right total Knee revision postoperative visit    HISTORY OF PRESENT ILLNESS   Patient returns for their seventh postoperative evaluation status post right total knee revision. status post manipulation    The patient is doing very fair. They have moderate complaints of pain. Rates pain as a 5 out of 10 notes pain mostly to the anterior aspect of the distal tibia into the medial aspect of the proximal tibia  There is small swelling about the knee. The patient has been doing physical therapy on her own at home as well as with physical therapist.  She is still working on building the container home with her  and son. She notes she is able to get about 15 to 20 minutes of work in before the pain starts to increase and she has to settle down. Notes that the therapists were concerned for possible stress reaction as they have done a vibratory test along the bone on the medial lateral malleolus and noted pain along the medial mal with the vibratory testing. She denies numbness burning tingling radiating pain notes it has a muscle pulling/ache. Denies any fall trauma or injury. They deny any calf swelling or numbness or tingling down the leg       Assessment     Review of Systems   Constitutional: Negative for chills and fever. HENT: Negative for ear discharge, ear pain, hearing loss, nosebleeds and sore throat. Eyes: Negative. Respiratory: Negative for cough, shortness of breath and wheezing. Cardiovascular: Negative for chest pain and palpitations. Gastrointestinal: Negative for abdominal pain, constipation, diarrhea, nausea and vomiting. Endocrine: Negative. Genitourinary: Negative for dysuria, frequency and urgency. Musculoskeletal: Positive for joint swelling. Skin: Negative for rash. Allergic/Immunologic: Negative.     Neurological: Negative for dizziness and headaches. Hematological: Negative. Psychiatric/Behavioral: Negative. PHYSICAL EXAM     Vital Signs:   Vitals:    06/15/21 1056   BP: 132/89   Pulse: 94       Examination of the knee shows a well-healed midline incision. There is small swelling. There is no drainage. Range of motion is 0-110 no extendsor lag. Tenderness noted to distal anterior tibia with noted hyperpigmented contusion-like appearing lesion on the skin above the sensitivity. Also noted medial tibial tenderness to palpation    The patient is neurovascularly intact. NEUROLOGICALLY: There is no evidence for sensory or motor deficits in the extremity. Coordination appears full with no spacticity or rigidity. Reflexes appear to be symmetric. Distal circulation intact. No signs of RSD. Calf nontender. Negative nica's,  no signs of dvt    Hip exam shows full ROM with no focal pain or Instability. Leg lengths are normal. There is no Trendelenburg Gait. RADIOLOGY   Xray   Have reviewed the xrays above from 6/15/2021  3 views of the right knee AP, lateral and sunrise views were performed and reviewed today and my impression is: Well-placed hinged knee prosthesis with long tibial and femoral stems. Distal aspect of the tibial stem possibly compressing anteriorly on the tibia may be creating some irritation/stress riser no evidence of acute fracture dislocation    IMPRESSION   8 months(s) status post right total knee revision    PLAN   The patient is doing well 8 months(s) status post right total knee revision. .   Advised for patient to continue with physical therapy for evaluation treatment of status post revision of type right total knee and for evaluation treatment of right shoulder pain. They may slowly resume normal activities.   Advised patient to continue with home exercises on her own    oxycodone with acetaminophen 10 mg every 8 hours   -Gave patient referral for pain management with continued evaluation regarding postoperative pain chronic pain with Faisal Urbano patient continue utilization of anti-inflammatories as needed. Discussed the utilization of a shin guard with an Ace wrap as the anterior aspect of the tibial stem may be creating stress reaction across the anterior tibia distally. But as the bone continues to feel the sensation that can increase its rigidity and strength and the pain can settle down. Also discussed with patient the this can go the other way that the bone can continue to thin and create a actual stress fracture across that area. Advised to follow-up in 3 months for repeat evaluation    The orders below, if any, were placed during this visit:          ICD-10-CM    1. Status post revision of total replacement of right knee  Z96.651 XR KNEE RIGHT (3 VIEWS)   2.  Chronic knee pain after total replacement of right knee joint  M25.561     G89.29     Z96.651          TOMASZ Acosta

## 2021-06-16 ENCOUNTER — TELEPHONE (OUTPATIENT)
Dept: ORTHOPEDIC SURGERY | Age: 61
End: 2021-06-16

## 2021-06-16 NOTE — TELEPHONE ENCOUNTER
Virginia Cuenca from [facility] is calling to ask about the patient's physical therapy.   Radha's number is [phone]

## 2021-06-17 DIAGNOSIS — M25.561 CHRONIC KNEE PAIN AFTER TOTAL REPLACEMENT OF RIGHT KNEE JOINT: ICD-10-CM

## 2021-06-17 DIAGNOSIS — G89.29 CHRONIC KNEE PAIN AFTER TOTAL REPLACEMENT OF RIGHT KNEE JOINT: ICD-10-CM

## 2021-06-17 DIAGNOSIS — Z96.651 CHRONIC KNEE PAIN AFTER TOTAL REPLACEMENT OF RIGHT KNEE JOINT: ICD-10-CM

## 2021-06-17 DIAGNOSIS — Z96.651 STATUS POST REVISION OF TOTAL REPLACEMENT OF RIGHT KNEE: Primary | ICD-10-CM

## 2021-06-23 ENCOUNTER — TELEPHONE (OUTPATIENT)
Dept: ORTHOPEDIC SURGERY | Age: 61
End: 2021-06-23

## 2021-06-23 DIAGNOSIS — Z96.651 STATUS POST REVISION OF TOTAL REPLACEMENT OF RIGHT KNEE: Primary | ICD-10-CM

## 2021-06-23 RX ORDER — OXYCODONE AND ACETAMINOPHEN 10; 325 MG/1; MG/1
1 TABLET ORAL EVERY 8 HOURS PRN
Qty: 21 TABLET | Refills: 0 | Status: SHIPPED | OUTPATIENT
Start: 2021-06-23 | End: 2021-06-29 | Stop reason: SDUPTHER

## 2021-06-23 NOTE — TELEPHONE ENCOUNTER
General Question     Subject: REFILL  Patient and /or Facility Request: [facility]  Contact Number: [phone]  PATIENT NEEDS REFILL ON HER PAIN MEDICATION

## 2021-06-29 ENCOUNTER — TELEPHONE (OUTPATIENT)
Dept: ORTHOPEDIC SURGERY | Age: 61
End: 2021-06-29

## 2021-06-29 DIAGNOSIS — Z96.651 STATUS POST REVISION OF TOTAL REPLACEMENT OF RIGHT KNEE: ICD-10-CM

## 2021-06-29 RX ORDER — OXYCODONE AND ACETAMINOPHEN 10; 325 MG/1; MG/1
1 TABLET ORAL EVERY 8 HOURS PRN
Qty: 21 TABLET | Refills: 0 | Status: SHIPPED | OUTPATIENT
Start: 2021-06-29 | End: 2021-07-06

## 2021-07-02 ENCOUNTER — HOSPITAL ENCOUNTER (OUTPATIENT)
Dept: PHYSICAL THERAPY | Age: 61
Setting detail: THERAPIES SERIES
Discharge: HOME | End: 2021-07-02
Payer: COMMERCIAL

## 2021-07-02 PROCEDURE — 97110 THERAPEUTIC EXERCISES: CPT

## 2021-07-02 PROCEDURE — 97530 THERAPEUTIC ACTIVITIES: CPT

## 2021-07-02 PROCEDURE — 97161 PT EVAL LOW COMPLEX 20 MIN: CPT

## 2021-07-02 NOTE — FLOWSHEET NOTE
REview of    [facility]  and [facility]  [address] [facility]  Phone: [phone]   Fax:     [phone]      Physical Therapy Daily/Aquatic Flow Sheet   Date:  2021    Patient Name:  Elmo Olszewski    :  1960  MRN: [id number]    Restrictions/Precautions:    Pertinent Medical History:Additional Pertinent Hx: arthritis, DDD cervical and lumbar,  HTN, HLD, chronic back pain,  DVT, left and right TKR,  right TKR revision 2020    Medical/Treatment Diagnosis Information:   · Diagnosis: Z96.651 (ICD-10-CM) - Presence of right artificial knee joint (revisionn 2020  · Treatment Diagnosis: Decreased functional mobility 2/2 right knee pain    Insurance/Certification information:  PT Insurance Information: [facility]  Physician Information:  Referring Practitioner: Kenny Banks of care signed (Y/N):     Date of Patient follow up with Physician:      Progress Report: []  Yes  [x]  No     Date Range for reporting period:  Beginnin2021  Ending:      Progress report due (10 Rx/or 30 days whichever is less):     Recertification due (POC duration/ or 90 days whichever is less):     Visit # POC/Insurance Allowable Auth Needed   1 67 []Yes   []No     SUBJECTIVE   Pt notes moderate knee pain and is limited with activity tolerance. Notes pain is anterior and the proximal tibia.   Has been doing PT at home and with PT,  Notes that the therapists were concerned for possible stress reaction as they have done a vibratory test along the bone on the medial lateral malleolus and noted pain along the medial mal with the vibratory testing.     Xray   Have reviewed the xrays above from 6/15/2021  3 views of the right knee AP, lateral and sunrise views were performed and reviewed today and my impression is: Well-placed hinged knee prosthesis with long tibial and femoral stems.  Distal aspect of the tibial stem possibly compressing anteriorly on the tibia may be creating some irritation/stress riser no evidence of acute fracture dislocation     Discussed the utilization of a shin guard with an Ace wrap as the anterior aspect of the tibial stem may be creating stress reaction across the anterior tibia distally.  But as the bone continues to feel the sensation that can increase its rigidity and strength and the pain can settle down.  Also discussed with patient the this can go the other way that the bone can continue to thin and create a actual stress fracture         Relevant Medical History:Additional Pertinent Hx: arthritis, DDD cervical and lumbar,  HTN, HLD, chronic back pain,  DVT, left and right TKR,  right TKR revision 11/2020  Functional Scale/Score: LEFS =  48    ROM LEFT RIGHT   HIP Flex wfl all wfl alll   HIP Abd       HIP Ext       HIP IR       HIP ER       Knee ext 0l -3a/p   Knee Flex 105 90a/94p   Ankle PF wfl all wfl all   Ankle DF       Ankle In       Ankle Ev       Strength  LEFT RIGHT   HIP Flexors 4+ all 4+ all   HIP Abductors       HIP Ext       Hip ER       Knee EXT (quad) 4+/5 4/5   Knee Flex (HS) 4+/5 4/5   Ankle DF 4+ all 4+ all   Ankle PF           Latex Allergy:  [x]NO      []YES  Preferred Language for Healthcare:   [x]English       []Other:    Land-based Visits Exercises/Activities:  Therapeutic Ex (06942)  Min:10 Resistance/Repetitions Notes   HEP  SLR,   Prone knee flexion  Heel slides  Sidelying abduction                                   Therapeutic Activity (40312) Min: 15     Pool tour policies and procedures Pt expresses understanding    Use of vicente hose to minimize edema Has vicente hose at home    Use of shelby guard Bones response to stress    NMR re-education (88754) Min:                          Manual Intervention (40031)  Min:                    Modalities  Min:               Aquatic Visits Exercises/Activities:  Aquatic Abbreviation Key  B= Belt DB= Dumbells T= Theratube   G=Gloves N= Noodles W= Weights   P= Paddles WW=Water Walker K= Kickboard        Transfers:         % Immersion:             Ambulation:   Exercises UE:      Forwards  Shoulder Shrugs     Lateral  Shoulder circles     Marching    Scapular Retraction      Retro   Rolling      Cariocas  Push Downs    Multifidus walkouts w/paddle  IR/ER      Punching    Stretching:  Rowing    Gastroc/Soleus   Elbow Flex/Ext    Hamstring   Shldr Flex/Ext     Knee flex stretch   Shldr Abd/Add    Piriformis   Horiz Abd/Add     Hip flexor    Arm Circles     SKTC   PNF Diagonals    DKTC  UE oscillations f/b, s/s    ITB   Wall Push-ups    Quad  Figure 8's    Mid back   Buoy pull/push downs    UT  Tband rows    Rhom  Tband lats    Post Shoulder  Lumbar Rot w/ paddles    Pec   Small ball pull downs                   diagonals             Cervical:       AROM Flex       AROM Extension     LEs exercises:   AROM Side Bending    Marching   AROM Rotation    Heel Raises   Chin tucks    Toe Raises   Chin nods     Squats        Hamstring Curls        Hip Flexion   Balance:      Hip Abduction  SLS    Hip Circles  Tandem stance    TA set  NBOS eyes open    Glut Set  NBOS eyes closed    Hip Extension  Hand to Opposite Knee    Hip Adduction    Box Step     Hip IR   Noodle Stance     Hip ER  Stop/Go Gait    Fig 8's  Switch Gait                Seated:  Functional:    Ankle Pumps  Step up forward    Ankle circles  Step up lateral    Knee flex & ext  Step down    Hip Abd & Adduction  [place] squats    Bicycle   Crate Lifts    Add Set with ball  Lunges forward    LX stab with med ball throws  Lunges lateral    Ankle INV  Lunges retro    Ankle EV  Lower ab curl with noodle      Upper ab curl with ball      Med ball straight lifts      Med ball diagonal lifts      Hydrorider          Noodle:      Leg Press  Deep Water:    Noodle hang at wall  Jog    Noodle hang deep water  Jumping Jacks    Noodle Bicycle  Heel to toe      Hand to opposite knee      Cross country skier      Rocking Horse      Other Therapeutic Activities:  Pt was educated on PT POC, Diagnosis, Prognosis, pathomechanics as well as frequency and duration of scheduling future physical therapy appointments. Time was also taken on this day to answer all patient questions and participation in PT. Reviewed appointment policy in detail with patient and patient verbalized understanding. Home Exercise Program:  Patient was instructed in the following for HEP:     . Patient verbalized/demonstrated understanding and was issued written handout. Charges: Therapeutic Exercise and NMR EXR  [] (32939) Provided verbal/tactile cueing for activities related to strengthening, flexibility, endurance, ROM for improvements in LE, proximal hip, and core control with self care, mobility, lifting, ambulation.  [] (84931) Provided verbal/tactile cueing for activities related to improving balance, coordination, kinesthetic sense, posture, motor skill, proprioception  to assist with LE, proximal hip, and core control in self care, mobility, lifting, ambulation and eccentric single leg control.      NMR and Therapeutic Activities:    [] (61127 or 68837) Provided verbal/tactile cueing for activities related to improving balance, coordination, kinesthetic sense, posture, motor skill, proprioception and motor activation to allow for proper function of core, proximal hip and LE with self care and ADLs and functional mobility.   [] (64800) Gait Re-education- Provided training and instruction to the patient for proper LE, core and proximal hip recruitment and positioning and eccentric body weight control with ambulation re-education including up and down stairs     Home Exercise Program:    [x] (27290) Reviewed/Progressed HEP activities related to strengthening, flexibility, endurance, ROM of core, proximal hip and LE for functional self-care, mobility, lifting and ambulation/stair navigation   [] (19722)Reviewed/Progressed HEP activities related to improving balance, coordination, kinesthetic sense, posture, motor skill, proprioception of core, proximal hip and LE for self care, mobility, lifting, and ambulation/stair navigation      Manual Treatments:  PROM / STM / Oscillations-Mobs:  G-I, II, III, IV (PA's, Inf., Post.)  [x] (74044) Provided manual therapy to mobilize LE, proximal hip and/or LS spine soft tissue/joints for the purpose of modulating pain, promoting relaxation,  increasing ROM, reducing/eliminating soft tissue swelling/inflammation/restriction, improving soft tissue extensibility and allowing for proper ROM for normal function with self care, mobility, lifting and ambulation.      Individual Aquatic Therapy:  [x] (13700) Provided verbal and tactile cueing for strengthening, flexibility, ROM using the therapeutic properties of water (buoyancy, resistance) for improvements in core control, mobility and ambulation     Aquatic Group:  [x] (36948) Provided intermittent verbal and tactile cueing for strengthening, endurance, flexibility and ROM for 2-4 individuals that do not require one-on one patient contact by the therapist       Land Timed Code Treatment Minutes: 25   Land Total Treatment Minutes: 40     Individual Aquatic Minutes:    Aquatic Group Minutes:      [x] EVAL (LOW) 32624 (typically 20 minutes face-to-face)  [] EVAL (MOD) 61103 (typically 30 minutes face-to-face)  [] EVAL (HIGH) 08110 (typically 45 minutes face-to-face)  [] RE-EVAL     [x] RF(52465) x   1  [] Dry needle 1 or 2 Muscles (93912)  [] NMR (25151) x     [] Dry needle 3+ Muscles (46974)  [] Manual (96568) x     [] Ultrasound (11526) x  [x] TA (14006) x    1 [] Mech Traction (93638)  [] ES(attended) (19373)     [] ES (un) (09645):   [] Vasopump (18001) [] Ionto (99886)   [] Aquatic Ind (08898) x    [] Aquatic Group (82125) x   [] Other:    Treatment/Activity Tolerance:  [x] Patient tolerated treatment well [] Patient limited by fatigue  [] Patient limited by pain  [] Patient limited by other medical complications  [] Other:     Prognosis: [x] Good [] Fair  [] Poor    GOALS:  Patient stated goal:  To not have as much pain  []? Progressing: []? Met: []? Not Met: []? Adjusted     Therapist goals for Patient:   Short Term Goals: To be achieved in: 2 weeks  1. Independent in HEP and progression per patient tolerance, in order to prevent re-injury. []? Progressing: []? Met: []? Not Met: []? Adjusted  2. Patient will have a decrease in pain to facilitate improvement in movement, function, and ADLs as indicated by Functional Deficits. []? Progressing: []? Met: []? Not Met: []? Adjusted     Long Term Goals: To be achieved in:  2  weeks  1. Disability index score of  30 % or less for the LEFS to assist with reaching prior level of function. []? Progressing: []? Met: []? Not Met: []? Adjusted  2. Patient will demonstrate increased AROM to 105 deg  to allow for proper joint functioning as indicated by patients Functional Deficits. []? Progressing: []? Met: []? Not Met: []? Adjusted  3. Patient will demonstrate an increase in Strength to good proximal hip strength and control, within 5lb HHD in LE to allow for proper functional mobility as indicated by patients Functional Deficits. []? Progressing: []? Met: []? Not Met: []? Adjusted  4. Patient will return to 70%  functional activities without increased symptoms or restriction. []? Progressing: []? Met: []? Not Met: []? Adjusted  5. To able to walk without my leg feeling like it will buckle. []? Progressing: []? Met: []? Not Met: []?  Adjusted         Patient Requires Follow-up: [x] Yes  [] No    Plan:   [x] Continue per plan of care [] Alter current plan (see comments)  [] Plan of care initiated [] Hold pending MD visit [] Discharge    Plan for Next Session:   Initiate aquatic therapy    Electronically signed by:  Caroline Durand, [id number]    Note: If patient does not return for scheduled/recommended follow up visits, this note will serve as a discharge from care along with the most recent update on progress.

## 2021-07-02 NOTE — PLAN OF CARE
[facility], [facility]  [address] [facility] [place]  Phone: [phone]   Fax:     [phone]                                                       Physical Therapy Certification    Dear Referring Practitioner: Cat Kim,    We had the pleasure of evaluating the following patient for physical therapy services at [facility]. A summary of our findings can be found in the initial assessment below. This includes our plan of care. If you have any questions or concerns regarding these findings, please do not hesitate to contact me at the office phone number checked above. Thank you for the referral.       Physician Signature:_______________________________Date:__________________  By signing above (or electronic signature), therapists plan is approved by physician              Patient: Kimmie Mckeon   : 1960   MRN: [id number]     Referring Physician: Referring Practitioner: Cat Kim      Evaluation Date: 2021        Medical Diagnosis Information:  Diagnosis: B35.195 (ICD-10-CM) - Presence of right artificial knee joint (revisionn 2020   Treatment Diagnosis: Decreased functional mobility 2/2 right knee pain                                         Insurance information: PT Insurance Information: [employer]     Precautions/ Contra-indications: none noted  Latex Allergy:  [x]NO      []YES  Preferred Language for Healthcare:   [x]English       []other:    C-SSRS Triggered by Intake questionnaire (Past 2 wk assessment ):   [x] No, Questionnaire did not trigger screening.   [] Yes, Patient intake triggered C-SSRS Screening      [] C-SSRS Screening completed  [] PCP notified via Epic     SUBJECTIVE   Pt notes moderate knee pain and is limited with activity tolerance. Notes pain is anterior and the proximal tibia.   Has been doing PT at home and with PT,  Notes Ankle In     Ankle Ev     Strength  LEFT RIGHT   HIP Flexors 4+ all 4+ all   HIP Abductors     HIP Ext     Hip ER     Knee EXT (quad) 4+/5 4/5   Knee Flex (HS) 4+/5 4/5   Ankle DF 4+ all 4+ all   Ankle PF     Ankle Inv     Ankle EV          Circumference  Mid apex  7 cm prox             Reflexes/Sensation:    [x]Dermatomes/Myotomes intact    [x]Reflexes equal and normal bilaterally   []Other:    Joint mobility:    []Normal    [x]Hypo   []Hyper    Orthopedic Special Tests:                        [x] Patient history, allergies, meds reviewed. Medical chart reviewed. See intake form. Review Of Systems (ROS):  [x]Performed Review of systems (Integumentary, CardioPulmonary, Neurological) by intake and observation. Intake form has been scanned into medical record. Patient has been instructed to contact their primary care physician regarding ROS issues if not already being addressed at this time.       Co-morbidities/Complexities (which will affect course of rehabilitation):    4  []None           Arthritic conditions   []Rheumatoid arthritis (M05.9)  [x]Osteoarthritis (M19.91)   Cardiovascular conditions   [x]Hypertension (I10)  [x]Hyperlipidemia (E78.5)  []Angina pectoris (I20)  []Atherosclerosis (I70)  []CVA Musculoskeletal conditions   [x]Disc pathology   []Congenital spine pathologies   []Prior surgical intervention  []Osteoporosis (M81.8)  []Osteopenia (M85.8)   Endocrine conditions   []Hypothyroid (E03.9)  []Hyperthyroid Gastrointestinal conditions   []Constipation (Q09.68)   Metabolic conditions   []Morbid obesity (E66.01)  []Diabetes type 1(E10.65) or 2 (E11.65)   []Neuropathy (G60.9)     Pulmonary conditions   []Asthma (J45)  []Coughing   []COPD (J44.9)   Psychological Disorders  []Anxiety (F41.9)  []Depression (F32.9)   []Other:   []Other:          Barriers to/and or personal factors that will affect rehab potential:              []Age  []Sex    []Smoker              []Motivation/Lack of Motivation []Co-Morbidities              []Cognitive Function, education/learning barriers              []Environmental, home barriers              []profession/work barriers  []past PT/medical experience  []other:  Justification:     Falls Risk Assessment (30 days):   [x] Falls Risk assessed and no intervention required. [] Falls Risk assessed and Patient requires intervention due to being higher risk   TUG score (>12s at risk):     [] Falls education provided, including         ASSESSMENT:  Pt with history of multiple right knee surgeries, currently having pain 2/2 stress reaction anterior tibia. Recommend aquatic therapy for strengthening and ROM while avoiding overloading.     Functional Impairments:     []Noted lumbar/proximal hip/LE hypomobility   [x]Decreased LE functional ROM   []Decreased core/proximal hip strength and neuromuscular control   [x]Decreased LE functional strength   []Reduced balance/proprioceptive control   []other:      Functional Activity Limitations (from functional questionnaire and intake)   []Reduced ability to tolerate prolonged functional positions   [x]Reduced ability or difficulty with changes of positions or transfers between positions   []Reduced ability to maintain good posture and demonstrate good body mechanics with sitting, bending, and lifting   []Reduced ability to sleep   [] Reduced ability or tolerance with driving and/or computer work   [x]Reduced ability to perform lifting, carrying tasks   [x]Reduced ability to squat   []Reduced ability to forward bend   [x]Reduced ability to ambulate prolonged functional periods/distances/surfaces   [x]Reduced ability to ascend/descend stairs   [x]Reduced ability to run, hop or jump   []other:     Participation Restrictions   []Reduced participation in self care activities   [x]Reduced participation in home management activities   [x]Reduced participation in work activities   [x]Reduced participation in social activities. [x]Reduced participation in sport activities. Classification :    []Signs/symptoms consistent with post-surgical status including decreased ROM, strength and function. []Signs/symptoms consistent with joint sprain/strain   []Signs/symptoms consistent with patella-femoral syndrome   [x]Signs/symptoms consistent with knee OA/hip OA   []Signs/symptoms consistent with internal derangement of knee/Hip   []Signs/symptoms consistent with functional hip weakness/NMR control      []Signs/symptoms consistent with tendinitis/tendinosis    []signs/symptoms consistent with pathology which may benefit from Dry needling      [x]other: weakness and limited ROM 2/2 multiple surgeries     Prognosis/Rehab Potential:      []Excellent   [x]Good    []Fair   []Poor    Tolerance of evaluation/treatment:    []Excellent   [x]Good    []Fair   []Poor    Physical Therapy Evaluation Complexity Justification  [x] A history of present problem with:  [] no personal factors and/or comorbidities that impact the plan of care;  [x]1-2 personal factors and/or comorbidities that impact the plan of care  []3 personal factors and/or comorbidities that impact the plan of care  [x] An examination of body systems using standardized tests and measures addressing any of the following: body structures and functions (impairments), activity limitations, and/or participation restrictions;:  [x] a total of 1-2 or more elements   [] a total of 3 or more elements   [] a total of 4 or more elements   [x] A clinical presentation with:  [x] stable and/or uncomplicated characteristics   [] evolving clinical presentation with changing characteristics  [] unstable and unpredictable characteristics;   [x] Clinical decision making of [x] low, [] moderate, [] high complexity using standardized patient assessment instrument and/or measurable assessment of functional outcome.     [x] EVAL (LOW) 19702 (typically 20 minutes face-to-face)  [] EVAL (MOD) 80687 (typically 30 minutes face-to-face)  [] EVAL (HIGH) 04542 (typically 45 minutes face-to-face)  [] RE-EVAL     PLAN:  Frequency/Duration:  2   days per week for   8  Weeks:  Interventions:  [x]  Therapeutic exercise including: strength training, ROM, for Lower extremity and core   [x]  NMR activation and proprioception for LE, Glutes and Core   []  Manual therapy as indicated for LE, Hip and spine to include: Dry Needling/IASTM, STM, PROM, Gr I-IV mobilizations, manipulation. [] Modalities as needed that may include: thermal agents, E-stim, Biofeedback, US, iontophoresis as indicated  [x] Patient education on joint protection, postural re-education, activity modification, progression of HEP. [x] Aquatic exercise including: strength training, ROM, and balance for Lower extremity and core     HEP instruction: flowsheet    GOALS:  Patient stated goal:  To not have as much pain  [] Progressing: [] Met: [] Not Met: [] Adjusted    Therapist goals for Patient:   Short Term Goals: To be achieved in: 2 weeks  1. Independent in HEP and progression per patient tolerance, in order to prevent re-injury. [] Progressing: [] Met: [] Not Met: [] Adjusted  2. Patient will have a decrease in pain to facilitate improvement in movement, function, and ADLs as indicated by Functional Deficits. [] Progressing: [] Met: [] Not Met: [] Adjusted    Long Term Goals: To be achieved in:  weeks  1. Disability index score of  30 % or less for the LEFS to assist with reaching prior level of function. [] Progressing: [] Met: [] Not Met: [] Adjusted  2. Patient will demonstrate increased AROM to 105 deg  to allow for proper joint functioning as indicated by patients Functional Deficits. [] Progressing: [] Met: [] Not Met: [] Adjusted  3. Patient will demonstrate an increase in Strength to good proximal hip strength and control, within 5lb HHD in LE to allow for proper functional mobility as indicated by patients Functional Deficits.    [] Progressing: [] Met: [] Not Met: [] Adjusted  4. Patient will return to 70%  functional activities without increased symptoms or restriction. [] Progressing: [] Met: [] Not Met: [] Adjusted  5. To able to walk without my leg feeling like it will buckle. [] Progressing: [] Met: [] Not Met: [] Adjusted     Electronically signed by:  Cristy Dykes PT DPT, MS  [id number]      Note: If patient does not return for scheduled/recommended follow up visits, this note will serve as a discharge from care along with the most recent update on progress.

## 2021-07-06 ENCOUNTER — TELEPHONE (OUTPATIENT)
Dept: ORTHOPEDIC SURGERY | Age: 61
End: 2021-07-06

## 2021-07-06 DIAGNOSIS — Z96.651 STATUS POST REVISION OF TOTAL REPLACEMENT OF RIGHT KNEE: Primary | ICD-10-CM

## 2021-07-06 NOTE — TELEPHONE ENCOUNTER
Prescription Refill     Medication Name:  Oxycondone   Ph[facility]: [facility]  Patient Contact Number:  [phone]

## 2021-07-07 RX ORDER — OXYCODONE AND ACETAMINOPHEN 10; 325 MG/1; MG/1
1 TABLET ORAL EVERY 8 HOURS PRN
Qty: 21 TABLET | Refills: 0 | Status: SHIPPED | OUTPATIENT
Start: 2021-07-07 | End: 2021-07-13 | Stop reason: SDUPTHER

## 2021-07-08 ENCOUNTER — HOSPITAL ENCOUNTER (OUTPATIENT)
Dept: PHYSICAL THERAPY | Age: 61
Setting detail: THERAPIES SERIES
Discharge: HOME | End: 2021-07-08
Payer: COMMERCIAL

## 2021-07-08 PROCEDURE — 97150 GROUP THERAPEUTIC PROCEDURES: CPT

## 2021-07-08 PROCEDURE — 97113 AQUATIC THERAPY/EXERCISES: CPT

## 2021-07-08 NOTE — FLOWSHEET NOTE
REview of    [facility] and [address] [facility]  [address] [facility]  Phone: [phone]   Fax:     [phone]      Physical Therapy Daily/Aquatic Flow Sheet   Date:  2021    Patient Name:  Kae Jeffrey :  1960  MRN: [id number]     Restrictions/Precautions:    Pertinent Medical History:Additional Pertinent Hx: arthritis, DDD cervical and lumbar,  HTN, HLD, chronic back pain,  DVT, left and right TKR,  right TKR revision 2020     Medical/Treatment Diagnosis Information:   · Diagnosis: Z96.651 (ICD-10-CM) - Presence of right artificial knee joint (revisionn 2020  · Treatment Diagnosis: Decreased functional mobility 2/ right knee pain     Insurance/Certification information:  PT Insurance Information: BC  Physician Information:  Referring Practitioner: Kary Barahona of care signed (Y/N):      Date of Patient follow up with Physician:     Progress Report: []  Yes  [x]  No     Date Range for reporting period:  Beginnin2021  Ending:      Progress report due (10 Rx/or 30 days whichever is less):     Recertification due (POC duration/ or 90 days whichever is less):     Visit # POC/Insurance Allowable Auth Needed    67 []Yes   []No     PAIN              R knee 10                    After RX 2/10    SUBJECTIVE   Pt notes moderate knee pain and is limited with activity tolerance. Notes pain is anterior and the proximal tibia.   Has been doing PT at home and with PT,  Notes that the therapists were concerned for possible stress reaction as they have done a vibratory test along the bone on the medial lateral malleolus and noted pain along the medial mal with the vibratory testing.    21 pt reports has had 5 revisions on R knee.             Xray   Have reviewed the xrays above from 6/15/2021  3 views of the right knee AP, lateral and sunrise views were performed and reviewed today and my impression is: Well-placed hinged knee prosthesis with long tibial and femoral stems.  Distal aspect of the tibial stem possibly compressing anteriorly on the tibia may be creating some irritation/stress riser no evidence of acute fracture dislocation     Discussed the utilization of a shin guard with an Ace wrap as the anterior aspect of the tibial stem may be creating stress reaction across the anterior tibia distally.  But as the bone continues to feel the sensation that can increase its rigidity and strength and the pain can settle down.  Also discussed with patient the this can go the other way that the bone can continue to thin and create a actual stress fracture         Relevant Medical History:Additional Pertinent Hx: arthritis, DDD cervical and lumbar,  HTN, HLD, chronic back pain,  DVT, left and right TKR,  right TKR revision 11/2020  Functional Scale/Score: LEFS =  48    ROM LEFT RIGHT   HIP Flex wfl all wfl alll   HIP Abd       HIP Ext       HIP IR       HIP ER       Knee ext 0l -3a/p   Knee Flex 105 90a/94p   Ankle PF wfl all wfl all   Ankle DF       Ankle In       Ankle Ev       Strength  LEFT RIGHT   HIP Flexors 4+ all 4+ all   HIP Abductors       HIP Ext       Hip ER       Knee EXT (quad) 4+/5 4/5   Knee Flex (HS) 4+/5 4/5   Ankle DF 4+ all 4+ all   Ankle PF           Latex Allergy:  [x]NO      []YES  Preferred Language for Healthcare:   [x]English       []Other:    Land-based Visits Exercises/Activities: NO LAND RX TODAY  Therapeutic Ex (50649)  Min:10 Resistance/Repetitions Notes   HEP  SLR,   Prone knee flexion  Heel slides  Sidelying abduction                                   Therapeutic Activity (18321) Min: 15     Pool tour policies and procedures Pt expresses understanding    Use of vicente hose to minimize edema Has vicente hose at home    Use of shelby guard Bones response to stress    NMR re-education (91503) Min:                          Manual Intervention (49148)  Min:                    Modalities  Min: Aquatic Visits Exercises/Activities:  Aquatic Abbreviation Key  B= Belt DB= Dumbells T= Theratube   G=Gloves N= Noodles W= Weights   P= Paddles WW=Water Walker K= Kickboard        Transfers:   steps ind 1 at a time      % Immersion: 75%            Ambulation:  laps ind Exercises UE:      Forwards 3 Shoulder Shrugs     Lateral 1 Shoulder circles     Marching    Scapular Retraction      Retro   Rolling      Cariocas  Push Downs    Multifidus walkouts w/paddle  IR/ER      Punching    Stretchin sec  Rowing    Gastroc/Soleus  2 B Elbow Flex/Ext    Hamstring   Shldr Flex/Ext     Knee flex stretch   Shldr Abd/Add    Piriformis   Horiz Abd/Add     Hip flexor    Arm Circles     SKTC   PNF Diagonals    DKTC  UE oscillations f/b, s/s    ITB   Wall Push-ups    Quad  Figure 8's    Mid back   Buoy pull/push downs    UT  Tband rows    Rhom  Tband lats    Post Shoulder  Lumbar Rot w/ paddles    Pec   Small ball pull downs                   diagonals             Cervical:       AROM Flex       AROM Extension     LEs exercises: B AROM Side Bending    Marching  10 AROM Rotation    Heel Raises  10 Chin tucks    Toe Raises  10 Chin nods     Squats  10      Hamstring Curls  10      Hip Flexion  10 Balance:      Hip Abduction 10 SLS    Hip Circles 10 Tandem stance    TA set  NBOS eyes open    Glut Set  NBOS eyes closed    Hip Extension  Hand to Opposite Knee    Hip Adduction    Box Step     Hip IR   Noodle Stance     Hip ER  Stop/Go Gait    Fig 8's  Switch Gait                Seated: chair Functional:    Ankle Pumps 10 Step up forward    Ankle circles 10 Step up lateral    Knee flex & ext 10 Step down    Hip Abd & Adduction 10 [place] squats    Bicycle  10 Crate Lifts    Add Set with ball 10 Lunges forward    LX stab with med ball throws  Lunges lateral    Ankle INV  Lunges retro    Ankle EV  Lower ab curl with noodle      Upper ab curl with ball      Med ball straight lifts      Med ball diagonal lifts      Hydrorider strengthening, flexibility, endurance, ROM of core, proximal hip and LE for functional self-care, mobility, lifting and ambulation/stair navigation   [] (54154)Reviewed/Progressed HEP activities related to improving balance, coordination, kinesthetic sense, posture, motor skill, proprioception of core, proximal hip and LE for self care, mobility, lifting, and ambulation/stair navigation      Manual Treatments:  PROM / STM / Oscillations-Mobs:  G-I, II, III, IV (PA's, Inf., Post.)  [] (81569) Provided manual therapy to mobilize LE, proximal hip and/or LS spine soft tissue/joints for the purpose of modulating pain, promoting relaxation,  increasing ROM, reducing/eliminating soft tissue swelling/inflammation/restriction, improving soft tissue extensibility and allowing for proper ROM for normal function with self care, mobility, lifting and ambulation.      Individual Aquatic Therapy:  [x] (23106) Provided verbal and tactile cueing for strengthening, flexibility, ROM using the therapeutic properties of water (buoyancy, resistance) for improvements in core control, mobility and ambulation     Aquatic Group:  [x] (62349) Provided intermittent verbal and tactile cueing for strengthening, endurance, flexibility and ROM for 2-4 individuals that do not require one-on one patient contact by the therapist       Land Timed Code Treatment Minutes: 0   Land Total Treatment Minutes: 0     Individual Aquatic Minutes: 25   Aquatic Group Minutes: 15     [] EVAL (LOW) 26541 (typically 20 minutes face-to-face)  [] EVAL (MOD) 27735 (typically 30 minutes face-to-face)  [] EVAL (HIGH) 41150 (typically 45 minutes face-to-face)  [] RE-EVAL     [] KK(70249) x     [] Dry needle 1 or 2 Muscles (49042)  [] NMR (81313) x     [] Dry needle 3+ Muscles (47455)  [] Manual (28184) x     [] Ultrasound (44097) x  [] TA (16590) x     [] Mech Traction (14239)  [] ES(attended) (26607)     [] ES (un) (36731):   [] Vasopump (09183) [] Ionto (99405)   [x] Aquatic Ind (03786) x2     [x] Aquatic Group ([phone]) x   [] Other:    Treatment/Activity Tolerance:  [x] Patient tolerated treatment well [] Patient limited by fatigue  [] Patient limited by pain  [] Patient limited by other medical complications  [x] Other: tolerated aquatic ex w/o increased discomfort/ complaint    Prognosis: [x] Good [] Fair  [] Poor    GOALS:  Patient stated goal:  To not have as much pain  []? Progressing: []? Met: []? Not Met: []? Adjusted     Therapist goals for Patient:   Short Term Goals: To be achieved in: 2 weeks  1. Independent in HEP and progression per patient tolerance, in order to prevent re-injury. []? Progressing: []? Met: []? Not Met: []? Adjusted  2. Patient will have a decrease in pain to facilitate improvement in movement, function, and ADLs as indicated by Functional Deficits. []? Progressing: []? Met: []? Not Met: []? Adjusted     Long Term Goals: To be achieved in:  2  weeks  1. Disability index score of  30 % or less for the LEFS to assist with reaching prior level of function. []? Progressing: []? Met: []? Not Met: []? Adjusted  2. Patient will demonstrate increased AROM to 105 deg  to allow for proper joint functioning as indicated by patients Functional Deficits. []? Progressing: []? Met: []? Not Met: []? Adjusted  3. Patient will demonstrate an increase in Strength to good proximal hip strength and control, within 5lb HHD in LE to allow for proper functional mobility as indicated by patients Functional Deficits. []? Progressing: []? Met: []? Not Met: []? Adjusted  4. Patient will return to 70%  functional activities without increased symptoms or restriction. []? Progressing: []? Met: []? Not Met: []? Adjusted  5. To able to walk without my leg feeling like it will buckle. []? Progressing: []? Met: []? Not Met: []?  Adjusted         Patient Requires Follow-up: [x] Yes  [] No    Plan:   [x] Continue per plan of care [] Alter current plan (see comments)  [] Plan of care initiated [] Hold pending MD visit [] Discharge    Plan for Next Session:   Gradually progress aquatic exercise as tolerated to increase   ROM, strength, and endurance to improve ADL's and decrease pain. ADD: increase gt, seated ex, noodle bike    Electronically signed by:  Janusz Byers,     Note: If patient does not return for scheduled/recommended follow up visits, this note will serve as a discharge from care along with the most recent update on progress.

## 2021-07-13 ENCOUNTER — TELEPHONE (OUTPATIENT)
Dept: ORTHOPEDIC SURGERY | Age: 61
End: 2021-07-13

## 2021-07-13 ENCOUNTER — HOSPITAL ENCOUNTER (OUTPATIENT)
Dept: PHYSICAL THERAPY | Age: 61
Setting detail: THERAPIES SERIES
Discharge: HOME | End: 2021-07-13
Payer: COMMERCIAL

## 2021-07-13 DIAGNOSIS — Z96.651 STATUS POST REVISION OF TOTAL REPLACEMENT OF RIGHT KNEE: ICD-10-CM

## 2021-07-13 RX ORDER — OXYCODONE AND ACETAMINOPHEN 10; 325 MG/1; MG/1
1 TABLET ORAL EVERY 8 HOURS PRN
Qty: 21 TABLET | Refills: 0 | Status: SHIPPED | OUTPATIENT
Start: 2021-07-13 | End: 2021-07-20 | Stop reason: SDUPTHER

## 2021-07-13 NOTE — FLOWSHEET NOTE
[place] [facility], [facility]  [address] [facility] [place] [place] [facility]  Phone: [phone]   Fax:     [phone]    Physical Therapy  Cancellation/No-show Note  Patient Name:  Darleen Mcgarry  :  1960   Date:  2021  Cancelled visits to date: 1  No-shows to date: 0    Patient status for today's appointment patient:  [x]  Cancelled  []  Rescheduled appointment  []  No-show     Reason given by patient:  [x]  Patient ill  []  Conflicting appointment  []  No transportation    []  Conflict with work  []  No reason given  []  Other:     Comments:      Phone call information:   []  Phone call made today to patient at _ time at number provided:      []  Patient answered, conversation as follows:      []  Patient did not answer, message left as follows:       [x]  Phone call not made today    Electronically signed by:  Rakesh Medina,

## 2021-07-13 NOTE — TELEPHONE ENCOUNTER
Prescription Refill     Medication Name:  OXYCODONE  Pharmacy: [address] [place] [address]  Patient Contact Number:  [phone]

## 2021-07-15 ENCOUNTER — HOSPITAL ENCOUNTER (OUTPATIENT)
Dept: PHYSICAL THERAPY | Age: 61
Setting detail: THERAPIES SERIES
Discharge: HOME | End: 2021-07-15
Payer: COMMERCIAL

## 2021-07-15 PROCEDURE — 97113 AQUATIC THERAPY/EXERCISES: CPT

## 2021-07-15 PROCEDURE — 97150 GROUP THERAPEUTIC PROCEDURES: CPT

## 2021-07-15 NOTE — FLOWSHEET NOTE
REview of    [facility]  and [address] [facility]  [address] [facility]  Phone: [phone]   Fax:     [phone]      Physical Therapy Daily/Aquatic Flow Sheet   Date:  7/15/2021    Patient Name:  Urmila Gibson :  1960  MRN: [id number]     Restrictions/Precautions:    Pertinent Medical History:Additional Pertinent Hx: arthritis, DDD cervical and lumbar,  HTN, HLD, chronic back pain,  DVT, left and right TKR,  right TKR revision 2020     Medical/Treatment Diagnosis Information:   · Diagnosis: Z96.651 (ICD-10-CM) - Presence of right artificial knee joint (revisionn 2020  · Treatment Diagnosis: Decreased functional mobility 2/2 right knee pain     Insurance/Certification information:  PT Insurance Information: BCBS  Physician Information:  Referring Practitioner: Jackie Rivas of care signed (Y/N):      Date of Patient follow up with Physician:     Progress Report: []  Yes  [x]  No     Date Range for reporting period:  Beginnin/15/2021  Ending:      Progress report due (10 Rx/or 30 days whichever is less):     Recertification due (POC duration/ or 90 days whichever is less):     Visit # POC/Insurance Allowable Auth Needed   3/16 67 []Yes   []No     PAIN              R knee 5/10                    After RX 3-4/10    SUBJECTIVE   Pt notes moderate knee pain and is limited with activity tolerance. Notes pain is anterior and the proximal tibia. Has been doing PT at home and with PT,  Notes that the therapists were concerned for possible stress reaction as they have done a vibratory test along the bone on the medial lateral malleolus and noted pain along the medial mal with the vibratory testing.    21 pt reports has had 5 revisions on R knee.   7/15: a little stiff and irritated             Xray   Have reviewed the xrays above from 6/15/2021  3 views of the right knee AP, lateral and sunrise views were performed and reviewed today and my impression is: Well-placed hinged knee prosthesis with long tibial and femoral stems.  Distal aspect of the tibial stem possibly compressing anteriorly on the tibia may be creating some irritation/stress riser no evidence of acute fracture dislocation     Discussed the utilization of a shin guard with an Ace wrap as the anterior aspect of the tibial stem may be creating stress reaction across the anterior tibia distally.  But as the bone continues to feel the sensation that can increase its rigidity and strength and the pain can settle down.  Also discussed with patient the this can go the other way that the bone can continue to thin and create a actual stress fracture         Relevant Medical History:Additional Pertinent Hx: arthritis, DDD cervical and lumbar,  HTN, HLD, chronic back pain,  DVT, left and right TKR,  right TKR revision 11/2020  Functional Scale/Score: LEFS =  48    ROM LEFT RIGHT   HIP Flex wfl all wfl alll   HIP Abd       HIP Ext       HIP IR       HIP ER       Knee ext 0l -3a/p   Knee Flex 105 90a/94p   Ankle PF wfl all wfl all   Ankle DF       Ankle In       Ankle Ev       Strength  LEFT RIGHT   HIP Flexors 4+ all 4+ all   HIP Abductors       HIP Ext       Hip ER       Knee EXT (quad) 4+/5 4/5   Knee Flex (HS) 4+/5 4/5   Ankle DF 4+ all 4+ all   Ankle PF           Latex Allergy:  [x]NO      []YES  Preferred Language for Healthcare:   [x]English       []Other:    Land-based Visits Exercises/Activities: NO LAND RX TODAY  Therapeutic Ex (24084)  Min: Resistance/Repetitions Notes   HEP  SLR,   Prone knee flexion  Heel slides  Sidelying abduction                                   Therapeutic Activity (59633) Min:      Pool tour policies and procedures Pt expresses understanding    Use of vicente hose to minimize edema Has vicente hose at home    Use of shelby guard Bones response to stress    NMR re-education (61056) Min:                          Manual Intervention ([phone])  Min: Modalities  Min:               Aquatic Visits Exercises/Activities:  Aquatic Abbreviation Key  B= Belt DB= Dumbells T= Theratube   G=Gloves N= Noodles W= Weights   P= Paddles WW=Water Walker K= Kickboard        Transfers:   steps ind 1 at a time      % Immersion: 75%            Ambulation:  laps ind Exercises UE:      Forwards 3 Shoulder Shrugs     Lateral 1 Shoulder circles     Marching    Scapular Retraction      Retro   Rolling      Cariocas  Push Downs    Multifidus walkouts w/paddle  IR/ER      Punching    Stretchin sec  Rowing    Gastroc/Soleus  2 B Elbow Flex/Ext    Hamstring  2 Shldr Flex/Ext     Knee flex stretch   Shldr Abd/Add    Piriformis   Horiz Abd/Add     Hip flexor    Arm Circles     SKTC   PNF Diagonals    DKTC  UE oscillations f/b, s/s    ITB   Wall Push-ups    Quad  Figure 8's    Mid back   Buoy pull/push downs    UT  Tband rows    Rhom  Tband lats    Post Shoulder  Lumbar Rot w/ paddles    Pec   Small ball pull downs                   diagonals             Cervical:       AROM Flex       AROM Extension     LEs exercises: B AROM Side Bending    Marching  10 AROM Rotation    Heel Raises  10 Chin tucks    Toe Raises  10 Chin nods     Squats  10      Hamstring Curls  10      Hip Flexion  10 Balance:      Hip Abduction 10 SLS    Hip Circles 10 Tandem stance    TA set  NBOS eyes open    Glut Set  NBOS eyes closed    Hip Extension 10 Hand to Opposite Knee    Hip Adduction    Box Step     Hip IR   Noodle Stance     Hip ER  Stop/Go Gait    Fig 8's  Switch Gait                Seated: chair Functional:    Ankle Pumps 10 Step up forward    Ankle circles 10 Step up lateral    Knee flex & ext 10 Step down    Hip Abd & Adduction 10 [place] squats    Bicycle  10 Crate Lifts    Add Set with ball 10 Lunges forward    LX stab with med ball throws  Lunges lateral    Ankle INV  Lunges retro    Ankle EV  Lower ab curl with noodle      Upper ab curl with ball      Med ball straight lifts      Med ball diagonal lifts      Hydrorider          Noodle:      Leg Press  Deep Water:    Noodle hang at wall  Jog    Noodle hang deep water  Jumping Jacks    Noodle Bicycle  Heel to toe      Hand to opposite knee      Cross country skier      Rocking Horse      Other Therapeutic Activities:  Pt was educated on PT POC, Diagnosis, Prognosis, pathomechanics as well as frequency and duration of scheduling future physical therapy appointments. Time was also taken on this day to answer all patient questions and participation in PT. Reviewed appointment policy in detail with patient and patient verbalized understanding. Home Exercise Program:  Patient was instructed in the following for HEP:     . Patient verbalized/demonstrated understanding and was issued written handout. Charges: Therapeutic Exercise and NMR EXR  [] (06967) Provided verbal/tactile cueing for activities related to strengthening, flexibility, endurance, ROM for improvements in LE, proximal hip, and core control with self care, mobility, lifting, ambulation.  [] (93012) Provided verbal/tactile cueing for activities related to improving balance, coordination, kinesthetic sense, posture, motor skill, proprioception  to assist with LE, proximal hip, and core control in self care, mobility, lifting, ambulation and eccentric single leg control.      NMR and Therapeutic Activities:    [] (12849 or 93987) Provided verbal/tactile cueing for activities related to improving balance, coordination, kinesthetic sense, posture, motor skill, proprioception and motor activation to allow for proper function of core, proximal hip and LE with self care and ADLs and functional mobility.   [] (87137) Gait Re-education- Provided training and instruction to the patient for proper LE, core and proximal hip recruitment and positioning and eccentric body weight control with ambulation re-education including up and down stairs     Home Exercise Program:    [] (75195) Reviewed/Progressed HEP activities related to strengthening, flexibility, endurance, ROM of core, proximal hip and LE for functional self-care, mobility, lifting and ambulation/stair navigation   [] (45102)Reviewed/Progressed HEP activities related to improving balance, coordination, kinesthetic sense, posture, motor skill, proprioception of core, proximal hip and LE for self care, mobility, lifting, and ambulation/stair navigation      Manual Treatments:  PROM / STM / Oscillations-Mobs:  G-I, II, III, IV (PA's, Inf., Post.)  [] (80481) Provided manual therapy to mobilize LE, proximal hip and/or LS spine soft tissue/joints for the purpose of modulating pain, promoting relaxation,  increasing ROM, reducing/eliminating soft tissue swelling/inflammation/restriction, improving soft tissue extensibility and allowing for proper ROM for normal function with self care, mobility, lifting and ambulation.      Individual Aquatic Therapy:  [x] (50583) Provided verbal and tactile cueing for strengthening, flexibility, ROM using the therapeutic properties of water (buoyancy, resistance) for improvements in core control, mobility and ambulation     Aquatic Group:  [x] (47027) Provided intermittent verbal and tactile cueing for strengthening, endurance, flexibility and ROM for 2-4 individuals that do not require one-on one patient contact by the therapist       Land Timed Code Treatment Minutes: 0   Land Total Treatment Minutes: 0     Individual Aquatic Minutes: 20   Aquatic Group Minutes: 15     [] EVAL (LOW) 06572 (typically 20 minutes face-to-face)  [] EVAL (MOD) 39372 (typically 30 minutes face-to-face)  [] EVAL (HIGH) 33754 (typically 45 minutes face-to-face)  [] RE-EVAL     [] ZT(54679) x     [] Dry needle 1 or 2 Muscles (36621)  [] NMR (23181) x     [] Dry needle 3+ Muscles (57964)  [] Manual (47504) x     [] Ultrasound (22881) x  [] TA (96224) x     [] Mech Traction (21700)  [] ES(attended) (72314)     [] ES (un) (83397): [] Vasopump (02446) [] Ionto (54817)   [x] Aquatic Ind (81260) x     [x] Aquatic Group (66991) x   [] Other:    Treatment/Activity Tolerance:  [x] Patient tolerated treatment well [] Patient limited by fatigue  [] Patient limited by pain  [] Patient limited by other medical complications  [x] Other: tolerated aquatic ex w/o increased discomfort/ complaint    Prognosis: [x] Good [] Fair  [] Poor    GOALS:  Patient stated goal:  To not have as much pain  []? Progressing: []? Met: []? Not Met: []? Adjusted     Therapist goals for Patient:   Short Term Goals: To be achieved in: 2 weeks  1. Independent in HEP and progression per patient tolerance, in order to prevent re-injury. []? Progressing: []? Met: []? Not Met: []? Adjusted  2. Patient will have a decrease in pain to facilitate improvement in movement, function, and ADLs as indicated by Functional Deficits. []? Progressing: []? Met: []? Not Met: []? Adjusted     Long Term Goals: To be achieved in:  2  weeks  1. Disability index score of  30 % or less for the LEFS to assist with reaching prior level of function. []? Progressing: []? Met: []? Not Met: []? Adjusted  2. Patient will demonstrate increased AROM to 105 deg  to allow for proper joint functioning as indicated by patients Functional Deficits. []? Progressing: []? Met: []? Not Met: []? Adjusted  3. Patient will demonstrate an increase in Strength to good proximal hip strength and control, within 5lb HHD in LE to allow for proper functional mobility as indicated by patients Functional Deficits. []? Progressing: []? Met: []? Not Met: []? Adjusted  4. Patient will return to 70%  functional activities without increased symptoms or restriction. []? Progressing: []? Met: []? Not Met: []? Adjusted  5. To able to walk without my leg feeling like it will buckle. []? Progressing: []? Met: []? Not Met: []?  Adjusted         Patient Requires Follow-up: [x] Yes  [] No    Plan:   [x] Continue per plan of care [] Alter current plan (see comments)  [] Plan of care initiated [] Hold pending MD visit [] Discharge    Plan for Next Session:   Gradually progress aquatic exercise as tolerated to increase   ROM, strength, and endurance to improve ADL's and decrease pain. ADD: increase gt, seated ex, noodle bike    Electronically signed by:  Kris Whitehead PTA     Note: If patient does not return for scheduled/recommended follow up visits, this note will serve as a discharge from care along with the most recent update on progress.

## 2021-07-20 ENCOUNTER — TELEPHONE (OUTPATIENT)
Dept: PAIN MANAGEMENT | Age: 61
End: 2021-07-20

## 2021-07-20 ENCOUNTER — HOSPITAL ENCOUNTER (OUTPATIENT)
Dept: PHYSICAL THERAPY | Age: 61
Setting detail: THERAPIES SERIES
Discharge: HOME | End: 2021-07-20
Payer: COMMERCIAL

## 2021-07-20 ENCOUNTER — TELEPHONE (OUTPATIENT)
Dept: ORTHOPEDIC SURGERY | Age: 61
End: 2021-07-20

## 2021-07-20 DIAGNOSIS — Z96.651 STATUS POST REVISION OF TOTAL REPLACEMENT OF RIGHT KNEE: ICD-10-CM

## 2021-07-20 PROCEDURE — 97113 AQUATIC THERAPY/EXERCISES: CPT

## 2021-07-20 PROCEDURE — 97150 GROUP THERAPEUTIC PROCEDURES: CPT

## 2021-07-20 RX ORDER — OXYCODONE AND ACETAMINOPHEN 10; 325 MG/1; MG/1
1 TABLET ORAL EVERY 8 HOURS PRN
Qty: 21 TABLET | Refills: 0 | Status: SHIPPED | OUTPATIENT
Start: 2021-07-20 | End: 2021-07-27

## 2021-07-20 NOTE — FLOWSHEET NOTE
REview of    [address] and [address] [facility]  [address] [facility], [facility]  Phone: [phone]   Fax:     [phone]      Physical Therapy Daily/Aquatic Flow Sheet   Date:  2021    Patient Name:  Pablo Crouch :  1960  MRN: [id number]     Restrictions/Precautions:    Pertinent Medical History:Additional Pertinent Hx: arthritis, DDD cervical and lumbar,  HTN, HLD, chronic back pain,  DVT, left and right TKR,  right TKR revision 2020     Medical/Treatment Diagnosis Information:   · Diagnosis: Z96.651 (ICD-10-CM) - Presence of right artificial knee joint (revisionn 2020  · Treatment Diagnosis: Decreased functional mobility 2/2 right knee pain     Insurance/Certification information:  PT Insurance Information: [facility]  Physician Information:  Referring Practitioner: Nena Clemons of care signed (Y/N):      Date of Patient follow up with Physician:     Progress Report: []  Yes  [x]  No     Date Range for reporting period:  Beginnin2021  Ending:      Progress report due (10 Rx/or 30 days whichever is less):     Recertification due (POC duration/ or 90 days whichever is less):     Visit # POC/Insurance Allowable Auth Needed    67 []Yes   []No     PAIN              R knee 4/10                    After RX 2/10    SUBJECTIVE   Pt notes moderate knee pain and is limited with activity tolerance. Notes pain is anterior and the proximal tibia. Has been doing PT at home and with PT,  Notes that the therapists were concerned for possible stress reaction as they have done a vibratory test along the bone on the medial lateral malleolus and noted pain along the medial mal with the vibratory testing.    21 pt reports has had 5 revisions on R knee.   7/15: a little stiff and irritated  21 tolerating aquatic ex w/o increased discomfort.            Xray   Have reviewed the xrays above from 6/15/2021  3 views of the right knee Manual Intervention (52879)  Min:                    Modalities  Min:               Aquatic Visits Exercises/Activities:  Aquatic Abbreviation Key  B= Belt DB= Dumbells T= Theratube   G=Gloves N= Noodles W= Weights   P= Paddles WW=Water Walker K= Kickboard        Transfers:   steps ind 1 at a time      % Immersion: 75%            Ambulation:  laps ind Exercises UE:      Forwards 5 Shoulder Shrugs     Lateral 2 Shoulder circles     Marching    Scapular Retraction      Retro   Rolling      Cariocas  Push Downs    Multifidus walkouts w/paddle  IR/ER      Punching    Stretchin sec  Rowing    Gastroc/Soleus  2 B Elbow Flex/Ext    Hamstring  2 B Shldr Flex/Ext     Knee flex stretch   Shldr Abd/Add    Piriformis   Horiz Abd/Add     Hip flexor    Arm Circles     SKTC   PNF Diagonals    DKTC  UE oscillations f/b, s/s    ITB   Wall Push-ups    Quad  Figure 8's    Mid back   Buoy pull/push downs    UT  Tband rows    Rhom  Tband lats    Post Shoulder  Lumbar Rot w/ paddles    Pec   Small ball pull downs                   diagonals             Cervical:       AROM Flex       AROM Extension     LEs exercises: B AROM Side Bending    Marching  10 AROM Rotation    Heel Raises  10 Chin tucks    Toe Raises  10 Chin nods     Squats  10      Hamstring Curls  10      Hip Flexion  10 Balance:      Hip Abduction 10 SLS    Hip Circles 10 Tandem stance    TA set  NBOS eyes open    Glut Set  NBOS eyes closed    Hip Extension 10 Hand to Opposite Knee    Hip Adduction    Box Step     Hip IR   Noodle Stance     Hip ER  Stop/Go Gait    Fig 8's  Switch Gait                Seated: chair Functional:    Ankle Pumps 20 Step up forward    Ankle circles 10 Step up lateral    Knee flex & ext 20 Step down    Hip Abd & Adduction 20 [place] squats    Bicycle  20 Crate Lifts    Add Set with ball 10 Lunges forward    LX stab with med ball throws  Lunges lateral    Ankle INV  Lunges retro    Ankle EV  Lower ab curl with noodle      Upper ab Home Exercise Program:    [] (55066) Reviewed/Progressed HEP activities related to strengthening, flexibility, endurance, ROM of core, proximal hip and LE for functional self-care, mobility, lifting and ambulation/stair navigation   [] (18115)Reviewed/Progressed HEP activities related to improving balance, coordination, kinesthetic sense, posture, motor skill, proprioception of core, proximal hip and LE for self care, mobility, lifting, and ambulation/stair navigation      Manual Treatments:  PROM / STM / Oscillations-Mobs:  G-I, II, III, IV (PA's, Inf., Post.)  [] (07993) Provided manual therapy to mobilize LE, proximal hip and/or LS spine soft tissue/joints for the purpose of modulating pain, promoting relaxation,  increasing ROM, reducing/eliminating soft tissue swelling/inflammation/restriction, improving soft tissue extensibility and allowing for proper ROM for normal function with self care, mobility, lifting and ambulation.      Individual Aquatic Therapy:  [x] (92901) Provided verbal and tactile cueing for strengthening, flexibility, ROM using the therapeutic properties of water (buoyancy, resistance) for improvements in core control, mobility and ambulation     Aquatic Group:  [x] (97080) Provided intermittent verbal and tactile cueing for strengthening, endurance, flexibility and ROM for 2-4 individuals that do not require one-on one patient contact by the therapist       Land Timed Code Treatment Minutes: 0   Land Total Treatment Minutes: 0     Individual Aquatic Minutes: 30   Aquatic Group Minutes: 15     [] EVAL (LOW) 72287 (typically 20 minutes face-to-face)  [] EVAL (MOD) 53809 (typically 30 minutes face-to-face)  [] EVAL (HIGH) 13799 (typically 45 minutes face-to-face)  [] RE-EVAL     [] EL(20014) x     [] Dry needle 1 or 2 Muscles (82440)  [] NMR (60640) x     [] Dry needle 3+ Muscles (38766)  [] Manual (04706) x     [] Ultrasound (16150) x  [] TA (00235) x     [] Mech Traction (13119)  [] ES(attended) (58539)     [] ES (un) (16087):   [] Vasopump (56503) [] Ionto (90223)   [x] Aquatic Ind (49452) x  2   [x] Aquatic Group (95532)   [] Other:    Treatment/Activity Tolerance:  [x] Patient tolerated treatment well [] Patient limited by fatigue  [] Patient limited by pain  [] Patient limited by other medical complications  [x] Other: tolerated aquatic ex w/o increased discomfort/ complaint    Prognosis: [x] Good [] Fair  [] Poor    GOALS:  Patient stated goal:  To not have as much pain  []? Progressing: []? Met: []? Not Met: []? Adjusted     Therapist goals for Patient:   Short Term Goals: To be achieved in: 2 weeks  1. Independent in HEP and progression per patient tolerance, in order to prevent re-injury. []? Progressing: []? Met: []? Not Met: []? Adjusted  2. Patient will have a decrease in pain to facilitate improvement in movement, function, and ADLs as indicated by Functional Deficits. []? Progressing: []? Met: []? Not Met: []? Adjusted     Long Term Goals: To be achieved in:  2  weeks  1. Disability index score of  30 % or less for the LEFS to assist with reaching prior level of function. []? Progressing: []? Met: []? Not Met: []? Adjusted  2. Patient will demonstrate increased AROM to 105 deg  to allow for proper joint functioning as indicated by patients Functional Deficits. []? Progressing: []? Met: []? Not Met: []? Adjusted  3. Patient will demonstrate an increase in Strength to good proximal hip strength and control, within 5lb HHD in LE to allow for proper functional mobility as indicated by patients Functional Deficits. []? Progressing: []? Met: []? Not Met: []? Adjusted  4. Patient will return to 70%  functional activities without increased symptoms or restriction. []? Progressing: []? Met: []? Not Met: []? Adjusted  5. To able to walk without my leg feeling like it will buckle. []? Progressing: []? Met: []? Not Met: []?  Adjusted         Patient Requires Follow-up: [x] Yes  [] No    Plan:   [x] Continue per plan of care [] Alter current plan (see comments)  [] Plan of care initiated [] Hold pending MD visit [] Discharge    Plan for Next Session:   Gradually progress aquatic exercise as tolerated to increase   ROM, strength, and endurance to improve ADL's and decrease pain. ADD: increase gt, seated ex, box step, noodle bike    Electronically signed by:  Feliberto Maravilla, PTA  [id number]    Note: If patient does not return for scheduled/recommended follow up visits, this note will serve as a discharge from care along with the most recent update on progress.

## 2021-07-20 NOTE — TELEPHONE ENCOUNTER
Prescription Refill     Medication Name:  OXYCODONE  Pharmacy: [address]  Patient Contact Number:  [phone]

## 2021-07-20 NOTE — TELEPHONE ENCOUNTER
Patient called requesting a f/u appt. She was last seen on 1/21/20. Can patient be scheduled?     Please advise

## 2021-07-21 DIAGNOSIS — M25.561 CHRONIC KNEE PAIN AFTER TOTAL REPLACEMENT OF RIGHT KNEE JOINT: Primary | ICD-10-CM

## 2021-07-21 DIAGNOSIS — G89.29 CHRONIC KNEE PAIN AFTER TOTAL REPLACEMENT OF RIGHT KNEE JOINT: Primary | ICD-10-CM

## 2021-07-21 DIAGNOSIS — Z96.651 CHRONIC KNEE PAIN AFTER TOTAL REPLACEMENT OF RIGHT KNEE JOINT: Primary | ICD-10-CM

## 2021-07-21 DIAGNOSIS — Z96.651 STATUS POST REVISION OF TOTAL REPLACEMENT OF RIGHT KNEE: ICD-10-CM

## 2021-07-22 ENCOUNTER — HOSPITAL ENCOUNTER (OUTPATIENT)
Dept: PHYSICAL THERAPY | Age: 61
Setting detail: THERAPIES SERIES
Discharge: HOME | End: 2021-07-22
Payer: COMMERCIAL

## 2021-07-22 PROCEDURE — 97150 GROUP THERAPEUTIC PROCEDURES: CPT

## 2021-07-22 PROCEDURE — 97113 AQUATIC THERAPY/EXERCISES: CPT

## 2021-07-22 NOTE — FLOWSHEET NOTE
REview of    [address] [facility] [[email or web address]]  and 500 [facility]  [address] [facility]  Phone: [phone]   Fax:     [phone]      Physical Therapy Daily/Aquatic Flow Sheet   Date:  2021    Patient Name:  Tania Prieto :  1960  MRN: [id number]     Restrictions/Precautions:    Pertinent Medical History:Additional Pertinent Hx: arthritis, DDD cervical and lumbar,  HTN, HLD, chronic back pain,  DVT, left and right TKR,  right TKR revision 2020     Medical/Treatment Diagnosis Information:   · Diagnosis: Z96.651 (ICD-10-CM) - Presence of right artificial knee joint (revisionn 2020  · Treatment Diagnosis: Decreased functional mobility 2/2 right knee pain     Insurance/Certification information:  PT Insurance Information: [employer]  Physician Information:  Referring Practitioner: Elaina Pelayo  vianney signed (Y/N):      Date of Patient follow up with Physician: Sept    Progress Report: []  Yes  [x]  No     Date Range for reporting period:  Beginnin2021  Ending:      Progress report due (10 Rx/or 30 days whichever is less):     Recertification due (POC duration/ or 90 days whichever is less):     Visit # POC/Insurance Allowable Auth Needed    67 []Yes   []No     PAIN              R knee 10                    After RX 0/10    SUBJECTIVE   Pt notes moderate knee pain and is limited with activity tolerance. Notes pain is anterior and the proximal tibia. Has been doing PT at home and with PT,  Notes that the therapists were concerned for possible stress reaction as they have done a vibratory test along the bone on the medial lateral malleolus and noted pain along the medial mal with the vibratory testing.    21 pt reports has had 5 revisions on R knee. 7/15: a little stiff and irritated  21 tolerating aquatic ex w/o increased discomfort.   21 woke up with increased discomfort R knee w/o reason.   Reports no increased discomfort since doing aquatic ex.             10 min late           Xray   Have reviewed the xrays above from 6/15/2021  3 views of the right knee AP, lateral and sunrise views were performed and reviewed today and my impression is: Well-placed hinged knee prosthesis with long tibial and femoral stems.  Distal aspect of the tibial stem possibly compressing anteriorly on the tibia may be creating some irritation/stress riser no evidence of acute fracture dislocation     Discussed the utilization of a shin guard with an Ace wrap as the anterior aspect of the tibial stem may be creating stress reaction across the anterior tibia distally.  But as the bone continues to feel the sensation that can increase its rigidity and strength and the pain can settle down.  Also discussed with patient the this can go the other way that the bone can continue to thin and create a actual stress fracture         Relevant Medical History:Additional Pertinent Hx: arthritis, DDD cervical and lumbar,  HTN, HLD, chronic back pain,  DVT, left and right TKR,  right TKR revision 11/2020  Functional Scale/Score: LEFS =  48    ROM LEFT RIGHT   HIP Flex wfl all wfl alll   HIP Abd       HIP Ext       HIP IR       HIP ER       Knee ext 0l -3a/p   Knee Flex 105 90a/94p   Ankle PF wfl all wfl all   Ankle DF       Ankle In       Ankle Ev       Strength  LEFT RIGHT   HIP Flexors 4+ all 4+ all   HIP Abductors       HIP Ext       Hip ER       Knee EXT (quad) 4+/5 4/5   Knee Flex (HS) 4+/5 4/5   Ankle DF 4+ all 4+ all   Ankle PF           Latex Allergy:  [x]NO      []YES  Preferred Language for Healthcare:   [x]English       []Other:    Land-based Visits Exercises/Activities: NO LAND RX TODAY  Therapeutic Ex (37637)  Min: Resistance/Repetitions Notes   HEP  SLR,   Prone knee flexion  Heel slides  Sidelying abduction                                   Therapeutic Activity (96633) Min:      Pool tour policies and procedures Pt expresses understanding    Use of vicente hose to minimize edema Has vicente hose at home    Use of shelby guard Bones response to stress    NMR re-education (31552) Min:                          Manual Intervention (.[phone])  Min:                    Modalities  Min:               Aquatic Visits Exercises/Activities:  Aquatic Abbreviation Key  B= Belt DB= Dumbells T= Theratube   G=Gloves N= Noodles W= Weights   P= Paddles WW=Water Walker K= Kickboard        Transfers:   steps ind 1 at a time      % Immersion: 75%            Ambulation:  laps ind Exercises UE:      Forwards 5 Shoulder Shrugs     Lateral 2 Shoulder circles     Marching    Scapular Retraction      Retro   Rolling      Cariocas  Push Downs    Multifidus walkouts w/paddle  IR/ER      Punching    Stretchin sec  Rowing    Gastroc/Soleus  2 B Elbow Flex/Ext    Hamstring  2 B Shldr Flex/Ext     Knee flex stretch   Shldr Abd/Add    Piriformis   Horiz Abd/Add     Hip flexor    Arm Circles     SKTC   PNF Diagonals    DKTC  UE oscillations f/b, s/s    ITB   Wall Push-ups    Quad  Figure 8's    Mid back   Buoy pull/push downs    UT  Tband rows    Rhom  Tband lats    Post Shoulder  Lumbar Rot w/ paddles    Pec   Small ball pull downs                   diagonals             Cervical:       AROM Flex       AROM Extension     LEs exercises: B AROM Side Bending    Marching  10 AROM Rotation    Heel Raises  10 Chin tucks    Toe Raises  10 Chin nods     Squats  10      Hamstring Curls  10      Hip Flexion  10 Balance:      Hip Abduction 10 SLS    Hip Circles 10 Tandem stance    TA set  NBOS eyes open    Glut Set  NBOS eyes closed    Hip Extension 10 Hand to Opposite Knee    Hip Adduction    Box Step     Hip IR   Noodle Stance     Hip ER  Stop/Go Gait    Fig 8's  Switch Gait                Seated: chair Functional:    Ankle Pumps 30 Step up forward    Ankle circles 10 Step up lateral    Knee flex & ext 30 Step down    Hip Abd & Adduction 30 [place] squats    Bicycle  30 Crate Lifts    Add Set with ball 10 Lunges forward    LX stab with med ball throws  Lunges lateral    Ankle INV  Lunges retro    Ankle EV  Lower ab curl with noodle      Upper ab curl with ball      Med ball straight lifts      Med ball diagonal lifts      Hydrorider          Noodle:      Leg Press  Deep Water:    Noodle hang at wall  Jog    Noodle hang deep water  Jumping Jacks    Noodle Bicycle 1 min   assist Heel to toe      Hand to opposite knee      Cross country skier      Rocking Horse      Other Therapeutic Activities:  Pt was educated on PT POC, Diagnosis, Prognosis, pathomechanics as well as frequency and duration of scheduling future physical therapy appointments. Time was also taken on this day to answer all patient questions and participation in PT. Reviewed appointment policy in detail with patient and patient verbalized understanding. Home Exercise Program:  Patient was instructed in the following for HEP:     . Patient verbalized/demonstrated understanding and was issued written handout. Charges: Therapeutic Exercise and NMR EXR  [] (57019) Provided verbal/tactile cueing for activities related to strengthening, flexibility, endurance, ROM for improvements in LE, proximal hip, and core control with self care, mobility, lifting, ambulation.  [] (46836) Provided verbal/tactile cueing for activities related to improving balance, coordination, kinesthetic sense, posture, motor skill, proprioception  to assist with LE, proximal hip, and core control in self care, mobility, lifting, ambulation and eccentric single leg control.      NMR and Therapeutic Activities:    [] (84694 or 57209) Provided verbal/tactile cueing for activities related to improving balance, coordination, kinesthetic sense, posture, motor skill, proprioception and motor activation to allow for proper function of core, proximal hip and LE with self care and ADLs and functional mobility.   [] (22238) Gait Re-education- Provided training and instruction to the patient for proper LE, core and proximal hip recruitment and positioning and eccentric body weight control with ambulation re-education including up and down stairs     Home Exercise Program:    [] (44046) Reviewed/Progressed HEP activities related to strengthening, flexibility, endurance, ROM of core, proximal hip and LE for functional self-care, mobility, lifting and ambulation/stair navigation   [] (75481)Reviewed/Progressed HEP activities related to improving balance, coordination, kinesthetic sense, posture, motor skill, proprioception of core, proximal hip and LE for self care, mobility, lifting, and ambulation/stair navigation      Manual Treatments:  PROM / STM / Oscillations-Mobs:  G-I, II, III, IV (PA's, Inf., Post.)  [] (45927) Provided manual therapy to mobilize LE, proximal hip and/or LS spine soft tissue/joints for the purpose of modulating pain, promoting relaxation,  increasing ROM, reducing/eliminating soft tissue swelling/inflammation/restriction, improving soft tissue extensibility and allowing for proper ROM for normal function with self care, mobility, lifting and ambulation.      Individual Aquatic Therapy:  [x] (63669) Provided verbal and tactile cueing for strengthening, flexibility, ROM using the therapeutic properties of water (buoyancy, resistance) for improvements in core control, mobility and ambulation     Aquatic Group:  [x] (73174) Provided intermittent verbal and tactile cueing for strengthening, endurance, flexibility and ROM for 2-4 individuals that do not require one-on one patient contact by the therapist       Land Timed Code Treatment Minutes: 0   Land Total Treatment Minutes: 0     Individual Aquatic Minutes: 15   Aquatic Group Minutes: 20     [] EVAL (LOW) 64550 (typically 20 minutes face-to-face)  [] EVAL (MOD) 32147 (typically 30 minutes face-to-face)  [] EVAL (HIGH) 66496 (typically 45 minutes face-to-face)  [] RE-EVAL     [] UD(44618) x     [] Dry needle 1 or 2 Muscles (20045)  [] NMR (07779) x     [] Dry needle 3+ Muscles (85991)  [] Manual (75844) x     [] Ultrasound (94367) x  [] TA (46755) x     [] Mech Traction (14446)  [] ES(attended) (24759)     [] ES (un) (89447):   [] Vasopump (94028) [] Ionto (05785)   [x] Aquatic Ind (51913) x     [x] Aquatic Group (89300)   [] Other:    Treatment/Activity Tolerance:  [x] Patient tolerated treatment well [] Patient limited by fatigue  [] Patient limited by pain  [] Patient limited by other medical complications  [x] Other: no pain after aquatic ex. Prognosis: [x] Good [] Fair  [] Poor    GOALS:  Patient stated goal:  To not have as much pain  []? Progressing: []? Met: []? Not Met: []? Adjusted     Therapist goals for Patient:   Short Term Goals: To be achieved in: 2 weeks  1. Independent in HEP and progression per patient tolerance, in order to prevent re-injury. []? Progressing: []? Met: []? Not Met: []? Adjusted  2. Patient will have a decrease in pain to facilitate improvement in movement, function, and ADLs as indicated by Functional Deficits. []? Progressing: []? Met: []? Not Met: []? Adjusted     Long Term Goals: To be achieved in:  2  weeks  1. Disability index score of  30 % or less for the LEFS to assist with reaching prior level of function. []? Progressing: []? Met: []? Not Met: []? Adjusted  2. Patient will demonstrate increased AROM to 105 deg  to allow for proper joint functioning as indicated by patients Functional Deficits. []? Progressing: []? Met: []? Not Met: []? Adjusted  3. Patient will demonstrate an increase in Strength to good proximal hip strength and control, within 5lb HHD in LE to allow for proper functional mobility as indicated by patients Functional Deficits. []? Progressing: []? Met: []? Not Met: []? Adjusted  4. Patient will return to 70%  functional activities without increased symptoms or restriction. []? Progressing: []? Met: []? Not Met: []? Adjusted  5.  To able to walk without my leg feeling like it will buckle. []? Progressing: []? Met: []? Not Met: []? Adjusted         Patient Requires Follow-up: [x] Yes  [] No    Plan:   [x] Continue per plan of care [] Alter current plan (see comments)  [] Plan of care initiated [] Hold pending MD visit [] Discharge    Plan for Next Session:   Gradually progress aquatic exercise as tolerated to increase   ROM, strength, and endurance to improve ADL's and decrease pain. ADD: increase gt,  box step,     Electronically signed by:  Gwen Jones, PTA  [phone]    Note: If patient does not return for scheduled/recommended follow up visits, this note will serve as a discharge from care along with the most recent update on progress.

## 2021-07-27 ENCOUNTER — APPOINTMENT (OUTPATIENT)
Dept: PHYSICAL THERAPY | Age: 61
End: 2021-07-27
Payer: COMMERCIAL

## 2021-07-28 ENCOUNTER — TELEPHONE (OUTPATIENT)
Dept: ORTHOPEDIC SURGERY | Age: 61
End: 2021-07-28

## 2021-07-28 DIAGNOSIS — Z96.651 STATUS POST REVISION OF TOTAL REPLACEMENT OF RIGHT KNEE: Primary | ICD-10-CM

## 2021-07-29 ENCOUNTER — HOSPITAL ENCOUNTER (OUTPATIENT)
Dept: PHYSICAL THERAPY | Age: 61
Setting detail: THERAPIES SERIES
Discharge: HOME | End: 2021-07-29
Payer: COMMERCIAL

## 2021-07-29 PROCEDURE — 97113 AQUATIC THERAPY/EXERCISES: CPT

## 2021-07-29 PROCEDURE — 97150 GROUP THERAPEUTIC PROCEDURES: CPT

## 2021-07-29 RX ORDER — OXYCODONE AND ACETAMINOPHEN 10; 325 MG/1; MG/1
1 TABLET ORAL EVERY 8 HOURS PRN
Qty: 21 TABLET | Refills: 0 | Status: SHIPPED | OUTPATIENT
Start: 2021-07-29 | End: 2021-08-05 | Stop reason: SDUPTHER

## 2021-07-29 NOTE — FLOWSHEET NOTE
REview of    [facility] and [facility]  [address] [facility]  Phone: [phone]   Fax:     [phone]      Physical Therapy Daily/Aquatic Flow Sheet   Date:  2021    Patient Name:  Nico Hill :  1960  MRN: [id number]     Restrictions/Precautions:    Pertinent Medical History:Additional Pertinent Hx: arthritis, DDD cervical and lumbar,  HTN, HLD, chronic back pain,  DVT, left and right TKR,  right TKR revision 2020     Medical/Treatment Diagnosis Information:   · Diagnosis: Z96.651 (ICD-10-CM) - Presence of right artificial knee joint (revisionn 2020  · Treatment Diagnosis: Decreased functional mobility 2/2 right knee pain     Insurance/Certification information:  PT Insurance Information: BC  Physician Information:  Referring Practitioner: Ruby Ireland of care signed (Y/N):      Date of Patient follow up with Physician: Sept    Progress Report: []  Yes  [x]  No     Date Range for reporting period:  Beginnin2021  Ending:      Progress report due (10 Rx/or 30 days whichever is less):     Recertification due (POC duration/ or 90 days whichever is less):     Visit # POC/Insurance Allowable Auth Needed    67 []Yes   []No     PAIN    R knee 6/10                    After RX 4/10    SUBJECTIVE   Pt notes moderate knee pain and is limited with activity tolerance. Notes pain is anterior and the proximal tibia. Has been doing PT at home and with PT,  Notes that the therapists were concerned for possible stress reaction as they have done a vibratory test along the bone on the medial lateral malleolus and noted pain along the medial mal with the vibratory testing.    21 pt reports has had 5 revisions on R knee. 7/15: a little stiff and irritated  21 tolerating aquatic ex w/o increased discomfort.   21 woke up with increased discomfort R knee w/o reason.   Reports no increased discomfort since doing aquatic ex. 10 min late  7-29-21 Day after aquatic ex sore. Maybe doing a little too much.   Overall feels aquatic ex helpful with 30% improvement.            Xray   Have reviewed the xrays above from 6/15/2021  3 views of the right knee AP, lateral and sunrise views were performed and reviewed today and my impression is: Well-placed hinged knee prosthesis with long tibial and femoral stems.  Distal aspect of the tibial stem possibly compressing anteriorly on the tibia may be creating some irritation/stress riser no evidence of acute fracture dislocation     Discussed the utilization of a shin guard with an Ace wrap as the anterior aspect of the tibial stem may be creating stress reaction across the anterior tibia distally.  But as the bone continues to feel the sensation that can increase its rigidity and strength and the pain can settle down.  Also discussed with patient the this can go the other way that the bone can continue to thin and create a actual stress fracture         Relevant Medical History:Additional Pertinent Hx: arthritis, DDD cervical and lumbar,  HTN, HLD, chronic back pain,  DVT, left and right TKR,  right TKR revision 11/2020  Functional Scale/Score: LEFS =  48    ROM LEFT RIGHT   HIP Flex wfl all wfl alll   HIP Abd       HIP Ext       HIP IR       HIP ER       Knee ext 0l -3a/p   Knee Flex 105 90a/94p   Ankle PF wfl all wfl all   Ankle DF       Ankle In       Ankle Ev       Strength  LEFT RIGHT   HIP Flexors 4+ all 4+ all   HIP Abductors       HIP Ext       Hip ER       Knee EXT (quad) 4+/5 4/5   Knee Flex (HS) 4+/5 4/5   Ankle DF 4+ all 4+ all   Ankle PF           Latex Allergy:  [x]NO      []YES  Preferred Language for Healthcare:   [x]English       []Other:    Land-based Visits Exercises/Activities: NO LAND RX TODAY  Therapeutic Ex (80459)  Min: Resistance/Repetitions Notes   HEP  SLR,   Prone knee flexion  Heel slides  Sidelying abduction Adduction 30 [place] squats    Bicycle  30 Crate Lifts    Add Set with ball 10 Lunges forward    LX stab with med ball throws  Lunges lateral    Ankle INV  Lunges retro    Ankle EV  Lower ab curl with noodle      Upper ab curl with ball      Med ball straight lifts      Med ball diagonal lifts      Hydrorider          Noodle:      Leg Press  Deep Water:    Noodle hang at wall  Jog    Noodle hang deep water  Jumping Jacks    Noodle Bicycle 1 min   assist Heel to toe      Hand to opposite knee      Cross country skier      Rocking Horse      Other Therapeutic Activities:  Pt was educated on PT POC, Diagnosis, Prognosis, pathomechanics as well as frequency and duration of scheduling future physical therapy appointments. Time was also taken on this day to answer all patient questions and participation in PT. Reviewed appointment policy in detail with patient and patient verbalized understanding. Home Exercise Program:  Patient was instructed in the following for HEP:     . Patient verbalized/demonstrated understanding and was issued written handout. Charges: Therapeutic Exercise and NMR EXR  [] (19685) Provided verbal/tactile cueing for activities related to strengthening, flexibility, endurance, ROM for improvements in LE, proximal hip, and core control with self care, mobility, lifting, ambulation.  [] (67539) Provided verbal/tactile cueing for activities related to improving balance, coordination, kinesthetic sense, posture, motor skill, proprioception  to assist with LE, proximal hip, and core control in self care, mobility, lifting, ambulation and eccentric single leg control.      NMR and Therapeutic Activities:    [] (23638 or 72058) Provided verbal/tactile cueing for activities related to improving balance, coordination, kinesthetic sense, posture, motor skill, proprioception and motor activation to allow for proper function of core, proximal hip and LE with self care and ADLs and functional mobility.   [] (85932) Gait Re-education- Provided training and instruction to the patient for proper LE, core and proximal hip recruitment and positioning and eccentric body weight control with ambulation re-education including up and down stairs     Home Exercise Program:    [] (44700) Reviewed/Progressed HEP activities related to strengthening, flexibility, endurance, ROM of core, proximal hip and LE for functional self-care, mobility, lifting and ambulation/stair navigation   [] (00817)Reviewed/Progressed HEP activities related to improving balance, coordination, kinesthetic sense, posture, motor skill, proprioception of core, proximal hip and LE for self care, mobility, lifting, and ambulation/stair navigation      Manual Treatments:  PROM / STM / Oscillations-Mobs:  G-I, II, III, IV (PA's, Inf., Post.)  [] (58779) Provided manual therapy to mobilize LE, proximal hip and/or LS spine soft tissue/joints for the purpose of modulating pain, promoting relaxation,  increasing ROM, reducing/eliminating soft tissue swelling/inflammation/restriction, improving soft tissue extensibility and allowing for proper ROM for normal function with self care, mobility, lifting and ambulation.      Individual Aquatic Therapy:  [x] (82016) Provided verbal and tactile cueing for strengthening, flexibility, ROM using the therapeutic properties of water (buoyancy, resistance) for improvements in core control, mobility and ambulation     Aquatic Group:  [x] (32969) Provided intermittent verbal and tactile cueing for strengthening, endurance, flexibility and ROM for 2-4 individuals that do not require one-on one patient contact by the therapist       Land Timed Code Treatment Minutes: 0   Land Total Treatment Minutes: 0     Individual Aquatic Minutes: 15   Aquatic Group Minutes: 20     [] EVAL (LOW) 69064 (typically 20 minutes face-to-face)  [] EVAL (MOD) 04851 (typically 30 minutes face-to-face)  [] EVAL (HIGH) 423 8117 (typically 45 minutes face-to-face)  [] RE-EVAL     [] FV(12881) x     [] Dry needle 1 or 2 Muscles (01577)  [] NMR (24875) x     [] Dry needle 3+ Muscles (84382)  [] Manual (06468) x     [] Ultrasound (43589) x  [] TA (17865) x     [] Mech Traction (55587)  [] ES(attended) (64555)     [] ES (un) (20960):   [] Vasopump (08332) [] Ionto (06899)   [x] Aquatic Ind (74339) x     [x] Aquatic Group (19543)   [] Other:    Treatment/Activity Tolerance:  [x] Patient tolerated treatment well [] Patient limited by fatigue  [] Patient limited by pain  [] Patient limited by other medical complications  [x] Other:  Able to stand on R LE at 25 % WB while doing L LE ex without complaint    Prognosis: [x] Good [] Fair  [] Poor    GOALS:  Patient stated goal:  To not have as much pain  []? Progressing: []? Met: []? Not Met: []? Adjusted     Therapist goals for Patient:   Short Term Goals: To be achieved in: 2 weeks  1. Independent in HEP and progression per patient tolerance, in order to prevent re-injury. []? Progressing: []? Met: []? Not Met: []? Adjusted  2. Patient will have a decrease in pain to facilitate improvement in movement, function, and ADLs as indicated by Functional Deficits. []? Progressing: []? Met: []? Not Met: []? Adjusted     Long Term Goals: To be achieved in:  2  weeks  1. Disability index score of  30 % or less for the LEFS to assist with reaching prior level of function. []? Progressing: []? Met: []? Not Met: []? Adjusted  2. Patient will demonstrate increased AROM to 105 deg  to allow for proper joint functioning as indicated by patients Functional Deficits. []? Progressing: []? Met: []? Not Met: []? Adjusted  3. Patient will demonstrate an increase in Strength to good proximal hip strength and control, within 5lb HHD in LE to allow for proper functional mobility as indicated by patients Functional Deficits. []? Progressing: []? Met: []? Not Met: []? Adjusted  4.  Patient will return to 70%  functional activities without increased symptoms or restriction. []? Progressing: []? Met: []? Not Met: []? Adjusted  5. To able to walk without my leg feeling like it will buckle. []? Progressing: []? Met: []? Not Met: []? Adjusted         Patient Requires Follow-up: [x] Yes  [] No    Plan:   [x] Continue per plan of care [] Alter current plan (see comments)  [] Plan of care initiated [] Hold pending MD visit [] Discharge    Plan for Next Session:   Gradually progress aquatic exercise as tolerated to increase   ROM, strength, and endurance to improve ADL's and decrease pain. ADD: increase gt,  box step, hand to opposite knee as tolerated. Electronically signed by:  Stepan Tolliver, PTA  [id number]    Note: If patient does not return for scheduled/recommended follow up visits, this note will serve as a discharge from care along with the most recent update on progress.

## 2021-08-03 ENCOUNTER — HOSPITAL ENCOUNTER (OUTPATIENT)
Dept: PHYSICAL THERAPY | Age: 61
Setting detail: THERAPIES SERIES
Discharge: HOME | End: 2021-08-03
Payer: COMMERCIAL

## 2021-08-03 PROCEDURE — 97150 GROUP THERAPEUTIC PROCEDURES: CPT

## 2021-08-03 PROCEDURE — 97530 THERAPEUTIC ACTIVITIES: CPT

## 2021-08-03 PROCEDURE — 97113 AQUATIC THERAPY/EXERCISES: CPT

## 2021-08-03 PROCEDURE — 97110 THERAPEUTIC EXERCISES: CPT

## 2021-08-03 NOTE — FLOWSHEET NOTE
increased discomfort since doing aquatic ex. 10 min late  7-29-21 Day after aquatic ex sore. Maybe doing a little too much. Overall feels aquatic ex helpful with 30% improvement.    8/3 - Feels that pain is less- was as high as an 8, now at a 6. Walking is still problematic         Xray   Have reviewed the xrays above from 6/15/2021  3 views of the right knee AP, lateral and sunrise views were performed and reviewed today and my impression is: Well-placed hinged knee prosthesis with long tibial and femoral stems.  Distal aspect of the tibial stem possibly compressing anteriorly on the tibia may be creating some irritation/stress riser no evidence of acute fracture dislocation     Discussed the utilization of a shin guard with an Ace wrap as the anterior aspect of the tibial stem may be creating stress reaction across the anterior tibia distally.  But as the bone continues to feel the sensation that can increase its rigidity and strength and the pain can settle down.  Also discussed with patient the this can go the other way that the bone can continue to thin and create a actual stress fracture         Relevant Medical History:Additional Pertinent Hx: arthritis, DDD cervical and lumbar,  HTN, HLD, chronic back pain,  DVT, left and right TKR,  right TKR revision 11/2020     Functional Scale/Score:WOMAC   =  48       8/3  [place]omac  45    ROM LEFT RIGHT   HIP Flex wfl all wfl alll   HIP Abd       HIP Ext       HIP IR       HIP ER       Knee ext 0l -3a/p   Knee Flex 105 93a/96p   Ankle PF wfl all wfl all   Ankle DF       Ankle In       Ankle Ev       Strength  LEFT RIGHT   HIP Flexors 4+ all 4+ all   HIP Abductors       HIP Ext       Hip ER       Knee EXT (quad) 4+/5 4/5   Knee Flex (HS) 4+/5 4+/5   Ankle DF 4+ all 4+ all   Ankle PF           Latex Allergy:  [x]NO      []YES  Preferred Language for Healthcare:   [x]English       []Other:    Land-based Visits Exercises/Activities: NO LAND RX TODAY  Therapeutic Ex Seated: chair Functional:    Ankle Pumps 30 Step up forward    Ankle circles 10 Step up lateral    Knee flex & ext 30 Step down    Hip Abd & Adduction 30 [place] squats    Bicycle  30 Crate Lifts    Add Set with ball 10 Lunges forward    LX stab with med ball throws  Lunges lateral    Ankle INV  Lunges retro    Ankle EV  Lower ab curl with noodle      Upper ab curl with ball      Med ball straight lifts      Med ball diagonal lifts      Hydrorider          Noodle:      Leg Press  Deep Water:    Noodle hang at wall  Jog    Noodle hang deep water  Jumping Jacks    Noodle Bicycle 1 min   assist Heel to toe      Hand to opposite knee      Cross country skier      Rocking Horse      Other Therapeutic Activities:  Pt was educated on PT POC, Diagnosis, Prognosis, pathomechanics as well as frequency and duration of scheduling future physical therapy appointments. Time was also taken on this day to answer all patient questions and participation in PT. Reviewed appointment policy in detail with patient and patient verbalized understanding. Home Exercise Program:  Patient was instructed in the following for HEP:     . Patient verbalized/demonstrated understanding and was issued written handout. Charges: Therapeutic Exercise and NMR EXR  [] (86419) Provided verbal/tactile cueing for activities related to strengthening, flexibility, endurance, ROM for improvements in LE, proximal hip, and core control with self care, mobility, lifting, ambulation.  [] (25928) Provided verbal/tactile cueing for activities related to improving balance, coordination, kinesthetic sense, posture, motor skill, proprioception  to assist with LE, proximal hip, and core control in self care, mobility, lifting, ambulation and eccentric single leg control.      NMR and Therapeutic Activities:    [] (63387 or 97437) Provided verbal/tactile cueing for activities related to improving balance, coordination, kinesthetic sense, posture, motor skill, proprioception and motor activation to allow for proper function of core, proximal hip and LE with self care and ADLs and functional mobility.   [] (48985) Gait Re-education- Provided training and instruction to the patient for proper LE, core and proximal hip recruitment and positioning and eccentric body weight control with ambulation re-education including up and down stairs     Home Exercise Program:    [] (11679) Reviewed/Progressed HEP activities related to strengthening, flexibility, endurance, ROM of core, proximal hip and LE for functional self-care, mobility, lifting and ambulation/stair navigation   [] (91007)Reviewed/Progressed HEP activities related to improving balance, coordination, kinesthetic sense, posture, motor skill, proprioception of core, proximal hip and LE for self care, mobility, lifting, and ambulation/stair navigation      Manual Treatments:  PROM / STM / Oscillations-Mobs:  G-I, II, III, IV (PA's, Inf., Post.)  [] (58048) Provided manual therapy to mobilize LE, proximal hip and/or LS spine soft tissue/joints for the purpose of modulating pain, promoting relaxation,  increasing ROM, reducing/eliminating soft tissue swelling/inflammation/restriction, improving soft tissue extensibility and allowing for proper ROM for normal function with self care, mobility, lifting and ambulation.      Individual Aquatic Therapy:  [x] (83789) Provided verbal and tactile cueing for strengthening, flexibility, ROM using the therapeutic properties of water (buoyancy, resistance) for improvements in core control, mobility and ambulation     Aquatic Group:  [x] (07525) Provided intermittent verbal and tactile cueing for strengthening, endurance, flexibility and ROM for 2-4 individuals that do not require one-on one patient contact by the therapist       Land Timed Code Treatment Minutes: 25   Land Total Treatment Minutes: 25     Individual Aquatic Minutes: 30   Aquatic Group Minutes: 15     [] EVAL (LOW) 31808 (typically 20 minutes face-to-face)  [] EVAL (MOD) 78395 (typically 30 minutes face-to-face)  [] EVAL (HIGH) 96664 (typically 45 minutes face-to-face)  [] RE-EVAL     [x] QE(32324) x   1  [] Dry needle 1 or 2 Muscles (16496)  [] NMR (80044) x     [] Dry needle 3+ Muscles (28998)  [] Manual (12530) x     [] Ultrasound (58088) x  [x] TA (88537) x   1  [] Mech Traction (44796)  [] ES(attended) (79423)     [] ES (un) (50962):   [] Vasopump (72577) [] Ionto (49727)   [x] Aquatic Ind (52692) x    2 [x] Aquatic Group (15546)   [] Other:    Treatment/Activity Tolerance:  [x] Patient tolerated treatment well [] Patient limited by fatigue  [] Patient limited by pain  [] Patient limited by other medical complications  [x] Other:   Progressing with aquatic ex with increased endurance. Prognosis: [x] Good [] Fair  [] Poor    GOALS:  Patient stated goal:  To not have as much pain  [x]? Progressing: []? Met: []? Not Met: []? Adjusted     Therapist goals for Patient:   Short Term Goals: To be achieved in: 2 weeks  1. Independent in HEP and progression per patient tolerance, in order to prevent re-injury. [x]? Progressing: []? Met: []? Not Met: []? Adjusted  2. Patient will have a decrease in pain to facilitate improvement in movement, function, and ADLs as indicated by Functional Deficits. [x]? Progressing: []? Met: []? Not Met: []? Adjusted     Long Term Goals: To be achieved in:  8  weeks  1. Disability index score of  30 % or less for the LEFS to assist with reaching prior level of function. [x]? Progressing: []? Met: []? Not Met: []? Adjusted  2. Patient will demonstrate increased AROM to 105 deg  to allow for proper joint functioning as indicated by patients Functional Deficits. [x]? Progressing: []? Met: []? Not Met: []? Adjusted  3.  Patient will demonstrate an increase in Strength to good proximal hip strength and control, within 5lb HHD in LE to allow for proper functional mobility as indicated by patients Functional Deficits. [x]? Progressing: []? Met: []? Not Met: []? Adjusted  4. Patient will return to 70%  functional activities without increased symptoms or restriction. [x]? Progressing: []? Met: []? Not Met: []? Adjusted  5. To able to walk without my leg feeling like it will buckle. [x]? Progressing: []? Met: []? Not Met: []? Adjusted         Patient Requires Follow-up: [x] Yes  [] No    Plan:   [x] Continue per plan of care [] Alter current plan (see comments)  [] Plan of care initiated [] Hold pending MD visit [] Discharge    Plan for Next Session:   Gradually progress aquatic exercise as tolerated to increase   ROM, strength, and endurance to improve ADL's and decrease pain. ADD: increase gt,  box step, squats, step ups fwds,, TKE / T-band  as tolerated. Electronically signed by:  Taqueria Martin, PT  [id number]                           [employer]     Note: If patient does not return for scheduled/recommended follow up visits, this note will serve as a discharge from care along with the most recent update on progress.

## 2021-08-04 ENCOUNTER — TELEPHONE (OUTPATIENT)
Dept: ORTHOPEDIC SURGERY | Age: 61
End: 2021-08-04

## 2021-08-04 DIAGNOSIS — Z96.651 STATUS POST REVISION OF TOTAL REPLACEMENT OF RIGHT KNEE: ICD-10-CM

## 2021-08-04 NOTE — TELEPHONE ENCOUNTER
Prescription Refill     Medication Name:  OXYCODONE   Pharmacy: [facility] [phone]  Patient Contact Number:  [phone]

## 2021-08-05 ENCOUNTER — HOSPITAL ENCOUNTER (OUTPATIENT)
Dept: PHYSICAL THERAPY | Age: 61
Setting detail: THERAPIES SERIES
Discharge: HOME | End: 2021-08-05
Payer: COMMERCIAL

## 2021-08-05 PROCEDURE — 97113 AQUATIC THERAPY/EXERCISES: CPT

## 2021-08-05 RX ORDER — OXYCODONE AND ACETAMINOPHEN 10; 325 MG/1; MG/1
1 TABLET ORAL EVERY 8 HOURS PRN
Qty: 21 TABLET | Refills: 0 | Status: SHIPPED | OUTPATIENT
Start: 2021-08-05 | End: 2021-08-11 | Stop reason: SDUPTHER

## 2021-08-05 NOTE — FLOWSHEET NOTE
REview of    [address] [facility]  and [facility]  [address] [facility], [facility]  Phone: [phone]   Fax:     [phone]      Physical Therapy Daily/Aquatic Flow Sheet   Date:  2021    Patient Name:  Eric Wise :  1960  MRN: [id number]     Restrictions/Precautions:    Pertinent Medical History:Additional Pertinent Hx: arthritis, DDD cervical and lumbar,  HTN, HLD, chronic back pain,  DVT, left and right TKR,  right TKR revision 2020     Medical/Treatment Diagnosis Information:   · Diagnosis: Z96.651 (ICD-10-CM) - Presence of right artificial knee joint (revisionn 2020  · Treatment Diagnosis: Decreased functional mobility 2/2 right knee pain     Insurance/Certification information:  PT Insurance Information: [facility]  Physician Information:  Referring Practitioner: Alfredo Willett signed (Y/N):      Date of Patient follow up with Physician: Sept    Progress Report: []  Yes  [x]  No     Date Range for reporting period:  Beginnin2021  Ending:      Progress report due (10 Rx/or 30 days whichever is less):     Recertification due (POC duration/ or 90 days whichever is less):     Visit # POC/Insurance Allowable Auth Needed    67 []Yes   []No     PAIN    R knee /10  Post aquatics - can be as high as a 6                     SUBJECTIVE   Pt notes moderate knee pain and is limited with activity tolerance. Notes pain is anterior and the proximal tibia. Has been doing PT at home and with PT,  Notes that the therapists were concerned for possible stress reaction as they have done a vibratory test along the bone on the medial lateral malleolus and noted pain along the medial mal with the vibratory testing.    21 pt reports has had 5 revisions on R knee. 7/15: a little stiff and irritated  21 tolerating aquatic ex w/o increased discomfort.   21 woke up with increased discomfort R knee w/o reason.   Reports no increased discomfort since doing aquatic ex. 10 min late  7-29-21 Day after aquatic ex sore. Maybe doing a little too much. Overall feels aquatic ex helpful with 30% improvement. 8/3 - Feels that pain is less- was as high as an 8, now at a 6. Walking is still problematic  8/5: I am late today because I had a stomach ache this morning.  Pt also reports Right knee is 'puffy' today.       Xray   Have reviewed the xrays above from 6/15/2021  3 views of the right knee AP, lateral and sunrise views were performed and reviewed today and my impression is: Well-placed hinged knee prosthesis with long tibial and femoral stems.  Distal aspect of the tibial stem possibly compressing anteriorly on the tibia may be creating some irritation/stress riser no evidence of acute fracture dislocation     Discussed the utilization of a shin guard with an Ace wrap as the anterior aspect of the tibial stem may be creating stress reaction across the anterior tibia distally.  But as the bone continues to feel the sensation that can increase its rigidity and strength and the pain can settle down.  Also discussed with patient the this can go the other way that the bone can continue to thin and create a actual stress fracture         Relevant Medical History:Additional Pertinent Hx: arthritis, DDD cervical and lumbar,  HTN, HLD, chronic back pain,  DVT, left and right TKR,  right TKR revision 11/2020     Functional Scale/Score:WOMAC   =  48       8/3  Womac  45    ROM LEFT RIGHT   HIP Flex wfl all wfl alll   HIP Abd       HIP Ext       HIP IR       HIP ER       Knee ext 0l -3a/p   Knee Flex 105 93a/96p   Ankle PF wfl all wfl all   Ankle DF       Ankle In       Ankle Ev       Strength  LEFT RIGHT   HIP Flexors 4+ all 4+ all   HIP Abductors       HIP Ext       Hip ER       Knee EXT (quad) 4+/5 4/5   Knee Flex (HS) 4+/5 4+/5   Ankle DF 4+ all 4+ all   Ankle PF           Latex Allergy:  [x]NO      []YES  Preferred Language for Healthcare:   [x]English       []Other:    Land-based Visits Exercises/Activities: NO LAND RX TODAY  Therapeutic Ex (77854)  Min:  5 Resistance/Repetitions Notes   HEP  SLR,   Prone knee flexion  Heel slides  Sidelying abduction Review of HEP                                  Therapeutic Activity (55956) Min: 20     Gait training Review of use of cane SPC to offload RLE to decrease pain    Review of current level of functional mobility and adl     Review of future aquatic options / [facility] - pt to check insurance for silver sneakers        Aquatic Visits Exercises/Activities:  Aquatic Abbreviation Key  B= Belt DB= Dumbells T= Theratube   G=Gloves N= Noodles W= Weights   P= Paddles WW=Water Walker K= Kickboard     **Pt 15 min late therefore unable to complete all exercises. Transfers:   steps ind 1 at a time      % Immersion: 75%            Ambulation:  laps ind Exercises UE:      Forwards 5 Shoulder Shrugs     Lateral 2 Shoulder circles     Marching  1  Scapular Retraction      Retro  1 Rolling      [place]  Push Downs    Multifidus walkouts w/paddle  IR/ER      Punching    Stretchin sec  Rowing    Gastroc/Soleus  2 B Elbow Flex/Ext    Hamstring  2 B R Shldr Flex/Ext     Knee flex stretch  /step  2 R Shldr Abd/Add    Piriformis   Horiz Abd/Add     Hip flexor    Arm Circles     SKTC   PNF Diagonals    DKTC  UE oscillations f/b, s/s    ITB   Wall Push-ups    Quad  Figure 8's    Mid back   Buoy pull/push downs    UT  Tband rows    Rhom  Tband lats    Post Shoulder  Lumbar Rot w/ paddles    Pec   Small ball pull downs                   diagonals             Cervical:       AROM Flex       AROM Extension     LEs exercises: B AROM Side Bending    Marching  10 AROM Rotation    Heel Raises  10 Chin tucks    Toe Raises  10 Chin nods     Squats  10      Hamstring Curls  10      Hip Flexion  10 Balance:      Hip Abduction 10 SLS    Hip Circles 10 Tandem stance 30 sec x 2   TA set  NBOS eyes open    Glut Set  NBOS eyes closed 30 sec    Hip Extension 10 Hand to Opposite Knee 10   Hip Adduction    Box Step  3 R/L   Hip IR   Noodle Stance     Hip ER  Stop/Go Gait    Fig 8's  Switch Gait                Seated: Chair *unable due to time Functional:    Ankle Pumps Step up forward    Ankle circles Step up lateral    Knee flex & ext Step down    Hip Abd & Adduction [place] squats    Bicycle  Crate Lifts    Add Set with ball Lunges forward    LX stab with med ball throws  Lunges lateral    Ankle INV  Lunges retro    Ankle EV  Lower ab curl with noodle      Upper ab curl with ball      Med ball straight lifts      Med ball diagonal lifts      Hydrorider          Noodle:      Leg Press  Deep Water:    Noodle hang at wall  Jog    Noodle hang deep water  Jumping Jacks    Noodle Bicycle  Heel to toe      Hand to opposite knee      Cross country skier      Rocking Horse      Other Therapeutic Activities:  Pt was educated on PT POC, Diagnosis, Prognosis, pathomechanics as well as frequency and duration of scheduling future physical therapy appointments. Time was also taken on this day to answer all patient questions and participation in PT. Reviewed appointment policy in detail with patient and patient verbalized understanding. Home Exercise Program:  Patient was instructed in the following for HEP:     . Patient verbalized/demonstrated understanding and was issued written handout. Charges: Therapeutic Exercise and NMR EXR  [] (72558) Provided verbal/tactile cueing for activities related to strengthening, flexibility, endurance, ROM for improvements in LE, proximal hip, and core control with self care, mobility, lifting, ambulation.  [] (94762) Provided verbal/tactile cueing for activities related to improving balance, coordination, kinesthetic sense, posture, motor skill, proprioception  to assist with LE, proximal hip, and core control in self care, mobility, lifting, ambulation and eccentric single leg control.      NMR and Therapeutic Activities:    [] (90965 or 79182) Provided verbal/tactile cueing for activities related to improving balance, coordination, kinesthetic sense, posture, motor skill, proprioception and motor activation to allow for proper function of core, proximal hip and LE with self care and ADLs and functional mobility.   [] (32978) Gait Re-education- Provided training and instruction to the patient for proper LE, core and proximal hip recruitment and positioning and eccentric body weight control with ambulation re-education including up and down stairs     Home Exercise Program:    [] (79557) Reviewed/Progressed HEP activities related to strengthening, flexibility, endurance, ROM of core, proximal hip and LE for functional self-care, mobility, lifting and ambulation/stair navigation   [] (15524)Reviewed/Progressed HEP activities related to improving balance, coordination, kinesthetic sense, posture, motor skill, proprioception of core, proximal hip and LE for self care, mobility, lifting, and ambulation/stair navigation      Manual Treatments:  PROM / STM / Oscillations-Mobs:  G-I, II, III, IV (PA's, Inf., Post.)  [] (57700) Provided manual therapy to mobilize LE, proximal hip and/or LS spine soft tissue/joints for the purpose of modulating pain, promoting relaxation,  increasing ROM, reducing/eliminating soft tissue swelling/inflammation/restriction, improving soft tissue extensibility and allowing for proper ROM for normal function with self care, mobility, lifting and ambulation.      Individual Aquatic Therapy:  [x] (65158) Provided verbal and tactile cueing for strengthening, flexibility, ROM using the therapeutic properties of water (buoyancy, resistance) for improvements in core control, mobility and ambulation     Aquatic Group:  [x] (35908) Provided intermittent verbal and tactile cueing for strengthening, endurance, flexibility and ROM for 2-4 individuals that do not require one-on one patient contact by Patient will demonstrate an increase in Strength to good proximal hip strength and control, within 5lb HHD in LE to allow for proper functional mobility as indicated by patients Functional Deficits. [x]? Progressing: []? Met: []? Not Met: []? Adjusted  4. Patient will return to 70%  functional activities without increased symptoms or restriction. [x]? Progressing: []? Met: []? Not Met: []? Adjusted  5. To able to walk without my leg feeling like it will buckle. [x]? Progressing: []? Met: []? Not Met: []? Adjusted         Patient Requires Follow-up: [x] Yes  [] No    Plan:   [x] Continue per plan of care [] Alter current plan (see comments)  [] Plan of care initiated [] Hold pending MD visit [] Discharge    Plan for Next Session:   Gradually progress aquatic exercise as tolerated to increase   ROM, strength, and endurance to improve ADL's and decrease pain. ADD: increase gt,  box step, squats, step ups fwds,, TKE / T-band  as tolerated. Electronically signed by:  Wei Kline, PTA  [id number]                                   Note: If patient does not return for scheduled/recommended follow up visits, this note will serve as a discharge from care along with the most recent update on progress.

## 2021-08-09 NOTE — TELEPHONE ENCOUNTER
I spoke with the patient and let her know that we were going to start tapering her medication and she needs to find a pian management doctor. I did send her a list of doctors via e-mail.

## 2021-08-10 ENCOUNTER — HOSPITAL ENCOUNTER (OUTPATIENT)
Dept: PHYSICAL THERAPY | Age: 61
Setting detail: THERAPIES SERIES
Discharge: HOME | End: 2021-08-10
Payer: COMMERCIAL

## 2021-08-10 PROCEDURE — 97150 GROUP THERAPEUTIC PROCEDURES: CPT

## 2021-08-10 PROCEDURE — 97113 AQUATIC THERAPY/EXERCISES: CPT

## 2021-08-10 NOTE — FLOWSHEET NOTE
REview of    [facility]  and [facility]  [address] [place] [facility]  Phone: [phone]   Fax:     [phone]      Physical Therapy Daily/Aquatic Flow Sheet   Date:  8/10/2021    Patient Name:  Rodrigo Jacobsen :  1960  MRN: [id number]     Restrictions/Precautions:    Pertinent Medical History:Additional Pertinent Hx: arthritis, DDD cervical and lumbar,  HTN, HLD, chronic back pain,  DVT, left and right TKR,  right TKR revision 2020     Medical/Treatment Diagnosis Information:   · Diagnosis: Z96.651 (ICD-10-CM) - Presence of right artificial knee joint (revisionn 2020  · Treatment Diagnosis: Decreased functional mobility 2/2 right knee pain     Insurance/Certification information:  PT Insurance Information: BC  Physician Information:  Referring Practitioner: Gary Gallardo  vianney signed (Y/N):      Date of Patient follow up with Physician: Sept    Progress Report: []  Yes  [x]  No     Date Range for reporting period:  Beginnin/10/2021  Ending:      Progress report due (10 Rx/or 30 days whichever is less):     Recertification due (POC duration/ or 90 days whichever is less):     Visit # POC/Insurance Allowable Auth Needed    67 []Yes   []No     PAIN    R knee 4/10     After Rx 0/10                   SUBJECTIVE   Pt notes moderate knee pain and is limited with activity tolerance. Notes pain is anterior and the proximal tibia. Has been doing PT at home and with PT,  Notes that the therapists were concerned for possible stress reaction as they have done a vibratory test along the bone on the medial lateral malleolus and noted pain along the medial mal with the vibratory testing.    21 pt reports has had 5 revisions on R knee. 7/15: a little stiff and irritated  21 tolerating aquatic ex w/o increased discomfort.   21 woke up with increased discomfort R knee w/o reason.   Reports no increased discomfort since doing aquatic ex. 10 min late  7-29-21 Day after aquatic ex sore. Maybe doing a little too much. Overall feels aquatic ex helpful with 30% improvement. 8/3 - Feels that pain is less- was as high as an 8, now at a 6. Walking is still problematic  8/5: I am late today because I had a stomach ache this morning. Pt also reports Right knee is 'puffy' today.   8-10-21 overall less discomfort, relief after pool x 1 day.        Xray   Have reviewed the xrays above from 6/15/2021  3 views of the right knee AP, lateral and sunrise views were performed and reviewed today and my impression is: Well-placed hinged knee prosthesis with long tibial and femoral stems.  Distal aspect of the tibial stem possibly compressing anteriorly on the tibia may be creating some irritation/stress riser no evidence of acute fracture dislocation     Discussed the utilization of a shin guard with an Ace wrap as the anterior aspect of the tibial stem may be creating stress reaction across the anterior tibia distally.  But as the bone continues to feel the sensation that can increase its rigidity and strength and the pain can settle down.  Also discussed with patient the this can go the other way that the bone can continue to thin and create a actual stress fracture         Relevant Medical History:Additional Pertinent Hx: arthritis, DDD cervical and lumbar,  HTN, HLD, chronic back pain,  DVT, left and right TKR,  right TKR revision 11/2020     Functional Scale/Score:WOMAC   =  48       8/3  Womac  45    ROM LEFT RIGHT   HIP Flex wfl all wfl alll   HIP Abd       HIP Ext       HIP IR       HIP ER       Knee ext 0l -3a/p   Knee Flex 105 93a/96p   Ankle PF wfl all wfl all   Ankle DF       Ankle In       Ankle Ev       Strength  LEFT RIGHT   HIP Flexors 4+ all 4+ all   HIP Abductors       HIP Ext       Hip ER       Knee EXT (quad) 4+/5 4/5   Knee Flex (HS) 4+/5 4+/5   Ankle DF 4+ all 4+ all   Ankle PF           Latex Allergy:  [x]NO []YES  Preferred Language for Healthcare:   [x]English       []Other:    Land-based Visits Exercises/Activities: NO LAND RX TODAY  Therapeutic Ex (23365)  Min:  5 Resistance/Repetitions Notes   HEP  SLR,   Prone knee flexion  Heel slides  Sidelying abduction Review of HEP                                  Therapeutic Activity (22180) Min: 20     Gait training Review of use of cane SPC to offload RLE to decrease pain    Review of current level of functional mobility and adl     Review of future aquatic options / [facility] - pt to check insurance for silver sneakers        Aquatic Visits Exercises/Activities:  Aquatic Abbreviation Key  B= Belt DB= Dumbells T= Theratube   G=Gloves N= Noodles W= Weights   P= Paddles WW=Water Walker K= Kickboard     **Pt 15 min late therefore unable to complete all exercises. Transfers:   steps ind 1 at a time      % Immersion: 75%            Ambulation:  laps ind Exercises UE:      Forwards 5 Shoulder Shrugs     Lateral 2 Shoulder circles     Marching  1  Scapular Retraction      Retro  1 Rolling      [place]  Push Downs    Multifidus walkouts w/paddle  IR/ER      Punching    Stretchin sec  Rowing    Gastroc/Soleus  2 B Elbow Flex/Ext    Hamstring  2 B  Shldr Flex/Ext     Knee flex stretch  /step  2 R Shldr Abd/Add    Piriformis   Horiz Abd/Add     Hip flexor    Arm Circles     SKTC   PNF Diagonals    DKTC  UE oscillations f/b, s/s    ITB   Wall Push-ups    Quad  Figure 8's    Mid back   Buoy pull/push downs    UT  Tband rows    Rhom  Tband lats    Post Shoulder  Lumbar Rot w/ paddles    Pec   Small ball pull downs                   diagonals             Cervical:       AROM Flex       AROM Extension     LEs exercises: B AROM Side Bending    Marching  10 AROM Rotation    Heel Raises  10 Chin tucks    Toe Raises  10 Chin nods     Squats  10      Hamstring Curls  10      Hip Flexion  10 Balance:      Hip Abduction 10 SLS    Hip Circles 10 Tandem stance 30 sec x 2   TA set NBOS eyes open    Glut Set  NBOS eyes closed 30 sec    Hip Extension 10 Hand to Opposite Knee 10   Hip Adduction    Box Step  3 R/L   Hip IR   Noodle Stance     Hip ER  Stop/Go Gait    Fig 8's  Switch Gait    TKE/ red T-band add           Seated: Chair  Functional:    Ankle Pumps 30 Step up forward    Ankle circles 10 Step up lateral    Knee flex & ext 30 Step down    Hip Abd & Adduction 30 [place] squats    Bicycle  30 Crate Lifts    Add Set with ball 10 Lunges forward    LX stab with med ball throws  Lunges lateral    Ankle INV  Lunges retro    Ankle EV  Lower ab curl with noodle      Upper ab curl with ball      Med ball straight lifts      Med ball diagonal lifts      Hydrorider          Noodle:      Leg Press  Deep Water:    Noodle hang at wall  Jog    Noodle hang deep water  Jumping Jacks    Noodle Bicycle 1 min   assist Heel to toe      Hand to opposite knee      Cross country skier      Rocking Horse      Other Therapeutic Activities:  Pt was educated on PT POC, Diagnosis, Prognosis, pathomechanics as well as frequency and duration of scheduling future physical therapy appointments. Time was also taken on this day to answer all patient questions and participation in PT. Reviewed appointment policy in detail with patient and patient verbalized understanding. Home Exercise Program:  Patient was instructed in the following for HEP:     . Patient verbalized/demonstrated understanding and was issued written handout. Charges:   Therapeutic Exercise and NMR EXR  [] (56454) Provided verbal/tactile cueing for activities related to strengthening, flexibility, endurance, ROM for improvements in LE, proximal hip, and core control with self care, mobility, lifting, ambulation.  [] (74019) Provided verbal/tactile cueing for activities related to improving balance, coordination, kinesthetic sense, posture, motor skill, proprioception  to assist with LE, proximal hip, and core control in self care, mobility, lifting, ambulation and eccentric single leg control. NMR and Therapeutic Activities:    [] (15350 or 62463) Provided verbal/tactile cueing for activities related to improving balance, coordination, kinesthetic sense, posture, motor skill, proprioception and motor activation to allow for proper function of core, proximal hip and LE with self care and ADLs and functional mobility.   [] (63667) Gait Re-education- Provided training and instruction to the patient for proper LE, core and proximal hip recruitment and positioning and eccentric body weight control with ambulation re-education including up and down stairs     Home Exercise Program:    [] (22754) Reviewed/Progressed HEP activities related to strengthening, flexibility, endurance, ROM of core, proximal hip and LE for functional self-care, mobility, lifting and ambulation/stair navigation   [] (84861)Reviewed/Progressed HEP activities related to improving balance, coordination, kinesthetic sense, posture, motor skill, proprioception of core, proximal hip and LE for self care, mobility, lifting, and ambulation/stair navigation      Manual Treatments:  PROM / STM / Oscillations-Mobs:  G-I, II, III, IV (PA's, Inf., Post.)  [] (96205) Provided manual therapy to mobilize LE, proximal hip and/or LS spine soft tissue/joints for the purpose of modulating pain, promoting relaxation,  increasing ROM, reducing/eliminating soft tissue swelling/inflammation/restriction, improving soft tissue extensibility and allowing for proper ROM for normal function with self care, mobility, lifting and ambulation.      Individual Aquatic Therapy:  [x] (01860) Provided verbal and tactile cueing for strengthening, flexibility, ROM using the therapeutic properties of water (buoyancy, resistance) for improvements in core control, mobility and ambulation     Aquatic Group:  [x] (75958) Provided intermittent verbal and tactile cueing for strengthening, endurance, flexibility and ROM for 2-4 individuals that do not require one-on one patient contact by the therapist       Land Timed Code Treatment Minutes:    Land Total Treatment Minutes: Individual Aquatic Minutes: 40   Aquatic Group Minutes: 15     [] EVAL (LOW) 23276 (typically 20 minutes face-to-face)  [] EVAL (MOD) 15202 (typically 30 minutes face-to-face)  [] EVAL (HIGH) 15988 (typically 45 minutes face-to-face)  [] RE-EVAL     [x] TV(56001) x   1  [] Dry needle 1 or 2 Muscles (50923)  [] NMR (33921) x     [] Dry needle 3+ Muscles (10029)  [] Manual (57445) x     [] Ultrasound (74147) x  [x] TA (10842) x   1  [] Mech Traction (38728)  [] ES(attended) (89098)     [] ES (un) (22331):   [] Vasopump (15512) [] Ionto (84978)   [x] Aquatic Ind (94193) x    2 [x] Aquatic Group (68575)   [] Other:    Treatment/Activity Tolerance:  [x] Patient tolerated treatment well [] Patient limited by fatigue  [] Patient limited by pain  [] Patient limited by other medical complications  [x] Other:   Progressing with aquatic ex with increased endurance. Prognosis: [x] Good [] Fair  [] Poor    GOALS:  Patient stated goal:  To not have as much pain  [x]? Progressing: []? Met: []? Not Met: []? Adjusted     Therapist goals for Patient:   Short Term Goals: To be achieved in: 2 weeks  1. Independent in HEP and progression per patient tolerance, in order to prevent re-injury. [x]? Progressing: []? Met: []? Not Met: []? Adjusted  2. Patient will have a decrease in pain to facilitate improvement in movement, function, and ADLs as indicated by Functional Deficits. [x]? Progressing: []? Met: []? Not Met: []? Adjusted     Long Term Goals: To be achieved in:  8  weeks  1. Disability index score of  30 % or less for the LEFS to assist with reaching prior level of function. [x]? Progressing: []? Met: []? Not Met: []? Adjusted  2. Patient will demonstrate increased AROM to 105 deg  to allow for proper joint functioning as indicated by patients Functional Deficits. [x]? Progressing: []? Met: []? Not Met: []? Adjusted  3. Patient will demonstrate an increase in Strength to good proximal hip strength and control, within 5lb HHD in LE to allow for proper functional mobility as indicated by patients Functional Deficits. [x]? Progressing: []? Met: []? Not Met: []? Adjusted  4. Patient will return to 70%  functional activities without increased symptoms or restriction. [x]? Progressing: []? Met: []? Not Met: []? Adjusted  5. To able to walk without my leg feeling like it will buckle. [x]? Progressing: []? Met: []? Not Met: []? Adjusted         Patient Requires Follow-up: [x] Yes  [] No    Plan:   [x] Continue per plan of care [] Alter current plan (see comments)  [] Plan of care initiated [] Hold pending MD visit [] Discharge    Plan for Next Session:   Gradually progress aquatic exercise as tolerated to increase   ROM, strength, and endurance to improve ADL's and decrease pain. ADD: increase gt,  squats, step ups fwds,, TKE / T-band , leg press as tolerated. Electronically signed by:  Ulises Batista, PTA  [id number]                                   Note: If patient does not return for scheduled/recommended follow up visits, this note will serve as a discharge from care along with the most recent update on progress.

## 2021-08-11 RX ORDER — OXYCODONE AND ACETAMINOPHEN 10; 325 MG/1; MG/1
1 TABLET ORAL EVERY 8 HOURS PRN
Qty: 21 TABLET | Refills: 0 | Status: SHIPPED | OUTPATIENT
Start: 2021-08-11 | End: 2021-08-19 | Stop reason: SDUPTHER

## 2021-08-11 NOTE — TELEPHONE ENCOUNTER
Prescription Refill     Medication Name:  OXYCODONE  Pharmacy: [address] [place] [address] [place]  Patient Contact Number:  [phone]

## 2021-08-12 ENCOUNTER — HOSPITAL ENCOUNTER (OUTPATIENT)
Dept: PHYSICAL THERAPY | Age: 61
Setting detail: THERAPIES SERIES
Discharge: HOME | End: 2021-08-12
Payer: COMMERCIAL

## 2021-08-12 PROCEDURE — 97150 GROUP THERAPEUTIC PROCEDURES: CPT

## 2021-08-12 PROCEDURE — 97113 AQUATIC THERAPY/EXERCISES: CPT

## 2021-08-12 NOTE — FLOWSHEET NOTE
REview of    [facility] [[email or web address]]  and [address] [facility]  [address] [place] [facility]  Phone: [phone]   Fax:     [phone]      Physical Therapy Daily/Aquatic Flow Sheet   Date:  2021    Patient Name:  Kae Jeffrey :  1960  MRN: [id number]     Restrictions/Precautions:    Pertinent Medical History:Additional Pertinent Hx: arthritis, DDD cervical and lumbar,  HTN, HLD, chronic back pain,  DVT, left and right TKR,  right TKR revision 2020     Medical/Treatment Diagnosis Information:   · Diagnosis: Z96.651 (ICD-10-CM) - Presence of right artificial knee joint (revisionn 2020  · Treatment Diagnosis: Decreased functional mobility 2/2 right knee pain     Insurance/Certification information:  PT Insurance Information: BC  Physician Information:  Referring Practitioner: Kary Barahona of care signed (Y/N):      Date of Patient follow up with Physician: Sept    Progress Report: []  Yes  [x]  No     Date Range for reporting period:  Beginnin2021  Ending:      Progress report due (10 Rx/or 30 days whichever is less):     Recertification due (POC duration/ or 90 days whichever is less):     Visit # POC/Insurance Allowable Auth Needed   10/16 67 []Yes   []No     PAIN    R knee 10     After Rx 0/10                   SUBJECTIVE   Pt notes moderate knee pain and is limited with activity tolerance. Notes pain is anterior and the proximal tibia. Has been doing PT at home and with PT,  Notes that the therapists were concerned for possible stress reaction as they have done a vibratory test along the bone on the medial lateral malleolus and noted pain along the medial mal with the vibratory testing.    21 pt reports has had 5 revisions on R knee. 7/15: a little stiff and irritated  21 tolerating aquatic ex w/o increased discomfort.   21 woke up with increased discomfort R knee w/o reason.   Reports no increased discomfort since doing aquatic ex. 10 min late  7-29-21 Day after aquatic ex sore. Maybe doing a little too much. Overall feels aquatic ex helpful with 30% improvement. 8/3 - Feels that pain is less- was as high as an 8, now at a 6. Walking is still problematic  8/5: I am late today because I had a stomach ache this morning. Pt also reports Right knee is 'puffy' today. 8-10-21 overall less discomfort, relief after pool x 1 day.   8-12-21 ovrall feels 40% improved.         Xray   Have reviewed the xrays above from 6/15/2021  3 views of the right knee AP, lateral and sunrise views were performed and reviewed today and my impression is: Well-placed hinged knee prosthesis with long tibial and femoral stems.  Distal aspect of the tibial stem possibly compressing anteriorly on the tibia may be creating some irritation/stress riser no evidence of acute fracture dislocation     Discussed the utilization of a shin guard with an Ace wrap as the anterior aspect of the tibial stem may be creating stress reaction across the anterior tibia distally.  But as the bone continues to feel the sensation that can increase its rigidity and strength and the pain can settle down.  Also discussed with patient the this can go the other way that the bone can continue to thin and create a actual stress fracture         Relevant Medical History:Additional Pertinent Hx: arthritis, DDD cervical and lumbar,  HTN, HLD, chronic back pain,  DVT, left and right TKR,  right TKR revision 11/2020     Functional Scale/Score:WOMAC   =   35   8-12-21 10 th visit    ROM LEFT RIGHT   HIP Flex wfl all wfl alll   HIP Abd       HIP Ext       HIP IR       HIP ER       Knee ext 0l -3a/p   Knee Flex 105 93a/96p   Ankle PF wfl all wfl all   Ankle DF       Ankle In       Ankle Ev       Strength  LEFT RIGHT   HIP Flexors 4+ all 4+ all   HIP Abductors       HIP Ext       Hip ER       Knee EXT (quad) 4+/5 4/5   Knee Flex (HS) 4+/5 4+/5   Ankle DF 4+ all 4+ all Ankle PF           Latex Allergy:  [x]NO      []YES  Preferred Language for Healthcare:   [x]English       []Other:    Land-based Visits Exercises/Activities: NO LAND RX TODAY  Therapeutic Ex (70732)  Min:  5 Resistance/Repetitions Notes   HEP  SLR,   Prone knee flexion  Heel slides  Sidelying abduction Review of HEP                                  Therapeutic Activity (82467) Min: 20     Gait training Review of use of cane SPC to offload RLE to decrease pain    Review of current level of functional mobility and adl     Review of future aquatic options / [facility] - pt to check insurance for silver sneakers        Aquatic Visits Exercises/Activities:  Aquatic Abbreviation Key  B= Belt DB= Dumbells T= Theratube   G=Gloves N= Noodles W= Weights   P= Paddles WW=Water Walker K= Kickboard     **Pt 15 min late therefore unable to complete all exercises. Transfers:   steps ind 1 at a time      % Immersion: 75%            Ambulation:  laps ind Exercises UE:      Forwards 5 Shoulder Shrugs     Lateral 2 Shoulder circles     Marching  1  Scapular Retraction      Retro  1 Rolling      [place]  Push Downs    Multifidus walkouts w/paddle  IR/ER      Punching    Stretchin sec  Rowing    Gastroc/Soleus  2 B Elbow Flex/Ext    Hamstring  2 B  Shldr Flex/Ext     Knee flex stretch  /step  2 R Shldr Abd/Add    Piriformis   Horiz Abd/Add     Hip flexor    Arm Circles     SKTC   PNF Diagonals    DKTC  UE oscillations f/b, s/s    ITB   Wall Push-ups    Quad  Figure 8's    Mid back   Buoy pull/push downs    UT  Tband rows    Rhom  Tband lats    Post Shoulder  Lumbar Rot w/ paddles    Pec   Small ball pull downs                   diagonals             Cervical:       AROM Flex       AROM Extension     LEs exercises: B AROM Side Bending    Marching  10 AROM Rotation    Heel Raises  10 Chin tucks    Toe Raises  10 Chin nods     Squats  10      Hamstring Curls  10      Hip Flexion  10 Balance:      Hip Abduction 10 SLS    Hip Circles 10 Tandem stance 30 sec x 2   TA set  NBOS eyes open    Glut Set  NBOS eyes closed 30 sec    Hip Extension 10 Hand to Opposite Knee 10   Hip Adduction    Box Step  3 R/L   Hip IR   Noodle Stance     Hip ER  Stop/Go Gait    Fig 8's  Switch Gait    TKE/ yellow  T-band 5 sec x 10 R           Seated: Chair  Functional:    Ankle Pumps 30 Step up forward    Ankle circles 10 Step up lateral    Knee flex & ext 30 Step down    Hip Abd & Adduction 30 [place] squats 10   Bicycle  30 Crate Lifts    Add Set with ball 10 Lunges forward    LX stab with med ball throws  Lunges lateral    Ankle INV  Lunges retro    Ankle EV  Lower ab curl with noodle      Upper ab curl with ball      Med ball straight lifts      Med ball diagonal lifts      Hydrorider          Noodle:      Leg Press  Deep Water:     hang at wall 2 min    relief Jog    Noodle hang deep water  Jumping Jacks    Noodle Bicycle 1 min   assist Heel to toe      Hand to opposite knee      Cross country skier      Rocking Horse      Other Therapeutic Activities:  Pt was educated on PT POC, Diagnosis, Prognosis, pathomechanics as well as frequency and duration of scheduling future physical therapy appointments. Time was also taken on this day to answer all patient questions and participation in PT. Reviewed appointment policy in detail with patient and patient verbalized understanding. Home Exercise Program:  Patient was instructed in the following for HEP:     . Patient verbalized/demonstrated understanding and was issued written handout. Charges:   Therapeutic Exercise and NMR EXR  [] (30295) Provided verbal/tactile cueing for activities related to strengthening, flexibility, endurance, ROM for improvements in LE, proximal hip, and core control with self care, mobility, lifting, ambulation.  [] (92672) Provided verbal/tactile cueing for activities related to improving balance, coordination, kinesthetic sense, posture, motor skill, proprioception  to assist with LE, proximal hip, and core control in self care, mobility, lifting, ambulation and eccentric single leg control. NMR and Therapeutic Activities:    [] (31466 or 05151) Provided verbal/tactile cueing for activities related to improving balance, coordination, kinesthetic sense, posture, motor skill, proprioception and motor activation to allow for proper function of core, proximal hip and LE with self care and ADLs and functional mobility.   [] (68808) Gait Re-education- Provided training and instruction to the patient for proper LE, core and proximal hip recruitment and positioning and eccentric body weight control with ambulation re-education including up and down stairs     Home Exercise Program:    [] (80391) Reviewed/Progressed HEP activities related to strengthening, flexibility, endurance, ROM of core, proximal hip and LE for functional self-care, mobility, lifting and ambulation/stair navigation   [] (09250)Reviewed/Progressed HEP activities related to improving balance, coordination, kinesthetic sense, posture, motor skill, proprioception of core, proximal hip and LE for self care, mobility, lifting, and ambulation/stair navigation      Manual Treatments:  PROM / STM / Oscillations-Mobs:  G-I, II, III, IV (PA's, Inf., Post.)  [] (46110) Provided manual therapy to mobilize LE, proximal hip and/or LS spine soft tissue/joints for the purpose of modulating pain, promoting relaxation,  increasing ROM, reducing/eliminating soft tissue swelling/inflammation/restriction, improving soft tissue extensibility and allowing for proper ROM for normal function with self care, mobility, lifting and ambulation.      Individual Aquatic Therapy:  [x] (62234) Provided verbal and tactile cueing for strengthening, flexibility, ROM using the therapeutic properties of water (buoyancy, resistance) for improvements in core control, mobility and ambulation     Aquatic Group:  [x] (91620) Provided intermittent verbal and tactile cueing for strengthening, endurance, flexibility and ROM for 2-4 individuals that do not require one-on one patient contact by the therapist       Land Timed Code Treatment Minutes:    Land Total Treatment Minutes: Individual Aquatic Minutes: 45   Aquatic Group Minutes: 15     [] EVAL (LOW) 25535 (typically 20 minutes face-to-face)  [] EVAL (MOD) 06451 (typically 30 minutes face-to-face)  [] EVAL (HIGH) 62569 (typically 45 minutes face-to-face)  [] RE-EVAL     [] IO(05431) x   1  [] Dry needle 1 or 2 Muscles (27925)  [] NMR (64339) x     [] Dry needle 3+ Muscles (96435)  [] Manual (28898) x     [] Ultrasound (19343) x  [x] TA (04888) x   1  [] Mech Traction (77321)  [] ES(attended) (04142)     [] ES (un) (87753):   [] Vasopump (37004) [] Ionto (97866)   [x] Aquatic Ind (40743) x    3 [x] Aquatic Group (52838)   [] Other:    Treatment/Activity Tolerance:  [x] Patient tolerated treatment well [] Patient limited by fatigue  [] Patient limited by pain  [] Patient limited by other medical complications  [x] Other:   Progressing with aquatic ex with increased endurance decreased pain. Prognosis: [x] Good [] Fair  [] Poor    GOALS:  Patient stated goal:  To not have as much pain  [x]? Progressing: []? Met: []? Not Met: []? Adjusted     Therapist goals for Patient:   Short Term Goals: To be achieved in: 2 weeks  1. Independent in HEP and progression per patient tolerance, in order to prevent re-injury. [x]? Progressing: []? Met: []? Not Met: []? Adjusted  2. Patient will have a decrease in pain to facilitate improvement in movement, function, and ADLs as indicated by Functional Deficits. [x]? Progressing: []? Met: []? Not Met: []? Adjusted     Long Term Goals: To be achieved in:  8  weeks  1. Disability index score of  30 % or less for the LEFS to assist with reaching prior level of function. [x]? Progressing: []? Met: []? Not Met: []? Adjusted  2.  Patient will demonstrate increased AROM to 105 deg  to allow for proper joint functioning as indicated by patients Functional Deficits. [x]? Progressing: []? Met: []? Not Met: []? Adjusted  3. Patient will demonstrate an increase in Strength to good proximal hip strength and control, within 5lb HHD in LE to allow for proper functional mobility as indicated by patients Functional Deficits. [x]? Progressing: []? Met: []? Not Met: []? Adjusted  4. Patient will return to 70%  functional activities without increased symptoms or restriction. [x]? Progressing: []? Met: []? Not Met: []? Adjusted  5. To able to walk without my leg feeling like it will buckle. [x]? Progressing: []? Met: []? Not Met: []? Adjusted         Patient Requires Follow-up: [x] Yes  [] No    Plan:   [x] Continue per plan of care [] Alter current plan (see comments)  [] Plan of care initiated [] Hold pending MD visit [] Discharge    Plan for Next Session:   Gradually progress aquatic exercise as tolerated to increase   ROM, strength, and endurance to improve ADL's and decrease pain. ADD: increase gt,  step ups fwds,, TKE / T-band , leg press as tolerated. Electronically signed by:  Rene , PTA  [id number]                                   Note: If patient does not return for scheduled/recommended follow up visits, this note will serve as a discharge from care along with the most recent update on progress.

## 2021-08-17 ENCOUNTER — HOSPITAL ENCOUNTER (OUTPATIENT)
Dept: PHYSICAL THERAPY | Age: 61
Setting detail: THERAPIES SERIES
Discharge: HOME | End: 2021-08-17
Payer: COMMERCIAL

## 2021-08-17 PROCEDURE — 97113 AQUATIC THERAPY/EXERCISES: CPT

## 2021-08-17 PROCEDURE — 97150 GROUP THERAPEUTIC PROCEDURES: CPT

## 2021-08-17 NOTE — FLOWSHEET NOTE
REview of    [facility]  and [address] [facility]  [address] [facility]  Phone: [phone]   Fax:     [phone]      Physical Therapy Daily/Aquatic Flow Sheet   Date:  2021    Patient Name:  Urmila Gibson :  1960  MRN: [id number]     Restrictions/Precautions:    Pertinent Medical History:Additional Pertinent Hx: arthritis, DDD cervical and lumbar,  HTN, HLD, chronic back pain,  DVT, left and right TKR,  right TKR revision 2020     Medical/Treatment Diagnosis Information:   · Diagnosis: Z96.651 (ICD-10-CM) - Presence of right artificial knee joint (revisionn 2020  · Treatment Diagnosis: Decreased functional mobility 2/2 right knee pain     Insurance/Certification information:  PT Insurance Information: BC  Physician Information:  Referring Practitioner: Jackie Rivas of care signed (Y/N):      Date of Patient follow up with Physician: Sept    Progress Report: []  Yes  [x]  No     Date Range for reporting period:  Beginnin2021  Ending:      Progress report due (10 Rx/or 30 days whichever is less):     Recertification due (POC duration/ or 90 days whichever is less):     Visit # POC/Insurance Allowable Auth Needed    67 []Yes   []No     PAIN    R knee 3/10     After Rx 0/10                   SUBJECTIVE   Pt notes moderate knee pain and is limited with activity tolerance. Notes pain is anterior and the proximal tibia. Has been doing PT at home and with PT,  Notes that the therapists were concerned for possible stress reaction as they have done a vibratory test along the bone on the medial lateral malleolus and noted pain along the medial mal with the vibratory testing.    21 pt reports has had 5 revisions on R knee. 7/15: a little stiff and irritated  21 tolerating aquatic ex w/o increased discomfort.   21 woke up with increased discomfort R knee w/o reason.   Reports no increased discomfort since doing aquatic ex. 10 min late  7-29-21 Day after aquatic ex sore. Maybe doing a little too much. Overall feels aquatic ex helpful with 30% improvement. 8/3 - Feels that pain is less- was as high as an 8, now at a 6. Walking is still problematic  8/5: I am late today because I had a stomach ache this morning. Pt also reports Right knee is 'puffy' today. 8-10-21 overall less discomfort, relief after pool x 1 day. 8-12-21 ovrall feels 40% improved. 8-17-21 steps mostly 1 at a time, occassionally step over step. Reports decreased pain/ tenderness.  Sleeping ok.        Xray   Have reviewed the xrays above from 6/15/2021  3 views of the right knee AP, lateral and sunrise views were performed and reviewed today and my impression is: Well-placed hinged knee prosthesis with long tibial and femoral stems.  Distal aspect of the tibial stem possibly compressing anteriorly on the tibia may be creating some irritation/stress riser no evidence of acute fracture dislocation     Discussed the utilization of a shin guard with an Ace wrap as the anterior aspect of the tibial stem may be creating stress reaction across the anterior tibia distally.  But as the bone continues to feel the sensation that can increase its rigidity and strength and the pain can settle down.  Also discussed with patient the this can go the other way that the bone can continue to thin and create a actual stress fracture         Relevant Medical History:Additional Pertinent Hx: arthritis, DDD cervical and lumbar,  HTN, HLD, chronic back pain,  DVT, left and right TKR,  right TKR revision 11/2020     Functional Scale/Score:WOMAC   =   35   8-12-21 10 th visit    ROM LEFT RIGHT   HIP Flex wfl all wfl alll   HIP Abd       HIP Ext       HIP IR       HIP ER       Knee ext 0l -3a/p   Knee Flex 105 93a/96p   Ankle PF wfl all wfl all   Ankle DF       Ankle In       Ankle Ev       Strength  LEFT RIGHT   HIP Flexors 4+ all 4+ all   HIP Abductors       HIP Ext       Hip ER       Knee EXT (quad) 4+/5 4/5   Knee Flex (HS) 4+/5 4+/5   Ankle DF 4+ all 4+ all   Ankle PF           Latex Allergy:  [x]NO      []YES  Preferred Language for Healthcare:   [x]English       []Other:    Land-based Visits Exercises/Activities: NO LAND RX TODAY  Therapeutic Ex (65130)  Min:  5 Resistance/Repetitions Notes   HEP  SLR,   Prone knee flexion  Heel slides  Sidelying abduction Review of HEP                                  Therapeutic Activity (22886) Min: 20     Gait training Review of use of cane SPC to offload RLE to decrease pain    Review of current level of functional mobility and adl     Review of future aquatic options / [facility] - pt to check insurance for silver sneakers        Aquatic Visits Exercises/Activities:  Aquatic Abbreviation Key  B= Belt DB= Dumbells T= Theratube   G=Gloves N= Noodles W= Weights   P= Paddles WW=Water Walker K= Kickboard     **Pt 15 min late therefore unable to complete all exercises.    Transfers:   steps ind 1 at a time      % Immersion: 75%            Ambulation:  laps ind Exercises UE:      Forwards 5 Shoulder Shrugs     Lateral 2 Shoulder circles     Marching  1  Scapular Retraction      Retro  1 Rolling      [place]  Push Downs    Multifidus walkouts w/paddle  IR/ER      Punching    Stretchin sec  Rowing    Gastroc/Soleus  2 B Elbow Flex/Ext    Hamstring  2 R Shldr Flex/Ext     Knee flex stretch  /step  2 R Shldr Abd/Add    Piriformis   Horiz Abd/Add     Hip flexor    Arm Circles     SKTC   PNF Diagonals    DKTC  UE oscillations f/b, s/s    ITB   Wall Push-ups    Quad  Figure 8's    Mid back   Buoy pull/push downs    UT  Tband rows    Rhom  Tband lats    Post Shoulder  Lumbar Rot w/ paddles    Pec   Small ball pull downs                   diagonals             Cervical:       AROM Flex       AROM Extension     LEs exercises: B AROM Side Bending    Marching  10 AROM Rotation    Heel Raises  10 Chin tucks    Toe Raises  10 Chin nods     Squats  10      Hamstring Curls  10      Hip Flexion  10 Balance: Hip Abduction 10 SLS    Hip Circles 10 Tandem stance 30 sec x 2   TA set  NBOS eyes open    Glut Set  NBOS eyes closed 30 sec    Hip Extension 10 Hand to Opposite Knee 10   Hip Adduction    Box Step  3 R/L   Hip IR   Noodle Stance     Hip ER  Stop/Go Gait    Fig 8's  Switch Gait    TKE/ RED    T-band 5 sec x 10 R           Seated: Chair  Functional:    Ankle Pumps 30 Step up forward 10 R/L  @ 25 % WB   Ankle circles 10 Step up lateral    Knee flex & ext 30 Step down    Hip Abd & Adduction 30 [place] squats 10   Bicycle  30 Crate Lifts    Add Set with ball 10 Lunges forward    LX stab with med ball throws  Lunges lateral    Ankle INV  Lunges retro    Ankle EV  Lower ab curl with noodle      Upper ab curl with ball      Med ball straight lifts      Med ball diagonal lifts      Hydrorider          Noodle:      Leg Press Small x 10 R Deep Water:     hang at wall 2 min    relief Jog    Noodle hang deep water  Jumping Jacks    Noodle Bicycle 1 min   assist Heel to toe      Hand to opposite knee      Cross country skier      Rocking Horse      Other Therapeutic Activities:  Pt was educated on PT POC, Diagnosis, Prognosis, pathomechanics as well as frequency and duration of scheduling future physical therapy appointments. Time was also taken on this day to answer all patient questions and participation in PT. Reviewed appointment policy in detail with patient and patient verbalized understanding. Home Exercise Program:  Patient was instructed in the following for HEP:     . Patient verbalized/demonstrated understanding and was issued written handout. Charges:   Therapeutic Exercise and NMR EXR  [] (58296) Provided verbal/tactile cueing for activities related to strengthening, flexibility, endurance, ROM for improvements in LE, proximal hip, and core control with self care, mobility, lifting, ambulation.  [] (86481) Provided verbal/tactile cueing for activities related to improving balance, coordination, kinesthetic sense, posture, motor skill, proprioception  to assist with LE, proximal hip, and core control in self care, mobility, lifting, ambulation and eccentric single leg control. NMR and Therapeutic Activities:    [] (22021 or 63139) Provided verbal/tactile cueing for activities related to improving balance, coordination, kinesthetic sense, posture, motor skill, proprioception and motor activation to allow for proper function of core, proximal hip and LE with self care and ADLs and functional mobility.   [] (19317) Gait Re-education- Provided training and instruction to the patient for proper LE, core and proximal hip recruitment and positioning and eccentric body weight control with ambulation re-education including up and down stairs     Home Exercise Program:    [] (22512) Reviewed/Progressed HEP activities related to strengthening, flexibility, endurance, ROM of core, proximal hip and LE for functional self-care, mobility, lifting and ambulation/stair navigation   [] (90831)Reviewed/Progressed HEP activities related to improving balance, coordination, kinesthetic sense, posture, motor skill, proprioception of core, proximal hip and LE for self care, mobility, lifting, and ambulation/stair navigation      Manual Treatments:  PROM / STM / Oscillations-Mobs:  G-I, II, III, IV (PA's, Inf., Post.)  [] (91821) Provided manual therapy to mobilize LE, proximal hip and/or LS spine soft tissue/joints for the purpose of modulating pain, promoting relaxation,  increasing ROM, reducing/eliminating soft tissue swelling/inflammation/restriction, improving soft tissue extensibility and allowing for proper ROM for normal function with self care, mobility, lifting and ambulation.      Individual Aquatic Therapy:  [x] (82616) Provided verbal and tactile cueing for strengthening, flexibility, ROM using the therapeutic properties of water (buoyancy, resistance) for improvements in core control, mobility and ambulation     Aquatic Group:  [x] (14679) Provided intermittent verbal and tactile cueing for strengthening, endurance, flexibility and ROM for 2-4 individuals that do not require one-on one patient contact by the therapist       Land Timed Code Treatment Minutes:    Land Total Treatment Minutes: Individual Aquatic Minutes: 40   Aquatic Group Minutes: 15     [] EVAL (LOW) 37205 (typically 20 minutes face-to-face)  [] EVAL (MOD) 99530 (typically 30 minutes face-to-face)  [] EVAL (HIGH) 62615 (typically 45 minutes face-to-face)  [] RE-EVAL     [] CW(58378) x   1  [] Dry needle 1 or 2 Muscles (71904)  [] NMR (72152) x     [] Dry needle 3+ Muscles (63976)  [] Manual (34794) x     [] Ultrasound (68325) x  [] TA (85958) x     [] Mech Traction (19210)  [] ES(attended) (00859)     [] ES (un) (02310):   [] Vasopump (29158) [] Ionto (31955)   [x] Aquatic Ind (20746) x    3 [x] Aquatic Group (79151)   [] Other:    Treatment/Activity Tolerance:  [x] Patient tolerated treatment well [] Patient limited by fatigue  [] Patient limited by pain  [] Patient limited by other medical complications  [x] Other:   Progressing with aquatic ex with increased endurance decreased pain. Prognosis: [x] Good [] Fair  [] Poor    GOALS:  Patient stated goal:  To not have as much pain  [x]? Progressing: []? Met: []? Not Met: []? Adjusted     Therapist goals for Patient:   Short Term Goals: To be achieved in: 2 weeks  1. Independent in HEP and progression per patient tolerance, in order to prevent re-injury. [x]? Progressing: []? Met: []? Not Met: []? Adjusted  2. Patient will have a decrease in pain to facilitate improvement in movement, function, and ADLs as indicated by Functional Deficits. [x]? Progressing: []? Met: []? Not Met: []? Adjusted     Long Term Goals: To be achieved in:  8  weeks  1.  Disability index score of  30 % or less for the LEFS to assist with reaching prior level of function. [x]? Progressing: []? Met: []? Not Met: []? Adjusted  2. Patient will demonstrate increased AROM to 105 deg  to allow for proper joint functioning as indicated by patients Functional Deficits. [x]? Progressing: []? Met: []? Not Met: []? Adjusted  3. Patient will demonstrate an increase in Strength to good proximal hip strength and control, within 5lb HHD in LE to allow for proper functional mobility as indicated by patients Functional Deficits. [x]? Progressing: []? Met: []? Not Met: []? Adjusted  4. Patient will return to 70%  functional activities without increased symptoms or restriction. [x]? Progressing: []? Met: []? Not Met: []? Adjusted  5. To able to walk without my leg feeling like it will buckle. [x]? Progressing: []? Met: []? Not Met: []? Adjusted         Patient Requires Follow-up: [x] Yes  [] No    Plan:   [x] Continue per plan of care [] Alter current plan (see comments)  [] Plan of care initiated [] Hold pending MD visit [] Discharge    Plan for Next Session:   Gradually progress aquatic exercise as tolerated to increase   ROM, strength, and endurance to improve ADL's and decrease pain. ADD: increase gt,  step ups fwds,   Step downs  leg press as tolerated. Electronically signed by:  Ulises Batista, PTA  [id number]                                   Note: If patient does not return for scheduled/recommended follow up visits, this note will serve as a discharge from care along with the most recent update on progress.

## 2021-08-18 ENCOUNTER — TELEPHONE (OUTPATIENT)
Dept: ORTHOPEDIC SURGERY | Age: 61
End: 2021-08-18

## 2021-08-18 DIAGNOSIS — Z96.651 STATUS POST REVISION OF TOTAL REPLACEMENT OF RIGHT KNEE: ICD-10-CM

## 2021-08-18 NOTE — TELEPHONE ENCOUNTER
Prescription Refill     Medication Name:  OXYCODONE    [facility]      Patient Contact Number:  [phone]

## 2021-08-19 ENCOUNTER — HOSPITAL ENCOUNTER (OUTPATIENT)
Dept: PHYSICAL THERAPY | Age: 61
Setting detail: THERAPIES SERIES
Discharge: HOME | End: 2021-08-19
Payer: COMMERCIAL

## 2021-08-19 PROCEDURE — 97150 GROUP THERAPEUTIC PROCEDURES: CPT

## 2021-08-19 PROCEDURE — 97113 AQUATIC THERAPY/EXERCISES: CPT

## 2021-08-19 RX ORDER — OXYCODONE AND ACETAMINOPHEN 10; 325 MG/1; MG/1
1 TABLET ORAL EVERY 8 HOURS PRN
Qty: 21 TABLET | Refills: 0 | Status: SHIPPED | OUTPATIENT
Start: 2021-08-19 | End: 2021-08-26

## 2021-08-19 NOTE — FLOWSHEET NOTE
[facility]view of    [facility] and [facility]  [address] [facility]  Phone: [phone]   Fax:     [phone]      Physical Therapy Daily/Aquatic Flow Sheet   Date:  2021    Patient Name:  Irais Chavarria :  1960  MRN: [id number]     Restrictions/Precautions:    Pertinent Medical History:Additional Pertinent Hx: arthritis, DDD cervical and lumbar,  HTN, HLD, chronic back pain,  DVT, left and right TKR,  right TKR revision 2020     Medical/Treatment Diagnosis Information:   · Diagnosis: Z96.651 (ICD-10-CM) - Presence of right artificial knee joint (revisionn 2020  · Treatment Diagnosis: Decreased functional mobility 2/2 right knee pain     Insurance/Certification information:  PT Insurance Information: [facility]  Physician Information:  Referring Practitioner: Sherin Mendez of care signed (Y/N):      Date of Patient follow up with Physician: Sept    Progress Report: []  Yes  [x]  No     Date Range for reporting period:  Beginnin2021  Ending:      Progress report due (10 Rx/or 30 days whichever is less):     Recertification due (POC duration/ or 90 days whichever is less):     Visit # POC/Insurance Allowable Auth Needed    67 []Yes   []No     PAIN    R knee 4/10     After Rx 3/10                   SUBJECTIVE   Pt notes moderate knee pain and is limited with activity tolerance. Notes pain is anterior and the proximal tibia. Has been doing PT at home and with PT,  Notes that the therapists were concerned for possible stress reaction as they have done a vibratory test along the bone on the medial lateral malleolus and noted pain along the medial mal with the vibratory testing.    21 pt reports has had 5 revisions on R knee. 7/15: a little stiff and irritated  21 tolerating aquatic ex w/o increased discomfort.   21 woke up with increased discomfort R knee w/o reason.   Reports no increased discomfort since doing aquatic ex. 10 min late  7-29-21 Day after aquatic ex sore. Maybe doing a little too much. Overall feels aquatic ex helpful with 30% improvement. 8/3 - Feels that pain is less- was as high as an 8, now at a 6. Walking is still problematic  8/5: I am late today because I had a stomach ache this morning. Pt also reports Right knee is 'puffy' today. 8-10-21 overall less discomfort, relief after pool x 1 day. 8-12-21 ovrall feels 40% improved. 8-17-21 steps mostly 1 at a time, occassionally step over step. Reports decreased pain/ tenderness. Sleeping ok. 8-19-21  Tolerating aquatic ex well. Feels rainy weather aggravates knee.   45 min late due to overslept.     Xray   Have reviewed the xrays above from 6/15/2021  3 views of the right knee AP, lateral and sunrise views were performed and reviewed today and my impression is: Well-placed hinged knee prosthesis with long tibial and femoral stems.  Distal aspect of the tibial stem possibly compressing anteriorly on the tibia may be creating some irritation/stress riser no evidence of acute fracture dislocation     Discussed the utilization of a shin guard with an Ace wrap as the anterior aspect of the tibial stem may be creating stress reaction across the anterior tibia distally.  But as the bone continues to feel the sensation that can increase its rigidity and strength and the pain can settle down.  Also discussed with patient the this can go the other way that the bone can continue to thin and create a actual stress fracture         Relevant Medical History:Additional Pertinent Hx: arthritis, DDD cervical and lumbar,  HTN, HLD, chronic back pain,  DVT, left and right TKR,  right TKR revision 11/2020     Functional Scale/Score:WOMAC   =   35   8-12-21 10 th visit    ROM LEFT RIGHT   HIP Flex wfl all wfl alll   HIP Abd       HIP Ext       HIP IR       HIP ER       Knee ext 0l -3a/p   Knee Flex 105 93a/96p   Ankle PF wfl all wfl all   Ankle DF       Ankle In       Ankle Ev       Strength  LEFT RIGHT   HIP Flexors 4+ all 4+ all   HIP Abductors       HIP Ext       Hip ER       Knee EXT (quad) 4+/5 4/5   Knee Flex (HS) 4+/5 4+/5   Ankle DF 4+ all 4+ all   Ankle PF           Latex Allergy:  [x]NO      []YES  Preferred Language for Healthcare:   [x]English       []Other:    Land-based Visits Exercises/Activities: NO LAND RX TODAY  Therapeutic Ex (20084)  Min:  5 Resistance/Repetitions Notes   HEP  SLR,   Prone knee flexion  Heel slides  Sidelying abduction Review of HEP                                  Therapeutic Activity (88801) Min: 20     Gait training Review of use of cane SPC to offload RLE to decrease pain    Review of current level of functional mobility and adl     Review of future aquatic options / [facility] - pt to check insurance for silver sneakers        Aquatic Visits Exercises/Activities:  Aquatic Abbreviation Key  B= Belt DB= Dumbells T= Theratube   G=Gloves N= Noodles W= Weights   P= Paddles WW=Water Walker K= Kickboard     **Pt 15 min late therefore unable to complete all exercises.    Transfers:   steps ind 1 at a time      % Immersion: 75%            Ambulation:  laps ind Exercises UE:      Forwards 5 Shoulder Shrugs     Lateral 2 Shoulder circles     Marching  1  Scapular Retraction      Retro  1 Rolling      [place]  Push Downs    Multifidus walkouts w/paddle  IR/ER      Punching    Stretchin sec  Rowing    Gastroc/Soleus  2 B Elbow Flex/Ext    Hamstring  2 R Shldr Flex/Ext     Knee flex stretch  /step  2 R Shldr Abd/Add    Piriformis   Horiz Abd/Add     Hip flexor    Arm Circles     SKTC   PNF Diagonals    DKTC  UE oscillations f/b, s/s    ITB   Wall Push-ups    Quad  Figure 8's    Mid back   Buoy pull/push downs    UT  Tband rows    Rhom  Tband lats    Post Shoulder  Lumbar Rot w/ paddles    Pec   Small ball pull downs                   diagonals             Cervical:       AROM Flex       AROM Extension     LEs exercises: B AROM Side Bending    Marching  10 AROM Rotation    Heel Raises  10 Chin tucks    Toe Raises  10 Chin nods     Squats  10      Hamstring Curls  10      Hip Flexion  10 Balance: Hip Abduction 10 SLS 15 sec R   Hip Circles 10 Tandem stance 30 sec x 2   TA set  NBOS eyes open    Glut Set  NBOS eyes closed 30 sec    Hip Extension 10 Hand to Opposite Knee 10   Hip Adduction    Box Step  3 R/L   Hip IR   Noodle Stance     Hip ER  Stop/Go Gait    Fig 8's  Switch Gait    TKE/ RED    T-band 5 sec x 10 R           Seated: Chair  Functional:    Ankle Pumps 30 Step up forward 10 R/L  @ 25 % WB   Ankle circles 10 Step up lateral    Knee flex & ext 30 Step down    Hip Abd & Adduction 30 [place] squats 10   Bicycle  30 Crate Lifts    Add Set with ball 10 Lunges forward    LX stab with med ball throws  Lunges lateral    Ankle INV  Lunges retro    Ankle EV  Lower ab curl with noodle      Upper ab curl with ball      Med ball straight lifts      Med ball diagonal lifts      Hydrorider          Noodle:      Leg Press Small x 10 R Deep Water:     hang at wall 2 min    relief Jog    Noodle hang deep water  Jumping Jacks    Noodle Bicycle 1 min   assist Heel to toe      Hand to opposite knee      Cross country skier      Rocking Horse      Other Therapeutic Activities:  Pt was educated on PT POC, Diagnosis, Prognosis, pathomechanics as well as frequency and duration of scheduling future physical therapy appointments. Time was also taken on this day to answer all patient questions and participation in PT. Reviewed appointment policy in detail with patient and patient verbalized understanding. Home Exercise Program:  Patient was instructed in the following for HEP:     . Patient verbalized/demonstrated understanding and was issued written handout. Charges:   Therapeutic Exercise and NMR EXR  [] (83268) Provided verbal/tactile cueing for activities related to strengthening, flexibility, endurance, ROM for improvements in LE, proximal hip, and core control with self care, mobility, lifting, ambulation.  [] (73321) Provided verbal/tactile cueing for activities related to improving balance, coordination, kinesthetic sense, posture, motor skill, proprioception  to assist with LE, proximal hip, and core control in self care, mobility, lifting, ambulation and eccentric single leg control. NMR and Therapeutic Activities:    [] (99790 or 61270) Provided verbal/tactile cueing for activities related to improving balance, coordination, kinesthetic sense, posture, motor skill, proprioception and motor activation to allow for proper function of core, proximal hip and LE with self care and ADLs and functional mobility.   [] (39143) Gait Re-education- Provided training and instruction to the patient for proper LE, core and proximal hip recruitment and positioning and eccentric body weight control with ambulation re-education including up and down stairs     Home Exercise Program:    [] (59400) Reviewed/Progressed HEP activities related to strengthening, flexibility, endurance, ROM of core, proximal hip and LE for functional self-care, mobility, lifting and ambulation/stair navigation   [] (68238)Reviewed/Progressed HEP activities related to improving balance, coordination, kinesthetic sense, posture, motor skill, proprioception of core, proximal hip and LE for self care, mobility, lifting, and ambulation/stair navigation      Manual Treatments:  PROM / STM / Oscillations-Mobs:  G-I, II, III, IV (PA's, Inf., Post.)  [] (78425) Provided manual therapy to mobilize LE, proximal hip and/or LS spine soft tissue/joints for the purpose of modulating pain, promoting relaxation,  increasing ROM, reducing/eliminating soft tissue swelling/inflammation/restriction, improving soft tissue extensibility and allowing for proper ROM for normal function with self care, mobility, lifting and ambulation.      Individual Aquatic Therapy:  [x] (46790) Provided verbal the LEFS to assist with reaching prior level of function. [x]? Progressing: []? Met: []? Not Met: []? Adjusted  2. Patient will demonstrate increased AROM to 105 deg  to allow for proper joint functioning as indicated by patients Functional Deficits. [x]? Progressing: []? Met: []? Not Met: []? Adjusted  3. Patient will demonstrate an increase in Strength to good proximal hip strength and control, within 5lb HHD in LE to allow for proper functional mobility as indicated by patients Functional Deficits. [x]? Progressing: []? Met: []? Not Met: []? Adjusted  4. Patient will return to 70%  functional activities without increased symptoms or restriction. [x]? Progressing: []? Met: []? Not Met: []? Adjusted  5. To able to walk without my leg feeling like it will buckle. [x]? Progressing: []? Met: []? Not Met: []? Adjusted         Patient Requires Follow-up: [x] Yes  [] No    Plan:   [x] Continue per plan of care [] Alter current plan (see comments)  [] Plan of care initiated [] Hold pending MD visit [] Discharge    Plan for Next Session:   Gradually progress aquatic exercise as tolerated to increase   ROM, strength, and endurance to improve ADL's and decrease pain. ADD: increase gt,   Step downs  leg press as tolerated. Electronically signed by:  Nori Mendoza, PTA  [id number]                                   Note: If patient does not return for scheduled/recommended follow up visits, this note will serve as a discharge from care along with the most recent update on progress.

## 2021-08-24 ENCOUNTER — HOSPITAL ENCOUNTER (OUTPATIENT)
Dept: PHYSICAL THERAPY | Age: 61
Setting detail: THERAPIES SERIES
Discharge: HOME | End: 2021-08-24
Payer: COMMERCIAL

## 2021-08-24 PROCEDURE — 97113 AQUATIC THERAPY/EXERCISES: CPT

## 2021-08-24 NOTE — FLOWSHEET NOTE
[facility] of    [address] [facility] and [address] [facility]  [address] [facility]  Phone: [phone]   Fax:     [phone]      Physical Therapy Daily/Aquatic Flow Sheet   Date:  2021    Patient Name:  Jennifer Sigala :  1960  MRN: [id number]     Restrictions/Precautions:    Pertinent Medical History:Additional Pertinent Hx: arthritis, DDD cervical and lumbar,  HTN, HLD, chronic back pain,  DVT, left and right TKR,  right TKR revision 2020     Medical/Treatment Diagnosis Information:   · Diagnosis: Z96.651 (ICD-10-CM) - Presence of right artificial knee joint (revisionn 2020  · Treatment Diagnosis: Decreased functional mobility 2/2 right knee pain     Insurance/Certification information:  PT Insurance Information: [facility]  Physician Information:  Referring Practitioner: Reagan Baez of care signed (Y/N):      Date of Patient follow up with Physician: Sept    Progress Report: []  Yes  [x]  No     Date Range for reporting period:  Beginnin2021  Ending:      Progress report due (10 Rx/or 30 days whichever is less):     Recertification due (POC duration/ or 90 days whichever is less):     Visit # POC/Insurance Allowable Auth Needed    67 []Yes   []No     PAIN    R knee 10     After Rx 3/10                   SUBJECTIVE   Pt notes moderate knee pain and is limited with activity tolerance. Notes pain is anterior and the proximal tibia. Has been doing PT at home and with PT,  Notes that the therapists were concerned for possible stress reaction as they have done a vibratory test along the bone on the medial lateral malleolus and noted pain along the medial mal with the vibratory testing.    21 pt reports has had 5 revisions on R knee. 7/15: a little stiff and irritated  21 tolerating aquatic ex w/o increased discomfort.   21 woke up with increased discomfort R knee w/o reason.   Reports no increased discomfort since doing aquatic ex. 10 min late  7-29-21 Day after aquatic ex sore. Maybe doing a little too much. Overall feels aquatic ex helpful with 30% improvement. 8/3 - Feels that pain is less- was as high as an 8, now at a 6. Walking is still problematic  8/5: I am late today because I had a stomach ache this morning. Pt also reports Right knee is 'puffy' today. 8-10-21 overall less discomfort, relief after pool x 1 day. 8-12-21 ovrall feels 40% improved. 8-17-21 steps mostly 1 at a time, occassionally step over step. Reports decreased pain/ tenderness. Sleeping ok. 8-19-21  Tolerating aquatic ex well. Feels rainy weather aggravates knee.   45 min late due to overslept.  8-24-21 less tender, overall 50% improved.          Xray   Have reviewed the xrays above from 6/15/2021  3 views of the right knee AP, lateral and sunrise views were performed and reviewed today and my impression is: Well-placed hinged knee prosthesis with long tibial and femoral stems.  Distal aspect of the tibial stem possibly compressing anteriorly on the tibia may be creating some irritation/stress riser no evidence of acute fracture dislocation     Discussed the utilization of a shin guard with an Ace wrap as the anterior aspect of the tibial stem may be creating stress reaction across the anterior tibia distally.  But as the bone continues to feel the sensation that can increase its rigidity and strength and the pain can settle down.  Also discussed with patient the this can go the other way that the bone can continue to thin and create a actual stress fracture         Relevant Medical History:Additional Pertinent Hx: arthritis, DDD cervical and lumbar,  HTN, HLD, chronic back pain,  DVT, left and right TKR,  right TKR revision 11/2020     Functional Scale/Score:WOMAC   =   35   8-12-21 10 th visit    ROM LEFT RIGHT   HIP Flex wfl all wfl alll   HIP Abd       HIP Ext       HIP IR       HIP ER       Knee ext 0l -3a/p   Knee Flex 105 93a/96p   Ankle PF wfl all wfl all   Ankle DF       Ankle In       Ankle Ev       Strength  LEFT RIGHT   HIP Flexors 4+ all 4+ all   HIP Abductors       HIP Ext       Hip ER       Knee EXT (quad) 4+/5 4/5   Knee Flex (HS) 4+/5 4+/5   Ankle DF 4+ all 4+ all   Ankle PF           Latex Allergy:  [x]NO      []YES  Preferred Language for Healthcare:   [x]English       []Other:    Land-based Visits Exercises/Activities: NO LAND RX TODAY  Therapeutic Ex (68505)  Min:  5 Resistance/Repetitions Notes   HEP  SLR,   Prone knee flexion  Heel slides  Sidelying abduction Review of HEP                                  Therapeutic Activity (13107) Min: 20     Gait training Review of use of cane SPC to offload RLE to decrease pain    Review of current level of functional mobility and adl     Review of future aquatic options / [facility] - pt to check insurance for silver sneakers        Aquatic Visits Exercises/Activities:  Aquatic Abbreviation Key  B= Belt DB= Dumbells T= Theratube   G=Gloves N= Noodles W= Weights   P= Paddles WW=Water Walker K= Kickboard     **Pt 15 min late therefore unable to complete all exercises.    Transfers:   steps ind 1 at a time      % Immersion: 75%            Ambulation:  laps ind Exercises UE:      Forwards 5 Shoulder Shrugs     Lateral 2 Shoulder circles     Marching  1  Scapular Retraction      Retro  1 Rolling      [place]  Push Downs    Multifidus walkouts w/paddle  IR/ER      Punching    Stretchin sec  Rowing    Gastroc/Soleus  2 B Elbow Flex/Ext    Hamstring  2 R Shldr Flex/Ext     Knee flex stretch  /step  2 R Shldr Abd/Add    Piriformis   Horiz Abd/Add     Hip flexor    Arm Circles     SKTC   PNF Diagonals    DKTC  UE oscillations f/b, s/s    ITB   Wall Push-ups    Quad  Figure 8's    Mid back   Buoy pull/push downs    UT  Tband rows    Rhom  Tband lats    Post Shoulder  Lumbar Rot w/ paddles    Pec   Small ball pull downs                   diagonals             Cervical: AROM Flex       AROM Extension     LEs exercises: B AROM Side Bending    Marching  10 AROM Rotation    Heel Raises  10 Chin tucks    Toe Raises  10 Chin nods     Squats  10      Hamstring Curls  10      Hip Flexion  10 Balance: Hip Abduction 10 SLS 30 sec R   Hip Circles 10 Tandem stance 30 sec x 2   TA set  NBOS eyes open    Glut Set  NBOS eyes closed 30 sec    Hip Extension 10 Hand to Opposite Knee 10   Hip Adduction    Box Step  3 R/L   Hip IR   Noodle Stance     Hip ER  Stop/Go Gait    Fig 8's  Switch Gait    TKE/ RED    T-band 5 sec x 10 R           Seated: Chair  Functional:    Ankle Pumps 30 Step up forward 10 R/L  @ 25 % WB   Ankle circles 10 Step up lateral    Knee flex & ext 30 Step down 5 @ 25% WB   Hip Abd & Adduction 30 [place] squats 10   Bicycle  30 Crate Lifts    Add Set with ball 10 Lunges forward    LX stab with med ball throws  Lunges lateral    Ankle INV  Lunges retro    Ankle EV  Lower ab curl with noodle      Upper ab curl with ball      Med ball straight lifts      Med ball diagonal lifts      Hydrorider          Noodle:      Leg Press Small x 15 R Deep Water:     hang at wall Jog    Noodle hang deep water Jumping Jacks    Noodle Bicycle Heel to toe      Hand to opposite knee      Cross country skier      Rocking Horse      Other Therapeutic Activities:  Pt was educated on PT POC, Diagnosis, Prognosis, pathomechanics as well as frequency and duration of scheduling future physical therapy appointments. Time was also taken on this day to answer all patient questions and participation in PT. Reviewed appointment policy in detail with patient and patient verbalized understanding. Home Exercise Program:  Patient was instructed in the following for HEP:     . Patient verbalized/demonstrated understanding and was issued written handout. Charges:   Therapeutic Exercise and NMR EXR  [] (73333) Provided verbal/tactile cueing for activities related to strengthening, flexibility, endurance, ROM for improvements in LE, proximal hip, and core control with self care, mobility, lifting, ambulation.  [] (34558) Provided verbal/tactile cueing for activities related to improving balance, coordination, kinesthetic sense, posture, motor skill, proprioception  to assist with LE, proximal hip, and core control in self care, mobility, lifting, ambulation and eccentric single leg control. NMR and Therapeutic Activities:    [] (81051 or 08587) Provided verbal/tactile cueing for activities related to improving balance, coordination, kinesthetic sense, posture, motor skill, proprioception and motor activation to allow for proper function of core, proximal hip and LE with self care and ADLs and functional mobility.   [] (22065) Gait Re-education- Provided training and instruction to the patient for proper LE, core and proximal hip recruitment and positioning and eccentric body weight control with ambulation re-education including up and down stairs     Home Exercise Program:    [] (95998) Reviewed/Progressed HEP activities related to strengthening, flexibility, endurance, ROM of core, proximal hip and LE for functional self-care, mobility, lifting and ambulation/stair navigation   [] (10254)Reviewed/Progressed HEP activities related to improving balance, coordination, kinesthetic sense, posture, motor skill, proprioception of core, proximal hip and LE for self care, mobility, lifting, and ambulation/stair navigation      Manual Treatments:  PROM / STM / Oscillations-Mobs:  G-I, II, III, IV (PA's, Inf., Post.)  [] (32019) Provided manual therapy to mobilize LE, proximal hip and/or LS spine soft tissue/joints for the purpose of modulating pain, promoting relaxation,  increasing ROM, reducing/eliminating soft tissue swelling/inflammation/restriction, improving soft tissue extensibility and allowing for proper ROM for normal function with self care, mobility, lifting and ambulation.      Individual Aquatic Therapy:  [x] (79280) Provided verbal and tactile cueing for strengthening, flexibility, ROM using the therapeutic properties of water (buoyancy, resistance) for improvements in core control, mobility and ambulation     Aquatic Group:  [x] (92575) Provided intermittent verbal and tactile cueing for strengthening, endurance, flexibility and ROM for 2-4 individuals that do not require one-on one patient contact by the therapist       Land Timed Code Treatment Minutes:    Land Total Treatment Minutes: Individual Aquatic Minutes: 40   Aquatic Group Minutes: 0     [] EVAL (LOW) 59428 (typically 20 minutes face-to-face)  [] EVAL (MOD) 34266 (typically 30 minutes face-to-face)  [] EVAL (HIGH) 58281 (typically 45 minutes face-to-face)  [] RE-EVAL     [] W[place](69649) x     [] Dry needle 1 or 2 Muscles (66198)  [] NMR (51360) x     [] Dry needle 3+ Muscles (99373)  [] Manual (45195) x     [] Ultrasound (82161) x  [] TA (99915) x     [] Mech Traction (60768)  [] ES(attended) (79374)     [] ES (un) (53644):   [] Vasopump (44906) [] Ionto (39698)   [x] Aquatic Ind (43726) x    3 [] Aquatic Group (21132)   [] Other:    Treatment/Activity Tolerance:  [x] Patient tolerated treatment well [] Patient limited by fatigue  [] Patient limited by pain  [] Patient limited by other medical complications  [x] Other:       Prognosis: [x] Good [] Fair  [] Poor    GOALS:  Patient stated goal:  To not have as much pain  [x]? Progressing: []? Met: []? Not Met: []? Adjusted     Therapist goals for Patient:   Short Term Goals: To be achieved in: 2 weeks  1. Independent in HEP and progression per patient tolerance, in order to prevent re-injury. [x]? Progressing: []? Met: []? Not Met: []? Adjusted  2. Patient will have a decrease in pain to facilitate improvement in movement, function, and ADLs as indicated by Functional Deficits. [x]? Progressing: []? Met: []? Not Met: []? Adjusted     Long Term Goals: To be achieved in:  8  weeks  1. Disability index score of  30 % or less for the LEFS to assist with reaching prior level of function. [x]? Progressing: []? Met: []? Not Met: []? Adjusted  2. Patient will demonstrate increased AROM to 105 deg  to allow for proper joint functioning as indicated by patients Functional Deficits. [x]? Progressing: []? Met: []? Not Met: []? Adjusted  3. Patient will demonstrate an increase in Strength to good proximal hip strength and control, within 5lb HHD in LE to allow for proper functional mobility as indicated by patients Functional Deficits. [x]? Progressing: []? Met: []? Not Met: []? Adjusted  4. Patient will return to 70%  functional activities without increased symptoms or restriction. [x]? Progressing: []? Met: []? Not Met: []? Adjusted  5. To able to walk without my leg feeling like it will buckle. [x]? Progressing: []? Met: []? Not Met: []? Adjusted         Patient Requires Follow-up: [x] Yes  [] No    Plan:   [x] Continue per plan of care [] Alter current plan (see comments)  [] Plan of care initiated [] Hold pending MD visit [] Discharge    Plan for Next Session:   Gradually progress aquatic exercise as tolerated to increase   ROM, strength, and endurance to improve ADL's and decrease pain. ADD: increase gt,   Step downs,  leg press as tolerated. Electronically signed by:  Beatrice Thakur, PTA  [id number]                                   Note: If patient does not return for scheduled/recommended follow up visits, this note will serve as a discharge from care along with the most recent update on progress.

## 2021-08-26 ENCOUNTER — HOSPITAL ENCOUNTER (OUTPATIENT)
Dept: PHYSICAL THERAPY | Age: 61
Setting detail: THERAPIES SERIES
Discharge: HOME | End: 2021-08-26
Payer: COMMERCIAL

## 2021-08-26 NOTE — FLOWSHEET NOTE
[place] [facility], [facility]  [address] [place], [facility]  Phone: [phone]   Fax:     [phone]    Physical Therapy  Cancellation/No-show Note  Patient Name:  Malgorzata Scott  :  1960   Date:  2021  Cancelled visits to date: 2  No-shows to date: 0    Patient status for today's appointment patient:  [x]  Cancelled  []  Rescheduled appointment  []  No-show     Reason given by patient:  []  Patient ill  []  Conflicting appointment  []  No transportation    []  Conflict with work  []  No reason given  [x]  Other:     Comments:  Called to cancel can't walk due to knee pain. Phone call information:   [x]  Phone call made today to patient at _ time at number provided:      []  Patient answered, conversation as follows:      [x]  Patient did not answer, message left as follows: called patient re: message can't walk due to knee pain. No answer,  left message  To call /see MD if increased knee pain and unable to walk . Called pt 2nd time , she answered and said she picked up the pace with walking at home and feels that may have aggravated her. Says it is feeling a little better.  Advised if not feeling better tomorrow, call MD.     []  Phone call not made today    Electronically signed by:  Favio Vaz,

## 2021-08-27 ENCOUNTER — TELEPHONE (OUTPATIENT)
Dept: ORTHOPEDIC SURGERY | Age: 61
End: 2021-08-27

## 2021-08-27 DIAGNOSIS — Z96.651 STATUS POST REVISION OF TOTAL REPLACEMENT OF RIGHT KNEE: Primary | ICD-10-CM

## 2021-08-27 RX ORDER — OXYCODONE AND ACETAMINOPHEN 10; 325 MG/1; MG/1
1 TABLET ORAL EVERY 8 HOURS PRN
Qty: 21 TABLET | Refills: 0 | Status: SHIPPED | OUTPATIENT
Start: 2021-08-27 | End: 2021-09-07 | Stop reason: SDUPTHER

## 2021-08-27 NOTE — TELEPHONE ENCOUNTER
Prescription Refill     Medication Name:  Oxycodone   Pharmacy: [address] [place] [address]   Patient Contact Number: [phone]     Patient would also like a call back, has some questions.

## 2021-08-31 ENCOUNTER — HOSPITAL ENCOUNTER (OUTPATIENT)
Dept: PHYSICAL THERAPY | Age: 61
Setting detail: THERAPIES SERIES
Discharge: HOME | End: 2021-08-31
Payer: COMMERCIAL

## 2021-08-31 PROCEDURE — 97150 GROUP THERAPEUTIC PROCEDURES: CPT

## 2021-08-31 PROCEDURE — 97530 THERAPEUTIC ACTIVITIES: CPT

## 2021-08-31 PROCEDURE — 97113 AQUATIC THERAPY/EXERCISES: CPT

## 2021-08-31 PROCEDURE — 97110 THERAPEUTIC EXERCISES: CPT

## 2021-08-31 NOTE — FLOWSHEET NOTE
REview of    [facility]  and [address] [facility]  [address] [facility]  Phone: [phone]   Fax:     [phone]      Physical Therapy Daily/Aquatic Flow Sheet   Date:  2021    Patient Name:  Verna Hemphill :  1960  MRN: [id number]     Restrictions/Precautions:    Pertinent Medical History:Additional Pertinent Hx: arthritis, DDD cervical and lumbar,  HTN, HLD, chronic back pain,  DVT, left and right TKR,  right TKR revision 2020     Medical/Treatment Diagnosis Information:   · Diagnosis: Z96.651 (ICD-10-CM) - Presence of right artificial knee joint (revisionn 2020  · Treatment Diagnosis: Decreased functional mobility 2/2 right knee pain     Insurance/Certification information:  PT Insurance Information: [facility]  Physician Information:  Referring Practitioner: Keo Valerio of care signed (Y/N): yes     Date of Patient follow up with Physician: Sept    Progress Report: []  Yes  [x]  No     Date Range for reporting period:  Beginnin2021  Ending:      Progress report due (10 Rx/or 30 days whichever is less):     Recertification due (POC duration/ or 90 days whichever is less):     Visit # POC/Insurance Allowable Auth Needed    67 []Yes   []No     PAIN    R knee 2/10     After Rx 0/10                   SUBJECTIVE   Pt notes moderate knee pain and is limited with activity tolerance. Notes pain is anterior and the proximal tibia. Has been doing PT at home and with PT,  Notes that the therapists were concerned for possible stress reaction as they have done a vibratory test along the bone on the medial lateral malleolus and noted pain along the medial mal with the vibratory testing.    21 pt reports has had 5 revisions on R knee. 7/15: a little stiff and irritated  21 tolerating aquatic ex w/o increased discomfort.   21 woke up with increased discomfort R knee w/o reason.   Reports no increased discomfort since doing aquatic ex. 10 min late  7-29-21 Day after aquatic ex sore. Maybe doing a little too much. Overall feels aquatic ex helpful with 30% improvement. 8/3 - Feels that pain is less- was as high as an 8, now at a 6. Walking is still problematic  8/5: I am late today because I had a stomach ache this morning. Pt also reports Right knee is 'puffy' today. 8-10-21 overall less discomfort, relief after pool x 1 day. 8-12-21 ovrall feels 40% improved. 8-17-21 steps mostly 1 at a time, occassionally step over step. Reports decreased pain/ tenderness. Sleeping ok. 8-19-21  Tolerating aquatic ex well. Feels rainy weather aggravates knee. 45 min late due to overslept.  8-24-21 less tender, overall 50% improved.    8/31 - Notes the soreness last week was due to positions during activity       Xray   Have reviewed the xrays above from 6/15/2021  3 views of the right knee AP, lateral and sunrise views were performed and reviewed today and my impression is: Well-placed hinged knee prosthesis with long tibial and femoral stems.  Distal aspect of the tibial stem possibly compressing anteriorly on the tibia may be creating some irritation/stress riser no evidence of acute fracture dislocation     Discussed the utilization of a shin guard with an Ace wrap as the anterior aspect of the tibial stem may be creating stress reaction across the anterior tibia distally.  But as the bone continues to feel the sensation that can increase its rigidity and strength and the pain can settle down.  Also discussed with patient the this can go the other way that the bone can continue to thin and create a actual stress fracture         Relevant Medical History:Additional Pertinent Hx: arthritis, DDD cervical and lumbar,  HTN, HLD, chronic back pain,  DVT, left and right TKR,  right TKR revision 11/2020       Functional Scale/Score     :WOMAC          7/2 48 eval   /    35   8-12-21 10 th visit    ROM LEFT RIGHT HIP Flex wfl all wfl alll   HIP Abd       HIP Ext       HIP IR       HIP ER       Knee ext 0l -3a/p   Knee Flex 105 93a/96p   Ankle PF wfl all wfl all   Ankle DF       Ankle In       Ankle Ev       Strength  LEFT RIGHT   HIP Flexors 4+ all 4+ all   HIP Abductors       HIP Ext       Hip ER       Knee EXT (quad) 4+/5 4/5   Knee Flex (HS) 4+/5 4+/5   Ankle DF 4+ all 4+ all   Ankle PF         21  Palpation -  Significantly less TTP over tibia. Right   ROM        -2/0 extension         95/100 flexion   Right strength   Hip flexion  4+/5, knee extension  4+/5      Latex Allergy:  [x]NO      []YES  Preferred Language for Healthcare:   [x]English       []Other:    Land-based Visits Exercises/Activities:  Re-eval per PT  Therapeutic Ex (57383)  Min:  10 Resistance/Repetitions Notes   HEP  SLR,   Prone knee flexion  Heel slides  Sidelying abduction Review of HEP    ADD sets Pillow squeeze 5 sec x 10                             Therapeutic Activity (66445) Min: 20     Review of current level of functional mobility and adl Review of activity modifications to prevent pain    Review of future aquatic options / [facility] - pt to check insurance for silver [employer] [place]        Aquatic Visits Exercises/Activities:  Aquatic Abbreviation Key  B= Belt DB= Dumbells T= Theratube   G=Gloves N= Noodles W= Weights   P= Paddles WW=Water Walker K= Kickboard     **Pt 15 min late therefore unable to complete all exercises.    Transfers:   steps ind 1 at a time      % Immersion: 75%            Ambulation:  laps ind Exercises UE:      Forwards 7 Shoulder Shrugs     Lateral 2 Shoulder circles     Marching  1  Scapular Retraction      Retro  1 Rolling      [place]  Push Downs    Multifidus walkouts w/paddle  IR/ER      Punching    Stretchin sec  Rowing    Gastroc/Soleus  2 B Elbow Flex/Ext    Hamstring  2 R Shldr Flex/Ext     Knee flex stretch  /step  2 R Shldr Abd/Add    Piriformis   Horiz Abd/Add     Hip flexor    Arm Circles SKTC   PNF Diagonals    DKTC  UE oscillations f/b, s/s    ITB   Wall Push-ups    Quad  Figure 8's    Mid back   Buoy pull/push downs    UT  Tband rows    Rhom  Tband lats    Post Shoulder  Lumbar Rot w/ paddles    Pec   Small ball pull downs                   diagonals             Cervical:       AROM Flex       AROM Extension     LEs exercises: B AROM Side Bending    Marching  10 AROM Rotation    Heel Raises  10 Chin tucks    Toe Raises  10 Chin nods     Squats  10      Hamstring Curls  10      Hip Flexion  10 Balance: Hip Abduction 10 SLS 30 sec R   Hip Circles 10 Tandem stance 30 sec x 2   TA set  NBOS eyes open    Glut Set  NBOS eyes closed 30 sec    Hip Extension 10 Hand to Opposite Knee 10   Hip Adduction    Box Step  3 R/L   Hip IR   Noodle Stance     Hip ER  Stop/Go Gait    Fig 8's  Switch Gait    TKE/ RED    T-band 5 sec x 10 R           Seated: Chair  Functional:    Ankle Pumps 30 Step up forward 10 R/L  @ 25 % WB   Ankle circles 10 Step up lateral    Knee flex & ext 30 Step down 5 @ 25% WB   Hip Abd & Adduction 30 Whiteside squats    Bicycle  30 Crate Lifts 10   Add Set with ball 10 Lunges forward    LX stab with med ball throws  Lunges lateral    Ankle INV  Lunges retro    Ankle EV  Lower ab curl with noodle      Upper ab curl with ball      Med ball straight lifts      Med ball diagonal lifts      Hydrorider          Noodle:      Leg Press Small x 15 R Deep Water:     hang at wall 2 min    relief Jog    Noodle hang deep water  Jumping Jacks    Noodle Bicycle 1 min   assist Heel to toe      Hand to opposite knee      Cross country skier      Rocking Horse      Other Therapeutic Activities:  Pt was educated on PT POC, Diagnosis, Prognosis, pathomechanics as well as frequency and duration of scheduling future physical therapy appointments. Time was also taken on this day to answer all patient questions and participation in PT.  Reviewed appointment policy in detail with patient and patient verbalized understanding. Home Exercise Program:  Patient was instructed in the following for HEP:     . Patient verbalized/demonstrated understanding and was issued written handout. Charges: Therapeutic Exercise and NMR EXR  [] (76788) Provided verbal/tactile cueing for activities related to strengthening, flexibility, endurance, ROM for improvements in LE, proximal hip, and core control with self care, mobility, lifting, ambulation.  [] (08174) Provided verbal/tactile cueing for activities related to improving balance, coordination, kinesthetic sense, posture, motor skill, proprioception  to assist with LE, proximal hip, and core control in self care, mobility, lifting, ambulation and eccentric single leg control.      NMR and Therapeutic Activities:    [] (63599 or 95187) Provided verbal/tactile cueing for activities related to improving balance, coordination, kinesthetic sense, posture, motor skill, proprioception and motor activation to allow for proper function of core, proximal hip and LE with self care and ADLs and functional mobility.   [] (19248) Gait Re-education- Provided training and instruction to the patient for proper LE, core and proximal hip recruitment and positioning and eccentric body weight control with ambulation re-education including up and down stairs     Home Exercise Program:    [] (13407) Reviewed/Progressed HEP activities related to strengthening, flexibility, endurance, ROM of core, proximal hip and LE for functional self-care, mobility, lifting and ambulation/stair navigation   [] (02342)Reviewed/Progressed HEP activities related to improving balance, coordination, kinesthetic sense, posture, motor skill, proprioception of core, proximal hip and LE for self care, mobility, lifting, and ambulation/stair navigation      Manual Treatments:  PROM / STM / Oscillations-Mobs:  G-I, II, III, IV (PA's, Inf., Post.)  [] (24514) Provided manual therapy to mobilize LE, proximal hip and/or LS spine soft tissue/joints for the purpose of modulating pain, promoting relaxation,  increasing ROM, reducing/eliminating soft tissue swelling/inflammation/restriction, improving soft tissue extensibility and allowing for proper ROM for normal function with self care, mobility, lifting and ambulation. Individual Aquatic Therapy:  [x] (87443) Provided verbal and tactile cueing for strengthening, flexibility, ROM using the therapeutic properties of water (buoyancy, resistance) for improvements in core control, mobility and ambulation     Aquatic Group:  [x] (75255) Provided intermittent verbal and tactile cueing for strengthening, endurance, flexibility and ROM for 2-4 individuals that do not require one-on one patient contact by the therapist       Land Timed Code Treatment Minutes: 30   Land Total Treatment Minutes: 30     Individual Aquatic Minutes: 40   Aquatic Group Minutes: 15     [] EVAL (LOW) 94757 (typically 20 minutes face-to-face)  [] EVAL (MOD) 71989 (typically 30 minutes face-to-face)  [] EVAL (HIGH) 56032 (typically 45 minutes face-to-face)  [] RE-EVAL     [x] TL(16657) x   1  [] Dry needle 1 or 2 Muscles (18319)  [] NMR (42911) x     [] Dry needle 3+ Muscles (01232)  [] Manual (15023) x     [] Ultrasound (06899) x  [x] TA (50251) x   1  [] Mech Traction (19004)  [] ES(attended) (43819)     [] ES (un) (15348):   [] Vasopump (87960) [] Ionto (27913)   [x] Aquatic Ind (44071) x    3 [x] Aquatic Group (18526)   [] Other:    Treatment/Activity Tolerance:  [x] Patient tolerated treatment well [] Patient limited by fatigue  [] Patient limited by pain  [] Patient limited by other medical complications  [] Other:       Prognosis: [x] Good [] Fair  [] Poor    GOALS:  Patient stated goal:  To not have as much pain  [x]? Progressing: []? Met: []? Not Met: []? Adjusted     Therapist goals for Patient:   Short Term Goals: To be achieved in: 2 weeks  1.  Independent in HEP and progression per patient tolerance, in order to prevent re-injury. [x]? Progressing: []? Met: []? Not Met: []? Adjusted  2. Patient will have a decrease in pain to facilitate improvement in movement, function, and ADLs as indicated by Functional Deficits. [x]? Progressing: []? Met: []? Not Met: []? Adjusted     Long Term Goals: To be achieved in:  8  weeks  1. Disability index score of  30 % or less for the LEFS to assist with reaching prior level of function. [x]? Progressing: []? Met: []? Not Met: []? Adjusted  2. Patient will demonstrate increased AROM to 105 deg  to allow for proper joint functioning as indicated by patients Functional Deficits. [x]? Progressing: []? Met: []? Not Met: []? Adjusted  3. Patient will demonstrate an increase in Strength to good proximal hip strength and control, within 5lb HHD in LE to allow for proper functional mobility as indicated by patients Functional Deficits. [x]? Progressing: []? Met: []? Not Met: []? Adjusted  4. Patient will return to 70%  functional activities without increased symptoms or restriction. [x]? Progressing: []? Met: []? Not Met: []? Adjusted  5. To able to walk without my leg feeling like it will buckle. [x]? Progressing: []? Met: []? Not Met: []? Adjusted         Patient Requires Follow-up: [x] Yes  [] No    Plan:   [x] Continue per plan of care [] Alter current plan (see comments)  [] Plan of care initiated [] Hold pending MD visit [] Discharge    Plan for Next Session:   Gradually progress aquatic exercise as tolerated to increase   ROM, strength, and endurance to improve ADL's and decrease pain. ADD: increase gt,   Step downs,  leg press as tolerated.      Electronically signed by:  Violeta Spear, PT  DPT, MS  0800                                               Ana Luisa avila                                    Note: If patient does not return for scheduled/recommended follow up visits, this note will serve as a discharge from care along with the most recent update on progress.

## 2021-08-31 NOTE — FLOWSHEET NOTE
Physical Therapy Re-Certification Plan of Care      Dear Shahzad Givens    We had the pleasure of treating the following patient for physical therapy services at [address] [place] [address]. A summary of our findings can be found in the updated assessment below. This includes our plan of care. If you have any questions or concerns regarding these findings, please do not hesitate to contact me at the office phone number checked above. Thank you for the referral.     Physician Signature:________________________________Date:__________________  By signing above (or electronic signature), therapists plan is approved by physician    Date Range Of Visits: -21  Total Visits to Date: 15  Overall Response to Treatment:   [x]Patient is responding well to treatment and improvement is noted with regards  to goals   []Patient should continue to improve in reasonable time if they continue HEP   []Patient has plateaued and is no longer responding to skilled PT intervention    []Patient is getting worse and would benefit from return to referring MD   []Patient unable to adhere to initial POC   []Other:  Pt reports 50% improvement in terms of pain, ADL and gait      Recommendation:    [x]Continue PT 2x / wk for 8 weeks.     []Hold PT, pending MD visit    REview of    [facility]  and [address] [place] [address]  [address] [facility]  Phone: [phone]   Fax:     [phone]      Physical Therapy Daily/Aquatic Flow Sheet   Date:  2021    Patient Name:  Kendy Knapp :  1960  MRN: [id number]     Restrictions/Precautions:    Pertinent Medical History:Additional Pertinent Hx: arthritis, DDD cervical and lumbar,  HTN, HLD, chronic back pain,  DVT, left and right TKR,  right TKR revision 2020     Medical/Treatment Diagnosis Information:   · Diagnosis: X60.098 (ICD-10-CM) - Presence of right artificial knee joint (revisionn 2020  · Treatment Diagnosis: Decreased functional mobility 2/2 right knee pain     Insurance/Certification information:  PT Insurance Information: [employer]  Physician Information:  Referring Practitioner: Izabela Nguyễn of care signed (Y/N): yes     Date of Patient follow up with Physician: Sept    Progress Report: []  Yes  [x]  No     Date Range for reporting period:  Beginnin2021  Ending:      Progress report due (10 Rx/or 30 days whichever is less):     Recertification due (POC duration/ or 90 days whichever is less):     Visit # POC/Insurance Allowable Auth Needed    67 []Yes   []No     PAIN    R knee 4/10     After Rx 3/10                   SUBJECTIVE   Pt notes moderate knee pain and is limited with activity tolerance. Notes pain is anterior and the proximal tibia. Has been doing PT at home and with PT,  Notes that the therapists were concerned for possible stress reaction as they have done a vibratory test along the bone on the medial lateral malleolus and noted pain along the medial mal with the vibratory testing. 21  Tolerating aquatic ex well. Feels rainy weather aggravates knee. 45 min late due to overslept.  21 less tender, overall 50% improved.     - Notes the soreness last week was due to positions during activity       Xray   Have reviewed the xrays above from 6/15/2021  3 views of the right knee AP, lateral and sunrise views were performed and reviewed today and my impression is: Well-placed hinged knee prosthesis with long tibial and femoral stems.  Distal aspect of the tibial stem possibly compressing anteriorly on the tibia may be creating some irritation/stress riser no evidence of acute fracture dislocation         Relevant Medical History:Additional Pertinent Hx: arthritis, DDD cervical and lumbar,  HTN, HLD, chronic back pain,  DVT, left and right TKR,  right TKR revision 2020       Functional Scale/Score     :WOMAC          7/2 48 eval   /    35 8-12-21 10 th visit      8/31/21  Palpation -  Significantly less TTP over tibia. Right   ROM        -2/0 extension         95/100 flexion   Right strength   Hip flexion  4+/5, knee extension  4+/5      GOALS:  Patient stated goal:  To not have as much pain  [x]? Progressing: []? Met: []? Not Met: []? Adjusted     Therapist goals for Patient:   Short Term Goals: To be achieved in: 2 weeks  1. Independent in HEP and progression per patient tolerance, in order to prevent re-injury. []? Progressing: [x]? Met: []? Not Met: []? Adjusted  2. Patient will have a decrease in pain to facilitate improvement in movement, function, and ADLs as indicated by Functional Deficits. []? Progressing: [x]? Met: []? Not Met: []? Adjusted     Long Term Goals: To be achieved in:  8  weeks  1. Disability index score of  30 % or less for the LEFS to assist with reaching prior level of function. [x]? Progressing: []? Met: []? Not Met: []? Adjusted  2. Patient will demonstrate increased AROM to 105 deg  to allow for proper joint functioning as indicated by patients Functional Deficits. [x]? Progressing: []? Met: []? Not Met: []? Adjusted  3. Patient will demonstrate an increase in Strength to good proximal hip strength and control, within 5lb HHD in LE to allow for proper functional mobility as indicated by patients Functional Deficits. [x]? Progressing: []? Met: []? Not Met: []? Adjusted  4. Patient will return to 70%  functional activities without increased symptoms or restriction. [x]? Progressing: []? Met: []? Not Met: []? Adjusted  5. To able to walk without my leg feeling like it will buckle. [x]? Progressing: []? Met: []? Not Met: []?  Adjusted         Patient Requires Follow-up: [x] Yes  [] No    Plan:   [x] Continue per plan of care [] Alter current plan (see comments)  [] Plan of care initiated [] Hold pending MD visit [] Discharge      Electronically signed by:  Bere Hernandez, [address] [facility]  DPT, [id number] [facility] [address] Note: If patient does not return for scheduled/recommended follow up visits, this note will serve as a discharge from care along with the most recent update on progress.

## 2021-09-02 ENCOUNTER — HOSPITAL ENCOUNTER (OUTPATIENT)
Dept: PHYSICAL THERAPY | Age: 61
Setting detail: THERAPIES SERIES
Discharge: HOME | End: 2021-09-02
Payer: COMMERCIAL

## 2021-09-02 PROCEDURE — 97113 AQUATIC THERAPY/EXERCISES: CPT

## 2021-09-02 NOTE — FLOWSHEET NOTE
REview of    [facility]  and [address] [facility]  [address] [place] [facility]  Phone: [phone]   Fax:     [phone]      Physical Therapy Daily/Aquatic Flow Sheet   Date:  2021    Patient Name:  Kelly Glass :  1960  MRN: [id number]     Restrictions/Precautions:    Pertinent Medical History:Additional Pertinent Hx: arthritis, DDD cervical and lumbar,  HTN, HLD, chronic back pain,  DVT, left and right TKR,  right TKR revision 2020     Medical/Treatment Diagnosis Information:   · Diagnosis: Z96.651 (ICD-10-CM) - Presence of right artificial knee joint (revisionn 2020  · Treatment Diagnosis: Decreased functional mobility 2/2 right knee pain     Insurance/Certification information:  PT Insurance Information: [facility]  Physician Information:  Referring Practitioner: Mayito Chans of care signed (Y/N): yes     Date of Patient follow up with Physician: Sept    Progress Report: []  Yes  [x]  No     Date Range for reporting period:  Beginnin2021  Ending:      Progress report due (10 Rx/or 30 days whichever is less):     Recertification due (POC duration/ or 90 days whichever is less):     Visit # POC/Insurance Allowable Auth Needed   + [phone] 67 []Yes   []No     PAIN    R knee 2/10     After Rx 0/10                   SUBJECTIVE   Pt notes moderate knee pain and is limited with activity tolerance. Notes pain is anterior and the proximal tibia. Has been doing PT at home and with PT,  Notes that the therapists were concerned for possible stress reaction as they have done a vibratory test along the bone on the medial lateral malleolus and noted pain along the medial mal with the vibratory testing.    21 pt reports has had 5 revisions on R knee. 7/15: a little stiff and irritated  21 tolerating aquatic ex w/o increased discomfort.   21 woke up with increased discomfort R knee w/o reason.   Reports no increased discomfort since doing aquatic ex. 10 min late  7-29-21 Day after aquatic ex sore. Maybe doing a little too much. Overall feels aquatic ex helpful with 30% improvement. 8/3 - Feels that pain is less- was as high as an 8, now at a 6. Walking is still problematic  8/5: I am late today because I had a stomach ache this morning. Pt also reports Right knee is 'puffy' today. 8-10-21 overall less discomfort, relief after pool x 1 day. 8-12-21 ovrall feels 40% improved. 8-17-21 steps mostly 1 at a time, occassionally step over step. Reports decreased pain/ tenderness. Sleeping ok. 8-19-21  Tolerating aquatic ex well. Feels rainy weather aggravates knee. 45 min late due to overslept.  8-24-21 less tender, overall 50% improved.    8/31 - Notes the soreness last week was due to positions during activity  9-2-21 doing ok today.        Xray   Have reviewed the xrays above from 6/15/2021  3 views of the right knee AP, lateral and sunrise views were performed and reviewed today and my impression is: Well-placed hinged knee prosthesis with long tibial and femoral stems.  Distal aspect of the tibial stem possibly compressing anteriorly on the tibia may be creating some irritation/stress riser no evidence of acute fracture dislocation     Discussed the utilization of a shin guard with an Ace wrap as the anterior aspect of the tibial stem may be creating stress reaction across the anterior tibia distally.  But as the bone continues to feel the sensation that can increase its rigidity and strength and the pain can settle down.  Also discussed with patient the this can go the other way that the bone can continue to thin and create a actual stress fracture         Relevant Medical History:Additional Pertinent Hx: arthritis, DDD cervical and lumbar,  HTN, HLD, chronic back pain,  DVT, left and right TKR,  right TKR revision 11/2020       Functional Scale/Score     :WOMAC          7/2 48 eval   /    35   8-12-21 10 th visit    ROM LEFT RIGHT   HIP Flex wfl all wfl alll   HIP Abd       HIP Ext       HIP IR       HIP ER       Knee ext 0l -3a/p   Knee Flex 105 93a/96p   Ankle PF wfl all wfl all   Ankle DF       Ankle In       Ankle Ev       Strength  LEFT RIGHT   HIP Flexors 4+ all 4+ all   HIP Abductors       HIP Ext       Hip ER       Knee EXT (quad) 4+/5 4/5   Knee Flex (HS) 4+/5 4+/5   Ankle DF 4+ all 4+ all   Ankle PF         21  Palpation -  Significantly less TTP over tibia. Right   ROM        -2/0 extension         95/100 flexion   Right strength   Hip flexion  4+/5, knee extension  4+/5      Latex Allergy:  [x]NO      []YES  Preferred Language for Healthcare:   [x]English       []Other:    Land-based Visits Exercises/Activities:  No LAND RX TODAY  Therapeutic Ex (73194)  Min:  10 Resistance/Repetitions Notes   HEP  SLR,   Prone knee flexion  Heel slides  Sidelying abduction Review of HEP    ADD sets Pillow squeeze 5 sec x 10                             Therapeutic Activity (66870) Min: 20     Review of current level of functional mobility and adl Review of activity modifications to prevent pain    Review of future aquatic options / [facility] - pt to check insurance for silver sneakers        Aquatic Visits Exercises/Activities:  Aquatic Abbreviation Key  B= Belt DB= Dumbells T= Theratube   G=Gloves N= Noodles W= Weights   P= Paddles WW=Water Walker K= Kickboard     **Pt 15 min late therefore unable to complete all exercises.    Transfers:   steps ind 1 at a time      % Immersion: 75%            Ambulation:  laps ind Exercises UE:      Forwards 8 Shoulder Shrugs     Lateral 2 Shoulder circles     Marching  1  Scapular Retraction      Retro  1 Rolling      [place]  Push Downs    Multifidus walkouts w/paddle  IR/ER      Punching    Stretchin sec  Rowing    Gastroc/Soleus  2 B Elbow Flex/Ext    Hamstring  2 R Shldr Flex/Ext     Knee flex stretch  /step  2 R Shldr Abd/Add    Piriformis   Horiz Abd/Add     Hip flexor    Arm Circles     SKTC   PNF Diagonals    DKTC  UE oscillations f/b, s/s    ITB   Wall Push-ups    Quad  Figure 8's    Mid back   Buoy pull/push downs    UT  Tband rows    Rhom  Tband lats    Post Shoulder  Lumbar Rot w/ paddles    Pec   Small ball pull downs                   diagonals             Cervical:       AROM Flex       AROM Extension     LEs exercises: B AROM Side Bending    Marching  10 AROM Rotation    Heel Raises  10 Chin tucks    Toe Raises  10 Chin nods     Squats  10      Hamstring Curls  10      Hip Flexion  10 Balance:     SLR / ER 10 R     Hip Abduction 10 SLS 30 sec R   Hip Circles 10 Tandem stance 30 sec x 2   TA set  NBOS eyes open    Glut Set  NBOS eyes closed 30 sec    Hip Extension 10 Hand to Opposite Knee 10   Hip Adduction    Box Step  3 R/L   Hip IR   Noodle Stance     Hip ER  Stop/Go Gait    Fig 8's  Switch Gait    TKE/ RED    T-band 5 sec x 10 R           Seated: Chair  Functional:    Ankle Pumps 30 Step up forward 10 R/L  @ 25 % WB   Ankle circles 10 Step up lateral    Knee flex & ext 30 Step down 10  @ 25% WB   HHA    Hip Abd & Adduction 30 [place] squats    Bicycle  30 Crate Lifts 10   Add Set with ball 10 Lunges forward    LX stab with med ball throws  Lunges lateral    Ankle INV  Lunges retro    Ankle EV  Lower ab curl with noodle      Upper ab curl with ball      Med ball straight lifts      Med ball diagonal lifts      Hydrorider          Noodle:      Leg Press Small x 20 R Deep Water:     hang at wall 2 min    relief Jog    Noodle hang deep water  Jumping Jacks    Noodle Bicycle 1 min   assist Heel to toe      Hand to opposite knee      Cross country skier      Rocking Horse      Other Therapeutic Activities:  Pt was educated on PT POC, Diagnosis, Prognosis, pathomechanics as well as frequency and duration of scheduling future physical therapy appointments. Time was also taken on this day to answer all patient questions and participation in PT.  Reviewed appointment policy in detail with patient and patient verbalized understanding. Home Exercise Program:  Patient was instructed in the following for HEP:     . Patient verbalized/demonstrated understanding and was issued written handout. Charges: Therapeutic Exercise and NMR EXR  [] (10543) Provided verbal/tactile cueing for activities related to strengthening, flexibility, endurance, ROM for improvements in LE, proximal hip, and core control with self care, mobility, lifting, ambulation.  [] (76264) Provided verbal/tactile cueing for activities related to improving balance, coordination, kinesthetic sense, posture, motor skill, proprioception  to assist with LE, proximal hip, and core control in self care, mobility, lifting, ambulation and eccentric single leg control.      NMR and Therapeutic Activities:    [] (25089 or 42238) Provided verbal/tactile cueing for activities related to improving balance, coordination, kinesthetic sense, posture, motor skill, proprioception and motor activation to allow for proper function of core, proximal hip and LE with self care and ADLs and functional mobility.   [] (13961) Gait Re-education- Provided training and instruction to the patient for proper LE, core and proximal hip recruitment and positioning and eccentric body weight control with ambulation re-education including up and down stairs     Home Exercise Program:    [] (08705) Reviewed/Progressed HEP activities related to strengthening, flexibility, endurance, ROM of core, proximal hip and LE for functional self-care, mobility, lifting and ambulation/stair navigation   [] (30225)Reviewed/Progressed HEP activities related to improving balance, coordination, kinesthetic sense, posture, motor skill, proprioception of core, proximal hip and LE for self care, mobility, lifting, and ambulation/stair navigation      Manual Treatments:  PROM / STM / Oscillations-Mobs:  G-I, II, III, IV (PA's, Inf., Post.)  [] (34044) Provided manual therapy to mobilize LE, proximal hip and/or LS spine soft tissue/joints for the purpose of modulating pain, promoting relaxation,  increasing ROM, reducing/eliminating soft tissue swelling/inflammation/restriction, improving soft tissue extensibility and allowing for proper ROM for normal function with self care, mobility, lifting and ambulation. Individual Aquatic Therapy:  [x] (22006) Provided verbal and tactile cueing for strengthening, flexibility, ROM using the therapeutic properties of water (buoyancy, resistance) for improvements in core control, mobility and ambulation     Aquatic Group:  [x] (42294) Provided intermittent verbal and tactile cueing for strengthening, endurance, flexibility and ROM for 2-4 individuals that do not require one-on one patient contact by the therapist       Land Timed Code Treatment Minutes: 0   Land Total Treatment Minutes: 0     Individual Aquatic Minutes: 50   Aquatic Group Minutes: 0     [] EVAL (LOW) 89654 (typically 20 minutes face-to-face)  [] EVAL (MOD) 43708 (typically 30 minutes face-to-face)  [] EVAL (HIGH) 59747 (typically 45 minutes face-to-face)  [] RE-EVAL     [] HS(81435) x     [] Dry needle 1 or 2 Muscles (02891)  [] NMR (91934) x     [] Dry needle 3+ Muscles (91090)  [] Manual (48764) x     [] Ultrasound (74399) x  [] TA (25209) x     [] Mech Traction (95946)  [] ES(attended) (23976)     [] ES (un) (66589):   [] Vasopump (83239) [] Ionto (62921)   [x] Aquatic Ind (91341) x    3 [] Aquatic Group (70769)   [] Other:    Treatment/Activity Tolerance:  [x] Patient tolerated treatment well [] Patient limited by fatigue  [] Patient limited by pain  [] Patient limited by other medical complications  [] Other:       Prognosis: [x] Good [] Fair  [] Poor    GOALS:  Patient stated goal:  To not have as much pain  [x]? Progressing: []? Met: []? Not Met: []? Adjusted     Therapist goals for Patient:   Short Term Goals: To be achieved in: 2 weeks  1. Independent in HEP and progression per patient tolerance, in order to prevent re-injury. [x]? Progressing: []? Met: []? Not Met: []? Adjusted  2. Patient will have a decrease in pain to facilitate improvement in movement, function, and ADLs as indicated by Functional Deficits. [x]? Progressing: []? Met: []? Not Met: []? Adjusted     Long Term Goals: To be achieved in:  8  weeks  1. Disability index score of  30 % or less for the LEFS to assist with reaching prior level of function. [x]? Progressing: []? Met: []? Not Met: []? Adjusted  2. Patient will demonstrate increased AROM to 105 deg  to allow for proper joint functioning as indicated by patients Functional Deficits. [x]? Progressing: []? Met: []? Not Met: []? Adjusted  3. Patient will demonstrate an increase in Strength to good proximal hip strength and control, within 5lb HHD in LE to allow for proper functional mobility as indicated by patients Functional Deficits. [x]? Progressing: []? Met: []? Not Met: []? Adjusted  4. Patient will return to 70%  functional activities without increased symptoms or restriction. [x]? Progressing: []? Met: []? Not Met: []? Adjusted  5. To able to walk without my leg feeling like it will buckle. [x]? Progressing: []? Met: []? Not Met: []? Adjusted         Patient Requires Follow-up: [x] Yes  [] No    Plan:   [x] Continue per plan of care [] Alter current plan (see comments)  [] Plan of care initiated [] Hold pending MD visit [] Discharge    Plan for Next Session:   Gradually progress aquatic exercise as tolerated to increase   ROM, strength, and endurance to improve ADL's and decrease pain. ADD: increase gt, lateral step ups, mini lunges fwds/ lateral,   leg press as tolerated.      Electronically signed by:  Ugo Frost, PTA  [id number]                                                                                Note: If patient does not return for scheduled/recommended follow up visits, this note will serve as a discharge from care along with the most recent update on progress.

## 2021-09-03 ENCOUNTER — TELEPHONE (OUTPATIENT)
Dept: ORTHOPEDIC SURGERY | Age: 61
End: 2021-09-03

## 2021-09-03 DIAGNOSIS — Z96.651 STATUS POST REVISION OF TOTAL REPLACEMENT OF RIGHT KNEE: ICD-10-CM

## 2021-09-03 NOTE — TELEPHONE ENCOUNTER
Prescription Refill     Medication Name:  OXYCODONE  MG  Pharmacy: [address] [place] [address]  Patient Contact Number:  [phone]    PATIENT IS COMPLETELY OUT OF MEDICATION SINCE TODAY

## 2021-09-07 ENCOUNTER — HOSPITAL ENCOUNTER (OUTPATIENT)
Dept: PHYSICAL THERAPY | Age: 61
Setting detail: THERAPIES SERIES
Discharge: HOME | End: 2021-09-07
Payer: COMMERCIAL

## 2021-09-07 DIAGNOSIS — M54.41 CHRONIC BILATERAL LOW BACK PAIN WITH BILATERAL SCIATICA: ICD-10-CM

## 2021-09-07 DIAGNOSIS — M54.42 CHRONIC BILATERAL LOW BACK PAIN WITH BILATERAL SCIATICA: ICD-10-CM

## 2021-09-07 DIAGNOSIS — G89.4 CHRONIC PAIN SYNDROME: ICD-10-CM

## 2021-09-07 DIAGNOSIS — G89.29 CHRONIC BILATERAL LOW BACK PAIN WITH BILATERAL SCIATICA: ICD-10-CM

## 2021-09-07 PROCEDURE — 97150 GROUP THERAPEUTIC PROCEDURES: CPT

## 2021-09-07 PROCEDURE — 97113 AQUATIC THERAPY/EXERCISES: CPT

## 2021-09-07 RX ORDER — GABAPENTIN 300 MG/1
CAPSULE ORAL
Qty: 90 CAPSULE | Refills: 3 | Status: SHIPPED | OUTPATIENT
Start: 2021-09-07 | End: 2022-02-01 | Stop reason: SDUPTHER

## 2021-09-07 RX ORDER — OXYCODONE AND ACETAMINOPHEN 10; 325 MG/1; MG/1
1 TABLET ORAL EVERY 8 HOURS PRN
Qty: 21 TABLET | Refills: 0 | Status: SHIPPED | OUTPATIENT
Start: 2021-09-07 | End: 2021-09-14 | Stop reason: SDUPTHER

## 2021-09-07 NOTE — FLOWSHEET NOTE
[address]  [address] of    [address] [place] [address] and [facility]  [address] [facility]  Phone: [phone]   Fax:     [phone]      Physical Therapy Daily/Aquatic Flow Sheet   Date:  2021    Patient Name:  Pablo Crouch :  1960  MRN: [id number]     Restrictions/Precautions:    Pertinent Medical History:Additional Pertinent Hx: arthritis, DDD cervical and lumbar,  HTN, HLD, chronic back pain,  DVT, left and right TKR,  right TKR revision 2020     Medical/Treatment Diagnosis Information:   · Diagnosis: Z96.651 (ICD-10-CM) - Presence of right artificial knee joint (revisionn 2020  · Treatment Diagnosis: Decreased functional mobility 2/2 right knee pain     Insurance/Certification information:  PT Insurance Information: BC  Physician Information:  Referring Practitioner: Nena Clemons of care signed (Y/N): yes     Date of Patient follow up with Physician: Sept    Progress Report: []  Yes  [x]  No     Date Range for reporting period:  Beginnin2021  Ending:      Progress report due (10 Rx/or 30 days whichever is less):     Recertification due (POC duration/ or 90 days whichever is less):     Visit # POC/Insurance Allowable Auth Needed   15/ 32 67 []Yes   []No     PAIN    R knee 3/10     After Rx 0/10                   SUBJECTIVE   Pt notes moderate knee pain and is limited with activity tolerance. Notes pain is anterior and the proximal tibia. Has been doing PT at home and with PT,  Notes that the therapists were concerned for possible stress reaction as they have done a vibratory test along the bone on the medial lateral malleolus and noted pain along the medial mal with the vibratory testing.    21 pt reports has had 5 revisions on R knee. 7/15: a little stiff and irritated  21 tolerating aquatic ex w/o increased discomfort.   21 woke up with increased discomfort R knee w/o reason.   Reports no increased discomfort since doing aquatic ex. 10 min late  7-29-21 Day after aquatic ex sore. Maybe doing a little too much. Overall feels aquatic ex helpful with 30% improvement. 8/3 - Feels that pain is less- was as high as an 8, now at a 6. Walking is still problematic  8/5: I am late today because I had a stomach ache this morning. Pt also reports Right knee is 'puffy' today. 8-10-21 overall less discomfort, relief after pool x 1 day. 8-12-21 ovrall feels 40% improved. 8-17-21 steps mostly 1 at a time, occassionally step over step. Reports decreased pain/ tenderness. Sleeping ok. 8-19-21  Tolerating aquatic ex well. Feels rainy weather aggravates knee. 45 min late due to overslept.  8-24-21 less tender, overall 50% improved. 8/31 - Notes the soreness last week was due to positions during activity  9-2-21 doing ok today. 9-7-21ok at present, had increased discomfort over the weekend, possibly due to rain. 15 min late.  Overall feels 50% improved.        Xray   Have reviewed the xrays above from 6/15/2021  3 views of the right knee AP, lateral and sunrise views were performed and reviewed today and my impression is: Well-placed hinged knee prosthesis with long tibial and femoral stems.  Distal aspect of the tibial stem possibly compressing anteriorly on the tibia may be creating some irritation/stress riser no evidence of acute fracture dislocation     Discussed the utilization of a shin guard with an Ace wrap as the anterior aspect of the tibial stem may be creating stress reaction across the anterior tibia distally.  But as the bone continues to feel the sensation that can increase its rigidity and strength and the pain can settle down.  Also discussed with patient the this can go the other way that the bone can continue to thin and create a actual stress fracture         Relevant Medical History:Additional Pertinent Hx: arthritis, DDD cervical and lumbar,  HTN, HLD, chronic back pain,  DVT, left and right TKR,  right TKR revision 2020       Functional Scale/Score     :WOMAC          7/ 48 eval   /    35   21 10  visit    ROM LEFT RIGHT   HIP Flex wfl all wfl alll   HIP Abd       HIP Ext       HIP IR       HIP ER       Knee ext 0l -3a/p   Knee Flex 105 93a/96p   Ankle PF wfl all wfl all   Ankle DF       Ankle In       Ankle Ev       Strength  LEFT RIGHT   HIP Flexors 4+ all 4+ all   HIP Abductors       HIP Ext       Hip ER       Knee EXT (quad) 4+/5 4/5   Knee Flex (HS) 4+/5 4+/5   Ankle DF 4+ all 4+ all   Ankle PF         21  Palpation -  Significantly less TTP over tibia. Right   ROM        -2/0 extension         95/100 flexion   Right strength   Hip flexion  4+/5, knee extension  4+/5      Latex Allergy:  [x]NO      []YES  Preferred Language for Healthcare:   [x]English       []Other:    Land-based Visits Exercises/Activities:  No LAND RX TODAY  Therapeutic Ex (55508)  Min:  10 Resistance/Repetitions Notes   HEP  SLR,   Prone knee flexion  Heel slides  Sidelying abduction Review of HEP    ADD sets Pillow squeeze 5 sec x 10                             Therapeutic Activity (30469) Min: 20     Review of current level of functional mobility and adl Review of activity modifications to prevent pain    Review of future aquatic options / [facility] - pt to check insurance for silver mitalikers        Aquatic Visits Exercises/Activities:  Aquatic Abbreviation Key  B= Belt DB= Dumbells T= Theratube   G=Gloves N= Noodles W= Weights   P= Paddles WW=Water Walker K= Kickboard     **Pt 15 min late therefore unable to complete all exercises.    Transfers:   steps ind 1 at a time      % Immersion: 75%            Ambulation:  laps ind Exercises UE:      Forwards 8 Shoulder Shrugs     Lateral 3 Shoulder circles     Marching 2 Scapular Retraction      Retro 2 Rolling      [place]  Push Downs    Multifidus walkouts w/paddle  IR/ER      Punching    Stretchin sec  Rowing    Gastroc/Soleus  2 B Elbow Flex/Ext    Hamstring  2 R Shldr Flex/Ext     Knee flex stretch  /step  2 R Shldr Abd/Add    Piriformis   Horiz Abd/Add     Hip flexor    Arm Circles     SKTC   PNF Diagonals    DKTC  UE oscillations f/b, s/s    ITB   Wall Push-ups    Quad  Figure 8's    Mid back   Buoy pull/push downs    UT  Tband rows    Rhom  Tband lats    Post Shoulder  Lumbar Rot w/ paddles    Pec   Small ball pull downs                   diagonals             Cervical:       AROM Flex       AROM Extension     LEs exercises: B AROM Side Bending    Marching  10 AROM Rotation    Heel Raises  10 Chin tucks    Toe Raises  10 Chin nods     Squats  10      Hamstring Curls  10      Hip Flexion  10 Balance:     SLR / ER 10 R     Hip Abduction 10 SLS 30 sec R   Hip Circles 10 Tandem stance 30 sec x 2   TA set  NBOS eyes open    Glut Set  NBOS eyes closed 30 sec    Hip Extension 10 Hand to Opposite Knee 10   Hip Adduction    Box Step  3 R/L   Hip IR   Noodle Stance     Hip ER  Stop/Go Gait    Fig 8's  Switch Gait    TKE/ RED    T-band 5 sec x 10 R           Seated: Chair  Functional:    Ankle Pumps 30 Step up forward 10 R/L  @ 25 % WB   Ankle circles 10 Step up lateral    Knee flex & ext 30 Step down 10  @ 25% WB   HHA    Hip Abd & Adduction 30 Felton squats    Bicycle  30 Crate Lifts 10   Add Set with ball 10 Lunges forward    LX stab with med ball throws  Lunges lateral    Ankle INV  Lunges retro    Ankle EV  Lower ab curl with noodle      Upper ab curl with ball      Med ball straight lifts      Med ball diagonal lifts      Hydrorider          Noodle:      Leg Press Small x 25 R Deep Water:     hang at wall 2 min    relief Jog    Noodle hang deep water  Jumping Jacks    Noodle Bicycle 1 min   Assist for balance  Heel to toe      Hand to opposite knee      Cross country skier      Rocking Horse      Other Therapeutic Activities:  Pt was educated on PT POC, Diagnosis, Prognosis, pathomechanics as well as frequency and duration of scheduling future physical therapy appointments. Time was also taken on this day to answer all patient questions and participation in PT. Reviewed appointment policy in detail with patient and patient verbalized understanding. Home Exercise Program:  Patient was instructed in the following for HEP:     . Patient verbalized/demonstrated understanding and was issued written handout. Charges: Therapeutic Exercise and NMR EXR  [] (39082) Provided verbal/tactile cueing for activities related to strengthening, flexibility, endurance, ROM for improvements in LE, proximal hip, and core control with self care, mobility, lifting, ambulation.  [] (40399) Provided verbal/tactile cueing for activities related to improving balance, coordination, kinesthetic sense, posture, motor skill, proprioception  to assist with LE, proximal hip, and core control in self care, mobility, lifting, ambulation and eccentric single leg control.      NMR and Therapeutic Activities:    [] (28254 or 30084) Provided verbal/tactile cueing for activities related to improving balance, coordination, kinesthetic sense, posture, motor skill, proprioception and motor activation to allow for proper function of core, proximal hip and LE with self care and ADLs and functional mobility.   [] (84245) Gait Re-education- Provided training and instruction to the patient for proper LE, core and proximal hip recruitment and positioning and eccentric body weight control with ambulation re-education including up and down stairs     Home Exercise Program:    [] (42445) Reviewed/Progressed HEP activities related to strengthening, flexibility, endurance, ROM of core, proximal hip and LE for functional self-care, mobility, lifting and ambulation/stair navigation   [] (99019)Reviewed/Progressed HEP activities related to improving balance, coordination, kinesthetic sense, posture, motor skill, proprioception of core, proximal hip and LE for self care, mobility, lifting, and ambulation/stair navigation      Manual Treatments:  PROM / STM / Oscillations-Mobs:  G-I, II, III, IV (PA's, Inf., Post.)  [] (83231) Provided manual therapy to mobilize LE, proximal hip and/or LS spine soft tissue/joints for the purpose of modulating pain, promoting relaxation,  increasing ROM, reducing/eliminating soft tissue swelling/inflammation/restriction, improving soft tissue extensibility and allowing for proper ROM for normal function with self care, mobility, lifting and ambulation. Individual Aquatic Therapy:  [x] (20449) Provided verbal and tactile cueing for strengthening, flexibility, ROM using the therapeutic properties of water (buoyancy, resistance) for improvements in core control, mobility and ambulation     Aquatic Group:  [x] (88900) Provided intermittent verbal and tactile cueing for strengthening, endurance, flexibility and ROM for 2-4 individuals that do not require one-on one patient contact by the therapist       Land Timed Code Treatment Minutes: 0   Land Total Treatment Minutes: 0     Individual Aquatic Minutes: [address] Minutes: 15     [] EVAL (LOW) 24797 (typically 20 minutes face-to-face)  [] EVAL (MOD) 26177 (typically 30 minutes face-to-face)  [] EVAL (HIGH) 92012 (typically 45 minutes face-to-face)  [] RE-EVAL     [] VH(02044) x     [] Dry needle 1 or 2 Muscles (97367)  [] NMR (59536) x     [] Dry needle 3+ Muscles (54328)  [] Manual (85281) x     [] Ultrasound (40936) x  [] TA (08124) x     [] Mech Traction (62396)  [] ES(attended) (50914)     [] ES (un) (80895):   [] Vasopump (78051) [] Ionto (80727)   [x] Aquatic Ind (11321) x    3 [x] Aquatic Group (00514)   [] Other:    Treatment/Activity Tolerance:  [x] Patient tolerated treatment well [] Patient limited by fatigue  [] Patient limited by pain  [] Patient limited by other medical complications  [] Other:       Prognosis: [x] Good [] Fair  [] Poor    GOALS:  Patient stated goal:  To not have as much pain  [x]? Note: If patient does not return for scheduled/recommended follow up visits, this note will serve as a discharge from care along with the most recent update on progress.

## 2021-09-09 ENCOUNTER — HOSPITAL ENCOUNTER (OUTPATIENT)
Dept: PHYSICAL THERAPY | Age: 61
Setting detail: THERAPIES SERIES
Discharge: HOME | End: 2021-09-09
Payer: COMMERCIAL

## 2021-09-09 PROCEDURE — 97113 AQUATIC THERAPY/EXERCISES: CPT

## 2021-09-09 PROCEDURE — 97150 GROUP THERAPEUTIC PROCEDURES: CPT

## 2021-09-09 NOTE — FLOWSHEET NOTE
[address]  [address] of    [address] [facility] and [address] [facility]  [address] [facility], [facility]  Phone: [phone]   Fax:     [phone]      Physical Therapy Daily/Aquatic Flow Sheet   Date:  2021    Patient Name:  Mami Jean Baptiste :  1960  MRN: [id number]     Restrictions/Precautions:    Pertinent Medical History:Additional Pertinent Hx: arthritis, DDD cervical and lumbar,  HTN, HLD, chronic back pain,  DVT, left and right TKR,  right TKR revision 2020     Medical/Treatment Diagnosis Information:   · Diagnosis: Z96.651 (ICD-10-CM) - Presence of right artificial knee joint (revisionn 2020  · Treatment Diagnosis: Decreased functional mobility 2/2 right knee pain     Insurance/Certification information:  PT Insurance Information: [facility]  Physician Information:  Referring Practitioner: Nicole Hale of care signed (Y/N): yes     Date of Patient follow up with Physician: Sept    Progress Report: []  Yes  [x]  No     Date Range for reporting period:  Beginnin2021  Ending:      Progress report due (10 Rx/or 30 days whichever is less):     Recertification due (POC duration/ or 90 days whichever is less):     Visit # POC/Insurance Allowable Auth Needed    67 []Yes   []No     PAIN    R knee 4/10     After Rx 0/10                   SUBJECTIVE   Pt notes moderate knee pain and is limited with activity tolerance. Notes pain is anterior and the proximal tibia. Has been doing PT at home and with PT,  Notes that the therapists were concerned for possible stress reaction as they have done a vibratory test along the bone on the medial lateral malleolus and noted pain along the medial mal with the vibratory testing.    21 pt reports has had 5 revisions on R knee. 7/15: a little stiff and irritated  21 tolerating aquatic ex w/o increased discomfort.   21 woke up with increased discomfort R knee w/o reason.   Reports no increased discomfort since doing aquatic ex. 10 min late  7-29-21 Day after aquatic ex sore. Maybe doing a little too much. Overall feels aquatic ex helpful with 30% improvement. 8/3 - Feels that pain is less- was as high as an 8, now at a 6. Walking is still problematic  8/5: I am late today because I had a stomach ache this morning. Pt also reports Right knee is 'puffy' today. 8-10-21 overall less discomfort, relief after pool x 1 day. 8-12-21 ovrall feels 40% improved. 8-17-21 steps mostly 1 at a time, occassionally step over step. Reports decreased pain/ tenderness. Sleeping ok. 8-19-21  Tolerating aquatic ex well. Feels rainy weather aggravates knee. 45 min late due to overslept.  8-24-21 less tender, overall 50% improved. 8/31 - Notes the soreness last week was due to positions during activity  9-2-21 doing ok today. 9-7-21ok at present, had increased discomfort over the weekend, possibly due to rain. 15 min late. Overall feels 50% improved. 9-8-21 reports woke with increased R knee pain this am without reason.  See MD 9-14-21       Xray   Have reviewed the xrays above from 6/15/2021  3 views of the right knee AP, lateral and sunrise views were performed and reviewed today and my impression is: Well-placed hinged knee prosthesis with long tibial and femoral stems.  Distal aspect of the tibial stem possibly compressing anteriorly on the tibia may be creating some irritation/stress riser no evidence of acute fracture dislocation     Discussed the utilization of a shin guard with an Ace wrap as the anterior aspect of the tibial stem may be creating stress reaction across the anterior tibia distally.  But as the bone continues to feel the sensation that can increase its rigidity and strength and the pain can settle down.  Also discussed with patient the this can go the other way that the bone can continue to thin and create a actual stress fracture         Relevant Medical History:Additional Pertinent Hx: arthritis, DDD cervical and lumbar,  HTN, HLD, chronic back pain,  DVT, left and right TKR,  right TKR revision 11/2020       Functional Scale/Score     :WOMAC          7/2 48 eval   /    35   8-12-21 10 th visit    ROM LEFT RIGHT   HIP Flex wfl all wfl alll   HIP Abd       HIP Ext       HIP IR       HIP ER       Knee ext 0l -3a/p   Knee Flex 105 93a/96p   Ankle PF wfl all wfl all   Ankle DF       Ankle In       Ankle Ev       Strength  LEFT RIGHT   HIP Flexors 4+ all 4+ all   HIP Abductors       HIP Ext       Hip ER       Knee EXT (quad) 4+/5 4/5   Knee Flex (HS) 4+/5 4+/5   Ankle DF 4+ all 4+ all   Ankle PF         8/31/21  Palpation -  Significantly less TTP over tibia. Right   ROM        -2/0 extension         95/100 flexion   Right strength   Hip flexion  4+/5, knee extension  4+/5      Latex Allergy:  [x]NO      []YES  Preferred Language for Healthcare:   [x]English       []Other:    Land-based Visits Exercises/Activities:  No LAND RX TODAY  Therapeutic Ex (38129)  Min:  10 Resistance/Repetitions Notes   HEP  SLR,   Prone knee flexion  Heel slides  Sidelying abduction Review of HEP    ADD sets Pillow squeeze 5 sec x 10                             Therapeutic Activity (83526) Min: 20     Review of current level of functional mobility and adl Review of activity modifications to prevent pain    Review of future aquatic options / [facility] - pt to check insurance for silver sneakers        Aquatic Visits Exercises/Activities:  Aquatic Abbreviation Key  B= Belt DB= Dumbells T= Theratube   G=Gloves N= Noodles W= Weights   P= Paddles WW=Water Walker K= Kickboard     **Pt 15 min late therefore unable to complete all exercises.    Transfers:   steps ind 1 at a time      % Immersion: 75%            Ambulation:  laps ind Exercises UE:      Forwards 8 Shoulder Shrugs     Lateral 3 Shoulder circles     Marching 2 Scapular Retraction      Retro 2 Rolling      Cariocas  Push Downs    Multifidus walkouts w/paddle  IR/ER      Punching    Stretchin sec  Rowing    Gastroc/Soleus  2 B Elbow Flex/Ext    Hamstring Shldr Flex/Ext     Knee flex stretch  /step Shldr Abd/Add    Piriformis   Horiz Abd/Add     Hip flexor    Arm Circles     SKTC   PNF Diagonals    DKTC  UE oscillations f/b, s/s    ITB   Wall Push-ups    Quad  Figure 8's    Mid back   Buoy pull/push downs    UT  Tband rows    Rhom  Tband lats    Post Shoulder  Lumbar Rot w/ paddles    Pec   Small ball pull downs                   diagonals             Cervical:       AROM Flex       AROM Extension     LEs exercises: B AROM Side Bending    Marching  10 AROM Rotation    Heel Raises  10 Chin tucks    Toe Raises  10 Chin nods     Squats  10      Hamstring Curls  10      Hip Flexion  10 Balance:     SLR / ER 10 R     Hip Abduction 10 Hip Circles 10 Tandem stance 30 sec x 2   TA set  NBOS eyes open    Glut Set  NBOS eyes closed 30 sec    Hip Extension 10 Hand to Opposite Knee 10   Hip Adduction    Hip IR   Noodle Stance     Hip ER  Stop/Go Gait    Fig 8's  Switch Gait              Seated: Chair  Functional:    Ankle Pumps 30 Step up forward 10 R/L  @ 25 % WB   Ankle circles 10 Step up lateral    Knee flex & ext 30 Hip Abd & Adduction 30 Candy Kitchen squats    Bicycle  30 Add Set with ball 10 Lunges forward    LX stab with med ball throws  Lunges lateral    Ankle INV  Lunges retro    Ankle EV  Lower ab curl with noodle      Upper ab curl with ball      Med ball straight lifts      Med ball diagonal lifts      Hydrorider          Noodle:      Deep Water:     hang at wall 2 min    relief Jog    Noodle hang deep water  Jumping Jacks    Heel to toe      Hand to opposite knee      Cross country skier      Rocking Horse      Other Therapeutic Activities:  Pt was educated on PT POC, Diagnosis, Prognosis, pathomechanics as well as frequency and duration of scheduling future physical therapy appointments.  Time was also taken on this day to answer all patient questions and participation in PT. Reviewed appointment policy in detail with patient and patient verbalized understanding. Home Exercise Program:  Patient was instructed in the following for HEP:     . Patient verbalized/demonstrated understanding and was issued written handout. Charges: Therapeutic Exercise and NMR EXR  [] (97457) Provided verbal/tactile cueing for activities related to strengthening, flexibility, endurance, ROM for improvements in LE, proximal hip, and core control with self care, mobility, lifting, ambulation.  [] (10841) Provided verbal/tactile cueing for activities related to improving balance, coordination, kinesthetic sense, posture, motor skill, proprioception  to assist with LE, proximal hip, and core control in self care, mobility, lifting, ambulation and eccentric single leg control.      NMR and Therapeutic Activities:    [] (91673 or 14104) Provided verbal/tactile cueing for activities related to improving balance, coordination, kinesthetic sense, posture, motor skill, proprioception and motor activation to allow for proper function of core, proximal hip and LE with self care and ADLs and functional mobility.   [] (69457) Gait Re-education- Provided training and instruction to the patient for proper LE, core and proximal hip recruitment and positioning and eccentric body weight control with ambulation re-education including up and down stairs     Home Exercise Program:    [] (39393) Reviewed/Progressed HEP activities related to strengthening, flexibility, endurance, ROM of core, proximal hip and LE for functional self-care, mobility, lifting and ambulation/stair navigation   [] (14985)Reviewed/Progressed HEP activities related to improving balance, coordination, kinesthetic sense, posture, motor skill, proprioception of core, proximal hip and LE for self care, mobility, lifting, and ambulation/stair navigation      Manual Treatments:  PROM / STM / Oscillations-Mobs:  G-I, II, III, IV (PA's, Inf., Post.)  [] (73570) Provided manual therapy to mobilize LE, proximal hip and/or LS spine soft tissue/joints for the purpose of modulating pain, promoting relaxation,  increasing ROM, reducing/eliminating soft tissue swelling/inflammation/restriction, improving soft tissue extensibility and allowing for proper ROM for normal function with self care, mobility, lifting and ambulation. Individual Aquatic Therapy:  [x] (13631) Provided verbal and tactile cueing for strengthening, flexibility, ROM using the therapeutic properties of water (buoyancy, resistance) for improvements in core control, mobility and ambulation     Aquatic Group:  [x] (64945) Provided intermittent verbal and tactile cueing for strengthening, endurance, flexibility and ROM for 2-4 individuals that do not require one-on one patient contact by the therapist       Land Timed Code Treatment Minutes: 0   Land Total Treatment Minutes: 0     Individual Aquatic Minutes: 30   Aquatic Group Minutes: 15     [] EVAL (LOW) 31552 (typically 20 minutes face-to-face)  [] EVAL (MOD) 35286 (typically 30 minutes face-to-face)  [] EVAL (HIGH) 94436 (typically 45 minutes face-to-face)  [] RE-EVAL     [] VV(36648) x     [] Dry needle 1 or 2 Muscles (22191)  [] NMR (63101) x     [] Dry needle 3+ Muscles (72738)  [] Manual (87495) x     [] Ultrasound (29984) x  [] TA (63983) x     [] Mech Traction (53944)  [] ES(attended) (70320)     [] ES (un) (34567):   [] Vasopump (24260) [] Ionto (32076)   [x] Aquatic Ind (32217) x    2 [x] Aquatic Group (07295)   [] Other:    Treatment/Activity Tolerance:  [x] Patient tolerated treatment well [] Patient limited by fatigue  [] Patient limited by pain  [] Patient limited by other medical complications  [x] Other:   Deferred some ex as above due to increased pain today. Advised ice, compression, rest.     Prognosis: [x] Good [] Fair  [] Poor    GOALS:  Patient stated goal:  To not have as much pain  [x]? Progressing: []?  Met: []? Not Met: []? Adjusted     Therapist goals for Patient:   Short Term Goals: To be achieved in: 2 weeks  1. Independent in HEP and progression per patient tolerance, in order to prevent re-injury. [x]? Progressing: []? Met: []? Not Met: []? Adjusted  2. Patient will have a decrease in pain to facilitate improvement in movement, function, and ADLs as indicated by Functional Deficits. [x]? Progressing: []? Met: []? Not Met: []? Adjusted     Long Term Goals: To be achieved in:  8  weeks  1. Disability index score of  30 % or less for the LEFS to assist with reaching prior level of function. [x]? Progressing: []? Met: []? Not Met: []? Adjusted  2. Patient will demonstrate increased AROM to 105 deg  to allow for proper joint functioning as indicated by patients Functional Deficits. [x]? Progressing: []? Met: []? Not Met: []? Adjusted  3. Patient will demonstrate an increase in Strength to good proximal hip strength and control, within 5lb HHD in LE to allow for proper functional mobility as indicated by patients Functional Deficits. [x]? Progressing: []? Met: []? Not Met: []? Adjusted  4. Patient will return to 70%  functional activities without increased symptoms or restriction. [x]? Progressing: []? Met: []? Not Met: []? Adjusted  5. To able to walk without my leg feeling like it will buckle. [x]? Progressing: []? Met: []? Not Met: []? Adjusted         Patient Requires Follow-up: [x] Yes  [] No    Plan:   [x] Continue per plan of care [] Alter current plan (see comments)  [] Plan of care initiated [] Hold pending MD visit [] Discharge    Plan for Next Session:   Gradually progress aquatic exercise as tolerated to increase   ROM, strength, and endurance to improve ADL's and decrease pain. ADD: resume previous ex. increase  Lateral gt, lateral step ups, mini lunges fwds/ lateral,   leg press as tolerated. See MD for recommendations.      Electronically signed by:  Pk Ordoñez, PTA  222 Note: If patient does not return for scheduled/recommended follow up visits, this note will serve as a discharge from care along with the most recent update on progress.

## 2021-09-14 ENCOUNTER — OFFICE VISIT (OUTPATIENT)
Dept: ORTHOPEDIC SURGERY | Age: 61
End: 2021-09-14
Payer: COMMERCIAL

## 2021-09-14 ENCOUNTER — HOSPITAL ENCOUNTER (OUTPATIENT)
Dept: PHYSICAL THERAPY | Age: 61
Setting detail: THERAPIES SERIES
Discharge: HOME | End: 2021-09-14
Payer: COMMERCIAL

## 2021-09-14 VITALS
SYSTOLIC BLOOD PRESSURE: 131 MMHG | WEIGHT: 172 LBS | BODY MASS INDEX: 29.37 KG/M2 | HEART RATE: 111 BPM | DIASTOLIC BLOOD PRESSURE: 82 MMHG | HEIGHT: 64 IN

## 2021-09-14 DIAGNOSIS — Z96.651 CHRONIC KNEE PAIN AFTER TOTAL REPLACEMENT OF RIGHT KNEE JOINT: Primary | ICD-10-CM

## 2021-09-14 DIAGNOSIS — M25.561 CHRONIC KNEE PAIN AFTER TOTAL REPLACEMENT OF RIGHT KNEE JOINT: Primary | ICD-10-CM

## 2021-09-14 DIAGNOSIS — G89.29 CHRONIC KNEE PAIN AFTER TOTAL REPLACEMENT OF RIGHT KNEE JOINT: Primary | ICD-10-CM

## 2021-09-14 DIAGNOSIS — Z96.651 STATUS POST REVISION OF TOTAL REPLACEMENT OF RIGHT KNEE: ICD-10-CM

## 2021-09-14 PROCEDURE — 97150 GROUP THERAPEUTIC PROCEDURES: CPT

## 2021-09-14 PROCEDURE — 99214 OFFICE O/P EST MOD 30 MIN: CPT | Performed by: ORTHOPAEDIC SURGERY

## 2021-09-14 PROCEDURE — 97113 AQUATIC THERAPY/EXERCISES: CPT

## 2021-09-14 RX ORDER — OXYCODONE AND ACETAMINOPHEN 10; 325 MG/1; MG/1
1 TABLET ORAL EVERY 8 HOURS PRN
Qty: 21 TABLET | Refills: 0 | Status: SHIPPED | OUTPATIENT
Start: 2021-09-14 | End: 2021-09-21

## 2021-09-14 ASSESSMENT — ENCOUNTER SYMPTOMS
CONSTIPATION: 0
SORE THROAT: 0
NAUSEA: 0
COUGH: 0
EYES NEGATIVE: 1
ALLERGIC/IMMUNOLOGIC NEGATIVE: 1
ABDOMINAL PAIN: 0
SHORTNESS OF BREATH: 0
VOMITING: 0
DIARRHEA: 0
WHEEZING: 0

## 2021-09-14 NOTE — FLOWSHEET NOTE
[address]  [address] of    [address] [place] [address] and [facility]  [address] [facility], [facility]  Phone: [phone]   Fax:     [phone]      Physical Therapy Daily/Aquatic Flow Sheet   Date:  2021    Patient Name:  Nico Hill :  1960  MRN: [id number]     Restrictions/Precautions:    Pertinent Medical History:Additional Pertinent Hx: arthritis, DDD cervical and lumbar,  HTN, HLD, chronic back pain,  DVT, left and right TKR,  right TKR revision 2020     Medical/Treatment Diagnosis Information:   · Diagnosis: Z96.651 (ICD-10-CM) - Presence of right artificial knee joint (revisionn 2020  · Treatment Diagnosis: Decreased functional mobility 2/2 right knee pain     Insurance/Certification information:  PT Insurance Information: BC  Physician Information:  Referring Practitioner: Ruby Ireland of care signed (Y/N): yes     Date of Patient follow up with Physician: Sept    Progress Report: []  Yes  [x]  No     Date Range for reporting period:  Beginnin2021  Ending:      Progress report due (10 Rx/or 30 days whichever is less):     Recertification due (POC duration/ or 90 days whichever is less):     Visit # POC/Insurance Allowable Auth Needed    67 []Yes   []No     PAIN    R knee 3/10     After Rx 0/10                   SUBJECTIVE   Pt notes moderate knee pain and is limited with activity tolerance. Notes pain is anterior and the proximal tibia. Has been doing PT at home and with PT,  Notes that the therapists were concerned for possible stress reaction as they have done a vibratory test along the bone on the medial lateral malleolus and noted pain along the medial mal with the vibratory testing.    21 pt reports has had 5 revisions on R knee. 7/15: a little stiff and irritated  21 tolerating aquatic ex w/o increased discomfort.   21 woke up with increased discomfort R knee w/o reason.   Reports no increased discomfort since doing aquatic ex. 10 min late  7-29-21 Day after aquatic ex sore. Maybe doing a little too much. Overall feels aquatic ex helpful with 30% improvement. 8/3 - Feels that pain is less- was as high as an 8, now at a 6. Walking is still problematic  8/5: I am late today because I had a stomach ache this morning. Pt also reports Right knee is 'puffy' today. 8-10-21 overall less discomfort, relief after pool x 1 day. 8-12-21 ovrall feels 40% improved. 8-17-21 steps mostly 1 at a time, occassionally step over step. Reports decreased pain/ tenderness. Sleeping ok. 8-19-21  Tolerating aquatic ex well. Feels rainy weather aggravates knee. 45 min late due to overslept.  8-24-21 less tender, overall 50% improved. 8/31 - Notes the soreness last week was due to positions during activity  9-2-21 doing ok today. 9-7-21ok at present, had increased discomfort over the weekend, possibly due to rain. 15 min late. Overall feels 50% improved. 9-8-21 reports woke with increased R knee pain this am without reason.  See MD 9-14-21 9-14-21 see MD today.        Xray   Have reviewed the xrays above from 6/15/2021  3 views of the right knee AP, lateral and sunrise views were performed and reviewed today and my impression is: Well-placed hinged knee prosthesis with long tibial and femoral stems.  Distal aspect of the tibial stem possibly compressing anteriorly on the tibia may be creating some irritation/stress riser no evidence of acute fracture dislocation     Discussed the utilization of a shin guard with an Ace wrap as the anterior aspect of the tibial stem may be creating stress reaction across the anterior tibia distally.  But as the bone continues to feel the sensation that can increase its rigidity and strength and the pain can settle down.  Also discussed with patient the this can go the other way that the bone can continue to thin and create a actual stress fracture         Relevant Medical History:Additional Pertinent Hx: arthritis, DDD cervical and lumbar,  HTN, HLD, chronic back pain,  DVT, left and right TKR,  right TKR revision 11/2020       Functional Scale/Score     :WOMAC          7/2 48 eval   /    35   8-12-21 10 th visit    ROM LEFT RIGHT   HIP Flex wfl all wfl alll   HIP Abd       HIP Ext       HIP IR       HIP ER       Knee ext 0l -3a/p   Knee Flex 105 93a/96p   Ankle PF wfl all wfl all   Ankle DF       Ankle In       Ankle Ev       Strength  LEFT RIGHT   HIP Flexors 4+ all 4+ all   HIP Abductors       HIP Ext       Hip ER       Knee EXT (quad) 4+/5 4/5   Knee Flex (HS) 4+/5 4+/5   Ankle DF 4+ all 4+ all   Ankle PF         8/31/21  Palpation -  Significantly less TTP over tibia. Right   ROM        -2/0 extension         95/100 flexion   Right strength   Hip flexion  4+/5, knee extension  4+/5      Latex Allergy:  [x]NO      []YES  Preferred Language for Healthcare:   [x]English       []Other:    Land-based Visits Exercises/Activities:  No LAND RX TODAY  Therapeutic Ex (88718)  Min:  10 Resistance/Repetitions Notes   HEP  SLR,   Prone knee flexion  Heel slides  Sidelying abduction Review of HEP    ADD sets Pillow squeeze 5 sec x 10                             Therapeutic Activity (91219) Min: 20     Review of current level of functional mobility and adl Review of activity modifications to prevent pain    Review of future aquatic options / [facility] - pt to check insurance for silver sneakers        Aquatic Visits Exercises/Activities:  Aquatic Abbreviation Key  B= Belt DB= Dumbells T= Theratube   G=Gloves N= Noodles W= Weights   P= Paddles WW=Water Walker K= Kickboard     **Pt 15 min late therefore unable to complete all exercises.    Transfers:   steps ind 1 at a time      % Immersion: 75%            Ambulation:  laps ind Exercises UE:      Forwards 8 Shoulder Shrugs     Lateral 3 Shoulder circles     Marching 2 Scapular Retraction      Retro 2 Rolling      [place]  Push Downs    Multifidus walkouts w/paddle  IR/ER      Punching    Stretchin sec  Rowing    Gastroc/Soleus  2 B Elbow Flex/Ext    Hamstring  2 R Shldr Flex/Ext     Knee flex stretch  /step  2 R Shldr Abd/Add    Piriformis   Horiz Abd/Add     Hip flexor    Arm Circles     SKTC   PNF Diagonals    DKTC  UE oscillations f/b, s/s    ITB   Wall Push-ups    Quad  Figure 8's    Mid back   Buoy pull/push downs    UT  Tband rows    Rhom  Tband lats    Post Shoulder  Lumbar Rot w/ paddles    Pec   Small ball pull downs                   diagonals             Cervical:       AROM Flex       AROM Extension     LEs exercises: B AROM Side Bending    Marching  10 AROM Rotation    Heel Raises  10 Chin tucks    Toe Raises  10 Chin nods     Squats  10      Hamstring Curls  10      Hip Flexion  10 Balance:     SLR / ER 10 R     Hip Abduction 10 SLS 30 sec R   Hip Circles 10 Tandem stance 30 sec x 2   TA set  NBOS eyes open    Glut Set  NBOS eyes closed 30 sec    Hip Extension 10 Hand to Opposite Knee 10   Hip Adduction    Box Step  3 R/L   Hip IR   Noodle Stance     Hip ER  Stop/Go Gait    Fig 8's  Switch Gait    TKE/ RED    T-band 5 sec x 10 R           Seated: Chair  Functional:    Ankle Pumps 30 Step up forward 10 R/L  @ 25 % WB   Ankle circles 10 Step up lateral 5 R   Knee flex & ext 30 Step down 10  @ 25% WB   HHA    Hip Abd & Adduction 30 [place] squats    Bicycle  30 Crate Lifts 10   Add Set with ball 10 Lunges forward    LX stab with med ball throws  Lunges lateral    Ankle INV  Lunges retro    Ankle EV  Lower ab curl with noodle      Upper ab curl with ball      Med ball straight lifts      Med ball diagonal lifts      Hydrorider          Noodle:      Leg Press Small x 30  R Deep Water:     hang at wall 3 min      relief Jog    Noodle hang deep water  Jumping Jacks    Noodle Bicycle 1 min   Assist for balance  Heel to toe      Hand to opposite knee      Cross country skier      Rocking Horse      Other Therapeutic Activities:  Pt was educated on PT POC, Diagnosis, Prognosis, pathomechanics as well as frequency and duration of scheduling future physical therapy appointments. Time was also taken on this day to answer all patient questions and participation in PT. Reviewed appointment policy in detail with patient and patient verbalized understanding. Home Exercise Program:  Patient was instructed in the following for HEP:     . Patient verbalized/demonstrated understanding and was issued written handout. Charges: Therapeutic Exercise and NMR EXR  [] (41372) Provided verbal/tactile cueing for activities related to strengthening, flexibility, endurance, ROM for improvements in LE, proximal hip, and core control with self care, mobility, lifting, ambulation.  [] (64221) Provided verbal/tactile cueing for activities related to improving balance, coordination, kinesthetic sense, posture, motor skill, proprioception  to assist with LE, proximal hip, and core control in self care, mobility, lifting, ambulation and eccentric single leg control.      NMR and Therapeutic Activities:    [] (01753 or 70898) Provided verbal/tactile cueing for activities related to improving balance, coordination, kinesthetic sense, posture, motor skill, proprioception and motor activation to allow for proper function of core, proximal hip and LE with self care and ADLs and functional mobility.   [] (37933) Gait Re-education- Provided training and instruction to the patient for proper LE, core and proximal hip recruitment and positioning and eccentric body weight control with ambulation re-education including up and down stairs     Home Exercise Program:    [] (66814) Reviewed/Progressed HEP activities related to strengthening, flexibility, endurance, ROM of core, proximal hip and LE for functional self-care, mobility, lifting and ambulation/stair navigation   [] (91984)Reviewed/Progressed HEP activities related to improving balance, coordination, kinesthetic sense, posture, motor skill, proprioception of core, proximal hip and LE for self care, mobility, lifting, and ambulation/stair navigation      Manual Treatments:  PROM / STM / Oscillations-Mobs:  G-I, II, III, IV (PA's, Inf., Post.)  [] (68252) Provided manual therapy to mobilize LE, proximal hip and/or LS spine soft tissue/joints for the purpose of modulating pain, promoting relaxation,  increasing ROM, reducing/eliminating soft tissue swelling/inflammation/restriction, improving soft tissue extensibility and allowing for proper ROM for normal function with self care, mobility, lifting and ambulation.      Individual Aquatic Therapy:  [x] (26618) Provided verbal and tactile cueing for strengthening, flexibility, ROM using the therapeutic properties of water (buoyancy, resistance) for improvements in core control, mobility and ambulation     Aquatic Group:  [x] (16888) Provided intermittent verbal and tactile cueing for strengthening, endurance, flexibility and ROM for 2-4 individuals that do not require one-on one patient contact by the therapist       Land Timed Code Treatment Minutes: 0   Land Total Treatment Minutes: 0     Individual Aquatic Minutes: 40   Aquatic Group Minutes: 20     [] EVAL (LOW) 91719 (typically 20 minutes face-to-face)  [] EVAL (MOD) 13514 (typically 30 minutes face-to-face)  [] EVAL (HIGH) 21151 (typically 45 minutes face-to-face)  [] RE-EVAL     [] WP(46588) x     [] Dry needle 1 or 2 Muscles (78303)  [] NMR (13566) x     [] Dry needle 3+ Muscles (84202)  [] Manual (10039) x     [] Ultrasound (58328) x  [] TA (05394) x     [] Mech Traction (57632)  [] ES(attended) (88311)     [] ES (un) (83953):   [] Vasopump (73208) [] Ionto (01328)   [x] Aquatic Ind (71573) x    3 [x] Aquatic Group (01516)   [] Other:    Treatment/Activity Tolerance:  [x] Patient tolerated treatment well [] Patient limited by fatigue  [] Patient limited by pain  [] Patient limited by other medical complications  [x] Other:   Resumed previous ex. Prognosis: [x] Good [] Fair  [] Poor    GOALS:  Patient stated goal:  To not have as much pain  [x]? Progressing: []? Met: []? Not Met: []? Adjusted     Therapist goals for Patient:   Short Term Goals: To be achieved in: 2 weeks  1. Independent in HEP and progression per patient tolerance, in order to prevent re-injury. [x]? Progressing: []? Met: []? Not Met: []? Adjusted  2. Patient will have a decrease in pain to facilitate improvement in movement, function, and ADLs as indicated by Functional Deficits. [x]? Progressing: []? Met: []? Not Met: []? Adjusted     Long Term Goals: To be achieved in:  8  weeks  1. Disability index score of  30 % or less for the LEFS to assist with reaching prior level of function. [x]? Progressing: []? Met: []? Not Met: []? Adjusted  2. Patient will demonstrate increased AROM to 105 deg  to allow for proper joint functioning as indicated by patients Functional Deficits. [x]? Progressing: []? Met: []? Not Met: []? Adjusted  3. Patient will demonstrate an increase in Strength to good proximal hip strength and control, within 5lb HHD in LE to allow for proper functional mobility as indicated by patients Functional Deficits. [x]? Progressing: []? Met: []? Not Met: []? Adjusted  4. Patient will return to 70%  functional activities without increased symptoms or restriction. [x]? Progressing: []? Met: []? Not Met: []? Adjusted  5. To able to walk without my leg feeling like it will buckle. [x]? Progressing: []? Met: []? Not Met: []? Adjusted         Patient Requires Follow-up: [x] Yes  [] No    Plan:   [x] Continue per plan of care [] Alter current plan (see comments)  [] Plan of care initiated [] Hold pending MD visit [] Discharge    Plan for Next Session:   Gradually progress aquatic exercise as tolerated to increase   ROM, strength, and endurance to improve ADL's and decrease pain.      ADD:   increase  Lateral gt,  mini lunges fwds/ lateral,   as tolerated. See MD for recommendations. Electronically signed by:  Geno Keyes, PTA  [id number]                                                                                Note: If patient does not return for scheduled/recommended follow up visits, this note will serve as a discharge from care along with the most recent update on progress.

## 2021-09-14 NOTE — PROGRESS NOTES
Patient Name: Arvin Krause MRN: [id number]   Age: 56 y.o. YOB: 1960   Sex: female         CHIEF COMPLAINT   Right total Knee revision postoperative visit    HISTORY OF PRESENT ILLNESS   Patient returns for their postoperative evaluation status post right total knee revision. status post manipulation  Continued therapy although recently had give way episdoe. Anterior tibial pain improved. Continued issues with weight bearing pain. rom of the knee improved. Still in therapy. Pain in the knee proximal tibia and sometimes distal femur and posterior knee. Give way of the right knee. The patient is doing very fair. They have moderate complaints of pain. Rates pain as a 5 out of 10 notes pain mostly to the anterior aspect of the distal tibia into the medial aspect of the proximal tibia  There is small swelling about the knee. The patient has been doing physical therapy on her own at home as well as with physical therapist.  She is still working on building the container home with her  and son. She notes she is able to get about 15 to 20 minutes of work in before the pain starts to increase and she has to settle down. Notes that the therapists were concerned for possible stress reaction as they have done a vibratory test along the bone on the medial lateral malleolus and noted pain along the medial mal with the vibratory testing. She denies numbness burning tingling radiating pain notes it has a muscle pulling/ache. Denies any fall trauma or injury. They deny any calf swelling or numbness or tingling down the leg       Assessment     Review of Systems   Constitutional: Negative for chills and fever. HENT: Negative for ear discharge, ear pain, hearing loss, nosebleeds and sore throat. Eyes: Negative. Respiratory: Negative for cough, shortness of breath and wheezing. Cardiovascular: Negative for chest pain and palpitations.    Gastrointestinal: Negative for abdominal pain, constipation, diarrhea, nausea and vomiting. Endocrine: Negative. Genitourinary: Negative for dysuria, frequency and urgency. Musculoskeletal: Positive for joint swelling. Skin: Negative for rash. Allergic/Immunologic: Negative. Neurological: Negative for dizziness and headaches. Hematological: Negative. Psychiatric/Behavioral: Negative. PHYSICAL EXAM     Vital Signs:   Vitals:    09/14/21 1330   BP: 131/82   Pulse: 111       Examination of the knee shows a well-healed midline incision. There is small swelling. There is no drainage. Range of motion is 0-110 no extendsor lag. Tenderness noted to distal anterior tibia with noted hyperpigmented contusion-like appearing lesion on the skin above the sensitivity. Also noted medial tibial tenderness to palpation    The patient is neurovascularly intact. NEUROLOGICALLY: There is no evidence for sensory or motor deficits in the extremity. Coordination appears full with no spacticity or rigidity. Reflexes appear to be symmetric. Distal circulation intact. No signs of RSD. Calf nontender. Negative nica's,  no signs of dvt    Hip exam shows full ROM with no focal pain or Instability. Leg lengths are normal. There is no Trendelenburg Gait. RADIOLOGY   Xray   Have reviewed the xrays above from 6/15/2021  3 views of the right knee AP, lateral and sunrise views were performed and reviewed today and my impression is: Well-placed hinged knee prosthesis with long tibial and femoral stems. Distal aspect of the tibial stem possibly compressing anteriorly on the tibia may be creating some irritation/stress riser no evidence of acute fracture dislocation    IMPRESSION   8 months(s) status post right total knee revision    PLAN   The patient is doing well 8 months(s) status post right total knee revision.     .   Advised for patient to continue with physical therapy for evaluation treatment of status post revision of type right total knee   They may slowly resume normal activities. Advised patient to continue with home exercises on her own         She was dc from pain management. Start on temporary regimen at this point. Plan for additional mangemtn options including medical marijuana. Advised patient continue utilization of anti-inflammatories as needed. Discussed the utilization of a shin guard with an Ace wrap as the anterior aspect of the tibial stem may be creating stress reaction across the anterior tibia distally. But as the bone continues to feel the sensation that can increase its rigidity and strength and the pain can settle down. Also discussed with patient the this can go the other way that the bone can continue to thin and create a actual stress fracture across that area. Advised to follow-up in 3 months for repeat evaluation    The orders below, if any, were placed during this visit:     Brace for the right knee for stabliization of the knee from give way. ICD-10-CM    1. Chronic knee pain after total replacement of right knee joint  M25.561     G89.29     Z96.651    2.  Status post revision of total replacement of right knee  Z96.651          Katlyn Villagomez MD

## 2021-09-16 ENCOUNTER — HOSPITAL ENCOUNTER (OUTPATIENT)
Dept: PHYSICAL THERAPY | Age: 61
Setting detail: THERAPIES SERIES
Discharge: HOME | End: 2021-09-16
Payer: COMMERCIAL

## 2021-09-16 PROCEDURE — 97150 GROUP THERAPEUTIC PROCEDURES: CPT

## 2021-09-16 PROCEDURE — 97113 AQUATIC THERAPY/EXERCISES: CPT

## 2021-09-16 NOTE — FLOWSHEET NOTE
[address]  [address] of    [address] [place] [address]  and [facility]  [address] [facility]  Phone: [phone]   Fax:     [phone]      Physical Therapy Daily/Aquatic Flow Sheet   Date:  2021    Patient Name:  Alina Warren :  1960  MRN: [id number]     Restrictions/Precautions:    Pertinent Medical History:Additional Pertinent Hx: arthritis, DDD cervical and lumbar,  HTN, HLD, chronic back pain,  DVT, left and right TKR,  right TKR revision 2020     Medical/Treatment Diagnosis Information:   · Diagnosis: Z96.651 (ICD-10-CM) - Presence of right artificial knee joint (revisionn 2020  · Treatment Diagnosis: Decreased functional mobility 2/2 right knee pain     Insurance/Certification information:  PT Insurance Information: [facility]  Physician Information:  Referring Practitioner: Shannen Rosas of care signed (Y/N): yes     Date of Patient follow up with Physician: Sept    Progress Report: []  Yes  [x]  No     Date Range for reporting period:  Beginnin2021  Ending:      Progress report due (10 Rx/or 30 days whichever is less):     Recertification due (POC duration/ or 90 days whichever is less):     Visit # POC/Insurance Allowable Auth Needed    67 []Yes   []No     PAIN    R knee 3/10     After Rx 0/10                   SUBJECTIVE   Pt notes moderate knee pain and is limited with activity tolerance. Notes pain is anterior and the proximal tibia. Has been doing PT at home and with PT,  Notes that the therapists were concerned for possible stress reaction as they have done a vibratory test along the bone on the medial lateral malleolus and noted pain along the medial mal with the vibratory testing.    21 pt reports has had 5 revisions on R knee. 7/15: a little stiff and irritated  21 tolerating aquatic ex w/o increased discomfort.   21 woke up with increased discomfort R knee w/o reason.   Reports no increased discomfort since doing aquatic ex. 10 min late  7-29-21 Day after aquatic ex sore. Maybe doing a little too much. Overall feels aquatic ex helpful with 30% improvement. 8/3 - Feels that pain is less- was as high as an 8, now at a 6. Walking is still problematic  8/5: I am late today because I had a stomach ache this morning. Pt also reports Right knee is 'puffy' today. 8-10-21 overall less discomfort, relief after pool x 1 day. 8-12-21 ovrall feels 40% improved. 8-17-21 steps mostly 1 at a time, occassionally step over step. Reports decreased pain/ tenderness. Sleeping ok. 8-19-21  Tolerating aquatic ex well. Feels rainy weather aggravates knee. 45 min late due to overslept.  8-24-21 less tender, overall 50% improved. 8/31 - Notes the soreness last week was due to positions during activity  9-2-21 doing ok today. 9-7-21ok at present, had increased discomfort over the weekend, possibly due to rain. 15 min late. Overall feels 50% improved. 9-8-21 reports woke with increased R knee pain this am without reason. See MD 9-14-21 9-14-21 see MD today. 9-16-21  MD pleased with progress. Recommended knee brace to prevent giving out, CBD oil.   F/U 6 weeks.        Xray   Have reviewed the xrays above from 6/15/2021  3 views of the right knee AP, lateral and sunrise views were performed and reviewed today and my impression is: Well-placed hinged knee prosthesis with long tibial and femoral stems.  Distal aspect of the tibial stem possibly compressing anteriorly on the tibia may be creating some irritation/stress riser no evidence of acute fracture dislocation     Discussed the utilization of a shin guard with an Ace wrap as the anterior aspect of the tibial stem may be creating stress reaction across the anterior tibia distally.  But as the bone continues to feel the sensation that can increase its rigidity and strength and the pain can settle down.  Also discussed with patient the this can go the other way that the bone can continue to thin and create a actual stress fracture         Relevant Medical History:Additional Pertinent Hx: arthritis, DDD cervical and lumbar,  HTN, HLD, chronic back pain,  DVT, left and right TKR,  right TKR revision 11/2020       Functional Scale/Score     :WOMAC          7/2 48 eval   /    35   8-12-21 10 th visit    ROM LEFT RIGHT   HIP Flex wfl all wfl alll   HIP Abd       HIP Ext       HIP IR       HIP ER       Knee ext 0l -3a/p   Knee Flex 105 93a/96p   Ankle PF wfl all wfl all   Ankle DF       Ankle In       Ankle Ev       Strength  LEFT RIGHT   HIP Flexors 4+ all 4+ all   HIP Abductors       HIP Ext       Hip ER       Knee EXT (quad) 4+/5 4/5   Knee Flex (HS) 4+/5 4+/5   Ankle DF 4+ all 4+ all   Ankle PF         8/31/21  Palpation -  Significantly less TTP over tibia. Right   ROM        -2/0 extension         95/100 flexion   Right strength   Hip flexion  4+/5, knee extension  4+/5      Latex Allergy:  [x]NO      []YES  Preferred Language for Healthcare:   [x]English       []Other:    Land-based Visits Exercises/Activities:  No LAND RX TODAY  Therapeutic Ex (50610)  Min:  10 Resistance/Repetitions Notes   HEP  SLR,   Prone knee flexion  Heel slides  Sidelying abduction Review of HEP    ADD sets Pillow squeeze 5 sec x 10                             Therapeutic Activity (74351) Min: 20     Review of current level of functional mobility and adl Review of activity modifications to prevent pain    Review of future aquatic options / [facility] - pt to check insurance for silver eakers        Aquatic Visits Exercises/Activities:  Aquatic Abbreviation Key  B= Belt DB= Dumbells T= Theratube   G=Gloves N= Noodles W= Weights   P= Paddles WW=Water Walker K= Kickboard     **Pt 15 min late therefore unable to complete all exercises.    Transfers:   steps ind 1 at a time      % Immersion: 75%            Ambulation:  laps ind Exercises UE:      Forwards 8 Shoulder Shrugs Lateral 4 Shoulder circles     Marching 2 Scapular Retraction      Retro 2 Rolling      [place]  Push Downs    Multifidus walkouts w/paddle  IR/ER      Punching    Stretchin sec  Rowing    Gastroc/Soleus  2 B Elbow Flex/Ext    Hamstring  2 R Shldr Flex/Ext     Knee flex stretch  /step  2 R Shldr Abd/Add    Piriformis   Horiz Abd/Add     Hip flexor    Arm Circles     SKTC   PNF Diagonals    DKTC  UE oscillations f/b, s/s    ITB   Wall Push-ups    Quad  Figure 8's    Mid back   Buoy pull/push downs    UT  Tband rows    Rhom  Tband lats    Post Shoulder  Lumbar Rot w/ paddles    Pec   Small ball pull downs                   diagonals             Cervical:       AROM Flex       AROM Extension     LEs exercises: B AROM Side Bending    Marching  10 AROM Rotation    Heel Raises  10 Chin tucks    Toe Raises  10 Chin nods     Squats  10      Hamstring Curls  10      Hip Flexion  10 Balance:     SLR / ER 10 R     Hip Abduction 10 SLS 30 sec R   Hip Circles 10 Tandem stance 30 sec x 2   TA set  NBOS eyes open    Glut Set  NBOS eyes closed 30 sec    Hip Extension 10 Hand to Opposite Knee 10   Hip Adduction    Box Step  3 R/L   Hip IR   Noodle Stance     Hip ER  Stop/Go Gait    Fig 8's  Switch Gait    TKE/ RED    T-band 5 sec x 10 R           Seated: Chair  Functional:    Ankle Pumps 30 Step up forward 10 R/L  @ 25 % WB   Ankle circles 10 Step up lateral 10 R   Knee flex & ext 30 Step down 10  @ 25% WB   HHA    Hip Abd & Adduction 30 Dugway squats    Bicycle  30 Crate Lifts 10   Add Set with ball 10 Lunges forward    LX stab with med ball throws  Lunges lateral    Ankle INV  Lunges retro    Ankle EV  Lower ab curl with noodle      Upper ab curl with ball      Med ball straight lifts      Med ball diagonal lifts      Hydrorider          Noodle:      Leg Press Small x 30  R Deep Water:     hang at wall 3 min      relief Jog    Noodle hang deep water  Jumping Jacks    Noodle Bicycle 1 min   Assist for balance  Heel to toe      Hand to opposite knee      Cross country skier      Rocking Horse      Other Therapeutic Activities:  Pt was educated on PT POC, Diagnosis, Prognosis, pathomechanics as well as frequency and duration of scheduling future physical therapy appointments. Time was also taken on this day to answer all patient questions and participation in PT. Reviewed appointment policy in detail with patient and patient verbalized understanding. Home Exercise Program:  Patient was instructed in the following for HEP:     . Patient verbalized/demonstrated understanding and was issued written handout. Charges: Therapeutic Exercise and NMR EXR  [] (68982) Provided verbal/tactile cueing for activities related to strengthening, flexibility, endurance, ROM for improvements in LE, proximal hip, and core control with self care, mobility, lifting, ambulation.  [] (88880) Provided verbal/tactile cueing for activities related to improving balance, coordination, kinesthetic sense, posture, motor skill, proprioception  to assist with LE, proximal hip, and core control in self care, mobility, lifting, ambulation and eccentric single leg control.      NMR and Therapeutic Activities:    [] (69936 or 97263) Provided verbal/tactile cueing for activities related to improving balance, coordination, kinesthetic sense, posture, motor skill, proprioception and motor activation to allow for proper function of core, proximal hip and LE with self care and ADLs and functional mobility.   [] (53022) Gait Re-education- Provided training and instruction to the patient for proper LE, core and proximal hip recruitment and positioning and eccentric body weight control with ambulation re-education including up and down stairs     Home Exercise Program:    [] (54750) Reviewed/Progressed HEP activities related to strengthening, flexibility, endurance, ROM of core, proximal hip and LE for functional self-care, mobility, lifting and ambulation/stair navigation   [] (21457)Reviewed/Progressed HEP activities related to improving balance, coordination, kinesthetic sense, posture, motor skill, proprioception of core, proximal hip and LE for self care, mobility, lifting, and ambulation/stair navigation      Manual Treatments:  PROM / STM / Oscillations-Mobs:  G-I, II, III, IV (PA's, Inf., Post.)  [] (00827) Provided manual therapy to mobilize LE, proximal hip and/or LS spine soft tissue/joints for the purpose of modulating pain, promoting relaxation,  increasing ROM, reducing/eliminating soft tissue swelling/inflammation/restriction, improving soft tissue extensibility and allowing for proper ROM for normal function with self care, mobility, lifting and ambulation.      Individual Aquatic Therapy:  [x] (84405) Provided verbal and tactile cueing for strengthening, flexibility, ROM using the therapeutic properties of water (buoyancy, resistance) for improvements in core control, mobility and ambulation     Aquatic Group:  [x] (82680) Provided intermittent verbal and tactile cueing for strengthening, endurance, flexibility and ROM for 2-4 individuals that do not require one-on one patient contact by the therapist       Land Timed Code Treatment Minutes: 0   Land Total Treatment Minutes: 0     Individual Aquatic Minutes: 40   Aquatic Group Minutes: 20     [] EVAL (LOW) 53473 (typically 20 minutes face-to-face)  [] EVAL (MOD) 08542 (typically 30 minutes face-to-face)  [] EVAL (HIGH) 52186 (typically 45 minutes face-to-face)  [] RE-EVAL     [] LK(73508) x     [] Dry needle 1 or 2 Muscles (99288)  [] NMR (39671) x     [] Dry needle 3+ Muscles (01566)  [] Manual (50300) x     [] Ultrasound (15493) x  [] TA (11691) x     [] Mech Traction (78152)  [] ES(attended) (46533)     [] ES (un) (22062):   [] Vasopump (00535) [] Ionto (61930)   [x] Aquatic Ind (37799) x    3 [x] Aquatic Group (24670)   [] Other:    Treatment/Activity Tolerance:  [x] Patient tolerated treatment well [] Patient limited by fatigue  [] Patient limited by pain  [] Patient limited by other medical complications  [x] Other:   Resumed previous ex. Prognosis: [x] Good [] Fair  [] Poor    GOALS:  Patient stated goal:  To not have as much pain  [x]? Progressing: []? Met: []? Not Met: []? Adjusted     Therapist goals for Patient:   Short Term Goals: To be achieved in: 2 weeks  1. Independent in HEP and progression per patient tolerance, in order to prevent re-injury. [x]? Progressing: []? Met: []? Not Met: []? Adjusted  2. Patient will have a decrease in pain to facilitate improvement in movement, function, and ADLs as indicated by Functional Deficits. [x]? Progressing: []? Met: []? Not Met: []? Adjusted     Long Term Goals: To be achieved in:  8  weeks  1. Disability index score of  30 % or less for the LEFS to assist with reaching prior level of function. [x]? Progressing: []? Met: []? Not Met: []? Adjusted  2. Patient will demonstrate increased AROM to 105 deg  to allow for proper joint functioning as indicated by patients Functional Deficits. [x]? Progressing: []? Met: []? Not Met: []? Adjusted  3. Patient will demonstrate an increase in Strength to good proximal hip strength and control, within 5lb HHD in LE to allow for proper functional mobility as indicated by patients Functional Deficits. [x]? Progressing: []? Met: []? Not Met: []? Adjusted  4. Patient will return to 70%  functional activities without increased symptoms or restriction. [x]? Progressing: []? Met: []? Not Met: []? Adjusted  5. To able to walk without my leg feeling like it will buckle. [x]? Progressing: []? Met: []? Not Met: []?  Adjusted         Patient Requires Follow-up: [x] Yes  [] No    Plan:   [x] Continue per plan of care [] Alter current plan (see comments)  [] Plan of care initiated [] Hold pending MD visit [] Discharge    Plan for Next Session:   Gradually progress aquatic exercise as tolerated to increase   ROM, strength, and endurance to improve ADL's and decrease pain. ADD:     mini lunges fwds/ lateral,   as tolerated. Electronically signed by:  Geno Keyes, PTA  [id number]                                                                                Note: If patient does not return for scheduled/recommended follow up visits, this note will serve as a discharge from care along with the most recent update on progress.

## 2021-09-21 ENCOUNTER — HOSPITAL ENCOUNTER (OUTPATIENT)
Dept: PHYSICAL THERAPY | Age: 61
Setting detail: THERAPIES SERIES
Discharge: HOME | End: 2021-09-21
Payer: COMMERCIAL

## 2021-09-21 PROCEDURE — 97113 AQUATIC THERAPY/EXERCISES: CPT

## 2021-09-21 PROCEDURE — 97150 GROUP THERAPEUTIC PROCEDURES: CPT

## 2021-09-21 NOTE — FLOWSHEET NOTE
[address]  [address] of    [address] [place] [address] and [address] [facility]  [address] [facility]  Phone: [phone]   Fax:     [phone]      Physical Therapy Daily/Aquatic Flow Sheet   Date:  2021    Patient Name:  Velia Connors :  1960  MRN: [id number]     Restrictions/Precautions:    Pertinent Medical History:Additional Pertinent Hx: arthritis, DDD cervical and lumbar,  HTN, HLD, chronic back pain,  DVT, left and right TKR,  right TKR revision 2020     Medical/Treatment Diagnosis Information:   · Diagnosis: Z96.651 (ICD-10-CM) - Presence of right artificial knee joint (revisionn 2020  · Treatment Diagnosis: Decreased functional mobility 2/2 right knee pain     Insurance/Certification information:  PT Insurance Information: BC  Physician Information:  Referring Practitioner: Estefany Adames of care signed (Y/N): yes     Date of Patient follow up with Physician: Sept    Progress Report: []  Yes  [x]  No     Date Range for reporting period:  Beginnin2021  Ending:      Progress report due (10 Rx/or 30 days whichever is less):     Recertification due (POC duration/ or 90 days whichever is less):     Visit # POC/Insurance Allowable Auth Needed    67 []Yes   []No     PAIN    R knee 3/10     After Rx -/10                   SUBJECTIVE   Pt notes moderate knee pain and is limited with activity tolerance. Notes pain is anterior and the proximal tibia. Has been doing PT at home and with PT,  Notes that the therapists were concerned for possible stress reaction as they have done a vibratory test along the bone on the medial lateral malleolus and noted pain along the medial mal with the vibratory testing.    21 pt reports has had 5 revisions on R knee. 7/15: a little stiff and irritated  21 tolerating aquatic ex w/o increased discomfort.   21 woke up with increased discomfort R knee w/o reason.   Reports no increased discomfort since doing aquatic ex. 10 min late  7-29-21 Day after aquatic ex sore. Maybe doing a little too much. Overall feels aquatic ex helpful with 30% improvement. 8/3 - Feels that pain is less- was as high as an 8, now at a 6. Walking is still problematic  8/5: I am late today because I had a stomach ache this morning. Pt also reports Right knee is 'puffy' today. 8-10-21 overall less discomfort, relief after pool x 1 day. 8-12-21 ovrall feels 40% improved. 8-17-21 steps mostly 1 at a time, occassionally step over step. Reports decreased pain/ tenderness. Sleeping ok. 8-19-21  Tolerating aquatic ex well. Feels rainy weather aggravates knee. 45 min late due to overslept.  8-24-21 less tender, overall 50% improved. 8/31 - Notes the soreness last week was due to positions during activity  9-2-21 doing ok today. 9-7-21ok at present, had increased discomfort over the weekend, possibly due to rain. 15 min late. Overall feels 50% improved. 9-8-21 reports woke with increased R knee pain this am without reason. See MD 9-14-21 9-14-21 see MD today. 9-16-21  MD pleased with progress. Recommended knee brace to prevent giving out, CBD oil. F/U 6 weeks. 9-21-21 using CBD oil  Oil drops and topical, seem helpful. 5       Xray   Have reviewed the xrays above from 6/15/2021  3 views of the right knee AP, lateral and sunrise views were performed and reviewed today and my impression is: Well-placed hinged knee prosthesis with long tibial and femoral stems.  Distal aspect of the tibial stem possibly compressing anteriorly on the tibia may be creating some irritation/stress riser no evidence of acute fracture dislocation     Discussed the utilization of a shin guard with an Ace wrap as the anterior aspect of the tibial stem may be creating stress reaction across the anterior tibia distally.  But as the bone continues to feel the sensation that can increase its rigidity and strength and the pain can settle down. Lou Grounds discussed with patient the this can go the other way that the bone can continue to thin and create a actual stress fracture         Relevant Medical History:Additional Pertinent Hx: arthritis, DDD cervical and lumbar,  HTN, HLD, chronic back pain,  DVT, left and right TKR,  right TKR revision 11/2020       Functional Scale/Score     :WOMAC          7/2 48 eval   /    35   8-12-21 10 th visit      59 9-21-21 19th visit    ROM LEFT RIGHT   HIP Flex wfl all wfl alll   HIP Abd       HIP Ext       HIP IR       HIP ER       Knee ext 0l -3a/p   Knee Flex 105 93a/96p   Ankle PF wfl all wfl all   Ankle DF       Ankle In       Ankle Ev       Strength  LEFT RIGHT   HIP Flexors 4+ all 4+ all   HIP Abductors       HIP Ext       Hip ER       Knee EXT (quad) 4+/5 4/5   Knee Flex (HS) 4+/5 4+/5   Ankle DF 4+ all 4+ all   Ankle PF         8/31/21  Palpation -  Significantly less TTP over tibia. Right   ROM        -2/0 extension         95/100 flexion   Right strength   Hip flexion  4+/5, knee extension  4+/5      Latex Allergy:  [x]NO      []YES  Preferred Language for Healthcare:   [x]English       []Other:    Land-based Visits Exercises/Activities:  No LAND RX TODAY  Therapeutic Ex (74194)  Min:  10 Resistance/Repetitions Notes   HEP  SLR,   Prone knee flexion  Heel slides  Sidelying abduction Review of HEP    ADD sets Pillow squeeze 5 sec x 10                             Therapeutic Activity (41370) Min: 20     Review of current level of functional mobility and adl Review of activity modifications to prevent pain    Review of future aquatic options / [facility] - pt to check insurance for silver sneakers        Aquatic Visits Exercises/Activities:  Aquatic Abbreviation Key  B= Belt DB= Dumbells T= Theratube   G=Gloves N= Noodles W= Weights   P= Paddles WW=Water Walker K= Kickboard     **Pt 15 min late therefore unable to complete all exercises.    Transfers:   steps ind 1 at a time      % Immersion: 75%            Ambulation:  laps ind Exercises UE:      Forwards 8 Shoulder Shrugs     Lateral 4 Shoulder circles     Marching 2 Scapular Retraction      Retro 2 Rolling      [place]as  Push Downs    Multifidus walkouts w/paddle  IR/ER      Punching    Stretchin sec  Rowing    Gastroc/Soleus  2 B Elbow Flex/Ext    Hamstring  2 R Shldr Flex/Ext     Knee flex stretch  /step  2 R Shldr Abd/Add    Piriformis   Horiz Abd/Add     Hip flexor    Arm Circles     SKTC   PNF Diagonals    DKTC  UE oscillations f/b, s/s    ITB   Wall Push-ups    Quad  Figure 8's    Mid back   Buoy pull/push downs    UT  Tband rows    Rhom  Tband lats    Post Shoulder  Lumbar Rot w/ paddles    Pec   Small ball pull downs                   diagonals             Cervical:       AROM Flex       AROM Extension     LEs exercises: B AROM Side Bending    Marching  10 AROM Rotation    Heel Raises  10 Chin tucks    Toe Raises  10 Chin nods     Squats  10      Hamstring Curls  10      Hip Flexion  10 Balance:     SLR / ER 10 R     Hip Abduction 10 SLS 30 sec R   Hip Circles 10 Tandem stance 30 sec x 2   TA set  NBOS eyes open    Glut Set  NBOS eyes closed 30 sec    Hip Extension 10 Hand to Opposite Knee 10   Hip Adduction    Box Step  3 R/L   Hip IR   Noodle Stance     Hip ER  Stop/Go Gait    Fig 8's  Switch Gait    TKE/ RED    T-band 5 sec x 15 R           Seated: Chair  Functional:    Ankle Pumps 30 Step up forward 10 R/L  @ 25 % WB   Ankle circles 10 Step up lateral 10 R   Knee flex & ext 30 Step down 10  @ 25% WB   HHA    Hip Abd & Adduction 30 Buhl squats    Bicycle  30 Crate Lifts 10   Add Set with ball 10 Lunges forward    LX stab with med ball throws  Lunges lateral 5 R/L    Cues    Ankle INV  Lunges retro    Ankle EV  Lower ab curl with noodle      Upper ab curl with ball      Med ball straight lifts      Med ball diagonal lifts      Hydrorider          Noodle:      Leg Press Small x 30  R Deep Water:     hang at wall 3 min relief Jog    Noodle hang deep water  Jumping Jacks    Noodle Bicycle 1 min   Assist for balance  Heel to toe      Hand to opposite knee      Cross country skier      Rocking Horse      Other Therapeutic Activities:  Pt was educated on PT POC, Diagnosis, Prognosis, pathomechanics as well as frequency and duration of scheduling future physical therapy appointments. Time was also taken on this day to answer all patient questions and participation in PT. Reviewed appointment policy in detail with patient and patient verbalized understanding. Home Exercise Program:  Patient was instructed in the following for HEP:     . Patient verbalized/demonstrated understanding and was issued written handout. Charges: Therapeutic Exercise and NMR EXR  [] (12303) Provided verbal/tactile cueing for activities related to strengthening, flexibility, endurance, ROM for improvements in LE, proximal hip, and core control with self care, mobility, lifting, ambulation.  [] (32192) Provided verbal/tactile cueing for activities related to improving balance, coordination, kinesthetic sense, posture, motor skill, proprioception  to assist with LE, proximal hip, and core control in self care, mobility, lifting, ambulation and eccentric single leg control.      NMR and Therapeutic Activities:    [] (67945 or 25856) Provided verbal/tactile cueing for activities related to improving balance, coordination, kinesthetic sense, posture, motor skill, proprioception and motor activation to allow for proper function of core, proximal hip and LE with self care and ADLs and functional mobility.   [] (32727) Gait Re-education- Provided training and instruction to the patient for proper LE, core and proximal hip recruitment and positioning and eccentric body weight control with ambulation re-education including up and down stairs     Home Exercise Program:    [] (57273) Reviewed/Progressed HEP activities related to strengthening, flexibility, endurance, ROM of core, proximal hip and LE for functional self-care, mobility, lifting and ambulation/stair navigation   [] (36181)Reviewed/Progressed HEP activities related to improving balance, coordination, kinesthetic sense, posture, motor skill, proprioception of core, proximal hip and LE for self care, mobility, lifting, and ambulation/stair navigation      Manual Treatments:  PROM / STM / Oscillations-Mobs:  G-I, II, III, IV (PA's, Inf., Post.)  [] (44170) Provided manual therapy to mobilize LE, proximal hip and/or LS spine soft tissue/joints for the purpose of modulating pain, promoting relaxation,  increasing ROM, reducing/eliminating soft tissue swelling/inflammation/restriction, improving soft tissue extensibility and allowing for proper ROM for normal function with self care, mobility, lifting and ambulation.      Individual Aquatic Therapy:  [x] (41465) Provided verbal and tactile cueing for strengthening, flexibility, ROM using the therapeutic properties of water (buoyancy, resistance) for improvements in core control, mobility and ambulation     Aquatic Group:  [x] (96819) Provided intermittent verbal and tactile cueing for strengthening, endurance, flexibility and ROM for 2-4 individuals that do not require one-on one patient contact by the therapist       Land Timed Code Treatment Minutes: 0   Land Total Treatment Minutes: 0     Individual Aquatic Minutes: 50   Aquatic Group Minutes: 10     [] EVAL (LOW) 04070 (typically 20 minutes face-to-face)  [] EVAL (MOD) 95129 (typically 30 minutes face-to-face)  [] EVAL (HIGH) 83142 (typically 45 minutes face-to-face)  [] RE-EVAL     [] LS(17779) x     [] Dry needle 1 or 2 Muscles (68543)  [] NMR (46441) x     [] Dry needle 3+ Muscles (94567)  [] Manual (42191) x     [] Ultrasound (71271) x  [] TA (02168) x     [] Mech Traction (38092)  [] ES(attended) (49688)     [] ES (un) (24504):   [] Vasopump (88920) [] Ionto (08974)   [x] Aquatic Ind (88061) x    3 [x] Aquatic Group (04877)   [] Other:    Treatment/Activity Tolerance:  [x] Patient tolerated treatment well [] Patient limited by fatigue  [] Patient limited by pain  [] Patient limited by other medical complications  [x] Other: progressing with increased endurance       Prognosis: [x] Good [] Fair  [] Poor    GOALS:  Patient stated goal:  To not have as much pain  [x]? Progressing: []? Met: []? Not Met: []? Adjusted     Therapist goals for Patient:   Short Term Goals: To be achieved in: 2 weeks  1. Independent in HEP and progression per patient tolerance, in order to prevent re-injury. [x]? Progressing: []? Met: []? Not Met: []? Adjusted  2. Patient will have a decrease in pain to facilitate improvement in movement, function, and ADLs as indicated by Functional Deficits. [x]? Progressing: []? Met: []? Not Met: []? Adjusted     Long Term Goals: To be achieved in:  8  weeks  1. Disability index score of  30 % or less for the LEFS to assist with reaching prior level of function. [x]? Progressing: []? Met: []? Not Met: []? Adjusted  2. Patient will demonstrate increased AROM to 105 deg  to allow for proper joint functioning as indicated by patients Functional Deficits. [x]? Progressing: []? Met: []? Not Met: []? Adjusted  3. Patient will demonstrate an increase in Strength to good proximal hip strength and control, within 5lb HHD in LE to allow for proper functional mobility as indicated by patients Functional Deficits. [x]? Progressing: []? Met: []? Not Met: []? Adjusted  4. Patient will return to 70%  functional activities without increased symptoms or restriction. [x]? Progressing: []? Met: []? Not Met: []? Adjusted  5. To able to walk without my leg feeling like it will buckle. [x]? Progressing: []? Met: []? Not Met: []?  Adjusted         Patient Requires Follow-up: [x] Yes  [] No    Plan:   [x] Continue per plan of care [] Alter current plan (see comments)  [] Plan of care initiated [] Hold pending MD visit [] Discharge    Plan for Next Session:   Gradually progress aquatic exercise as tolerated to increase   ROM, strength, and endurance to improve ADL's and decrease pain. ADD:     mini lunges fwds, sit to stand   as tolerated. Electronically signed by:  Nora Buenrostro, PTA  [id number]                                                                                Note: If patient does not return for scheduled/recommended follow up visits, this note will serve as a discharge from care along with the most recent update on progress.

## 2021-09-23 ENCOUNTER — HOSPITAL ENCOUNTER (OUTPATIENT)
Dept: PHYSICAL THERAPY | Age: 61
Setting detail: THERAPIES SERIES
Discharge: HOME | End: 2021-09-23
Payer: COMMERCIAL

## 2021-09-23 PROCEDURE — 97150 GROUP THERAPEUTIC PROCEDURES: CPT

## 2021-09-23 PROCEDURE — 97113 AQUATIC THERAPY/EXERCISES: CPT

## 2021-09-23 NOTE — FLOWSHEET NOTE
212  REview of    [address] [place] [address] and 500 [facility]  [address] [facility]  Phone: [phone]   Fax:     [phone]      Physical Therapy Daily/Aquatic Flow Sheet   Date:  2021    Patient Name:  Abhijeet Stokes :  1960  MRN: [id number]     Restrictions/Precautions:    Pertinent Medical History:Additional Pertinent Hx: arthritis, DDD cervical and lumbar,  HTN, HLD, chronic back pain,  DVT, left and right TKR,  right TKR revision 2020     Medical/Treatment Diagnosis Information:   · Diagnosis: Z96.651 (ICD-10-CM) - Presence of right artificial knee joint (revisionn 2020  · Treatment Diagnosis: Decreased functional mobility 2/2 right knee pain     Insurance/Certification information:  PT Insurance Information: [employer]  Physician Information:  Referring Practitioner: Chris Foster of care signed (Y/N): yes     Date of Patient follow up with Physician: Sept    Progress Report: []  Yes  [x]  No     Date Range for reporting period:  Beginnin2021  Ending:      Progress report due (10 Rx/or 30 days whichever is less):     Recertification due (POC duration/ or 90 days whichever is less):     Visit # POC/Insurance Allowable Auth Needed    67 []Yes   []No     PAIN    R knee 3410     After Rx 0/10                   SUBJECTIVE   Pt notes moderate knee pain and is limited with activity tolerance. Notes pain is anterior and the proximal tibia. Has been doing PT at home and with PT,  Notes that the therapists were concerned for possible stress reaction as they have done a vibratory test along the bone on the medial lateral malleolus and noted pain along the medial mal with the vibratory testing.    21 pt reports has had 5 revisions on R knee. 7/15: a little stiff and irritated  21 tolerating aquatic ex w/o increased discomfort.   21 woke up with increased discomfort R knee w/o reason.   Reports no increased discomfort since doing aquatic ex. 10 min late  7-29-21 Day after aquatic ex sore. Maybe doing a little too much. Overall feels aquatic ex helpful with 30% improvement. 8/3 - Feels that pain is less- was as high as an 8, now at a 6. Walking is still problematic  8/5: I am late today because I had a stomach ache this morning. Pt also reports Right knee is 'puffy' today. 8-10-21 overall less discomfort, relief after pool x 1 day. 8-12-21 ovrall feels 40% improved. 8-17-21 steps mostly 1 at a time, occassionally step over step. Reports decreased pain/ tenderness. Sleeping ok. 8-19-21  Tolerating aquatic ex well. Feels rainy weather aggravates knee. 45 min late due to overslept.  8-24-21 less tender, overall 50% improved. 8/31 - Notes the soreness last week was due to positions during activity  9-2-21 doing ok today. 9-7-21ok at present, had increased discomfort over the weekend, possibly due to rain. 15 min late. Overall feels 50% improved. 9-8-21 reports woke with increased R knee pain this am without reason. See MD 9-14-21 9-14-21 see MD today. 9-16-21  MD pleased with progress. Recommended knee brace to prevent giving out, CBD oil. F/U 6 weeks.    9-21-21 using CBD oil  Oil drops and topical, seem helpful.  9-23-21 little more sore today due to colder weather.        Xray   Have reviewed the xrays above from 6/15/2021  3 views of the right knee AP, lateral and sunrise views were performed and reviewed today and my impression is: Well-placed hinged knee prosthesis with long tibial and femoral stems.  Distal aspect of the tibial stem possibly compressing anteriorly on the tibia may be creating some irritation/stress riser no evidence of acute fracture dislocation     Discussed the utilization of a shin guard with an Ace wrap as the anterior aspect of the tibial stem may be creating stress reaction across the anterior tibia distally.  But as the bone continues to feel the sensation that can increase its rigidity and strength and the pain can settle down.  Also discussed with patient the this can go the other way that the bone can continue to thin and create a actual stress fracture         Relevant Medical History:Additional Pertinent Hx: arthritis, DDD cervical and lumbar,  HTN, HLD, chronic back pain,  DVT, left and right TKR,  right TKR revision 11/2020       Functional Scale/Score     :WOMAC          7/2 48 eval   /    35   8-12-21 10 th visit      59 9-21-21 19th visit    ROM LEFT RIGHT   HIP Flex wfl all wfl alll   HIP Abd       HIP Ext       HIP IR       HIP ER       Knee ext 0l -3a/p   Knee Flex 105 93a/96p   Ankle PF wfl all wfl all   Ankle DF       Ankle In       Ankle Ev       Strength  LEFT RIGHT   HIP Flexors 4+ all 4+ all   HIP Abductors       HIP Ext       Hip ER       Knee EXT (quad) 4+/5 4/5   Knee Flex (HS) 4+/5 4+/5   Ankle DF 4+ all 4+ all   Ankle PF         8/31/21  Palpation -  Significantly less TTP over tibia.   Right   ROM        -2/0 extension         95/100 flexion   Right strength   Hip flexion  4+/5, knee extension  4+/5      Latex Allergy:  [x]NO      []YES  Preferred Language for Healthcare:   [x]English       []Other:    Land-based Visits Exercises/Activities:  No LAND RX TODAY  Therapeutic Ex (17971)  Min:  10 Resistance/Repetitions Notes   HEP  SLR,   Prone knee flexion  Heel slides  Sidelying abduction Review of HEP    ADD sets Pillow squeeze 5 sec x 10                             Therapeutic Activity (93706) Min: 20     Review of current level of functional mobility and adl Review of activity modifications to prevent pain    Review of future aquatic options / [facility] - pt to check insurance for silver sneakers        Aquatic Visits Exercises/Activities:  Aquatic Abbreviation Key  B= Belt DB= Dumbells T= Theratube   G=Gloves N= Noodles W= Weights   P= Paddles WW=Water Walker K= Kickboard     **Pt 15 min late therefore unable to complete all exercises.    Transfers:   steps ind 1 at a time      % Immersion: 75%            Ambulation:  laps ind Exercises UE:      Forwards 8 Shoulder Shrugs     Lateral 4 Shoulder circles     Marching 2 Scapular Retraction      Retro 2 Rolling      Cariocas  Push Downs    Multifidus walkouts w/paddle  IR/ER      Punching    Stretchin sec  Rowing    Gastroc/Soleus  2 B Elbow Flex/Ext    Hamstring  2 R Shldr Flex/Ext     Knee flex stretch  /step  2 R Shldr Abd/Add    Piriformis   Horiz Abd/Add     Hip flexor    Arm Circles     SKTC   PNF Diagonals    DKTC  UE oscillations f/b, s/s    ITB   Wall Push-ups    Quad  Figure 8's    Mid back   Buoy pull/push downs    UT  Tband rows    Rhom  Tband lats    Post Shoulder  Lumbar Rot w/ paddles    Pec   Small ball pull downs                   diagonals             Cervical:       AROM Flex       AROM Extension     LEs exercises: B AROM Side Bending    Marching  10 AROM Rotation    Heel Raises  10 Chin tucks    Toe Raises  10 Chin nods     Squats  10      Hamstring Curls  10      Hip Flexion  10 Balance:     SLR / ER 10 R     Hip Abduction 10 SLS 30 sec R   Hip Circles 10 Tandem stance 30 sec x 2   TA set  NBOS eyes open    Glut Set  NBOS eyes closed 30 sec    Hip Extension 10 Hand to Opposite Knee 10   Hip Adduction    Box Step  3 R/L   Hip IR   Noodle Stance     Hip ER  Stop/Go Gait    Fig 8's  Switch Gait    TKE/ RED    T-band 5 sec x 15 R           Seated: Chair  Functional:    Ankle Pumps 30 Step up forward 10 R/L  @ 25 % WB   Ankle circles 10 Step up lateral 10 R   Knee flex & ext 30 Step down 10  @ 25% WB   HHA    Hip Abd & Adduction 30 Century squats    Bicycle  30 Crate Lifts 10   Add Set with ball 10 Lunges forward    LX stab with med ball throws  Lunges lateral 5 R/L    Cues    Ankle INV  Lunges retro    Ankle EV  Lower ab curl with noodle      Upper ab curl with ball      Med ball straight lifts    Sit to stand from bench w/o hands 10 Med ball diagonal lifts Hydrorider          Noodle:      Leg Press Small x 30  R Deep Water:     hang at wall 3 min      relief Jog    Noodle hang deep water  Jumping Jacks    Noodle Bicycle 1 min   Assist for balance  Heel to toe      Hand to opposite knee      Cross country skier      Rocking Horse      Other Therapeutic Activities:  Pt was educated on PT POC, Diagnosis, Prognosis, pathomechanics as well as frequency and duration of scheduling future physical therapy appointments. Time was also taken on this day to answer all patient questions and participation in PT. Reviewed appointment policy in detail with patient and patient verbalized understanding. Home Exercise Program:  Patient was instructed in the following for HEP:     . Patient verbalized/demonstrated understanding and was issued written handout. Charges: Therapeutic Exercise and NMR EXR  [] (56894) Provided verbal/tactile cueing for activities related to strengthening, flexibility, endurance, ROM for improvements in LE, proximal hip, and core control with self care, mobility, lifting, ambulation.  [] (28911) Provided verbal/tactile cueing for activities related to improving balance, coordination, kinesthetic sense, posture, motor skill, proprioception  to assist with LE, proximal hip, and core control in self care, mobility, lifting, ambulation and eccentric single leg control.      NMR and Therapeutic Activities:    [] (99973 or 32489) Provided verbal/tactile cueing for activities related to improving balance, coordination, kinesthetic sense, posture, motor skill, proprioception and motor activation to allow for proper function of core, proximal hip and LE with self care and ADLs and functional mobility.   [] (58940) Gait Re-education- Provided training and instruction to the patient for proper LE, core and proximal hip recruitment and positioning and eccentric body weight control with ambulation re-education including up and down stairs     Home Exercise Program: [] (90596) Reviewed/Progressed HEP activities related to strengthening, flexibility, endurance, ROM of core, proximal hip and LE for functional self-care, mobility, lifting and ambulation/stair navigation   [] (74005)Reviewed/Progressed HEP activities related to improving balance, coordination, kinesthetic sense, posture, motor skill, proprioception of core, proximal hip and LE for self care, mobility, lifting, and ambulation/stair navigation      Manual Treatments:  PROM / STM / Oscillations-Mobs:  G-I, II, III, IV (PA's, Inf., Post.)  [] (13493) Provided manual therapy to mobilize LE, proximal hip and/or LS spine soft tissue/joints for the purpose of modulating pain, promoting relaxation,  increasing ROM, reducing/eliminating soft tissue swelling/inflammation/restriction, improving soft tissue extensibility and allowing for proper ROM for normal function with self care, mobility, lifting and ambulation.      Individual Aquatic Therapy:  [x] (62853) Provided verbal and tactile cueing for strengthening, flexibility, ROM using the therapeutic properties of water (buoyancy, resistance) for improvements in core control, mobility and ambulation     Aquatic Group:  [x] (87603) Provided intermittent verbal and tactile cueing for strengthening, endurance, flexibility and ROM for 2-4 individuals that do not require one-on one patient contact by the therapist       Land Timed Code Treatment Minutes: 0   Land Total Treatment Minutes: 0     Individual Aquatic Minutes: [address] Minutes: 15     [] EVAL (LOW) 61988 (typically 20 minutes face-to-face)  [] EVAL (MOD) 84979 (typically 30 minutes face-to-face)  [] EVAL (HIGH) 80020 (typically 45 minutes face-to-face)  [] RE-EVAL     [] GB(21177) x     [] Dry needle 1 or 2 Muscles (69173)  [] NMR (99964) x     [] Dry needle 3+ Muscles (12227)  [] Manual (16035) x     [] Ultrasound (23378) x  [] TA (36929) x     [] Mech Traction (75977)  [] ES(attended) (46621)     [] ES (un) (54635):   [] Vasopump (30194) [] Ionto (81322)   [x] Aquatic Ind (14752) x    3 [x] Aquatic Group (80129)   [] Other:    Treatment/Activity Tolerance:  [x] Patient tolerated treatment well [] Patient limited by fatigue  [] Patient limited by pain  [] Patient limited by other medical complications  [x] Other: performing/ tolerating increased aquatic ex well     Prognosis: [x] Good [] Fair  [] Poor    GOALS:  Patient stated goal:  To not have as much pain  [x]? Progressing: []? Met: []? Not Met: []? Adjusted     Therapist goals for Patient:   Short Term Goals: To be achieved in: 2 weeks  1. Independent in HEP and progression per patient tolerance, in order to prevent re-injury. [x]? Progressing: []? Met: []? Not Met: []? Adjusted  2. Patient will have a decrease in pain to facilitate improvement in movement, function, and ADLs as indicated by Functional Deficits. [x]? Progressing: []? Met: []? Not Met: []? Adjusted     Long Term Goals: To be achieved in:  8  weeks  1. Disability index score of  30 % or less for the LEFS to assist with reaching prior level of function. [x]? Progressing: []? Met: []? Not Met: []? Adjusted  2. Patient will demonstrate increased AROM to 105 deg  to allow for proper joint functioning as indicated by patients Functional Deficits. [x]? Progressing: []? Met: []? Not Met: []? Adjusted  3. Patient will demonstrate an increase in Strength to good proximal hip strength and control, within 5lb HHD in LE to allow for proper functional mobility as indicated by patients Functional Deficits. [x]? Progressing: []? Met: []? Not Met: []? Adjusted  4. Patient will return to 70%  functional activities without increased symptoms or restriction. [x]? Progressing: []? Met: []? Not Met: []? Adjusted  5. To able to walk without my leg feeling like it will buckle. [x]? Progressing: []? Met: []? Not Met: []?  Adjusted         Patient Requires Follow-up: [x] Yes  [] No    Plan:   [x] Continue per plan of care [] Alter current plan (see comments)  [] Plan of care initiated [] Hold pending MD visit [] Discharge    Plan for Next Session:   Gradually progress aquatic exercise as tolerated to increase   ROM, strength, and endurance to improve ADL's and decrease pain. ADD:     mini lunges fwds,  as tolerated. Electronically signed by:  Darleen Diggs, PTA  [id number]                                                                                Note: If patient does not return for scheduled/recommended follow up visits, this note will serve as a discharge from care along with the most recent update on progress.

## 2021-09-28 ENCOUNTER — HOSPITAL ENCOUNTER (OUTPATIENT)
Dept: PHYSICAL THERAPY | Age: 61
Setting detail: THERAPIES SERIES
Discharge: HOME | End: 2021-09-28
Payer: COMMERCIAL

## 2021-09-28 ENCOUNTER — TELEPHONE (OUTPATIENT)
Dept: ORTHOPEDIC SURGERY | Age: 61
End: 2021-09-28

## 2021-09-28 DIAGNOSIS — Z96.651 STATUS POST REVISION OF TOTAL REPLACEMENT OF RIGHT KNEE: Primary | ICD-10-CM

## 2021-09-28 PROCEDURE — 97150 GROUP THERAPEUTIC PROCEDURES: CPT

## 2021-09-28 PROCEDURE — 97530 THERAPEUTIC ACTIVITIES: CPT

## 2021-09-28 PROCEDURE — 97113 AQUATIC THERAPY/EXERCISES: CPT

## 2021-09-28 PROCEDURE — 97110 THERAPEUTIC EXERCISES: CPT

## 2021-09-28 NOTE — FLOWSHEET NOTE
212  REview of    [address] [facility] and 500 [facility]  [address] [facility]  Phone: [phone]   Fax:     [phone]      Physical Therapy Daily/Aquatic Flow Sheet   Date:  2021    Patient Name:  Floresita Enriquez :  1960  MRN: [id number]     Restrictions/Precautions:    Pertinent Medical History:Additional Pertinent Hx: arthritis, DDD cervical and lumbar,  HTN, HLD, chronic back pain,  DVT, left and right TKR,  right TKR revision 2020     Medical/Treatment Diagnosis Information:   · Diagnosis: Z96.651 (ICD-10-CM) - Presence of right artificial knee joint (revisionn 2020  · Treatment Diagnosis: Decreased functional mobility 2/2 right knee pain     Insurance/Certification information:  PT Insurance Information: [facility]  Physician Information:  Referring Practitioner: Bryce Pepe of care signed (Y/N): yes     Date of Patient follow up with Physician: Sept    Progress Report: []  Yes  [x]  No     Date Range for reporting period:  Beginnin2021  Ending:      Progress report due (10 Rx/or 30 days whichever is less):     Recertification due (POC duration/ or 90 days whichever is less):     Visit # POC/Insurance Allowable Auth Needed    67 []Yes   []No     PAIN    R knee 3/10     After Rx 0/10                   SUBJECTIVE   Pt notes moderate knee pain and is limited with activity tolerance. Notes pain is anterior and the proximal tibia. Has been doing PT at home and with PT,  Notes that the therapists were concerned for possible stress reaction as they have done a vibratory test along the bone on the medial lateral malleolus and noted pain along the medial mal with the vibratory testing.    21 pt reports has had 5 revisions on R knee. 7/15: a little stiff and irritated  21 tolerating aquatic ex w/o increased discomfort.   21 woke up with increased discomfort R knee w/o reason.   Reports no increased be creating stress reaction across the anterior tibia distally.  But as the bone continues to feel the sensation that can increase its rigidity and strength and the pain can settle down.  Also discussed with patient the this can go the other way that the bone can continue to thin and create a actual stress fracture         Relevant Medical History:Additional Pertinent Hx: arthritis, DDD cervical and lumbar,  HTN, HLD, chronic back pain,  DVT, left and right TKR,  right TKR revision 11/2020       Functional Scale/Score     :WOMAC          7/2 48 eval   /    35   8-12-21 10 th visit      59 9-21-21 19th visit    ROM LEFT Eval              RIGHT                    Discharge   HIP Flex wfl all wfl alll                                              wfl   HIP Abd       HIP Ext       HIP IR       HIP ER       Knee ext 0l -3a/p                                            -2a/0p   Knee Flex 105 93a/96p                                        100a/103p   Ankle PF wfl all wfl all                                               wfl   Ankle DF       Ankle In       Ankle Ev       Strength  LEFT RIGHT   HIP Flexors 4+ all 4+ all                                                    4+/5   HIP Abductors       HIP Ext       Hip ER       Knee EXT (quad) 4+/5 4/5                                                              4+/5   Knee Flex (HS) 4+/5 4+/5                                                            5-/5   Ankle DF 4+ all 4+ all                                                           5/5   Ankle PF         8/31/21  Palpation -  Significantly less TTP over tibia.   Right   ROM        -2/0 extension         95/100 flexion   Right strength   Hip flexion  4+/5, knee extension  4+/5      Latex Allergy:  [x]NO      []YES  Preferred Language for Healthcare:   [x]English       []Other:    Land-based Visits Exercises/Activities:  No LAND RX TODAY  Therapeutic Ex (74584)  Min:  10 Resistance/Repetitions Notes   HEP  SLR,   Prone knee proximal hip, and core control in self care, mobility, lifting, ambulation and eccentric single leg control. NMR and Therapeutic Activities:    [] (44078 or 79958) Provided verbal/tactile cueing for activities related to improving balance, coordination, kinesthetic sense, posture, motor skill, proprioception and motor activation to allow for proper function of core, proximal hip and LE with self care and ADLs and functional mobility.   [] (68046) Gait Re-education- Provided training and instruction to the patient for proper LE, core and proximal hip recruitment and positioning and eccentric body weight control with ambulation re-education including up and down stairs     Home Exercise Program:    [] (44018) Reviewed/Progressed HEP activities related to strengthening, flexibility, endurance, ROM of core, proximal hip and LE for functional self-care, mobility, lifting and ambulation/stair navigation   [] (29280)Reviewed/Progressed HEP activities related to improving balance, coordination, kinesthetic sense, posture, motor skill, proprioception of core, proximal hip and LE for self care, mobility, lifting, and ambulation/stair navigation      Manual Treatments:  PROM / STM / Oscillations-Mobs:  G-I, II, III, IV (PA's, Inf., Post.)  [] (74390) Provided manual therapy to mobilize LE, proximal hip and/or LS spine soft tissue/joints for the purpose of modulating pain, promoting relaxation,  increasing ROM, reducing/eliminating soft tissue swelling/inflammation/restriction, improving soft tissue extensibility and allowing for proper ROM for normal function with self care, mobility, lifting and ambulation.      Individual Aquatic Therapy:  [x] (01382) Provided verbal and tactile cueing for strengthening, flexibility, ROM using the therapeutic properties of water (buoyancy, resistance) for improvements in core control, mobility and ambulation     Aquatic Group:  [x] (35724) Provided intermittent verbal and tactile cueing for strengthening, endurance, flexibility and ROM for 2-4 individuals that do not require one-on one patient contact by the therapist       Land Timed Code Treatment Minutes: 30   Land Total Treatment Minutes: 30     Individual Aquatic Minutes: [address] Minutes: 15     [] EVAL (LOW) 68800 (typically 20 minutes face-to-face)  [] EVAL (MOD) 71636 (typically 30 minutes face-to-face)  [] EVAL (HIGH) 55228 (typically 45 minutes face-to-face)  [] RE-EVAL     [] KR(57091) x     [] Dry needle 1 or 2 Muscles (49326)  [] NMR (15557) x     [] Dry needle 3+ Muscles (57339)  [] Manual (74714) x     [] Ultrasound (05444) x  [] TA (99280) x     [] Mech Traction (25092)  [] ES(attended) (12936)     [] ES (un) (90154):   [] Vasopump (76768) [] Ionto (59221)   [x] Aquatic Ind (63788) x    3 [x] Aquatic Group (74050)   [] Other:    Treatment/Activity Tolerance:  [x] Patient tolerated treatment well [] Patient limited by fatigue  [] Patient limited by pain  [] Patient limited by other medical complications  [x] Other:  Issued written aquatic HEP booklet,  [facility]  30 day pass. Reviewed aquatic exercise progression/precautions. Pt verbalized understanding. Prognosis: [x] Good [] Fair  [] Poor    GOALS:  Patient stated goal:  To not have as much pain  [x]? Progressing: []? Met: []? Not Met: []? Adjusted     Therapist goals for Patient:   Short Term Goals: To be achieved in: 2 weeks  1. Independent in HEP and progression per patient tolerance, in order to prevent re-injury. [x]? Progressing: []? Met: []? Not Met: []? Adjusted  2. Patient will have a decrease in pain to facilitate improvement in movement, function, and ADLs as indicated by Functional Deficits. [x]? Progressing: []? Met: []? Not Met: []? Adjusted     Long Term Goals: To be achieved in:  8  weeks  1. Disability index score of  30 % or less for the LEFS to assist with reaching prior level of function. [x]? Progressing: []? Met: []?  Not Met: []?

## 2021-09-28 NOTE — FLOWSHEET NOTE
[address]  [address] of    [address] [place] [address] and [address] [facility]  [address] [facility]  Phone: [phone]   Fax:     [phone]      Physical Therapy Daily/Aquatic Flow Sheet   Date:  2021    Patient Name:  Verna Hemphill :  1960  MRN: [id number]     Restrictions/Precautions:    Pertinent Medical History:Additional Pertinent Hx: arthritis, DDD cervical and lumbar,  HTN, HLD, chronic back pain,  DVT, left and right TKR,  right TKR revision 2020     Medical/Treatment Diagnosis Information:   · Diagnosis: Z96.651 (ICD-10-CM) - Presence of right artificial knee joint (revisionn 2020  · Treatment Diagnosis: Decreased functional mobility 2/2 right knee pain     Insurance/Certification information:  PT Insurance Information: [facility]  Physician Information:  Referring Practitioner: Keo Valerio of care signed (Y/N): yes     Date of Patient follow up with Physician: Sept    Progress Report: []  Yes  [x]  No     Date Range for reporting period:  Beginnin2021  Ending:      Progress report due (10 Rx/or 30 days whichever is less):     Recertification due (POC duration/ or 90 days whichever is less):     Visit # POC/Insurance Allowable Auth Needed    67 []Yes   []No     PAIN    R knee 3/10     After Rx 0/10                   SUBJECTIVE   Pt notes moderate knee pain and is limited with activity tolerance. Notes pain is anterior and the proximal tibia. Has been doing PT at home and with PT,  Notes that the therapists were concerned for possible stress reaction as they have done a vibratory test along the bone on the medial lateral malleolus and noted pain along the medial mal with the vibratory testing.    21 pt reports has had 5 revisions on R knee. 7/15: a little stiff and irritated  21 tolerating aquatic ex w/o increased discomfort.   21 woke up with increased discomfort R knee w/o reason.   Reports no increased discomfort since doing aquatic ex. 10 min late  7-29-21 Day after aquatic ex sore. Maybe doing a little too much. Overall feels aquatic ex helpful with 30% improvement. 8/3 - Feels that pain is less- was as high as an 8, now at a 6. Walking is still problematic  8/5: I am late today because I had a stomach ache this morning. Pt also reports Right knee is 'puffy' today. 8-10-21 overall less discomfort, relief after pool x 1 day. 8-12-21 ovrall feels 40% improved. 8-17-21 steps mostly 1 at a time, occassionally step over step. Reports decreased pain/ tenderness. Sleeping ok. 8-19-21  Tolerating aquatic ex well. Feels rainy weather aggravates knee. 45 min late due to overslept.  8-24-21 less tender, overall 50% improved. 8/31 - Notes the soreness last week was due to positions during activity  9-2-21 doing ok today. 9-7-21ok at present, had increased discomfort over the weekend, possibly due to rain. 15 min late. Overall feels 50% improved. 9-8-21 reports woke with increased R knee pain this am without reason. See MD 9-14-21 9-14-21 see MD today. 9-16-21  MD pleased with progress. Recommended knee brace to prevent giving out, CBD oil. F/U 6 weeks. 9-21-21 using CBD oil  Oil drops and topical, seem helpful.  9-23-21 little more sore today due to colder weather. 9/28 - a little irritated with change in weather.   Can work around the house for an hour, then needs to take a rest.     Xray   Have reviewed the xrays above from 6/15/2021  3 views of the right knee AP, lateral and sunrise views were performed and reviewed today and my impression is: Well-placed hinged knee prosthesis with long tibial and femoral stems.  Distal aspect of the tibial stem possibly compressing anteriorly on the tibia may be creating some irritation/stress riser no evidence of acute fracture dislocation     Discussed the utilization of a shin guard with an Ace wrap as the anterior aspect of the tibial stem may be creating stress reaction across the anterior tibia distally.  But as the bone continues to feel the sensation that can increase its rigidity and strength and the pain can settle down.  Also discussed with patient the this can go the other way that the bone can continue to thin and create a actual stress fracture         Relevant Medical History:Additional Pertinent Hx: arthritis, DDD cervical and lumbar,  HTN, HLD, chronic back pain,  DVT, left and right TKR,  right TKR revision 11/2020       Functional Scale/Score     :WOMAC          7/2 48 eval   /    35   8-12-21 10 th visit      59 9-21-21 19th visit    ROM LEFT Eval              RIGHT                    Discharge   HIP Flex wfl all wfl alll                                              wfl   HIP Abd       HIP Ext       HIP IR       HIP ER       Knee ext 0l -3a/p                                            -2a/0p   Knee Flex 105 93a/96p                                        100a/103p   Ankle PF wfl all wfl all                                               wfl   Ankle DF       Ankle In       Ankle Ev       Strength  LEFT RIGHT   HIP Flexors 4+ all 4+ all                                                    4+/5   HIP Abductors       HIP Ext       Hip ER       Knee EXT (quad) 4+/5 4/5                                                              4+/5   Knee Flex (HS) 4+/5 4+/5                                                            5-/5   Ankle DF 4+ all 4+ all                                                           5/5   Ankle PF         8/31/21  Palpation -  Significantly less TTP over tibia.   Right   ROM        -2/0 extension         95/100 flexion   Right strength   Hip flexion  4+/5, knee extension  4+/5      Latex Allergy:  [x]NO      []YES  Preferred Language for Healthcare:   [x]English       []Other:    Land-based Visits Exercises/Activities:  No LAND RX TODAY  Therapeutic Ex (04054)  Min:  10 Resistance/Repetitions Notes   HEP  SLR,   Prone knee flexion  Heel slides  Sidelying abduction Review of HEP    ADD sets Pillow squeeze 5 sec x 10         Review of FC options for pt Nustep, rec bike - Ue ex  - no LE resistance ex in [facility]                   Therapeutic Activity (97713) Min: 20     Review of current level of functional mobility and adl Review of activity modifications to prevent pain    Review of future aquatic options Review of FC/[facility] [employer]        Aquatic Visits Exercises/Activities:  Aquatic Abbreviation Key  B= Belt DB= Dumbells T= Theratube   G=Gloves N= Noodles W= Weights   P= Paddles WW=Water Walker K= Kickboard     **Pt 15 min late therefore unable to complete all exercises.    Transfers:   steps ind 1 at a time      % Immersion: 75%            Ambulation:  laps ind Exercises UE:      Forwards 8 Shoulder Shrugs     Lateral 4 Shoulder circles     Marching 2 Scapular Retraction      Retro 2 Rolling      [place]  Push Downs    Multifidus walkouts w/paddle  IR/ER      Punching    Stretchin sec  Rowing    Gastroc/Soleus  2 B Elbow Flex/Ext    Hamstring  2 R Shldr Flex/Ext     Knee flex stretch  /step  2 R Shldr Abd/Add    Piriformis   Horiz Abd/Add     Hip flexor    Arm Circles     SKTC   PNF Diagonals    DKTC  UE oscillations f/b, s/s    ITB   Wall Push-ups    Quad  Figure 8's    Mid back   Buoy pull/push downs    UT  Tband rows    Rhom  Tband lats    Post Shoulder  Lumbar Rot w/ paddles    Pec   Small ball pull downs                   diagonals             Cervical:       AROM Flex       AROM Extension     LEs exercises: B AROM Side Bending    Marching  10 AROM Rotation    Heel Raises  10 Chin tucks    Toe Raises  10 Chin nods     Squats  10      Hamstring Curls  10      Hip Flexion  10 Balance:     SLR / ER 10 R     Hip Abduction 10 SLS 30 sec R   Hip Circles 10 Tandem stance 30 sec x 2   TA set  NBOS eyes open    Glut Set  NBOS eyes closed 30 sec    Hip Extension 10 Hand to Opposite Knee 10   Hip Adduction    Box Step  3 R/L   Hip IR Noodle Stance     Hip ER  Stop/Go Gait    Fig 8's  Switch Gait    TKE/ RED    T-band 5 sec x 15 R           Seated: Chair  Functional:    Ankle Pumps 30 Step up forward 10 R/L  @ 25 % WB   Ankle circles 10 Step up lateral 10 R   Knee flex & ext 30 Step down 10  @ 25% WB   HHA    Hip Abd & Adduction 30 [place] squats    Bicycle  30 Crate Lifts 10   Add Set with ball 10 Lunges forward    LX stab with med ball throws  Lunges lateral 5 R/L    Cues    Ankle INV  Lunges retro    Ankle EV  Lower ab curl with noodle      Upper ab curl with ball      Med ball straight lifts    Sit to stand from bench w/o hands 10 Med ball diagonal lifts      Hydrorider          Noodle:      Leg Press Small x 30  R Deep Water:     hang at wall 3 min      relief Jog    Noodle hang deep water  Jumping Jacks    Noodle Bicycle 1 min   Assist for balance  Heel to toe      Hand to opposite knee      Cross country skier      Rocking Horse      Other Therapeutic Activities:  Pt was educated on PT POC, Diagnosis, Prognosis, pathomechanics as well as frequency and duration of scheduling future physical therapy appointments. Time was also taken on this day to answer all patient questions and participation in PT. Reviewed appointment policy in detail with patient and patient verbalized understanding. Home Exercise Program:  Patient was instructed in the following for HEP:     . Patient verbalized/demonstrated understanding and was issued written handout. Charges:   Therapeutic Exercise and NMR EXR  [] (15083) Provided verbal/tactile cueing for activities related to strengthening, flexibility, endurance, ROM for improvements in LE, proximal hip, and core control with self care, mobility, lifting, ambulation.  [] (95492) Provided verbal/tactile cueing for activities related to improving balance, coordination, kinesthetic sense, posture, motor skill, proprioception  to assist with LE, proximal hip, and core control in self care, mobility, lifting, ambulation and eccentric single leg control. NMR and Therapeutic Activities:    [] (03742 or 78163) Provided verbal/tactile cueing for activities related to improving balance, coordination, kinesthetic sense, posture, motor skill, proprioception and motor activation to allow for proper function of core, proximal hip and LE with self care and ADLs and functional mobility.   [] (03725) Gait Re-education- Provided training and instruction to the patient for proper LE, core and proximal hip recruitment and positioning and eccentric body weight control with ambulation re-education including up and down stairs     Home Exercise Program:    [] (85439) Reviewed/Progressed HEP activities related to strengthening, flexibility, endurance, ROM of core, proximal hip and LE for functional self-care, mobility, lifting and ambulation/stair navigation   [] (17774)Reviewed/Progressed HEP activities related to improving balance, coordination, kinesthetic sense, posture, motor skill, proprioception of core, proximal hip and LE for self care, mobility, lifting, and ambulation/stair navigation      Manual Treatments:  PROM / STM / Oscillations-Mobs:  G-I, II, III, IV (PA's, Inf., Post.)  [] (84075) Provided manual therapy to mobilize LE, proximal hip and/or LS spine soft tissue/joints for the purpose of modulating pain, promoting relaxation,  increasing ROM, reducing/eliminating soft tissue swelling/inflammation/restriction, improving soft tissue extensibility and allowing for proper ROM for normal function with self care, mobility, lifting and ambulation.      Individual Aquatic Therapy:  [x] (33602) Provided verbal and tactile cueing for strengthening, flexibility, ROM using the therapeutic properties of water (buoyancy, resistance) for improvements in core control, mobility and ambulation     Aquatic Group:  [x] (05490) Provided intermittent verbal and tactile cueing for strengthening, endurance, flexibility and ROM for 2-4 individuals that do not require one-on one patient contact by the therapist       Land Timed Code Treatment Minutes: 30   Land Total Treatment Minutes: [address] [facility] Minutes: [address] [facility] [address]: 15     [] EVAL (LOW) 71296 (typically 20 minutes face-to-face)  [] EVAL (MOD) 57400 (typically 30 minutes face-to-face)  [] EVAL (HIGH) 45138 (typically 45 minutes face-to-face)  [] RE-EVAL     [] GP(75687) x     [] Dry needle 1 or 2 Muscles (78156)  [] NMR (46749) x     [] Dry needle 3+ Muscles (67899)  [] Manual (31692) x     [] Ultrasound (93334) x  [] TA (50531) x     [] Mech Traction (07825)  [] ES(attended) (91291)     [] ES (un) (62667):   [] Vasopump (67688) [] Ionto (10661)   [x] Aquatic Ind (94852) x    3 [x] Aquatic Group (28019)   [] Other:    Treatment/Activity Tolerance:  [x] Patient tolerated treatment well [] Patient limited by fatigue  [] Patient limited by pain  [] Patient limited by other medical complications  [x] Other: performing/ tolerating increased aquatic ex well     Prognosis: [x] Good [] Fair  [] Poor    GOALS:  Patient stated goal:  To not have as much pain  [x]? Progressing: []? Met: []? Not Met: []? Adjusted     Therapist goals for Patient:   Short Term Goals: To be achieved in: 2 weeks  1. Independent in HEP and progression per patient tolerance, in order to prevent re-injury. [x]? Progressing: []? Met: []? Not Met: []? Adjusted  2. Patient will have a decrease in pain to facilitate improvement in movement, function, and ADLs as indicated by Functional Deficits. [x]? Progressing: []? Met: []? Not Met: []? Adjusted     Long Term Goals: To be achieved in:  8  weeks  1. Disability index score of  30 % or less for the LEFS to assist with reaching prior level of function. [x]? Progressing: []? Met: []? Not Met: []? Adjusted  2. Patient will demonstrate increased AROM to 105 deg  to allow for proper joint functioning as indicated by patients Functional Deficits. [x]? Progressing: []? Met: []? Not Met: []? Adjusted  3. Patient will demonstrate an increase in Strength to good proximal hip strength and control, within 5lb HHD in LE to allow for proper functional mobility as indicated by patients Functional Deficits. [x]? Progressing: []? Met: []? Not Met: []? Adjusted  4. Patient will return to 70%  functional activities without increased symptoms or restriction. [x]? Progressing: []? Met: []? Not Met: []? Adjusted  5. To able to walk without my leg feeling like it will buckle. [x]? Progressing: []? Met: []? Not Met: []? Adjusted         Patient Requires Follow-up: [x] Yes  [] No    Plan:   [x] Continue per plan of care [] Alter current plan (see comments)  [] Plan of care initiated [] Hold pending MD visit [] Discharge    Plan for Next Session:   Gradually progress aquatic exercise as tolerated to increase   ROM, strength, and endurance to improve ADL's and decrease pain. ADD:     mini lunges fwds,  as tolerated. Electronically signed by:  Lacho Shoemaker, PT  2016                                                                               Note: If patient does not return for scheduled/recommended follow up visits, this note will serve as a discharge from care along with the most recent update on progress.

## 2021-09-28 NOTE — TELEPHONE ENCOUNTER
Prescription Refill      Medication Name:  OXYCODONE  MG  Pharmacy: [address] [place] [address]  Patient Contact Number:  [phone]

## 2021-09-30 ENCOUNTER — HOSPITAL ENCOUNTER (OUTPATIENT)
Dept: PHYSICAL THERAPY | Age: 61
Setting detail: THERAPIES SERIES
Discharge: HOME | End: 2021-09-30
Payer: COMMERCIAL

## 2021-09-30 PROCEDURE — 97113 AQUATIC THERAPY/EXERCISES: CPT

## 2021-09-30 NOTE — FLOWSHEET NOTE
[address]  [address]view of    [address] [place] [address] and [address] [facility]  [address] [facility]  Phone: [phone]   Fax:     [phone]      Physical Therapy Daily/Aquatic Flow Sheet   Date:  2021    Patient Name:  Maria Esther Vera :  1960  MRN: [id number]     Restrictions/Precautions:    Pertinent Medical History:Additional Pertinent Hx: arthritis, DDD cervical and lumbar,  HTN, HLD, chronic back pain,  DVT, left and right TKR,  right TKR revision 2020     Medical/Treatment Diagnosis Information:   · Diagnosis: Z96.651 (ICD-10-CM) - Presence of right artificial knee joint (revisionn 2020  · Treatment Diagnosis: Decreased functional mobility 2/2 right knee pain     Insurance/Certification information:  PT Insurance Information: [facility]  Physician Information:  Referring Practitioner: Eliza Scott  vianney signed (Y/N): yes     Date of Patient follow up with Physician: Sept    Progress Report: []  Yes  [x]  No     Date Range for reporting period:  Beginnin2021  Ending:      Progress report due (10 Rx/or 30 days whichever is less):     Recertification due (POC duration/ or 90 days whichever is less):     Visit # POC/Insurance Allowable Auth Needed    67 []Yes   []No     PAIN    R knee 3/10     After Rx -/10                   SUBJECTIVE   Pt notes moderate knee pain and is limited with activity tolerance. Notes pain is anterior and the proximal tibia. Has been doing PT at home and with PT,  Notes that the therapists were concerned for possible stress reaction as they have done a vibratory test along the bone on the medial lateral malleolus and noted pain along the medial mal with the vibratory testing.    21 pt reports has had 5 revisions on R knee. 7/15: a little stiff and irritated  21 tolerating aquatic ex w/o increased discomfort.   21 woke up with increased discomfort R knee w/o reason.   Reports no increased discomfort since doing aquatic ex. 10 min late  7-29-21 Day after aquatic ex sore. Maybe doing a little too much. Overall feels aquatic ex helpful with 30% improvement. 8/3 - Feels that pain is less- was as high as an 8, now at a 6. Walking is still problematic  8/5: I am late today because I had a stomach ache this morning. Pt also reports Right knee is 'puffy' today. 8-10-21 overall less discomfort, relief after pool x 1 day. 8-12-21 ovrall feels 40% improved. 8-17-21 steps mostly 1 at a time, occassionally step over step. Reports decreased pain/ tenderness. Sleeping ok. 8-19-21  Tolerating aquatic ex well. Feels rainy weather aggravates knee. 45 min late due to overslept.  8-24-21 less tender, overall 50% improved. 8/31 - Notes the soreness last week was due to positions during activity  9-2-21 doing ok today. 9-7-21ok at present, had increased discomfort over the weekend, possibly due to rain. 15 min late. Overall feels 50% improved. 9-8-21 reports woke with increased R knee pain this am without reason. See MD 9-14-21 9-14-21 see MD today. 9-16-21  MD pleased with progress. Recommended knee brace to prevent giving out, CBD oil. F/U 6 weeks. 9-21-21 using CBD oil  Oil drops and topical, seem helpful.  9-23-21 little more sore today due to colder weather. 9/28 - a little irritated with change in weather. Can work around the house for an hour, then needs to take a rest.  9-30-21 40 min late.  Plans to continue aquatic ex independently.      Xray   Have reviewed the xrays above from 6/15/2021  3 views of the right knee AP, lateral and sunrise views were performed and reviewed today and my impression is: Well-placed hinged knee prosthesis with long tibial and femoral stems.  Distal aspect of the tibial stem possibly compressing anteriorly on the tibia may be creating some irritation/stress riser no evidence of acute fracture dislocation     Discussed the utilization of a shin guard with an Ace wrap as the anterior aspect of the tibial stem may be creating stress reaction across the anterior tibia distally.  But as the bone continues to feel the sensation that can increase its rigidity and strength and the pain can settle down.  Also discussed with patient the this can go the other way that the bone can continue to thin and create a actual stress fracture         Relevant Medical History:Additional Pertinent Hx: arthritis, DDD cervical and lumbar,  HTN, HLD, chronic back pain,  DVT, left and right TKR,  right TKR revision 11/2020       Functional Scale/Score     :WOMAC          7/2 48 eval   /    35   8-12-21 10 th visit      59 9-21-21 19th visit    ROM LEFT Eval              RIGHT                    Discharge   HIP Flex wfl all wfl alll                                              wfl   HIP Abd       HIP Ext       HIP IR       HIP ER       Knee ext 0l -3a/p                                            -2a/0p   Knee Flex 105 93a/96p                                        100a/103p   Ankle PF wfl all wfl all                                               wfl   Ankle DF       Ankle In       Ankle Ev       Strength  LEFT RIGHT   HIP Flexors 4+ all 4+ all                                                    4+/5   HIP Abductors       HIP Ext       Hip ER       Knee EXT (quad) 4+/5 4/5                                                              4+/5   Knee Flex (HS) 4+/5 4+/5                                                            5-/5   Ankle DF 4+ all 4+ all                                                           5/5   Ankle PF         8/31/21  Palpation -  Significantly less TTP over tibia.   Right   ROM        -2/0 extension         95/100 flexion   Right strength   Hip flexion  4+/5, knee extension  4+/5      Latex Allergy:  [x]NO      []YES  Preferred Language for Healthcare:   [x]English       []Other:    Land-based Visits Exercises/Activities:  No LAND RX TODAY  Therapeutic Ex (87236)  Min:  10 Resistance/Repetitions Notes   HEP  SLR,   Prone knee flexion  Heel slides  Sidelying abduction Review of HEP    ADD sets Pillow squeeze 5 sec x 10         Review of FC options for pt Nustep, rec bike - Ue ex  - no LE resistance ex in [facility]                   Therapeutic Activity (60991) Min: 20     Review of current level of functional mobility and adl Review of activity modifications to prevent pain    Review of future aquatic options Review of FC/[facility] [employer]        Aquatic Visits Exercises/Activities:  Aquatic Abbreviation Key  B= Belt DB= Dumbells T= Theratube   G=Gloves N= Noodles W= Weights   P= Paddles WW=Water Walker K= Kickboard     **Pt 15 min late therefore unable to complete all exercises.    Transfers:   steps ind 1 at a time      % Immersion: 75%            Ambulation:  laps ind Exercises UE:      Forwards 8 Shoulder Shrugs     Lateral 4 Shoulder circles     Marching 2 Scapular Retraction      Retro 2 Rolling      [place]ariocas  Push Downs    Multifidus walkouts w/paddle  IR/ER      Punching    Stretchin sec  Rowing    Gastroc/Soleus  2 B Elbow Flex/Ext    Hamstring Shldr Flex/Ext     Knee flex stretch  /step Shldr Abd/Add    Piriformis   Horiz Abd/Add     Hip flexor    Arm Circles     SKTC   PNF Diagonals    DKTC  UE oscillations f/b, s/s    ITB   Wall Push-ups    Quad  Figure 8's    Mid back   Buoy pull/push downs    UT  Tband rows    Rhom  Tband lats    Post Shoulder  Lumbar Rot w/ paddles    Pec   Small ball pull downs                   diagonals             Cervical:       AROM Flex       AROM Extension     LEs exercises: B AROM Side Bending    Marching  10 AROM Rotation    Heel Raises  10 Chin tucks    Toe Raises  10 Chin nods     Squats  10      Hamstring Curls  10      Hip Flexion  10 Balance:     SLR / ER 10 R     Hip Abduction 10 SLS   Hip Circles 10 Tandem stance   TA set  NBOS eyes open   Glut Set  NBOS eyes closed   Hip Extension 10 Hand to Opposite Knee 10   Hip Adduction Box Step   Hip IR   Noodle Stance   Hip ER  Stop/Go Gait    Fig 8's  Switch Gait    TKE/ RED    T-band 5 sec x 15 R           Seated: Chair  Functional:    Ankle Pumps 30 Step up forward 10 R/L  @ 25 % WB   Ankle circles 10 Step up lateral 10 R   Knee flex & ext 30 Step down 10  @ 25% WB   HHA    Hip Abd & Adduction 30 [place] squats   Bicycle  30 Crate Lifts   Add Set with ball 10 Lunges forward   LX stab with med ball throws  Lunges lateral   Ankle INV  Lunges retro    Ankle EV  Lower ab curl with noodle      Upper ab curl with ball      Med ball straight lifts    Med ball diagonal lifts      Hydrorider          Noodle:      Leg Press Small x 30  R Deep Water: Instructed for progression if tolerated    gentle    hang at wall Jog x   Noodle hang deep water Jumping Jacks x   Noodle Bicycle Heel to toe x     Hand to opposite knee x     Cross country skier x     Rocking Horse ------     Other Therapeutic Activities:  Pt was educated on PT POC, Diagnosis, Prognosis, pathomechanics as well as frequency and duration of scheduling future physical therapy appointments. Time was also taken on this day to answer all patient questions and participation in PT. Reviewed appointment policy in detail with patient and patient verbalized understanding. Home Exercise Program:  Patient was instructed in the following for HEP:     . Patient verbalized/demonstrated understanding and was issued written handout. Charges:   Therapeutic Exercise and NMR EXR  [] (90948) Provided verbal/tactile cueing for activities related to strengthening, flexibility, endurance, ROM for improvements in LE, proximal hip, and core control with self care, mobility, lifting, ambulation.  [] (88183) Provided verbal/tactile cueing for activities related to improving balance, coordination, kinesthetic sense, posture, motor skill, proprioception  to assist with LE, proximal hip, and core control in self care, mobility, lifting, ambulation and eccentric single leg control. NMR and Therapeutic Activities:    [] (59121 or 57892) Provided verbal/tactile cueing for activities related to improving balance, coordination, kinesthetic sense, posture, motor skill, proprioception and motor activation to allow for proper function of core, proximal hip and LE with self care and ADLs and functional mobility.   [] (99564) Gait Re-education- Provided training and instruction to the patient for proper LE, core and proximal hip recruitment and positioning and eccentric body weight control with ambulation re-education including up and down stairs     Home Exercise Program:    [] (55505) Reviewed/Progressed HEP activities related to strengthening, flexibility, endurance, ROM of core, proximal hip and LE for functional self-care, mobility, lifting and ambulation/stair navigation   [] (88970)Reviewed/Progressed HEP activities related to improving balance, coordination, kinesthetic sense, posture, motor skill, proprioception of core, proximal hip and LE for self care, mobility, lifting, and ambulation/stair navigation      Manual Treatments:  PROM / STM / Oscillations-Mobs:  G-I, II, III, IV (PA's, Inf., Post.)  [] (42657) Provided manual therapy to mobilize LE, proximal hip and/or LS spine soft tissue/joints for the purpose of modulating pain, promoting relaxation,  increasing ROM, reducing/eliminating soft tissue swelling/inflammation/restriction, improving soft tissue extensibility and allowing for proper ROM for normal function with self care, mobility, lifting and ambulation.      Individual Aquatic Therapy:  [x] (17785) Provided verbal and tactile cueing for strengthening, flexibility, ROM using the therapeutic properties of water (buoyancy, resistance) for improvements in core control, mobility and ambulation     Aquatic Group:  [x] (01619) Provided intermittent verbal and tactile cueing for strengthening, endurance, flexibility and ROM for 2-4 individuals that do not require one-on one patient contact by the therapist       Land Timed Code Treatment Minutes:    Land Total Treatment Minutes: Individual Aquatic Minutes:    Aquatic Group Minutes: 35     [] EVAL (LOW) 12409 (typically 20 minutes face-to-face)  [] EVAL (MOD) 15767 (typically 30 minutes face-to-face)  [] EVAL (HIGH) 37704 (typically 45 minutes face-to-face)  [] RE-EVAL     [] NK(80884) x     [] Dry needle 1 or 2 Muscles (43117)  [] NMR (07328) x     [] Dry needle 3+ Muscles (90302)  [] Manual (00907) x     [] Ultrasound (60813) x  [] TA (03354) x     [] Mech Traction (69460)  [] ES(attended) (98774)     [] ES (un) (81760  [facility]):   [] Vasopump (89864) [] Ionto (01756)   [x] Aquatic Ind (29651) x    2 [] Aquatic Group (55225)   [] Other:    Treatment/Activity Tolerance:  [x] Patient tolerated treatment well [] Patient limited by fatigue  [] Patient limited by pain  [] Patient limited by other medical complications  [x] Other:  Issued written aquatic HEP booklet,  [facility]  30 day pass. Reviewed aquatic exercise progression/precautions. Pt verbalized understanding. Prognosis: [x] Good [] Fair  [] Poor    GOALS:  Patient stated goal:  To not have as much pain  [x]? Progressing: []? Met: []? Not Met: []? Adjusted     Therapist goals for Patient:   Short Term Goals: To be achieved in: 2 weeks  1. Independent in HEP and progression per patient tolerance, in order to prevent re-injury. [x]? Progressing: []? Met: []? Not Met: []? Adjusted  2. Patient will have a decrease in pain to facilitate improvement in movement, function, and ADLs as indicated by Functional Deficits. [x]? Progressing: []? Met: []? Not Met: []? Adjusted     Long Term Goals: To be achieved in:  8  weeks  1. Disability index score of  30 % or less for the LEFS to assist with reaching prior level of function. [x]? Progressing: []? Met: []? Not Met: []? Adjusted  2.  Patient will demonstrate increased AROM to 105 deg  to allow for proper joint

## 2021-10-01 RX ORDER — OXYCODONE AND ACETAMINOPHEN 10; 325 MG/1; MG/1
1 TABLET ORAL EVERY 8 HOURS PRN
Qty: 21 TABLET | Refills: 0 | Status: SHIPPED | OUTPATIENT
Start: 2021-10-01 | End: 2021-10-26 | Stop reason: SDUPTHER

## 2021-10-26 ENCOUNTER — OFFICE VISIT (OUTPATIENT)
Dept: ORTHOPEDIC SURGERY | Age: 61
End: 2021-10-26

## 2021-10-26 VITALS
SYSTOLIC BLOOD PRESSURE: 125 MMHG | DIASTOLIC BLOOD PRESSURE: 80 MMHG | HEART RATE: 96 BPM | BODY MASS INDEX: 29.37 KG/M2 | HEIGHT: 64 IN | WEIGHT: 172 LBS

## 2021-10-26 DIAGNOSIS — G89.29 CHRONIC KNEE PAIN AFTER TOTAL REPLACEMENT OF RIGHT KNEE JOINT: Primary | ICD-10-CM

## 2021-10-26 DIAGNOSIS — Z96.651 CHRONIC KNEE PAIN AFTER TOTAL REPLACEMENT OF RIGHT KNEE JOINT: Primary | ICD-10-CM

## 2021-10-26 DIAGNOSIS — M25.561 CHRONIC KNEE PAIN AFTER TOTAL REPLACEMENT OF RIGHT KNEE JOINT: Primary | ICD-10-CM

## 2021-10-26 DIAGNOSIS — Z96.651 STATUS POST REVISION OF TOTAL REPLACEMENT OF RIGHT KNEE: ICD-10-CM

## 2021-10-26 PROCEDURE — 99214 OFFICE O/P EST MOD 30 MIN: CPT | Performed by: ORTHOPAEDIC SURGERY

## 2021-10-26 RX ORDER — OXYCODONE AND ACETAMINOPHEN 10; 325 MG/1; MG/1
1 TABLET ORAL EVERY 8 HOURS PRN
Qty: 21 TABLET | Refills: 0 | Status: SHIPPED | OUTPATIENT
Start: 2021-10-26 | End: 2021-11-02

## 2021-10-26 ASSESSMENT — ENCOUNTER SYMPTOMS
SHORTNESS OF BREATH: 0
VOMITING: 0
CONSTIPATION: 0
COUGH: 0
SORE THROAT: 0
ABDOMINAL PAIN: 0
EYES NEGATIVE: 1
DIARRHEA: 0
WHEEZING: 0
NAUSEA: 0
ALLERGIC/IMMUNOLOGIC NEGATIVE: 1

## 2021-10-26 NOTE — PROGRESS NOTES
Patient Name: Sarah Deluna MRN: [id number]   Age: 56 y.o. YOB: 1960   Sex: female         CHIEF COMPLAINT   Right total Knee revision postoperative visit    HISTORY OF PRESENT ILLNESS   Patient returns for their postoperative evaluation status post right total knee revision. status post manipulation  Continued therapy although recently had give way episdoe. Anterior tibial pain improved. Continued issues with weight bearing pain. Walking only one block at a time. Using shin guard with some help. Sore no more with somehelp in pain for several hours. Rom of the knee improved. Still in therapy. Pain in the knee proximal tibia and sometimes distal femur and posterior knee. Give way of the right knee. The patient is doing very fair. They have moderate complaints of pain. Rates pain as a 4 out of 10 notes pain mostly to the anterior aspect of the distal tibia into the medial aspect of the proximal tibia  There is small swelling about the knee. The patient has been doing physical therapy on her own at home as well as with physical therapist.  She is still working on building the container home with her  and son. She notes she is able to get about 15 to 20 minutes of work in before the pain starts to increase and she has to settle down. Notes that the therapists were concerned for possible stress reaction as they have done a vibratory test along the bone on the medial lateral malleolus and noted pain along the medial mal with the vibratory testing. She denies numbness burning tingling radiating pain notes it has a muscle pulling/ache. Denies any fall trauma or injury. They deny any calf swelling or numbness or tingling down the leg       Assessment     Review of Systems   Constitutional: Negative for chills and fever. HENT: Negative for ear discharge, ear pain, hearing loss, nosebleeds and sore throat. Eyes: Negative.     Respiratory: Negative for cough, shortness of breath and wheezing. Cardiovascular: Negative for chest pain and palpitations. Gastrointestinal: Negative for abdominal pain, constipation, diarrhea, nausea and vomiting. Endocrine: Negative. Genitourinary: Negative for dysuria, frequency and urgency. Musculoskeletal: Positive for joint swelling. Skin: Negative for rash. Allergic/Immunologic: Negative. Neurological: Negative for dizziness and headaches. Hematological: Negative. Psychiatric/Behavioral: Negative. PHYSICAL EXAM     Vital Signs:   Vitals:    10/26/21 1335   BP: 125/80   Pulse: 96       Examination of the knee shows a well-healed midline incision. There is small swelling. There is no drainage. Range of motion is 0-110 no extendsor lag. Tenderness noted to distal anterior tibia with noted hyperpigmented contusion-like appearing lesion on the skin above the sensitivity. Also noted medial tibial tenderness to palpation    The patient is neurovascularly intact. NEUROLOGICALLY: There is no evidence for sensory or motor deficits in the extremity. Coordination appears full with no spacticity or rigidity. Reflexes appear to be symmetric. Distal circulation intact. No signs of RSD. Calf nontender. Negative nica's,  no signs of dvt    Hip exam shows full ROM with no focal pain or Instability. Leg lengths are normal. There is no Trendelenburg Gait. RADIOLOGY   Xray   Have reviewed the xrays above from 6/15/2021 compared to today  3 views of the right knee AP, lateral and sunrise views were performed and reviewed today and my impression is: Well-placed hinged knee prosthesis with long tibial and femoral stems.   Distal aspect of the tibial stem possibly compressing anteriorly on the tibia may be creating some irritation/stress riser no evidence of acute fracture dislocation    IMPRESSION   10 months(s) status post right total knee revision    PLAN   The patient is doing well 10 months(s) status post right total knee revision. .   Advised for patient to continue with physical therapy for evaluation treatment of status post revision of type right total knee   They may slowly resume normal activities. Advised patient to continue with home exercises on her own         She was dc from pain management. Start on temporary regimen at this point. Plan for additional mangemtn options including medical marijuana. Advised patient continue utilization of anti-inflammatories as needed. Discussed the utilization of a shin guard with an Ace wrap as the anterior aspect of the tibial stem may be creating stress reaction across the anterior tibia distally. But as the bone continues to feel the sensation that can increase its rigidity and strength and the pain can settle down. Also discussed with patient the this can go the other way that the bone can continue to thin and create a actual stress fracture across that area. Advised to follow-up in 3 months for repeat evaluation    The orders below, if any, were placed during this visit:     Brace for the right knee for stabliization of the knee from give way. ICD-10-CM    1. Chronic knee pain after total replacement of right knee joint  M25.561     G89.29     Z96.651    2.  Status post revision of total replacement of right knee  Z96.651          Jossie Quiroga MD

## 2021-11-08 ENCOUNTER — TELEPHONE (OUTPATIENT)
Dept: ORTHOPEDIC SURGERY | Age: 61
End: 2021-11-08

## 2021-11-08 DIAGNOSIS — Z96.651 HISTORY OF TOTAL RIGHT KNEE REPLACEMENT: Primary | ICD-10-CM

## 2021-11-08 RX ORDER — OXYCODONE AND ACETAMINOPHEN 10; 325 MG/1; MG/1
1 TABLET ORAL EVERY 6 HOURS PRN
Qty: 21 TABLET | Refills: 0 | Status: SHIPPED | OUTPATIENT
Start: 2021-11-08 | End: 2021-11-15

## 2021-11-08 NOTE — TELEPHONE ENCOUNTER
Prescription Refill     Medication Name:  Oxycodone  Pharmacy: [address]  Patient Contact Number:  [phone]

## 2021-11-19 ENCOUNTER — TELEPHONE (OUTPATIENT)
Dept: ORTHOPEDIC SURGERY | Age: 61
End: 2021-11-19

## 2021-11-19 DIAGNOSIS — Z96.651 HISTORY OF TOTAL RIGHT KNEE REPLACEMENT: Primary | ICD-10-CM

## 2021-11-19 RX ORDER — OXYCODONE AND ACETAMINOPHEN 10; 325 MG/1; MG/1
1 TABLET ORAL EVERY 8 HOURS PRN
Qty: 21 TABLET | Refills: 0 | Status: SHIPPED | OUTPATIENT
Start: 2021-11-19 | End: 2021-11-26

## 2021-11-29 ENCOUNTER — TELEPHONE (OUTPATIENT)
Dept: ORTHOPEDIC SURGERY | Age: 61
End: 2021-11-29

## 2021-11-29 DIAGNOSIS — Z96.651 HISTORY OF TOTAL RIGHT KNEE REPLACEMENT: Primary | ICD-10-CM

## 2021-11-29 NOTE — TELEPHONE ENCOUNTER
Prescription Refill     Medication Name:  OXYCODONE   Ph[facility]: [facility] [phone]  Patient Contact Number:  [phone]

## 2021-11-30 RX ORDER — OXYCODONE AND ACETAMINOPHEN 10; 325 MG/1; MG/1
1 TABLET ORAL EVERY 8 HOURS PRN
Qty: 21 TABLET | Refills: 0 | Status: SHIPPED | OUTPATIENT
Start: 2021-11-30 | End: 2021-12-07

## 2021-12-14 ENCOUNTER — TELEPHONE (OUTPATIENT)
Dept: ORTHOPEDIC SURGERY | Age: 61
End: 2021-12-14

## 2021-12-14 DIAGNOSIS — Z96.651 HISTORY OF TOTAL RIGHT KNEE REPLACEMENT: Primary | ICD-10-CM

## 2021-12-14 NOTE — TELEPHONE ENCOUNTER
Prescription Refill     Medication Name:  Oxycodone  Pharmacy: [facility]  Patient Contact Number:  [phone]

## 2021-12-15 RX ORDER — OXYCODONE AND ACETAMINOPHEN 10; 325 MG/1; MG/1
1 TABLET ORAL EVERY 8 HOURS PRN
Qty: 21 TABLET | Refills: 0 | Status: SHIPPED | OUTPATIENT
Start: 2021-12-15 | End: 2021-12-22

## 2022-01-03 ENCOUNTER — TELEPHONE (OUTPATIENT)
Dept: ORTHOPEDIC SURGERY | Age: 62
End: 2022-01-03

## 2022-01-03 DIAGNOSIS — Z96.651 HISTORY OF TOTAL RIGHT KNEE REPLACEMENT: Primary | ICD-10-CM

## 2022-01-03 NOTE — TELEPHONE ENCOUNTER
Prescription Refill     Medication Name:  OXYCODONE  Pharmacy: [address] [facility]  Patient Contact Number:  [phone]

## 2022-01-04 RX ORDER — OXYCODONE AND ACETAMINOPHEN 10; 325 MG/1; MG/1
1 TABLET ORAL EVERY 8 HOURS PRN
Qty: 21 TABLET | Refills: 0 | Status: SHIPPED | OUTPATIENT
Start: 2022-01-04 | End: 2022-01-11

## 2022-02-01 ENCOUNTER — OFFICE VISIT (OUTPATIENT)
Dept: ORTHOPEDIC SURGERY | Age: 62
End: 2022-02-01
Payer: COMMERCIAL

## 2022-02-01 VITALS — WEIGHT: 172 LBS | HEIGHT: 64 IN | BODY MASS INDEX: 29.37 KG/M2

## 2022-02-01 DIAGNOSIS — Z96.651 STATUS POST REVISION OF TOTAL REPLACEMENT OF RIGHT KNEE: Primary | ICD-10-CM

## 2022-02-01 DIAGNOSIS — M54.42 CHRONIC BILATERAL LOW BACK PAIN WITH BILATERAL SCIATICA: ICD-10-CM

## 2022-02-01 DIAGNOSIS — M25.561 CHRONIC KNEE PAIN AFTER TOTAL REPLACEMENT OF RIGHT KNEE JOINT: ICD-10-CM

## 2022-02-01 DIAGNOSIS — G89.4 CHRONIC PAIN SYNDROME: ICD-10-CM

## 2022-02-01 DIAGNOSIS — M54.41 CHRONIC BILATERAL LOW BACK PAIN WITH BILATERAL SCIATICA: ICD-10-CM

## 2022-02-01 DIAGNOSIS — Z96.651 CHRONIC KNEE PAIN AFTER TOTAL REPLACEMENT OF RIGHT KNEE JOINT: ICD-10-CM

## 2022-02-01 DIAGNOSIS — G89.29 CHRONIC KNEE PAIN AFTER TOTAL REPLACEMENT OF RIGHT KNEE JOINT: ICD-10-CM

## 2022-02-01 DIAGNOSIS — G89.29 CHRONIC BILATERAL LOW BACK PAIN WITH BILATERAL SCIATICA: ICD-10-CM

## 2022-02-01 PROCEDURE — 99213 OFFICE O/P EST LOW 20 MIN: CPT | Performed by: PHYSICIAN ASSISTANT

## 2022-02-01 RX ORDER — GABAPENTIN 300 MG/1
300 CAPSULE ORAL 3 TIMES DAILY
Qty: 90 CAPSULE | Refills: 5 | Status: SHIPPED | OUTPATIENT
Start: 2022-02-01 | End: 2022-08-09

## 2022-02-01 RX ORDER — OXYCODONE AND ACETAMINOPHEN 10; 325 MG/1; MG/1
1 TABLET ORAL EVERY 8 HOURS PRN
Qty: 21 TABLET | Refills: 0 | Status: SHIPPED | OUTPATIENT
Start: 2022-02-01 | End: 2022-02-08

## 2022-02-01 ASSESSMENT — ENCOUNTER SYMPTOMS
EYES NEGATIVE: 1
SHORTNESS OF BREATH: 0
ALLERGIC/IMMUNOLOGIC NEGATIVE: 1
COUGH: 0
SORE THROAT: 0
DIARRHEA: 0
WHEEZING: 0
VOMITING: 0
CONSTIPATION: 0
NAUSEA: 0
ABDOMINAL PAIN: 0

## 2022-02-01 NOTE — PROGRESS NOTES
Patient Name: Oscar Krishnan MRN: <[id number]>   Age: 59 y.o. YOB: 1960   Sex: female         CHIEF COMPLAINT   Right total Knee revision postoperative visit    HISTORY OF PRESENT ILLNESS   Patient returns for their postoperative evaluation status post right total knee revision. status post manipulation  Continued therapy continue to work on her own notes doing much better overall rates pain levels a 2 out of 10. Anterior tibial pain improved. Weightbearing pain is improved she notes that for her son's wedding recently she was able to wear heels for an extended period of time and did very well. Walking distances improving. Sore no more and CBD topically with somehelp in pain for several hours. Rom of the knee improved. Still in therapy. Pain in the knee proximal tibia and sometimes distal femur and posterior knee. Give way of the right knee. The patient is doing very fair. They have moderate complaints of pain. Rates pain as a 4 out of 10 notes pain mostly to the anterior aspect of the distal tibia into the medial aspect of the proximal tibia  There is small swelling about the knee. The patient has been doing physical therapy on her own at home as well as with physical therapist.  She is still working on building the container home with her  and son. She notes she is able to get about 15 to 20 minutes of work in before the pain starts to increase and she has to settle down. Notes that the therapists were concerned for possible stress reaction as they have done a vibratory test along the bone on the medial lateral malleolus and noted pain along the medial mal with the vibratory testing. She denies numbness burning tingling radiating pain notes it has a muscle pulling/ache. Denies any fall trauma or injury.   They deny any calf swelling or numbness or tingling down the leg       Assessment     Review of Systems   Constitutional: Negative for chills and fever. HENT: Negative for ear discharge, ear pain, hearing loss, nosebleeds and sore throat. Eyes: Negative. Respiratory: Negative for cough, shortness of breath and wheezing. Cardiovascular: Negative for chest pain and palpitations. Gastrointestinal: Negative for abdominal pain, constipation, diarrhea, nausea and vomiting. Endocrine: Negative. Genitourinary: Negative for dysuria, frequency and urgency. Musculoskeletal: Positive for joint swelling. Skin: Negative for rash. Allergic/Immunologic: Negative. Neurological: Negative for dizziness and headaches. Hematological: Negative. Psychiatric/Behavioral: Negative. PHYSICAL EXAM     Vital Signs: There were no vitals filed for this visit. Examination of the knee shows a well-healed midline incision. There is small swelling. There is no drainage. Range of motion is 0-110 no extendsor lag. Tenderness noted to distal anterior tibia with noted hyperpigmented contusion-like appearing lesion on the skin above the sensitivity. Also noted medial tibial tenderness to palpation    The patient is neurovascularly intact. NEUROLOGICALLY: There is no evidence for sensory or motor deficits in the extremity. Coordination appears full with no spacticity or rigidity. Reflexes appear to be symmetric. Distal circulation intact. No signs of RSD. Calf nontender. Negative nica's,  no signs of dvt    Hip exam shows full ROM with no focal pain or Instability. Leg lengths are normal. There is no Trendelenburg Gait. RADIOLOGY   Xray   Have reviewed the xrays above from 6/15/2021 compared to today  3 views of the right knee AP, lateral and sunrise views were performed and reviewed today and my impression is: Well-placed hinged knee prosthesis with long tibial and femoral stems.   Distal aspect of the tibial stem possibly compressing anteriorly on the tibia may be creating some irritation/stress riser no evidence of acute fracture dislocation    IMPRESSION   13 months(s) status post right total knee revision    PLAN   The patient is doing well 13 months(s) status post right total knee revision. .   Advised for patient to continue with physical therapy for evaluation treatment of status post revision of type right total knee   They may slowly resume normal activities. Advised to continue with home exercises on her own increase activities as tolerated  Refilled gabapentin 300 mg 3 times daily  Refilled oxycodone  mg 1 every 6 to 8 hours as needed pain quantity 21 0 refills  Plan for follow-up in 6 months  She was dc from pain management. Start on temporary regimen at this point. Plan for additional mangemtn options including medical marijuana. Advised patient continue utilization of anti-inflammatories as needed. Discussed the utilization of a shin guard with an Ace wrap as the anterior aspect of the tibial stem may be creating stress reaction across the anterior tibia distally. But as the bone continues to feel the sensation that can increase its rigidity and strength and the pain can settle down. Also discussed with patient the this can go the other way that the bone can continue to thin and create a actual stress fracture across that area. The orders below, if any, were placed during this visit:     Brace for the right knee for stabliization of the knee from give way. ICD-10-CM    1. Status post revision of total replacement of right knee  Z96.651    2.  Chronic knee pain after total replacement of right knee joint  M25.561     G89.29     Z96.651          [place], [place]

## 2022-02-21 ENCOUNTER — TELEPHONE (OUTPATIENT)
Dept: ORTHOPEDIC SURGERY | Age: 62
End: 2022-02-21

## 2022-02-21 DIAGNOSIS — Z96.651 STATUS POST REVISION OF TOTAL REPLACEMENT OF RIGHT KNEE: Primary | ICD-10-CM

## 2022-02-21 NOTE — TELEPHONE ENCOUNTER
Prescription Refill     Medication Name:  OXYCODONE  Pharmacy: [facility] [address], [address] [place] [address] [phone]   Patient Contact Number:  [phone]

## 2022-02-22 RX ORDER — OXYCODONE AND ACETAMINOPHEN 10; 325 MG/1; MG/1
1 TABLET ORAL EVERY 8 HOURS PRN
Qty: 21 TABLET | Refills: 0 | Status: SHIPPED | OUTPATIENT
Start: 2022-02-22 | End: 2022-03-01

## 2022-03-21 ENCOUNTER — TELEPHONE (OUTPATIENT)
Dept: ORTHOPEDIC SURGERY | Age: 62
End: 2022-03-21

## 2022-03-21 DIAGNOSIS — Z96.651 STATUS POST REVISION OF TOTAL REPLACEMENT OF RIGHT KNEE: Primary | ICD-10-CM

## 2022-03-21 NOTE — TELEPHONE ENCOUNTER
Prescription Refill     Medication Name:  OXYCODONE   Pharmacy: [facility]  Address: [address] [place] [address], [address]  Phone: [phone]  Patient Contact Number:  [phone]

## 2022-03-23 NOTE — TELEPHONE ENCOUNTER
General Question     Subject: PATIENT CHECKING ON HER PRESCRIPTION REQUEST  Patient Junie, [address] Number: [phone]

## 2022-03-24 RX ORDER — OXYCODONE AND ACETAMINOPHEN 10; 325 MG/1; MG/1
1 TABLET ORAL EVERY 8 HOURS PRN
Qty: 21 TABLET | Refills: 0 | Status: SHIPPED | OUTPATIENT
Start: 2022-03-24 | End: 2022-03-31

## 2022-04-21 ENCOUNTER — TELEPHONE (OUTPATIENT)
Dept: ORTHOPEDIC SURGERY | Age: 62
End: 2022-04-21

## 2022-04-21 DIAGNOSIS — Z96.651 CHRONIC KNEE PAIN AFTER TOTAL REPLACEMENT OF RIGHT KNEE JOINT: Primary | ICD-10-CM

## 2022-04-21 DIAGNOSIS — G89.29 CHRONIC KNEE PAIN AFTER TOTAL REPLACEMENT OF RIGHT KNEE JOINT: Primary | ICD-10-CM

## 2022-04-21 DIAGNOSIS — M25.561 CHRONIC KNEE PAIN AFTER TOTAL REPLACEMENT OF RIGHT KNEE JOINT: Primary | ICD-10-CM

## 2022-04-21 NOTE — TELEPHONE ENCOUNTER
Prescription Refill     Medication Name: oxycodone  Ph[facility]: [facility]  Patient Contact Number:  [phone]

## 2022-04-22 RX ORDER — OXYCODONE AND ACETAMINOPHEN 10; 325 MG/1; MG/1
1 TABLET ORAL EVERY 8 HOURS PRN
Qty: 21 TABLET | Refills: 0 | Status: SHIPPED | OUTPATIENT
Start: 2022-04-22 | End: 2022-04-29

## 2022-05-19 DIAGNOSIS — G89.4 CHRONIC PAIN SYNDROME: ICD-10-CM

## 2022-05-23 RX ORDER — DULOXETIN HYDROCHLORIDE 60 MG/1
CAPSULE, DELAYED RELEASE ORAL
Qty: 180 CAPSULE | Refills: 2 | Status: SHIPPED | OUTPATIENT
Start: 2022-05-23

## 2022-05-24 ENCOUNTER — TELEPHONE (OUTPATIENT)
Dept: ORTHOPEDIC SURGERY | Age: 62
End: 2022-05-24

## 2022-05-24 DIAGNOSIS — Z96.651 CHRONIC KNEE PAIN AFTER TOTAL REPLACEMENT OF RIGHT KNEE JOINT: Primary | ICD-10-CM

## 2022-05-24 DIAGNOSIS — G89.29 CHRONIC KNEE PAIN AFTER TOTAL REPLACEMENT OF RIGHT KNEE JOINT: Primary | ICD-10-CM

## 2022-05-24 DIAGNOSIS — M25.561 CHRONIC KNEE PAIN AFTER TOTAL REPLACEMENT OF RIGHT KNEE JOINT: Primary | ICD-10-CM

## 2022-05-24 NOTE — TELEPHONE ENCOUNTER
Prescription Refill     Medication Name: OXYCODONE    Pharmacy: [address] [place] [address] [phone] [place] [phone]     Patient Contact Number:  [phone]

## 2022-05-25 RX ORDER — OXYCODONE AND ACETAMINOPHEN 10; 325 MG/1; MG/1
1 TABLET ORAL EVERY 8 HOURS PRN
Qty: 21 TABLET | Refills: 0 | Status: SHIPPED | OUTPATIENT
Start: 2022-05-25 | End: 2022-06-01

## 2022-06-22 ENCOUNTER — TELEPHONE (OUTPATIENT)
Dept: ORTHOPEDIC SURGERY | Age: 62
End: 2022-06-22

## 2022-06-22 DIAGNOSIS — G89.29 CHRONIC KNEE PAIN AFTER TOTAL REPLACEMENT OF RIGHT KNEE JOINT: Primary | ICD-10-CM

## 2022-06-22 DIAGNOSIS — Z96.651 CHRONIC KNEE PAIN AFTER TOTAL REPLACEMENT OF RIGHT KNEE JOINT: Primary | ICD-10-CM

## 2022-06-22 DIAGNOSIS — M25.561 CHRONIC KNEE PAIN AFTER TOTAL REPLACEMENT OF RIGHT KNEE JOINT: Primary | ICD-10-CM

## 2022-06-22 RX ORDER — OXYCODONE AND ACETAMINOPHEN 10; 325 MG/1; MG/1
1 TABLET ORAL EVERY 8 HOURS PRN
Qty: 21 TABLET | Refills: 0 | Status: SHIPPED | OUTPATIENT
Start: 2022-06-22 | End: 2022-07-21 | Stop reason: SDUPTHER

## 2022-06-22 NOTE — TELEPHONE ENCOUNTER
Prescription Refill     Medication Name:  OXYCODONE   Pharmacy: [address] [place] [address]   Patient Contact Number: [phone]

## 2022-07-21 ENCOUNTER — TELEPHONE (OUTPATIENT)
Dept: ORTHOPEDIC SURGERY | Age: 62
End: 2022-07-21

## 2022-07-21 DIAGNOSIS — M25.561 CHRONIC KNEE PAIN AFTER TOTAL REPLACEMENT OF RIGHT KNEE JOINT: ICD-10-CM

## 2022-07-21 DIAGNOSIS — Z96.651 CHRONIC KNEE PAIN AFTER TOTAL REPLACEMENT OF RIGHT KNEE JOINT: ICD-10-CM

## 2022-07-21 DIAGNOSIS — G89.29 CHRONIC KNEE PAIN AFTER TOTAL REPLACEMENT OF RIGHT KNEE JOINT: ICD-10-CM

## 2022-07-21 RX ORDER — OXYCODONE AND ACETAMINOPHEN 10; 325 MG/1; MG/1
1 TABLET ORAL EVERY 8 HOURS PRN
Qty: 21 TABLET | Refills: 0 | Status: SHIPPED | OUTPATIENT
Start: 2022-07-21 | End: 2022-07-26 | Stop reason: SDUPTHER

## 2022-07-21 NOTE — TELEPHONE ENCOUNTER
Prescription Refill     Medication Name:  Oxycodone  Pharmacy: [facility]  Patient Contact Number:  [phone]

## 2022-07-26 RX ORDER — OXYCODONE AND ACETAMINOPHEN 10; 325 MG/1; MG/1
1 TABLET ORAL EVERY 8 HOURS PRN
Qty: 21 TABLET | Refills: 0 | Status: SHIPPED | OUTPATIENT
Start: 2022-07-26 | End: 2022-08-02 | Stop reason: SDUPTHER

## 2022-08-02 ENCOUNTER — OFFICE VISIT (OUTPATIENT)
Dept: ORTHOPEDIC SURGERY | Age: 62
End: 2022-08-02
Payer: COMMERCIAL

## 2022-08-02 ENCOUNTER — TELEPHONE (OUTPATIENT)
Dept: ORTHOPEDIC SURGERY | Age: 62
End: 2022-08-02

## 2022-08-02 VITALS — HEIGHT: 64 IN | BODY MASS INDEX: 29.37 KG/M2 | WEIGHT: 172 LBS

## 2022-08-02 DIAGNOSIS — Z96.651 STATUS POST REVISION OF TOTAL REPLACEMENT OF RIGHT KNEE: ICD-10-CM

## 2022-08-02 DIAGNOSIS — G89.29 CHRONIC KNEE PAIN AFTER TOTAL REPLACEMENT OF RIGHT KNEE JOINT: Primary | ICD-10-CM

## 2022-08-02 DIAGNOSIS — M25.561 CHRONIC KNEE PAIN AFTER TOTAL REPLACEMENT OF RIGHT KNEE JOINT: Primary | ICD-10-CM

## 2022-08-02 DIAGNOSIS — Z96.651 CHRONIC KNEE PAIN AFTER TOTAL REPLACEMENT OF RIGHT KNEE JOINT: Primary | ICD-10-CM

## 2022-08-02 PROCEDURE — 99214 OFFICE O/P EST MOD 30 MIN: CPT | Performed by: ORTHOPAEDIC SURGERY

## 2022-08-02 RX ORDER — OXYCODONE AND ACETAMINOPHEN 10; 325 MG/1; MG/1
1 TABLET ORAL EVERY 8 HOURS PRN
Qty: 21 TABLET | Refills: 0 | Status: SHIPPED | OUTPATIENT
Start: 2022-08-02 | End: 2022-08-09

## 2022-08-02 ASSESSMENT — ENCOUNTER SYMPTOMS
NAUSEA: 0
COUGH: 0
CONSTIPATION: 0
DIARRHEA: 0
WHEEZING: 0
SORE THROAT: 0
ALLERGIC/IMMUNOLOGIC NEGATIVE: 1
SHORTNESS OF BREATH: 0
ABDOMINAL PAIN: 0
EYES NEGATIVE: 1
VOMITING: 0

## 2022-08-02 NOTE — PROGRESS NOTES
Patient Name: Abril García MRN: [id number]   Age: 59 y.o. YOB: 1960   Sex: female         CHIEF COMPLAINT   Right total Knee revision postoperative visit    HISTORY OF PRESENT ILLNESS   Patient returns for their postoperative evaluation status post right total knee revision. Bilateral hip pain and chronic back issues with pain. 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 Previously:    Continued therapy continue to work on her doing much better overall rates pain levels a 2 out of 10. Anterior tibial pain improved. Weightbearing pain is improved she notes that for her son's wedding recently she was able to wear heels for an extended period of time and did very well. Walking distances improving. Sore no more and CBD topically with somehelp in pain for several hours. Rom of the knee improved. Still in therapy. Pain in the knee proximal tibia and sometimes distal femur and posterior knee. Give way of the right knee. The patient is doing very fair. They have moderate complaints of pain. Rates pain as a 4 out of 10 notes pain mostly to the anterior aspect of the distal tibia into the medial aspect of the proximal tibia  There is small swelling about the knee. The patient has been doing physical therapy on her own at home as well as with physical therapist.  She is still working on building the container home with her  and son. She notes she is able to get about 15 to 20 minutes of work in before the pain starts to increase and she has to settle down. Notes that the therapists were concerned for possible stress reaction as they have done a vibratory test along the bone on the medial lateral malleolus and noted pain along the medial mal with the vibratory testing. She denies numbness burning tingling radiating pain notes it has a muscle pulling/ache. Denies any fall trauma or injury.   They deny any calf swelling or numbness or tingling down the leg       Assessment     Review of Systems   Constitutional:  Negative for chills and fever. HENT:  Negative for ear discharge, ear pain, hearing loss, nosebleeds and sore throat. Eyes: Negative. Respiratory:  Negative for cough, shortness of breath and wheezing. Cardiovascular:  Negative for chest pain and palpitations. Gastrointestinal:  Negative for abdominal pain, constipation, diarrhea, nausea and vomiting. Endocrine: Negative. Genitourinary:  Negative for dysuria, frequency and urgency. Musculoskeletal:  Positive for joint swelling. Skin:  Negative for rash. Allergic/Immunologic: Negative. Neurological:  Negative for dizziness and headaches. Hematological: Negative. Psychiatric/Behavioral: Negative. PHYSICAL EXAM     Vital Signs: There were no vitals filed for this visit. Examination of the knee shows a well-healed midline incision. There is small swelling. There is no drainage. Range of motion is 0-110 no extensor lag. Tenderness noted to distal anterior tibia with noted hyperpigmented contusion-like appearing lesion on the skin above the sensitivity. Also noted medial tibial tenderness to palpation    The patient is neurovascularly intact. NEUROLOGICALLY: There is no evidence for sensory or motor deficits in the extremity. Coordination appears full with no spacticity or rigidity. Reflexes appear to be symmetric. Distal circulation intact. No signs of RSD. Calf nontender. Negative nica's,  no signs of dvt    Hip exam shows full ROM with no focal pain or Instability. Leg lengths are normal. There is no Trendelenburg Gait. Bilateral hip trochanteric irritation with IT band pain. Hip pain appears to have extension into the back bilaterally. This has noted hyperextension of the lower lumbar and tightness of the rectus with weak  hamstrings.        RADIOLOGY   Xray   Have reviewed the xrays above from 6/15/2021 compared to today  3 views of the right knee AP, lateral and sunrise views were performed and reviewed today and my impression is: Well-placed hinged knee prosthesis with long tibial and femoral stems. Distal aspect of the tibial stem possibly compressing anteriorly on the tibia with noted bone healing across this area on ap and lateral with no changes in position of the tibial alignement. IMPRESSION   13 months(s) plus status post right total knee revision    PLAN   The patient is doing well 13 months(s) plus status post right total knee revision. .   Advised for patient to continue with physical therapy for evaluation treatment of status post revision of type right total knee   They may slowly resume normal activities. Advised to continue with home exercises on her own increase activities as tolerated  Refilled gabapentin 300 mg 3 times daily  Refilled oxycodone  mg 1 every 6 to 8 hours as needed pain quantity 21 0 refills  Plan for follow-up in 6 months  She was dc from pain management. Start on temporary regimen at this point. Plan for additional mangemtn options including medical marijuana. Advised patient continue utilization of anti-inflammatories as needed. Discussed the utilization of a shin guard with an Ace wrap as the anterior aspect of the tibial stem may be creating stress reaction across the anterior tibia distally. Brace for the right knee for stabliization of the knee from give way. ICD-10-CM    1. Chronic knee pain after total replacement of right knee joint  M25.561 XR KNEE RIGHT (3 VIEWS)    G89.29 Ambulatory referral to Physical Therapy    Z96.651 oxyCODONE-acetaminophen (ENDOCET)  MG per tablet      2.  Status post revision of total replacement of right knee  Z96.651 XR KNEE RIGHT (3 VIEWS)     Ambulatory referral to Physical Therapy            Kim Henry MD

## 2022-08-02 NOTE — TELEPHONE ENCOUNTER
General Question     Subject: PATIENT SHOULD BE THERE BY 10:30. PLEASE ADVISE.   Patient: Ree Hackett"  Contact Number: [phone]

## 2022-08-07 DIAGNOSIS — G89.4 CHRONIC PAIN SYNDROME: ICD-10-CM

## 2022-08-07 DIAGNOSIS — M54.42 CHRONIC BILATERAL LOW BACK PAIN WITH BILATERAL SCIATICA: ICD-10-CM

## 2022-08-07 DIAGNOSIS — M54.41 CHRONIC BILATERAL LOW BACK PAIN WITH BILATERAL SCIATICA: ICD-10-CM

## 2022-08-07 DIAGNOSIS — G89.29 CHRONIC BILATERAL LOW BACK PAIN WITH BILATERAL SCIATICA: ICD-10-CM

## 2022-08-09 RX ORDER — GABAPENTIN 300 MG/1
CAPSULE ORAL
Qty: 90 CAPSULE | Refills: 5 | Status: SHIPPED | OUTPATIENT
Start: 2022-08-09 | End: 2022-09-08

## 2023-08-24 NOTE — TELEPHONE ENCOUNTER
PT: 15.8  INR: 1.3    5 mg daily. [Time Spent: ___ minutes] : I have spent [unfilled] minutes of time on the encounter.

## (undated) DEVICE — PACK PROCEDURE SURG TOTAL KNEE

## (undated) DEVICE — GOWN,SIRUS,POLYRNF,BRTHSLV,XL,30/CS: Brand: MEDLINE

## (undated) DEVICE — KIT DRP FOR RIO ROBOTIC ARM ASST SYS

## (undated) DEVICE — BLADE ES L2.75IN ELASTOMERIC COAT DURABLE BEND UPTO 90DEG

## (undated) DEVICE — GLOVE SURG SZ 75 CRM LTX FREE POLYISOPRENE POLYMER BEAD ANTI

## (undated) DEVICE — PLATE ES AD W 9FT CRD 2

## (undated) DEVICE — SOLUTION IV 1000ML 0.9% SOD CHL

## (undated) DEVICE — PRE OP PACK: Brand: MEDLINE INDUSTRIES, INC.

## (undated) DEVICE — GOWN,SIRUS,POLYRNF,SETINSLV,L,20/CS: Brand: MEDLINE

## (undated) DEVICE — BLANKET WRM W29.9XL79.1IN UP BODY FORC AIR MISTRAL-AIR

## (undated) DEVICE — PEEL-AWAY HOOD: Brand: FLYTE, SURGICOOL

## (undated) DEVICE — SUTURE MCRYL SZ 0 L18IN ABSRB VLT L36MM CT-1 1/2 CIR Y740D

## (undated) DEVICE — COUNTER NDL 40 COUNT HLD 70 NUM FOAM BLK SGL MAG W BLDE REMV

## (undated) DEVICE — SYRINGE MED 30ML STD CLR PLAS LUERLOCK TIP N CTRL DISP

## (undated) DEVICE — INTENDED USE FOR SURGICAL MARKING ON INTACT SKIN, ALSO PROVIDES A PERMANENT METHOD OF IDENTIFYING OBJECTS IN THE OPERATING ROOM: Brand: WRITESITE® PLUS MINI PREP RESISTANT MARKER

## (undated) DEVICE — 2108 SERIES SAGITTAL BLADE (9.1 X 0.64 X 35.2MM)

## (undated) DEVICE — SPLINT ORTH 22IN KNEE BASIC

## (undated) DEVICE — BIPOLAR SEALER 23-112-1 AQM 6.0: Brand: AQUAMANTYS ®

## (undated) DEVICE — CONTAINER,SPECIMEN,PNEU TUBE,3OZ,OR STRL: Brand: MEDLINE

## (undated) DEVICE — SUTURE NONABSORBABLE MONOFILAMENT 2-0 FS 18 IN ETHILON 664H

## (undated) DEVICE — SUTURE MCRYL SZ 2-0 L18IN ABSRB VLT L36MM CT-1 1/2 CIR Y739D

## (undated) DEVICE — INTENDED FOR TISSUE SEPARATION, AND OTHER PROCEDURES THAT REQUIRE A SHARP SURGICAL BLADE TO PUNCTURE OR CUT.: Brand: BARD-PARKER ® CARBON RIB-BACK BLADES

## (undated) DEVICE — SYSTEM SKIN CLSR 22CM DERMBND PRINEO

## (undated) DEVICE — CHLORAPREP 26ML ORANGE

## (undated) DEVICE — T-DRAPE,EXTREMITY,STERILE: Brand: MEDLINE

## (undated) DEVICE — DRESSING WND ISL THERABOND 4X10IN

## (undated) DEVICE — VELCLOSE LATEX FREE ELASTIC BANDAGE D/L 6INX10YD

## (undated) DEVICE — GARMENT,MEDLINE,DVT,INT,CALF,MED, GEN2: Brand: MEDLINE

## (undated) DEVICE — GLOVE ORANGE PI 8   MSG9080

## (undated) DEVICE — SURE SET-DOUBLE BASIN-LF: Brand: MEDLINE INDUSTRIES, INC.

## (undated) DEVICE — SMALL VOLUME BONE MARROW ASPIRATION KIT: Brand: SPINESMITH FUSIONARY GRAFT DELIVERY SYSTEM

## (undated) DEVICE — COVER,TABLE,HEAVY DUTY,77"X90",STRL: Brand: MEDLINE

## (undated) DEVICE — 3M™ STERI-DRAPE™ U-DRAPE 1015: Brand: STERI-DRAPE™

## (undated) DEVICE — PAD,ABDOMINAL,5"X9",ST,LF,25/BX: Brand: MEDLINE INDUSTRIES, INC.

## (undated) DEVICE — KIT INT FIX FEM TIB CKPT MAKOPLASTY

## (undated) DEVICE — SUTURE VCRL SZ 3-0 L27IN ABSRB UD L26MM SH 1/2 CIR J416H

## (undated) DEVICE — SUTURE ETHBND EXCEL SZ 0 L18IN NONABSORBABLE GRN L36MM CT-1 CX21D

## (undated) DEVICE — BLADE SHV L13CM DIA4MM CVD EXCALIBUR AGG COOLCUT

## (undated) DEVICE — DECANTER BAG 9": Brand: MEDLINE INDUSTRIES, INC.

## (undated) DEVICE — HOLDER SCALP PLAS G STD

## (undated) DEVICE — SOLUTION IV IRRIG 0.9% NACL 3000ML BAG 2B7477

## (undated) DEVICE — 3M™ COBAN™ NL STERILE NON-LATEX SELF-ADHERENT WRAP, 2086S, 6 IN X 5 YD (15 CM X 4,5 M), 12 ROLLS/CASE: Brand: 3M™ COBAN™

## (undated) DEVICE — DUAL CUT SAGITTAL BLADE

## (undated) DEVICE — PIN BNE FIX TEMP L140MM DIA4MM MAKO

## (undated) DEVICE — TUBING PMP L6FT CONT WAVE EXTN

## (undated) DEVICE — GLOVE SURG SZ 8 L12IN FNGR THK79MIL GRN LTX FREE

## (undated) DEVICE — 2108 SERIES SAGITTAL BLADE (19.5 X 1.27 X 90.0MM)

## (undated) DEVICE — DRAPE C ARM UNIV W41XL74IN CLR PLAS XR VELC CLSR POLY STRP

## (undated) DEVICE — DRESSING THERABOND 3D ANTIMIC CNTCT SYS 15INCHX10INCH

## (undated) DEVICE — 450 ML BOTTLE OF 0.05% CHLORHEXIDINE GLUCONATE IN 99.95% STERILE WATER FOR IRRIGATION, USP AND APPLICATOR.: Brand: IRRISEPT ANTIMICROBIAL WOUND LAVAGE

## (undated) DEVICE — SURGICAL SET UP - SURE SET: Brand: MEDLINE INDUSTRIES, INC.

## (undated) DEVICE — DRESSING,GAUZE,XEROFORM,CURAD,1"X8",ST: Brand: CURAD

## (undated) DEVICE — SUTURE STRATAFIX SPRL SZ 1 L14IN ABSRB VLT L48CM CTX 1/2 SXPD2B405

## (undated) DEVICE — JEWISH HOSPITAL TURNOVER KIT: Brand: MEDLINE INDUSTRIES, INC.

## (undated) DEVICE — GLOVE SURG SZ 75 L12IN FNGR THK94MIL TRNSLUC YEL LTX

## (undated) DEVICE — UNDERGLOVE SURG SZ 8 BLU LTX FREE SYN POLYISOPRENE POLYMER

## (undated) DEVICE — PICO 7 10CM X 40CM: Brand: PICO™ 7

## (undated) DEVICE — GLOVE SURG SZ 75 L12IN FNGR THK94MIL WHT POLYMER LTX BEAD

## (undated) DEVICE — NEEDLE SPNL L3.5IN PNK HUB S STL REG WALL FIT STYL W/ QNCKE

## (undated) DEVICE — STAPLER SKIN H3.9MM WIRE DIA0.58MM CRWN 6.9MM 35 STPL FIX

## (undated) DEVICE — 3M™ TEGADERM™ TRANSPARENT FILM DRESSING FRAME STYLE, 1627, 4 IN X 10 IN (10 CM X 25 CM), 20/CT 4CT/CASE: Brand: 3M™ TEGADERM™

## (undated) DEVICE — COVER LT HNDL BLU PLAS

## (undated) DEVICE — E-Z CLEAN, NON-STICK, PTFE COATED, ELECTROSURGICAL BLADE ELECTRODE, 2.5 INCH (6.35 CM): Brand: EZ CLEAN

## (undated) DEVICE — BLADE SHV L13CM DIA4MM TAPR TIP SCIS LIKE CUT OVL OUTER

## (undated) DEVICE — TURNOVER KIT RM INF CTRL TECH

## (undated) DEVICE — CLIP IRRIG FOR M LNG HD ATTCH EMAX 2 + ANSPACH

## (undated) DEVICE — CANNULA NSL AD TBNG L7FT PVC STR NONFLARED PRNG O2 DEL W STD

## (undated) DEVICE — GLOVE SURG SZ 75 L12IN FNGR THK87MIL WHT LTX FREE

## (undated) DEVICE — SHEET BD X PROTECT-A-BED

## (undated) DEVICE — YANKAUER,OPEN TIP,W/O VENT,STERILE: Brand: MEDLINE INDUSTRIES, INC.

## (undated) DEVICE — GOWN,PREVENTION PLUS,XLN/XL,ST,24/CS: Brand: MEDLINE

## (undated) DEVICE — BLADE SAW W12.5XL70MM THK0.8MM CUT THK1.12MM S STL RECIP

## (undated) DEVICE — SOLUTION IV IRRIG WATER 1000ML POUR BRL 2F7114

## (undated) DEVICE — 3 BONE CEMENT MIXER: Brand: MIXEVAC

## (undated) DEVICE — COVER US PRB W15XL244CM ST SURGICAL/INTRAOPERATIVE W/

## (undated) DEVICE — HANDPIECE SUCTION TUBING INTERPULSE 10FT

## (undated) DEVICE — ORTHO PRE OP PACK: Brand: MEDLINE INDUSTRIES, INC.

## (undated) DEVICE — PODIATRY PK

## (undated) DEVICE — ART BMC PLUS PROCESSING KIT: Brand: CELLING ART BMC SYSTEM

## (undated) DEVICE — TUBING PMP L16FT MAIN DISP FOR AR-6400 AR-6475

## (undated) DEVICE — CUFF RESTRN WRST OR ANK 45FT AD FOAM

## (undated) DEVICE — BANDAGE,ELASTIC,ESMARK,STERILE,6"X9',LF: Brand: MEDLINE

## (undated) DEVICE — SOLUTION IV 250ML 0.9% SOD CHL PH 5 INJ USP VIAFLX PLAS

## (undated) DEVICE — SYRINGE MED 50ML LUERLOCK TIP

## (undated) DEVICE — 1000 S-DRAPE TOWEL DRAPE 10/BX: Brand: STERI-DRAPE™

## (undated) DEVICE — PROBE ABLAT XL 90DEG ASPIR BPLR RF 1 PC ELECTRD ERGO HNDL

## (undated) DEVICE — ELECTRODE PT RET AD L9FT HI MOIST COND ADH HYDRGEL CORDED

## (undated) DEVICE — Z INACTIVE USE 2660664 SOLUTION IRRIG 3000ML 0.9% SOD CHL USP UROMATIC PLAS CONT

## (undated) DEVICE — HIGH FLOW TIP

## (undated) DEVICE — BUR SURG DIA6MM CRAN LNG HD FLUT

## (undated) DEVICE — BANDAGE COBAN 4 IN COMPR W4INXL5YD FOAM COHESIVE QUIK STK SELF ADH SFT

## (undated) DEVICE — COAXIAL HIGH FLOW TIP WITH SOFT SHIELD

## (undated) DEVICE — PACK PROCEDURE SURG ARTHROSCOPY

## (undated) DEVICE — TOPAZ EZ MICRODEBRIDER COBLATION WAND WITH INTEGRATED FINGER SWITCH IFS: Brand: COBLATION

## (undated) DEVICE — SYRINGE MED 20ML STD CLR PLAS LUERLOCK TIP N CTRL DISP

## (undated) DEVICE — SUTURE ETHBND EXCEL SZ 2 L30IN NONABSORBABLE GRN L40MM V-37 MX69G

## (undated) DEVICE — 3M™ STERI-DRAPE™ INSTRUMENT POUCH 1018: Brand: STERI-DRAPE™

## (undated) DEVICE — SYRINGE, LUER LOCK, 10ML: Brand: MEDLINE

## (undated) DEVICE — KIT TRK KNEE PROC VIZADISC

## (undated) DEVICE — STANDARD HYPODERMIC NEEDLE,POLYPROPYLENE HUB: Brand: MONOJECT

## (undated) DEVICE — SUTURE MCRYL SZ 4-0 L27IN ABSRB UD L19MM PS-2 1/2 CIR PRIM Y426H

## (undated) DEVICE — Z CONVERTED USE 2271043 CONTAINER SPEC COLL 4OZ SCR ON LID PEEL PCH

## (undated) DEVICE — KNEE HOLDER DISPOSABLE LINER: Brand: ALVARADO®  KNEE SUPPORT

## (undated) DEVICE — SST TWIST DRILL, STANDARD, 3.2MM DIA. X 127MM: Brand: MICROAIRE®

## (undated) DEVICE — APPLICATOR MEDICATED 26 CC SOLUTION HI LT ORNG CHLORAPREP

## (undated) DEVICE — MARKER SURG SKIN UTIL BLK REG TIP NONSMEARING W/ 6IN RUL

## (undated) DEVICE — NEEDLE HYPO 22GA L1.5IN BLK POLYPR HUB S STL REG BVL STR

## (undated) DEVICE — BOOT POS LEG DEMAYO

## (undated) DEVICE — TUBING IRRIG HI FLO RIO SYS ANSPACH EMAX 2

## (undated) DEVICE — NO USE 18 MONTHS GOWN XL IMPERV REINF ST ECLIPSE DISP

## (undated) DEVICE — COVER,MAYO STAND,XL,STERILE: Brand: MEDLINE

## (undated) DEVICE — PIN BNE FIX L110MM DIA32MM

## (undated) DEVICE — Z DUP USE 2701075 SYSTEM SKIN CLSR 42CM DERMBND PRINEO

## (undated) DEVICE — Z INACTIVE NO SUPPLIER SOLUTIONIRRIG 3000ML 0.9% SOD CHL FLX CONT [79720808] [HOSPIRA WORLDWIDE INC]

## (undated) DEVICE — CORD RETRCT SIL